# Patient Record
Sex: FEMALE | Race: BLACK OR AFRICAN AMERICAN | NOT HISPANIC OR LATINO | Employment: OTHER | URBAN - METROPOLITAN AREA
[De-identification: names, ages, dates, MRNs, and addresses within clinical notes are randomized per-mention and may not be internally consistent; named-entity substitution may affect disease eponyms.]

---

## 2017-01-06 RX ORDER — GLIMEPIRIDE 1 MG/1
TABLET ORAL
Qty: 30 TABLET | Refills: 0 | Status: SHIPPED | OUTPATIENT
Start: 2017-01-06 | End: 2017-02-06 | Stop reason: SDUPTHER

## 2017-01-10 RX ORDER — INSULIN GLARGINE 100 [IU]/ML
INJECTION, SOLUTION SUBCUTANEOUS
Qty: 15 ML | Refills: 11 | Status: SHIPPED | OUTPATIENT
Start: 2017-01-10 | End: 2017-01-11 | Stop reason: SDUPTHER

## 2017-01-10 RX ORDER — INSULIN GLARGINE 100 [IU]/ML
INJECTION, SOLUTION SUBCUTANEOUS
Refills: 0 | Status: CANCELLED | OUTPATIENT
Start: 2017-01-10

## 2017-01-11 RX ORDER — INSULIN GLARGINE 100 [IU]/ML
INJECTION, SOLUTION SUBCUTANEOUS
Qty: 24 ML | Refills: 3 | Status: SHIPPED | OUTPATIENT
Start: 2017-01-11 | End: 2017-01-12 | Stop reason: SDUPTHER

## 2017-01-11 NOTE — TELEPHONE ENCOUNTER
please resend the pt insulin rx to [facility]  You sent in 5 pens which is enough for 2 mo. She can receive 3 mo for the same price  Pt will check with the pharmacy later to  the rx

## 2017-01-12 RX ORDER — INSULIN GLARGINE 100 [IU]/ML
INJECTION, SOLUTION SUBCUTANEOUS
Qty: 24 ML | Refills: 3 | Status: SHIPPED | OUTPATIENT
Start: 2017-01-12 | End: 2017-02-14 | Stop reason: SDUPTHER

## 2017-01-12 NOTE — TELEPHONE ENCOUNTER
----- Message from Citlaly Joshua sent at 1/12/2017  2:22 PM CST -----  Contact: [facility] is requesting a new Rx for patient  Per Krzysztof wright/ [facility], directions need to say dispense 2 box per 30 days. This way it will only cost the patient   $37.00    RX name: insulin glargine (LANTUS SOLOSTAR) 100 unit/mL (3 mL) InPn pen  Strength:   Quantity:   Directions:  INJECT 25 UNITS SUB-Q ONCE DAILY (PM)    Pharmacy name: [facility]      Phone number where patient can be reached:

## 2017-01-13 NOTE — TELEPHONE ENCOUNTER
Insurance will pay for 90 days of the pt lantus at the most   The pt is taking 25 u daily and would need 8 pens for 90 days but the Lantus comes 5 pens per box and she would need 2 boxes to cover 90 days but 2 boxes would be 120 days and insurance is kicking back the claim. The pharmacy will not open the boxes and disp individual pens . The pt paid for the  Medicine and is trying to get reimbursed.   The only why to make this happen is to change the sig of the lantus. Can we do that.  Please advise  don't rx in epic please.

## 2017-01-13 NOTE — TELEPHONE ENCOUNTER
----- Message from Citlaly Joshua sent at 1/13/2017  9:35 AM CST -----  Contact: Teofilo @ [facility] pharmacy  FY message    Just wanted to let us know that there seems to be some confusion with patients   LANTUS SOLOSTAR pen. Teofilo explained that 1 box should last patient 60 days based on original directions. Anything 31-59 days patient will cost the patient a 2nd copay unless the directions are changed. He doesn't think that the double wants to double patients insulin if not needed just to reduce co pay amount

## 2017-01-19 RX ORDER — METFORMIN HYDROCHLORIDE 500 MG/1
TABLET ORAL
Qty: 60 TABLET | Refills: 0 | Status: SHIPPED | OUTPATIENT
Start: 2017-01-19 | End: 2017-02-14 | Stop reason: SDUPTHER

## 2017-01-31 ENCOUNTER — CLINICAL SUPPORT (OUTPATIENT)
Dept: FAMILY MEDICINE | Facility: CLINIC | Age: 67
End: 2017-01-31
Payer: MEDICARE

## 2017-01-31 DIAGNOSIS — Z12.11 SCREEN FOR COLON CANCER: Primary | ICD-10-CM

## 2017-01-31 PROCEDURE — 82272 OCCULT BLD FECES 1-3 TESTS: CPT

## 2017-02-01 LAB
OB PNL STL: NEGATIVE

## 2017-02-06 RX ORDER — GLIMEPIRIDE 1 MG/1
TABLET ORAL
Qty: 30 TABLET | Refills: 0 | Status: SHIPPED | OUTPATIENT
Start: 2017-02-06 | End: 2017-02-14 | Stop reason: SDUPTHER

## 2017-02-06 RX ORDER — LISINOPRIL 20 MG/1
TABLET ORAL
Qty: 90 TABLET | Refills: 0 | Status: SHIPPED | OUTPATIENT
Start: 2017-02-06 | End: 2017-05-09 | Stop reason: SDUPTHER

## 2017-02-14 ENCOUNTER — OFFICE VISIT (OUTPATIENT)
Dept: FAMILY MEDICINE | Facility: CLINIC | Age: 67
End: 2017-02-14
Payer: MEDICARE

## 2017-02-14 VITALS
BODY MASS INDEX: 37.47 KG/M2 | HEIGHT: 67 IN | WEIGHT: 238.75 LBS | SYSTOLIC BLOOD PRESSURE: 136 MMHG | TEMPERATURE: 99 F | DIASTOLIC BLOOD PRESSURE: 78 MMHG | HEART RATE: 97 BPM | OXYGEN SATURATION: 95 %

## 2017-02-14 DIAGNOSIS — R60.9 EDEMA, UNSPECIFIED TYPE: ICD-10-CM

## 2017-02-14 DIAGNOSIS — I10 ESSENTIAL HYPERTENSION: ICD-10-CM

## 2017-02-14 DIAGNOSIS — R71.8 RBC MICROCYTOSIS: ICD-10-CM

## 2017-02-14 DIAGNOSIS — E66.9 NON MORBID OBESITY, UNSPECIFIED OBESITY TYPE: ICD-10-CM

## 2017-02-14 PROCEDURE — 99214 OFFICE O/P EST MOD 30 MIN: CPT | Mod: S$GLB,,, | Performed by: FAMILY MEDICINE

## 2017-02-14 PROCEDURE — 3075F SYST BP GE 130 - 139MM HG: CPT | Mod: S$GLB,,, | Performed by: FAMILY MEDICINE

## 2017-02-14 PROCEDURE — 2022F DILAT RTA XM EVC RTNOPTHY: CPT | Mod: S$GLB,,, | Performed by: FAMILY MEDICINE

## 2017-02-14 PROCEDURE — 4010F ACE/ARB THERAPY RXD/TAKEN: CPT | Mod: S$GLB,,, | Performed by: FAMILY MEDICINE

## 2017-02-14 PROCEDURE — 3078F DIAST BP <80 MM HG: CPT | Mod: S$GLB,,, | Performed by: FAMILY MEDICINE

## 2017-02-14 PROCEDURE — 99499 UNLISTED E&M SERVICE: CPT | Mod: S$GLB,,, | Performed by: FAMILY MEDICINE

## 2017-02-14 PROCEDURE — 3046F HEMOGLOBIN A1C LEVEL >9.0%: CPT | Mod: S$GLB,,, | Performed by: FAMILY MEDICINE

## 2017-02-14 RX ORDER — METFORMIN HYDROCHLORIDE 500 MG/1
500 TABLET ORAL 2 TIMES DAILY WITH MEALS
Qty: 180 TABLET | Refills: 3 | Status: SHIPPED | OUTPATIENT
Start: 2017-02-14 | End: 2017-07-17 | Stop reason: SDUPTHER

## 2017-02-14 RX ORDER — GLIMEPIRIDE 2 MG/1
2 TABLET ORAL
Qty: 90 TABLET | Refills: 3 | Status: SHIPPED | OUTPATIENT
Start: 2017-02-14 | End: 2017-07-17 | Stop reason: SDUPTHER

## 2017-02-14 RX ORDER — INSULIN GLARGINE 100 [IU]/ML
INJECTION, SOLUTION SUBCUTANEOUS
Qty: 45 ML | Refills: 3 | Status: SHIPPED | OUTPATIENT
Start: 2017-02-14 | End: 2017-07-17 | Stop reason: SDUPTHER

## 2017-02-14 RX ORDER — METOPROLOL TARTRATE 25 MG/1
25 TABLET, FILM COATED ORAL DAILY
Qty: 90 TABLET | Refills: 3 | Status: SHIPPED | OUTPATIENT
Start: 2017-02-14 | End: 2017-07-17 | Stop reason: SDUPTHER

## 2017-02-14 NOTE — MR AVS SNAPSHOT
[place] [address] [place] [address]  Phone: [phone]  Fax: [phone]                  Katherine Berger   2017 9:00 AM   Office Visit    Description:  Female : 1950   Provider:  Itz Muse MD   Department:  [facility]           Reason for Visit     Follow-up           Diagnoses this Visit        Comments    Uncontrolled type 2 diabetes mellitus without complication, with long-term current use of insulin    -  Primary     Edema, unspecified type         RBC microcytosis         Non morbid obesity, unspecified obesity type         Essential hypertension                To Do List           Future Appointments        Provider Department Dept Phone    2017 8:30 AM [facility] [phone]      Goals (5 Years of Data)     None       These Medications        Disp Refills Start End    metoprolol tartrate (LOPRESSOR) 25 MG tablet 90 tablet 3 2017    Take 1 tablet (25 mg total) by mouth once daily. - Oral    Pharmacy: [address] Ph #: [phone]       metformin (GLUCOPHAGE) 500 MG tablet 180 tablet 3 2017     Take 1 tablet (500 mg total) by mouth 2 (two) times daily with meals. - Oral    Pharmacy: [address] Ph #: [phone]       glimepiride (AMARYL) 2 MG tablet 90 tablet 3 2017     Take 1 tablet (2 mg total) by mouth before breakfast. - Oral    Pharmacy: [facility] [address] [place] [address] Ph #: [phone]       insulin glargine (LANTUS SOLOSTAR) 100 unit/mL (3 mL) InPn pen 45 mL 3 2017     INJECT up to 45 UNITS SUB-Q ONCE DAILY (PM)    Pharmacy: [address] Ph #: [phone]       Notes to Pharmacy: Enough for 3 mo supply      [facility]     [facility] Care [facility] -  Assistance  Registered  nurses in the [facility] provide clinical advisement, health education, appointment booking, and other advisory services.  Call for this free service at [phone].             Medications           Message regarding Medications     Verify the changes and/or additions to your medication regime listed below are the same as discussed with your clinician today.  If any of these changes or additions are incorrect, please notify your healthcare provider.        CHANGE how you are taking these medications     Start Taking Instead of    metformin (GLUCOPHAGE) 500 MG tablet metformin (GLUCOPHAGE) 500 MG tablet    Dosage:  Take 1 tablet (500 mg total) by mouth 2 (two) times daily with meals. Dosage:  TAKE ONE TABLET BY MOUTH TWICE DAILY WITH MEALS    Reason for Change:  Reorder     glimepiride (AMARYL) 2 MG tablet glimepiride (AMARYL) 1 MG tablet    Dosage:  Take 1 tablet (2 mg total) by mouth before breakfast. Dosage:  TAKE ONE TABLET BY MOUTH BEFORE BREAKFAST    Reason for Change:  Reorder     insulin glargine (LANTUS SOLOSTAR) 100 unit/mL (3 mL) InPn pen insulin glargine (LANTUS SOLOSTAR) 100 unit/mL (3 mL) InPn pen    Dosage:  INJECT up to 45 UNITS SUB-Q ONCE DAILY (PM) Dosage:  INJECT 25 UNITS SUB-Q ONCE DAILY (PM)    Reason for Change:  Reorder       STOP taking these medications     furosemide (LASIX) 20 MG tablet One daily for 7 days and may repeat.           Verify that the below list of medications is an accurate representation of the medications you are currently taking.  If none reported, the list may be blank. If incorrect, please contact your healthcare provider. Carry this list with you in case of emergency.           Current Medications     amitriptyline (ELAVIL) 10 MG tablet One to three po qhs for headache    aspirin 325 MG tablet Take 1 tablet (325 mg total) by mouth 2 (two) times daily.    chlorthalidone (HYGROTEN) 25 MG Tab Take 1 tablet (25 mg total) by mouth once daily. Start 12 19 2016  "   glimepiride (AMARYL) 2 MG tablet Take 1 tablet (2 mg total) by mouth before breakfast.    insulin glargine (LANTUS SOLOSTAR) 100 unit/mL (3 mL) InPn pen INJECT up to 45 UNITS SUB-Q ONCE DAILY (PM)    lisinopril (PRINIVIL,ZESTRIL) 20 MG tablet TAKE ONE TABLET BY MOUTH ONCE DAILY    metformin (GLUCOPHAGE) 500 MG tablet Take 1 tablet (500 mg total) by mouth 2 (two) times daily with meals.    metoprolol tartrate (LOPRESSOR) 25 MG tablet Take 1 tablet (25 mg total) by mouth once daily.    MULTIVIT/IRON/FA/K/HERB NO.244 (ALIVE WOMEN'S ENERGY ORAL) Take 1 tablet by mouth once daily.     ondansetron (ZOFRAN-ODT) 4 MG TbDL Take 1 tablet (4 mg total) by mouth every 6 (six) hours as needed (nausea).    oxycodone-acetaminophen (PERCOCET)  mg per tablet Take 1 tablet by mouth every 4 (four) hours as needed for Pain.    tamsulosin (FLOMAX) 0.4 mg Cp24 Take 1 capsule (0.4 mg total) by mouth once daily.           Clinical Reference Information           Your Vitals Were     BP Pulse Temp Height Weight SpO2    136/78 97 98.5 °F (36.9 °C) (Oral) 5' 7" (1.702 m) 108.3 kg (238 lb 12.1 oz) 95%    BMI                37.39 kg/m2          Blood Pressure          Most Recent Value    BP  136/78      Allergies as of 2017     No Known Allergies      Immunizations Administered on Date of Encounter - 2017     None      Orders Placed During Today's Visit      Normal Orders This Visit    Comprehensive metabolic panel     Hemoglobin A1c     Lipid panel     Future Labs/Procedures Expected by Expires    Comprehensive metabolic panel  As directed 2018    Hemoglobin A1c  As directed 2018    Lipid panel  As directed 2018      [facility] Sign-Up     Activating your [facility] account is as easy as 1-2-3!     1) Visit [email or web address], select Sign Up Now, enter this activation code and your date of birth, then select Next.  [email or web address]  Expires: 3/31/2017  9:40 AM      2) Create a username and password to use when you " visit [facility] in the future and select a security question in case you lose your password and select Next.    3) Enter your e-mail address and click Sign Up!    Additional Information  If you have questions, please e-mail [email or web address] or call [phone] to talk to our [facility] staff. Remember, [facility] is NOT to be used for urgent needs. For medical emergencies, dial 911.         Instructions    glimepiride increase to 2 mg daily    Lantus solo star go to 30 units daily    Increase Lantus solo star by 2 units if glucose cont to be over 200 for 3 days in a row fasting.         Language Assistance Services     ATTENTION: Language assistance services are available, free of charge. Please call [phone].      ATENCIÓN: Si yony johnson, tiene a logan disposición servicios gratuitos de asistencia lingüística. Llame al [phone].     CHÚ Ý: N?u b?n nói Ti?ng Vi?t, có các d?ch v? h? tr? ngôn ng? mi?n phí dành cho b?n. G?i s? [phone].         [facility] complies with applicable Federal civil rights laws and does not discriminate on the basis of race, color, national origin, age, disability, or sex.

## 2017-02-14 NOTE — PATIENT INSTRUCTIONS
glimepiride increase to 2 mg daily    Lantus solo star go to 30 units daily    Increase Lantus solo star by 2 units if glucose cont to be over 200 for 3 days in a row fasting.

## 2017-02-16 RX ORDER — INSULIN GLARGINE 100 [IU]/ML
45 INJECTION, SOLUTION SUBCUTANEOUS DAILY
Qty: 3 BOX | Refills: 3 | Status: SHIPPED | OUTPATIENT
Start: 2017-02-16 | End: 2017-07-17 | Stop reason: SDUPTHER

## 2017-02-18 NOTE — PROGRESS NOTES
Patient ID: Katherine Berger is a 66 y.o. female.    Chief Complaint: Follow-up (check-up)    HPI      Katherine Berger is a 66 y.o. female here to discuss chronic rinku problems.  Dm cont to use insulin and oral meds- no co  Htn stable on meds  Co of arthritis intermittent and stable.              Review of Symptoms    Constitutional  Neg activity change, No chills /fever   Resp  Neg hemoptysis, stridor, choking  CVS  Neg chest pain, palpitations    Physical Exam    Constitutional:  Oriented to person, place, and time.appears well-developed and well-nourished.  No distress.      HENT  Head: Normocephalic and atraumatic  Right Ear: External ear normal.   Left Ear: External ear normal.   Nose: External nose normal.   Mouth:  Moist mucus membranes.    Eyes:  Conjunctivae are normal. Right eye exhibits no discharge.  Left eye exhibits no discharge. No scleral icterus.  No periorbital edema    Cardiovascular:  Regular rate, Regular rhythm     No extrasystole  Normal S1-S2      Pulmonary/Chest:   Normal effort and breath sounds.  No wheezing, rhonchi or rales.    Musculoskeletal:  No edema. No obvious deformity No waisting       Neurological:  Alert and oriented to person, place, and time.   Coordination normal.     Skin:   Skin is warm and dry.  No diaphoresis.   No rash noted.     Psychiatric: Normal mood and affect. Behavior is normal.  Judgment and thought content normal.       Assessment / Plan:      ICD-10-CM ICD-9-CM   1. Uncontrolled type 2 diabetes mellitus without complication, with long-term current use of insulin E11.65 250.02    Z79.4 V58.67   2. Edema, unspecified type R60.9 782.3   3. RBC microcytosis R71.8 790.09   4. Non morbid obesity, unspecified obesity type E66.9 278.00   5. Essential hypertension I10 401.9     Uncontrolled type 2 diabetes mellitus without complication, with long-term current use of insulin  -     Comprehensive metabolic panel; Future  -     Lipid panel; Future  -     Hemoglobin A1c;  Future    Edema, unspecified type  -     Comprehensive metabolic panel; Future  -     Lipid panel; Future  -     Hemoglobin A1c; Future    RBC microcytosis  -     Comprehensive metabolic panel; Future  -     Lipid panel; Future  -     Hemoglobin A1c; Future    Non morbid obesity, unspecified obesity type  -     Comprehensive metabolic panel; Future  -     Lipid panel; Future  -     Hemoglobin A1c; Future    Essential hypertension  -     Comprehensive metabolic panel; Future  -     Lipid panel; Future  -     Hemoglobin A1c; Future    Other orders  -     metoprolol tartrate (LOPRESSOR) 25 MG tablet; Take 1 tablet (25 mg total) by mouth once daily.  Dispense: 90 tablet; Refill: 3  -     metformin (GLUCOPHAGE) 500 MG tablet; Take 1 tablet (500 mg total) by mouth 2 (two) times daily with meals.  Dispense: 180 tablet; Refill: 3  -     glimepiride (AMARYL) 2 MG tablet; Take 1 tablet (2 mg total) by mouth before breakfast.  Dispense: 90 tablet; Refill: 3  -     insulin glargine (LANTUS SOLOSTAR) 100 unit/mL (3 mL) InPn pen; INJECT up to 45 UNITS SUB-Q ONCE DAILY (PM)  Dispense: 45 mL; Refill: 3

## 2017-05-09 RX ORDER — LISINOPRIL 20 MG/1
TABLET ORAL
Qty: 90 TABLET | Refills: 0 | Status: SHIPPED | OUTPATIENT
Start: 2017-05-09 | End: 2017-07-17 | Stop reason: SDUPTHER

## 2017-06-05 ENCOUNTER — HOSPITAL ENCOUNTER (OUTPATIENT)
Dept: RADIOLOGY | Facility: HOSPITAL | Age: 67
Discharge: HOME | End: 2017-06-05
Attending: ORTHOPAEDIC SURGERY
Payer: MEDICARE

## 2017-06-05 ENCOUNTER — OFFICE VISIT (OUTPATIENT)
Dept: ORTHOPEDICS | Facility: CLINIC | Age: 67
End: 2017-06-05
Payer: MEDICARE

## 2017-06-05 VITALS — HEIGHT: 67 IN | BODY MASS INDEX: 38.15 KG/M2 | WEIGHT: 243.06 LBS

## 2017-06-05 DIAGNOSIS — Z96.651 STATUS POST TOTAL RIGHT KNEE REPLACEMENT: ICD-10-CM

## 2017-06-05 DIAGNOSIS — Z96.652 STATUS POST TOTAL LEFT KNEE REPLACEMENT: Primary | ICD-10-CM

## 2017-06-05 DIAGNOSIS — Z96.652 STATUS POST TOTAL LEFT KNEE REPLACEMENT: ICD-10-CM

## 2017-06-05 PROCEDURE — 99999 PR PBB SHADOW E&M-EST. PATIENT-LVL II: CPT | Mod: PBBFAC,,, | Performed by: ORTHOPAEDIC SURGERY

## 2017-06-05 PROCEDURE — 73560 X-RAY EXAM OF KNEE 1 OR 2: CPT | Mod: 26,59,RT, | Performed by: RADIOLOGY

## 2017-06-05 PROCEDURE — 73560 X-RAY EXAM OF KNEE 1 OR 2: CPT | Mod: TC,RT

## 2017-06-05 PROCEDURE — 99213 OFFICE O/P EST LOW 20 MIN: CPT | Mod: S$GLB,,, | Performed by: ORTHOPAEDIC SURGERY

## 2017-06-05 PROCEDURE — 73562 X-RAY EXAM OF KNEE 3: CPT | Mod: 26,LT,, | Performed by: RADIOLOGY

## 2017-06-06 NOTE — PROGRESS NOTES
HPI:    Katherine Berger is a 67 y.o. female who is here today for   Chief Complaint   Patient presents with    Left Knee - Post-op Evaluation    left knee     TKR 6-10-16   .  Patient complains of giving way and some pain with the left total knee.  There were no surgical complications.      Duration: 12 months  Intensity: mild  Character of pain: sharp  Location: She reports that the pain is predominately  intermediate    Past Medical History:   Diagnosis Date    Arthritis     Diabetes mellitus     Hypertension     Kidney stone           Current Outpatient Prescriptions:     amitriptyline (ELAVIL) 10 MG tablet, One to three po qhs for headache, Disp: 90 tablet, Rfl: 1    chlorthalidone (HYGROTEN) 25 MG Tab, Take 1 tablet (25 mg total) by mouth once daily. Start 12 19 2016, Disp: 30 tablet, Rfl: 11    glimepiride (AMARYL) 2 MG tablet, Take 1 tablet (2 mg total) by mouth before breakfast., Disp: 90 tablet, Rfl: 3    insulin glargine (LANTUS SOLOSTAR) 100 unit/mL (3 mL) InPn pen, INJECT up to 45 UNITS SUB-Q ONCE DAILY (PM), Disp: 45 mL, Rfl: 3    insulin glargine (LANTUS SOLOSTAR) 100 unit/mL (3 mL) InPn pen, Inject 45 Units into the skin once daily., Disp: 3 Box, Rfl: 3    lisinopril (PRINIVIL,ZESTRIL) 20 MG tablet, TAKE ONE TABLET BY MOUTH ONCE DAILY, Disp: 90 tablet, Rfl: 0    metformin (GLUCOPHAGE) 500 MG tablet, Take 1 tablet (500 mg total) by mouth 2 (two) times daily with meals., Disp: 180 tablet, Rfl: 3    metoprolol tartrate (LOPRESSOR) 25 MG tablet, Take 1 tablet (25 mg total) by mouth once daily., Disp: 90 tablet, Rfl: 3    MULTIVIT/IRON/FA/K/HERB NO.244 (ALIVE WOMEN'S ENERGY ORAL), Take 1 tablet by mouth once daily. , Disp: , Rfl:     ondansetron (ZOFRAN-ODT) 4 MG TbDL, Take 1 tablet (4 mg total) by mouth every 6 (six) hours as needed (nausea)., Disp: 60 tablet, Rfl: 0    oxycodone-acetaminophen (PERCOCET)  mg per tablet, Take 1 tablet by mouth every 4 (four) hours as needed for Pain.,  "Disp: 90 tablet, Rfl: 0    tamsulosin (FLOMAX) 0.4 mg Cp24, Take 1 capsule (0.4 mg total) by mouth once daily., Disp: 30 capsule, Rfl: 11    aspirin 325 MG tablet, Take 1 tablet (325 mg total) by mouth 2 (two) times daily. (Patient taking differently: Take 325 mg by mouth once daily. ), Disp: 60 tablet, Rfl: 0     Review of patient's allergies indicates:  No Known Allergies     ROS  Constitutional: Negative for fever, Negative for weight loss  HENT Negative for congestion  Cardiovascular: Negative chest pain  Respiratory: Negative Shortness of breath  Heme: Negative excessive bleeding  Skin:NegativeItching, Negative breakdown  Musculoskeletal:Negative for back pain, Positive for joint pain, Positive muscle pain, Positive muscle weakness  Neurological: Negative for numbness and paresthesias   Psychiatric/Behavioral: Negative altered mental status, Negative for depression    Physical Exam:   Ht 5' 7" (1.702 m)   Wt 110.2 kg (243 lb 0.9 oz)   BMI 38.07 kg/m²   General appearance: This is a well-developed, Well nourished female No obvious acute distress.  Psychiatric: normal mood and affect;  pleasant and conversant; judgment and thought content normal  Gait is coordinated.  Patient is ambulating well.    Cardiovascular: There are no swelling or varicosities present.   Respiratory: normal respiratory effort   Lymphatic: no adenopathy   Neurologic: alert and oriented to person, place, and time   Deep Tendon Reflexes are normal;  Coordination and Balance: Normal   Musculoskeletal   Neck    ROM shows normal flexion and extension and lateral rotation    Palpation: Non-tender    Stability is normal    Strength is normal    Skin is normal without masses and lesions    Sensation is intact to light touch   Back    ROM showsnormal flexion, extension    and  rotation    Palpation shows no masses    Stability is normal    Strength to flexion and extension well maintained    Core strength is diminished    Skin shows no rashes " or cafe au lait spots;     Sensation is intact to light touch    Right Knee  Swelling None  TendernessNone  Range of Motion: 120    Left Knee: Swelling None  TendernessNone  Range of Motion: 120  No obvious instability.    Radiograph   Implants in excellent position no signs of loosening or failure.    Assessment: Some slight laxity to her left total knee.    Plan: Patient is ambulating very well.  Patient would like to try some physical therapy for some more strengthening.

## 2017-06-18 RX ORDER — AMITRIPTYLINE HYDROCHLORIDE 10 MG/1
TABLET, FILM COATED ORAL
Qty: 90 TABLET | Refills: 0 | Status: SHIPPED | OUTPATIENT
Start: 2017-06-18 | End: 2017-07-17 | Stop reason: SDUPTHER

## 2017-07-17 ENCOUNTER — OFFICE VISIT (OUTPATIENT)
Dept: FAMILY MEDICINE | Facility: CLINIC | Age: 67
End: 2017-07-17
Payer: MEDICARE

## 2017-07-17 VITALS
OXYGEN SATURATION: 98 % | WEIGHT: 246.94 LBS | SYSTOLIC BLOOD PRESSURE: 134 MMHG | DIASTOLIC BLOOD PRESSURE: 82 MMHG | TEMPERATURE: 98 F | BODY MASS INDEX: 38.76 KG/M2 | HEART RATE: 80 BPM | HEIGHT: 67 IN

## 2017-07-17 DIAGNOSIS — N95.9 MENOPAUSAL AND POSTMENOPAUSAL DISORDER: ICD-10-CM

## 2017-07-17 DIAGNOSIS — Z12.31 ENCOUNTER FOR SCREENING MAMMOGRAM FOR BREAST CANCER: ICD-10-CM

## 2017-07-17 DIAGNOSIS — M54.50 CHRONIC BILATERAL LOW BACK PAIN WITHOUT SCIATICA: ICD-10-CM

## 2017-07-17 DIAGNOSIS — E66.9 NON MORBID OBESITY, UNSPECIFIED OBESITY TYPE: ICD-10-CM

## 2017-07-17 DIAGNOSIS — G89.29 CHRONIC BILATERAL LOW BACK PAIN WITHOUT SCIATICA: ICD-10-CM

## 2017-07-17 DIAGNOSIS — M62.830 LUMBAR PARASPINAL MUSCLE SPASM: ICD-10-CM

## 2017-07-17 DIAGNOSIS — Z12.11 ENCOUNTER FOR SCREENING FOR MALIGNANT NEOPLASM OF COLON: ICD-10-CM

## 2017-07-17 DIAGNOSIS — Z23 NEED FOR PNEUMOCOCCAL VACCINATION: ICD-10-CM

## 2017-07-17 DIAGNOSIS — I10 ESSENTIAL HYPERTENSION: Primary | ICD-10-CM

## 2017-07-17 PROCEDURE — 99499 UNLISTED E&M SERVICE: CPT | Mod: S$GLB,,, | Performed by: FAMILY MEDICINE

## 2017-07-17 PROCEDURE — 90670 PCV13 VACCINE IM: CPT | Mod: S$GLB,,, | Performed by: FAMILY MEDICINE

## 2017-07-17 PROCEDURE — 4010F ACE/ARB THERAPY RXD/TAKEN: CPT | Mod: S$GLB,,, | Performed by: FAMILY MEDICINE

## 2017-07-17 PROCEDURE — 3046F HEMOGLOBIN A1C LEVEL >9.0%: CPT | Mod: S$GLB,,, | Performed by: FAMILY MEDICINE

## 2017-07-17 PROCEDURE — G0009 ADMIN PNEUMOCOCCAL VACCINE: HCPCS | Mod: S$GLB,,, | Performed by: FAMILY MEDICINE

## 2017-07-17 PROCEDURE — 99214 OFFICE O/P EST MOD 30 MIN: CPT | Mod: S$GLB,,, | Performed by: FAMILY MEDICINE

## 2017-07-17 RX ORDER — LISINOPRIL 20 MG/1
20 TABLET ORAL DAILY
Qty: 90 TABLET | Refills: 3 | Status: SHIPPED | OUTPATIENT
Start: 2017-07-17 | End: 2018-08-26 | Stop reason: SDUPTHER

## 2017-07-17 RX ORDER — INSULIN GLARGINE 100 [IU]/ML
45 INJECTION, SOLUTION SUBCUTANEOUS DAILY
Qty: 3 BOX | Refills: 3 | Status: SHIPPED | OUTPATIENT
Start: 2017-07-17 | End: 2018-09-10

## 2017-07-17 RX ORDER — CHLORTHALIDONE 25 MG/1
25 TABLET ORAL DAILY
Qty: 90 TABLET | Refills: 3 | Status: SHIPPED | OUTPATIENT
Start: 2017-07-17 | End: 2018-12-29 | Stop reason: SDUPTHER

## 2017-07-17 RX ORDER — AMITRIPTYLINE HYDROCHLORIDE 10 MG/1
TABLET, FILM COATED ORAL
Qty: 90 TABLET | Refills: 3 | Status: SHIPPED | OUTPATIENT
Start: 2017-07-17 | End: 2019-07-01 | Stop reason: SDUPTHER

## 2017-07-17 RX ORDER — METFORMIN HYDROCHLORIDE 500 MG/1
500 TABLET ORAL 2 TIMES DAILY WITH MEALS
Qty: 180 TABLET | Refills: 3 | Status: SHIPPED | OUTPATIENT
Start: 2017-07-17 | End: 2017-09-29 | Stop reason: SDUPTHER

## 2017-07-17 RX ORDER — GLIMEPIRIDE 2 MG/1
2 TABLET ORAL
Qty: 90 TABLET | Refills: 3 | Status: SHIPPED | OUTPATIENT
Start: 2017-07-17 | End: 2017-08-16 | Stop reason: SDUPTHER

## 2017-07-17 RX ORDER — METOPROLOL TARTRATE 25 MG/1
25 TABLET, FILM COATED ORAL DAILY
Qty: 90 TABLET | Refills: 3 | Status: SHIPPED | OUTPATIENT
Start: 2017-07-17 | End: 2018-08-26 | Stop reason: SDUPTHER

## 2017-07-17 RX ORDER — ONDANSETRON 4 MG/1
4 TABLET, ORALLY DISINTEGRATING ORAL EVERY 6 HOURS PRN
Qty: 60 TABLET | Refills: 0 | Status: SHIPPED | OUTPATIENT
Start: 2017-07-17 | End: 2019-07-01

## 2017-07-17 RX ORDER — ONDANSETRON 4 MG/1
4 TABLET, ORALLY DISINTEGRATING ORAL EVERY 6 HOURS PRN
Qty: 60 TABLET | Refills: 0 | Status: SHIPPED | OUTPATIENT
Start: 2017-07-17 | End: 2017-07-17 | Stop reason: SDUPTHER

## 2017-07-23 NOTE — PROGRESS NOTES
Patient ID: Katherine Berger is a 67 y.o. female.    Chief Complaint: Follow-up (check-up)    HPI      Katherine Berger is a 67 y.o. female checking up with several chronic diseases.  Patient with essential hypertension-controlled on current medicines  Patient with diabetes mellitus currently on insulin  Patient with obesity-we slightly elevated from last visit  Osteoarthritis-intermittent use of meds-stable            Review of Symptoms    Constitutional  Neg activity change, No chills /fever   Resp  Neg hemoptysis, stridor, choking  CVS  Neg chest pain, palpitations  Other review systems except for muscle skeletal within normal limits-up to 14      Physical Exam    Constitutional:  Oriented to person, place, and time.appears well-developed and well-nourished.  No distress.      HENT  Head: Normocephalic and atraumatic  Right Ear: External ear normal.   Left Ear: External ear normal.   Nose: External nose normal.   Mouth:  Moist mucus membranes.    Eyes:  Conjunctivae are normal. Right eye exhibits no discharge.  Left eye exhibits no discharge. No scleral icterus.  No periorbital edema    Cardiovascular:  Regular rate, Regular rhythm     No extrasystole  Normal S1-S2      Pulmonary/Chest:   Normal effort and breath sounds.  No wheezing, rhonchi or rales.    Musculoskeletal:  No edema. No obvious deformity No waisting       Neurological:  Alert and oriented to person, place, and time.   Coordination normal.     Skin:   Skin is warm and dry.  No diaphoresis.   No rash noted.     Psychiatric: Normal mood and affect. Behavior is normal.  Judgment and thought content normal.       Assessment / Plan:      ICD-10-CM ICD-9-CM   1. Essential hypertension I10 401.9   2. Non morbid obesity, unspecified obesity type E66.9 278.00   3. Uncontrolled type 2 diabetes mellitus without complication, with long-term current use of insulin E11.65 250.02    Z79.4 V58.67   4. Encounter for screening mammogram for breast cancer Z12.31 V76.12    5. Encounter for screening for malignant neoplasm of colon Z12.11 V76.51   6. Need for pneumococcal vaccination Z23 V03.82   7. Menopausal and postmenopausal disorder N95.9 627.9   8. Lumbar paraspinal muscle spasm M62.830 724.8   9. Chronic bilateral low back pain without sciatica M54.5 724.2    G89.29 338.29     Essential hypertension  -     Hemoglobin A1c; Future; Expected date: 07/17/2017  -     Comprehensive metabolic panel; Future  -     CBC auto differential; Future  -     Lipid panel; Future  -     TSH; Future    Non morbid obesity, unspecified obesity type  -     Hemoglobin A1c; Future; Expected date: 07/17/2017  -     Comprehensive metabolic panel; Future  -     CBC auto differential; Future  -     Lipid panel; Future  -     TSH; Future    Uncontrolled type 2 diabetes mellitus without complication, with long-term current use of insulin  -     Hemoglobin A1c; Future; Expected date: 07/17/2017  -     Comprehensive metabolic panel; Future  -     CBC auto differential; Future  -     Lipid panel; Future  -     TSH; Future    Encounter for screening mammogram for breast cancer  -     Mammo Digital Screening Bilat with CAD; Future; Expected date: 07/17/2017    Encounter for screening for malignant neoplasm of colon  -     Case request GI: COLONOSCOPY    Need for pneumococcal vaccination  -     Pneumococcal Conjugate Vaccine (13 Valent) (IM)    Menopausal and postmenopausal disorder  -     DXA Bone Density Spine And Hip; Future; Expected date: 07/17/2017    Lumbar paraspinal muscle spasm  -     Ambulatory Referral to Physical/Occupational Therapy    Chronic bilateral low back pain without sciatica  -     Ambulatory Referral to Physical/Occupational Therapy    Other orders  -     Cancel: Hemoglobin A1c  -     Discontinue: ondansetron (ZOFRAN-ODT) 4 MG TbDL; Take 1 tablet (4 mg total) by mouth every 6 (six) hours as needed (nausea).  Dispense: 60 tablet; Refill: 0  -     metoprolol tartrate (LOPRESSOR) 25 MG  tablet; Take 1 tablet (25 mg total) by mouth once daily.  Dispense: 90 tablet; Refill: 3  -     metformin (GLUCOPHAGE) 500 MG tablet; Take 1 tablet (500 mg total) by mouth 2 (two) times daily with meals.  Dispense: 180 tablet; Refill: 3  -     lisinopril (PRINIVIL,ZESTRIL) 20 MG tablet; Take 1 tablet (20 mg total) by mouth once daily.  Dispense: 90 tablet; Refill: 3  -     insulin glargine (LANTUS SOLOSTAR) 100 unit/mL (3 mL) InPn pen; Inject 45 Units into the skin once daily.  Dispense: 3 Box; Refill: 3  -     glimepiride (AMARYL) 2 MG tablet; Take 1 tablet (2 mg total) by mouth before breakfast.  Dispense: 90 tablet; Refill: 3  -     chlorthalidone (HYGROTEN) 25 MG Tab; Take 1 tablet (25 mg total) by mouth once daily. Start 12 19 2016  Dispense: 90 tablet; Refill: 3  -     amitriptyline (ELAVIL) 10 MG tablet; TAKE ONE TO THREE TABLETS BY MOUTH AT BEDTIME FOR  HEADACHE  Dispense: 90 tablet; Refill: 3  -     ondansetron (ZOFRAN-ODT) 4 MG TbDL; Take 1 tablet (4 mg total) by mouth every 6 (six) hours as needed (nausea).  Dispense: 60 tablet; Refill: 0

## 2017-07-26 DIAGNOSIS — Z11.59 NEED FOR HEPATITIS C SCREENING TEST: Primary | ICD-10-CM

## 2017-08-08 ENCOUNTER — HOSPITAL ENCOUNTER (OUTPATIENT)
Dept: RADIOLOGY | Facility: HOSPITAL | Age: 67
Discharge: HOME | End: 2017-08-08
Attending: FAMILY MEDICINE
Payer: MEDICARE

## 2017-08-08 DIAGNOSIS — N95.9 MENOPAUSAL AND POSTMENOPAUSAL DISORDER: ICD-10-CM

## 2017-08-08 PROCEDURE — 77080 DXA BONE DENSITY AXIAL: CPT | Mod: TC,PO

## 2017-08-09 ENCOUNTER — TELEPHONE (OUTPATIENT)
Dept: FAMILY MEDICINE | Facility: CLINIC | Age: 67
End: 2017-08-09

## 2017-08-09 DIAGNOSIS — I10 ESSENTIAL HYPERTENSION: Primary | ICD-10-CM

## 2017-08-09 DIAGNOSIS — D64.9 ANEMIA, UNSPECIFIED TYPE: ICD-10-CM

## 2017-08-09 NOTE — TELEPHONE ENCOUNTER
[facility] is going to [facility] Tuesday  Orders were sent to [facility]   I will change them to [facility]

## 2017-08-15 ENCOUNTER — LAB VISIT (OUTPATIENT)
Dept: LAB | Facility: HOSPITAL | Age: 67
End: 2017-08-15
Attending: FAMILY MEDICINE
Payer: MEDICARE

## 2017-08-15 DIAGNOSIS — D64.9 ANEMIA, UNSPECIFIED TYPE: ICD-10-CM

## 2017-08-15 DIAGNOSIS — I10 ESSENTIAL HYPERTENSION: ICD-10-CM

## 2017-08-15 LAB
ALBUMIN SERPL BCP-MCNC: 4 G/DL
ALP SERPL-CCNC: 71 U/L
ALT SERPL W/O P-5'-P-CCNC: 32 U/L
ANION GAP SERPL CALC-SCNC: 13 MMOL/L
ANISOCYTOSIS BLD QL SMEAR: SLIGHT
AST SERPL-CCNC: 23 U/L
BASOPHILS # BLD AUTO: 0.04 K/UL
BASOPHILS NFR BLD: 0.4 %
BILIRUB SERPL-MCNC: 0.3 MG/DL
BUN SERPL-MCNC: 17 MG/DL
CALCIUM SERPL-MCNC: 9.5 MG/DL
CHLORIDE SERPL-SCNC: 100 MMOL/L
CHOLEST/HDLC SERPL: 5.2 {RATIO}
CO2 SERPL-SCNC: 29 MMOL/L
CREAT SERPL-MCNC: 0.78 MG/DL
DIFFERENTIAL METHOD: ABNORMAL
EOSINOPHIL # BLD AUTO: 0.4 K/UL
EOSINOPHIL NFR BLD: 3.9 %
ERYTHROCYTE [DISTWIDTH] IN BLOOD BY AUTOMATED COUNT: 15.2 %
EST. GFR  (AFRICAN AMERICAN): >60 ML/MIN/1.73 M^2
EST. GFR  (NON AFRICAN AMERICAN): >60 ML/MIN/1.73 M^2
GLUCOSE SERPL-MCNC: 226 MG/DL
HCT VFR BLD AUTO: 34.2 %
HDL/CHOLESTEROL RATIO: 19.3 %
HDLC SERPL-MCNC: 140 MG/DL
HDLC SERPL-MCNC: 27 MG/DL
HGB BLD-MCNC: 10.5 G/DL
HYPOCHROMIA BLD QL SMEAR: ABNORMAL
LDLC SERPL CALC-MCNC: 82.2 MG/DL
LYMPHOCYTES # BLD AUTO: 2.7 K/UL
LYMPHOCYTES NFR BLD: 28.3 %
MCH RBC QN AUTO: 21.4 PG
MCHC RBC AUTO-ENTMCNC: 30.7 G/DL
MCV RBC AUTO: 70 FL
MONOCYTES # BLD AUTO: 0.7 K/UL
MONOCYTES NFR BLD: 6.8 %
NEUTROPHILS # BLD AUTO: 5.8 K/UL
NEUTROPHILS NFR BLD: 60.6 %
NONHDLC SERPL-MCNC: 113 MG/DL
PLATELET # BLD AUTO: 276 K/UL
PLATELET BLD QL SMEAR: ABNORMAL
PMV BLD AUTO: 11.7 FL
POIKILOCYTOSIS BLD QL SMEAR: SLIGHT
POTASSIUM SERPL-SCNC: 4.2 MMOL/L
PROT SERPL-MCNC: 7.3 G/DL
RBC # BLD AUTO: 4.91 M/UL
SODIUM SERPL-SCNC: 142 MMOL/L
TRIGL SERPL-MCNC: 154 MG/DL
TSH SERPL DL<=0.005 MIU/L-ACNC: 0.62 UIU/ML
WBC # BLD AUTO: 9.6 K/UL

## 2017-08-15 PROCEDURE — 83036 HEMOGLOBIN GLYCOSYLATED A1C: CPT | Mod: PO

## 2017-08-15 PROCEDURE — 80053 COMPREHEN METABOLIC PANEL: CPT | Mod: PO

## 2017-08-15 PROCEDURE — 80061 LIPID PANEL: CPT

## 2017-08-15 PROCEDURE — 85025 COMPLETE CBC W/AUTO DIFF WBC: CPT | Mod: PO

## 2017-08-15 PROCEDURE — 84443 ASSAY THYROID STIM HORMONE: CPT | Mod: PO

## 2017-08-15 PROCEDURE — 36415 COLL VENOUS BLD VENIPUNCTURE: CPT | Mod: PO

## 2017-08-16 ENCOUNTER — TELEPHONE (OUTPATIENT)
Dept: FAMILY MEDICINE | Facility: CLINIC | Age: 67
End: 2017-08-16

## 2017-08-16 LAB
ESTIMATED AVG GLUCOSE: 235 MG/DL
HBA1C MFR BLD HPLC: 9.8 %

## 2017-08-16 RX ORDER — GLIMEPIRIDE 4 MG/1
4 TABLET ORAL
Qty: 90 TABLET | Refills: 2 | Status: SHIPPED | OUTPATIENT
Start: 2017-08-16 | End: 2017-09-29 | Stop reason: SDUPTHER

## 2017-08-17 NOTE — TELEPHONE ENCOUNTER
Your labs are good except your HA!C is too high    I suggest slowly going up on insulin to 50 then 55 units    And increase your Amaryl to 4 mg daily.      I will call in the new dose of Amaryl

## 2017-08-29 NOTE — TELEPHONE ENCOUNTER
Patient is  Using Lantus  She is in the gap  It is $111 for 33 day supply    She needs 30 day supply (as close to 30 days as possible)  Sent to the pharmacy    She wants vials  Can she use [facility] while in the gap?    Please advise

## 2017-08-31 NOTE — TELEPHONE ENCOUNTER
I spoke with the pt   She is ok with BID insulin ( relion [facility] )  Send in vials with syringes and needles    Send to [facility]

## 2017-09-29 RX ORDER — METFORMIN HYDROCHLORIDE 500 MG/1
500 TABLET ORAL 2 TIMES DAILY WITH MEALS
Qty: 180 TABLET | Refills: 3 | Status: SHIPPED | OUTPATIENT
Start: 2017-09-29 | End: 2018-12-29 | Stop reason: SDUPTHER

## 2017-09-29 RX ORDER — GLIMEPIRIDE 4 MG/1
4 TABLET ORAL
Qty: 90 TABLET | Refills: 2 | Status: SHIPPED | OUTPATIENT
Start: 2017-09-29 | End: 2018-03-09 | Stop reason: SDUPTHER

## 2017-09-29 NOTE — TELEPHONE ENCOUNTER
----- Message from Citlaly Joshua sent at 9/29/2017  2:32 PM CDT -----  Patient is requesting a medication refill.     RX name: glimepiride (AMARYL) 4 MG tablet  Strength:   Quantity: 90 day with refills   Directions:  Take 1 tablet (4 mg total) by mouth before breakfast. - Oral    RX name: metformin (GLUCOPHAGE) 500 MG tablet  Strength:   Quantity: 90 day with refills   Directions: Take 1 tablet (500 mg total) by mouth 2 (two) times daily with meals. - Oral    Pharmacy name:  [facility]

## 2017-11-29 RX ORDER — HUMAN INSULIN 100 [IU]/ML
INJECTION, SUSPENSION SUBCUTANEOUS
Qty: 30 ML | Refills: 2 | Status: SHIPPED | OUTPATIENT
Start: 2017-11-29 | End: 2018-09-10

## 2017-12-20 ENCOUNTER — HOSPITAL ENCOUNTER (OUTPATIENT)
Dept: RADIOLOGY | Facility: HOSPITAL | Age: 67
Discharge: HOME | End: 2017-12-20
Attending: FAMILY MEDICINE
Payer: MEDICARE

## 2017-12-20 DIAGNOSIS — Z12.31 ENCOUNTER FOR SCREENING MAMMOGRAM FOR BREAST CANCER: ICD-10-CM

## 2017-12-20 PROCEDURE — 77063 BREAST TOMOSYNTHESIS BI: CPT | Mod: 26,,, | Performed by: RADIOLOGY

## 2017-12-20 PROCEDURE — 77067 SCR MAMMO BI INCL CAD: CPT | Mod: TC

## 2017-12-20 PROCEDURE — 77067 SCR MAMMO BI INCL CAD: CPT | Mod: 26,,, | Performed by: RADIOLOGY

## 2018-01-19 ENCOUNTER — OFFICE VISIT (OUTPATIENT)
Dept: FAMILY MEDICINE | Facility: CLINIC | Age: 68
End: 2018-01-19
Payer: MEDICARE

## 2018-01-19 VITALS
WEIGHT: 238.56 LBS | HEIGHT: 67 IN | OXYGEN SATURATION: 97 % | TEMPERATURE: 99 F | BODY MASS INDEX: 37.44 KG/M2 | SYSTOLIC BLOOD PRESSURE: 128 MMHG | HEART RATE: 93 BPM | DIASTOLIC BLOOD PRESSURE: 80 MMHG

## 2018-01-19 DIAGNOSIS — R05.9 COUGH: Primary | ICD-10-CM

## 2018-01-19 PROCEDURE — 99213 OFFICE O/P EST LOW 20 MIN: CPT | Mod: S$GLB,,, | Performed by: NURSE PRACTITIONER

## 2018-01-19 RX ORDER — BENZONATATE 200 MG/1
200 CAPSULE ORAL 3 TIMES DAILY PRN
Qty: 30 CAPSULE | Refills: 0 | Status: SHIPPED | OUTPATIENT
Start: 2018-01-19 | End: 2018-01-29

## 2018-01-19 NOTE — PROGRESS NOTES
Subjective:       Patient ID: Katherine Berger is a 67 y.o. female.    Chief Complaint: Cough (with bodyache since thursday)    Cough   This is a new problem. The current episode started yesterday. The problem has been unchanged. The problem occurs every few minutes. The cough is non-productive. Associated symptoms include nasal congestion, postnasal drip and a sore throat. Pertinent negatives include no chest pain, chills, ear congestion, ear pain, fever, headaches, heartburn, hemoptysis, myalgias, rash, rhinorrhea, shortness of breath, sweats, weight loss or wheezing. The symptoms are aggravated by lying down. Treatments tried: clorcedin. The treatment provided no relief. There is no history of asthma, bronchiectasis, bronchitis, COPD, emphysema, environmental allergies or pneumonia.     Review of Systems   Constitutional: Negative for chills, diaphoresis, fatigue, fever and weight loss.   HENT: Positive for congestion, postnasal drip and sore throat. Negative for ear pain, rhinorrhea and voice change.    Respiratory: Positive for cough. Negative for hemoptysis, chest tightness, shortness of breath and wheezing.    Cardiovascular: Negative for chest pain, palpitations and leg swelling.   Gastrointestinal: Negative for heartburn.   Musculoskeletal: Negative for myalgias.   Skin: Negative for rash.   Allergic/Immunologic: Negative for environmental allergies.   Neurological: Negative for headaches.       Objective:      Physical Exam   Constitutional: She is oriented to person, place, and time. She appears well-developed and well-nourished. No distress.   HENT:   Right Ear: Tympanic membrane and external ear normal.   Left Ear: Tympanic membrane and external ear normal.   Nose: No mucosal edema or rhinorrhea. Right sinus exhibits no maxillary sinus tenderness and no frontal sinus tenderness. Left sinus exhibits no maxillary sinus tenderness and no frontal sinus tenderness.   Mouth/Throat: No oropharyngeal exudate,  posterior oropharyngeal edema or posterior oropharyngeal erythema.   Cardiovascular: Normal rate, regular rhythm and normal heart sounds.    Pulmonary/Chest: Effort normal and breath sounds normal. No respiratory distress. She has no wheezes.   Neurological: She is alert and oriented to person, place, and time.   Skin: Skin is warm and dry. She is not diaphoretic.   Psychiatric: She has a normal mood and affect. Her behavior is normal. Judgment and thought content normal.   Vitals reviewed.      Assessment:       1. Cough        Plan:       Cough  -     benzonatate (TESSALON) 200 MG capsule; Take 1 capsule (200 mg total) by mouth 3 (three) times daily as needed for Cough.  Dispense: 30 capsule; Refill: 0      Continue antihistamine

## 2018-02-18 ENCOUNTER — HOSPITAL ENCOUNTER (EMERGENCY)
Facility: HOSPITAL | Age: 68
Discharge: HOME | End: 2018-02-18
Attending: EMERGENCY MEDICINE
Payer: MEDICARE

## 2018-02-18 VITALS
BODY MASS INDEX: 37.67 KG/M2 | WEIGHT: 240 LBS | HEIGHT: 67 IN | SYSTOLIC BLOOD PRESSURE: 130 MMHG | HEART RATE: 80 BPM | RESPIRATION RATE: 17 BRPM | DIASTOLIC BLOOD PRESSURE: 85 MMHG | TEMPERATURE: 98 F | OXYGEN SATURATION: 98 %

## 2018-02-18 DIAGNOSIS — R10.9 FLANK PAIN: Primary | ICD-10-CM

## 2018-02-18 LAB
ANION GAP SERPL CALC-SCNC: 11 MMOL/L
ANISOCYTOSIS BLD QL SMEAR: SLIGHT
BACTERIA #/AREA URNS AUTO: ABNORMAL /HPF
BASOPHILS # BLD AUTO: 0.02 K/UL
BASOPHILS NFR BLD: 0.2 %
BILIRUB UR QL STRIP: NEGATIVE
BUN SERPL-MCNC: 16 MG/DL
CALCIUM SERPL-MCNC: 9.8 MG/DL
CHLORIDE SERPL-SCNC: 98 MMOL/L
CLARITY UR REFRACT.AUTO: ABNORMAL
CO2 SERPL-SCNC: 30 MMOL/L
COLOR UR AUTO: YELLOW
CREAT SERPL-MCNC: 0.75 MG/DL
DIFFERENTIAL METHOD: ABNORMAL
EOSINOPHIL # BLD AUTO: 0.1 K/UL
EOSINOPHIL NFR BLD: 0.8 %
ERYTHROCYTE [DISTWIDTH] IN BLOOD BY AUTOMATED COUNT: 14.3 %
EST. GFR  (AFRICAN AMERICAN): >60 ML/MIN/1.73 M^2
EST. GFR  (NON AFRICAN AMERICAN): >60 ML/MIN/1.73 M^2
GLUCOSE SERPL-MCNC: 412 MG/DL
GLUCOSE UR QL STRIP: ABNORMAL
HCT VFR BLD AUTO: 31.9 %
HGB BLD-MCNC: 10 G/DL
HGB UR QL STRIP: ABNORMAL
HYALINE CASTS UR QL AUTO: 0 /LPF
HYPOCHROMIA BLD QL SMEAR: ABNORMAL
KETONES UR QL STRIP: NEGATIVE
LEUKOCYTE ESTERASE UR QL STRIP: ABNORMAL
LYMPHOCYTES # BLD AUTO: 2.4 K/UL
LYMPHOCYTES NFR BLD: 22 %
MCH RBC QN AUTO: 20.6 PG
MCHC RBC AUTO-ENTMCNC: 31.3 G/DL
MCV RBC AUTO: 66 FL
MICROSCOPIC COMMENT: ABNORMAL
MONOCYTES # BLD AUTO: 0.9 K/UL
MONOCYTES NFR BLD: 8.4 %
NEUTROPHILS # BLD AUTO: 7.6 K/UL
NEUTROPHILS NFR BLD: 68.6 %
NITRITE UR QL STRIP: NEGATIVE
PH UR STRIP: 5 [PH] (ref 5–8)
PLATELET # BLD AUTO: 330 K/UL
PMV BLD AUTO: 11.4 FL
POIKILOCYTOSIS BLD QL SMEAR: SLIGHT
POTASSIUM SERPL-SCNC: 3.8 MMOL/L
PROT UR QL STRIP: ABNORMAL
RBC # BLD AUTO: 4.86 M/UL
RBC #/AREA URNS AUTO: 0 /HPF (ref 0–4)
SODIUM SERPL-SCNC: 139 MMOL/L
SP GR UR STRIP: 1.02 (ref 1–1.03)
SQUAMOUS #/AREA URNS AUTO: 10 /HPF
URN SPEC COLLECT METH UR: ABNORMAL
UROBILINOGEN UR STRIP-ACNC: 1 EU/DL
WBC # BLD AUTO: 11.06 K/UL
WBC #/AREA URNS AUTO: 1 /HPF (ref 0–5)
YEAST UR QL AUTO: ABNORMAL

## 2018-02-18 PROCEDURE — 25000003 PHARM REV CODE 250: Performed by: EMERGENCY MEDICINE

## 2018-02-18 PROCEDURE — 99284 EMERGENCY DEPT VISIT MOD MDM: CPT | Mod: 25

## 2018-02-18 PROCEDURE — 96361 HYDRATE IV INFUSION ADD-ON: CPT

## 2018-02-18 PROCEDURE — 81000 URINALYSIS NONAUTO W/SCOPE: CPT

## 2018-02-18 PROCEDURE — 96374 THER/PROPH/DIAG INJ IV PUSH: CPT

## 2018-02-18 PROCEDURE — 80048 BASIC METABOLIC PNL TOTAL CA: CPT

## 2018-02-18 PROCEDURE — 96375 TX/PRO/DX INJ NEW DRUG ADDON: CPT

## 2018-02-18 PROCEDURE — 63600175 PHARM REV CODE 636 W HCPCS: Performed by: EMERGENCY MEDICINE

## 2018-02-18 PROCEDURE — 85025 COMPLETE CBC W/AUTO DIFF WBC: CPT

## 2018-02-18 RX ORDER — MORPHINE SULFATE 4 MG/ML
4 INJECTION, SOLUTION INTRAMUSCULAR; INTRAVENOUS
Status: COMPLETED | OUTPATIENT
Start: 2018-02-18 | End: 2018-02-18

## 2018-02-18 RX ORDER — ONDANSETRON 2 MG/ML
4 INJECTION INTRAMUSCULAR; INTRAVENOUS
Status: COMPLETED | OUTPATIENT
Start: 2018-02-18 | End: 2018-02-18

## 2018-02-18 RX ORDER — TAMSULOSIN HYDROCHLORIDE 0.4 MG/1
0.4 CAPSULE ORAL DAILY
Qty: 30 CAPSULE | Refills: 11 | Status: SHIPPED | OUTPATIENT
Start: 2018-02-18 | End: 2019-03-29

## 2018-02-18 RX ORDER — HYDROCODONE BITARTRATE AND ACETAMINOPHEN 5; 325 MG/1; MG/1
1 TABLET ORAL EVERY 4 HOURS PRN
Qty: 18 TABLET | Refills: 0 | Status: SHIPPED | OUTPATIENT
Start: 2018-02-18 | End: 2019-03-29

## 2018-02-18 RX ORDER — DOCUSATE SODIUM 100 MG/1
100 CAPSULE, LIQUID FILLED ORAL 2 TIMES DAILY PRN
Qty: 30 CAPSULE | Refills: 0 | Status: SHIPPED | OUTPATIENT
Start: 2018-02-18 | End: 2020-06-15 | Stop reason: SDUPTHER

## 2018-02-18 RX ORDER — KETOROLAC TROMETHAMINE 30 MG/ML
15 INJECTION, SOLUTION INTRAMUSCULAR; INTRAVENOUS ONCE
Status: COMPLETED | OUTPATIENT
Start: 2018-02-18 | End: 2018-02-18

## 2018-02-18 RX ADMIN — ONDANSETRON 4 MG: 2 INJECTION INTRAMUSCULAR; INTRAVENOUS at 01:02

## 2018-02-18 RX ADMIN — SODIUM CHLORIDE 1000 ML: 0.9 INJECTION, SOLUTION INTRAVENOUS at 01:02

## 2018-02-18 RX ADMIN — MORPHINE SULFATE 4 MG: 4 INJECTION INTRAVENOUS at 01:02

## 2018-02-18 RX ADMIN — KETOROLAC TROMETHAMINE 15 MG: 30 INJECTION, SOLUTION INTRAMUSCULAR at 01:02

## 2018-02-18 NOTE — ED PROVIDER NOTES
"Encounter Date: 2/18/2018       History     Chief Complaint   Patient presents with    Flank Pain     "I got a pain in my side and it burns when I use the bathroom for like a week" denies trauma. "and I got a cough"     Patient comes emergency Department complaining of dysuria and right flank pain she states similar to previous kidney stones.  Pain began today and is severe and colicky          Review of patient's allergies indicates:  No Known Allergies  Past Medical History:   Diagnosis Date    Arthritis     Diabetes mellitus     Hypertension     Kidney stone      Past Surgical History:   Procedure Laterality Date    COLONOSCOPY  05/23/2013    dr ram - 5 years    FOOT SURGERY Right 2010    bone spur    FOOT SURGERY Left 2010    fx ankle    HYSTERECTOMY      age 47    KNEE SURGERY Left 06/10/2016    TKR    OOPHORECTOMY      TONSILLECTOMY       Family History   Problem Relation Age of Onset    Heart attack Mother     Seizures Mother      fell in bathtub and drowned    Colon cancer Sister      Social History   Substance Use Topics    Smoking status: Never Smoker    Smokeless tobacco: Never Used    Alcohol use No     Review of Systems   Constitutional: Negative for fever.   HENT: Negative for congestion, sore throat and voice change.    Respiratory: Negative for chest tightness and shortness of breath.    Cardiovascular: Negative for chest pain.   Gastrointestinal: Positive for abdominal pain. Negative for abdominal distention, anal bleeding, blood in stool, constipation, diarrhea, nausea, rectal pain and vomiting.   Genitourinary: Positive for dysuria and flank pain. Negative for vaginal bleeding and vaginal discharge.   Musculoskeletal: Negative for back pain.   Skin: Negative for rash.   Neurological: Negative for weakness.   Hematological: Does not bruise/bleed easily.   All other systems reviewed and are negative.      Physical Exam     Initial Vitals [02/18/18 1159]   BP Pulse Resp Temp " SpO2   (!) 146/63 84 17 98.1 °F (36.7 °C) 95 %      MAP       90.67         Physical Exam    Nursing note and vitals reviewed.  Constitutional: Vital signs are normal. She appears well-developed and well-nourished. She is not diaphoretic. She appears distressed.   HENT:   Head: Normocephalic and atraumatic.   Eyes: Conjunctivae and EOM are normal. Pupils are equal, round, and reactive to light.   Neck: Normal range of motion. Neck supple.   Cardiovascular: Normal rate, regular rhythm, normal heart sounds and intact distal pulses.   Pulmonary/Chest: Breath sounds normal. No respiratory distress. She has no wheezes. She has no rhonchi. She has no rales.   Abdominal: Soft. She exhibits no distension. There is no tenderness. There is no rebound and no guarding.   Musculoskeletal: Normal range of motion. She exhibits no edema or tenderness.   Neurological: She is alert and oriented to person, place, and time.   Skin: Skin is warm and dry.   Psychiatric: She has a normal mood and affect.         ED Course   Procedures  Labs Reviewed   URINALYSIS - Abnormal; Notable for the following:        Result Value    Appearance, UA Cloudy (*)     Protein, UA 1+ (*)     Glucose, UA 4+ (*)     Occult Blood UA Trace (*)     Leukocytes, UA 1+ (*)     All other components within normal limits   CBC W/ AUTO DIFFERENTIAL - Abnormal; Notable for the following:     Hemoglobin 10.0 (*)     Hematocrit 31.9 (*)     MCV 66 (*)     MCH 20.6 (*)     MCHC 31.3 (*)     All other components within normal limits   BASIC METABOLIC PANEL - Abnormal; Notable for the following:     CO2 30 (*)     Glucose 412 (*)     All other components within normal limits   URINALYSIS MICROSCOPIC - Abnormal; Notable for the following:     Bacteria, UA Moderate (*)     All other components within normal limits                                Imaging Results          CT Renal Stone Study ABD Pelvis WO (Final result)  Result time 02/18/18 13:56:43    Final result by Devonte  ISABEL Cornejo MD (02/18/18 13:56:43)                 Impression:             No acute abnormality identified in the abdomen or pelvis.        All CT scans at this facility use dose modulation, iterative reconstruction and/or weight based dosing when appropriate to reduce radiation dose to as low as reasonably achievable.       Electronically signed by: ELIZA CORNEJO MD  Date:     02/18/18  Time:    13:56              Narrative:    Exam: CT RENAL STONE STUDY ABD PELVIS WO    Technique: Axial CT images performed through the abdomen and pelvis without intravenous contrast. Multiplanar reformats were performed and interpreted.    Comparison: December 14, 2015    Clinical History: Abdominal pain. Flank pain, stone disease suspected        Findings:         Lung bases are clear.    No urolithiasis, hydronephrosis, or perinephric stranding.  The liver, spleen, kidneys, adrenal glands, and pancreas have a normal noncontrasted appearance.   The gallbladder is unremarkable.  No free fluid, free air, or inflammatory change.     The bowel is nondistended and within normal limits.  The appendix is normal.  The abdominal aorta is normal in caliber.  Small fat containing umbilical hernia.    The urinary bladder is unremarkable.  Status post hysterectomy.    No significant osseous abnormality is identified.  Multilevel thoracolumbar spondylosis.                              Labs Reviewed   URINALYSIS - Abnormal; Notable for the following:        Result Value    Appearance, UA Cloudy (*)     Protein, UA 1+ (*)     Glucose, UA 4+ (*)     Occult Blood UA Trace (*)     Leukocytes, UA 1+ (*)     All other components within normal limits   CBC W/ AUTO DIFFERENTIAL - Abnormal; Notable for the following:     Hemoglobin 10.0 (*)     Hematocrit 31.9 (*)     MCV 66 (*)     MCH 20.6 (*)     MCHC 31.3 (*)     All other components within normal limits   BASIC METABOLIC PANEL - Abnormal; Notable for the following:     CO2 30 (*)     Glucose 412 (*)      All other components within normal limits   URINALYSIS MICROSCOPIC - Abnormal; Notable for the following:     Bacteria, UA Moderate (*)     All other components within normal limits          Clinical Impression:   The encounter diagnosis was Flank pain.    Disposition:   Disposition: Discharged  Condition: Stable                        Jostin Jasmine MD  02/18/18 7988

## 2018-02-19 ENCOUNTER — OFFICE VISIT (OUTPATIENT)
Dept: FAMILY MEDICINE | Facility: CLINIC | Age: 68
End: 2018-02-19
Payer: MEDICARE

## 2018-02-19 VITALS
HEIGHT: 67 IN | WEIGHT: 226.19 LBS | BODY MASS INDEX: 35.5 KG/M2 | OXYGEN SATURATION: 98 % | HEART RATE: 95 BPM | DIASTOLIC BLOOD PRESSURE: 66 MMHG | SYSTOLIC BLOOD PRESSURE: 120 MMHG | TEMPERATURE: 99 F

## 2018-02-19 PROCEDURE — 99213 OFFICE O/P EST LOW 20 MIN: CPT | Mod: S$GLB,,, | Performed by: FAMILY MEDICINE

## 2018-02-19 PROCEDURE — 3008F BODY MASS INDEX DOCD: CPT | Mod: S$GLB,,, | Performed by: FAMILY MEDICINE

## 2018-02-19 PROCEDURE — 99499 UNLISTED E&M SERVICE: CPT | Mod: S$GLB,,, | Performed by: FAMILY MEDICINE

## 2018-02-19 RX ORDER — GABAPENTIN 100 MG/1
100 CAPSULE ORAL 3 TIMES DAILY
Qty: 90 CAPSULE | Refills: 2 | Status: SHIPPED | OUTPATIENT
Start: 2018-02-19 | End: 2019-07-01 | Stop reason: SDUPTHER

## 2018-02-19 RX ORDER — AMOXICILLIN AND CLAVULANATE POTASSIUM 875; 125 MG/1; MG/1
1 TABLET, FILM COATED ORAL 2 TIMES DAILY
Qty: 14 TABLET | Refills: 0 | Status: SHIPPED | OUTPATIENT
Start: 2018-02-19 | End: 2018-02-26

## 2018-02-26 ENCOUNTER — TELEPHONE (OUTPATIENT)
Dept: FAMILY MEDICINE | Facility: CLINIC | Age: 68
End: 2018-02-26

## 2018-02-26 DIAGNOSIS — I10 ESSENTIAL HYPERTENSION: ICD-10-CM

## 2018-02-26 DIAGNOSIS — R10.9 FLANK PAIN: ICD-10-CM

## 2018-02-26 DIAGNOSIS — D64.9 ANEMIA, UNSPECIFIED TYPE: ICD-10-CM

## 2018-02-26 NOTE — PROGRESS NOTES
Patient ID: Katherine Berger is a 67 y.o. female.    Chief Complaint: Follow-up; Flank Pain; and Fatigue    HPI      Katherine Berger is a 67 y.o. female patient with type 2 diabetes experiencing some fatigue.  Patient also nursing some tingling type sensation lower extremity.  No fever chills nausea vomiting diarrhea.  Flank pain treated for urinary tract infection/nephrolithiasis.  Flank pain reduction of pain from ER visit 2/18.      Review of Symptoms    Constitutional  Neg activity change, No chills /fever   Resp  Neg hemoptysis, stridor, choking  CVS  Neg chest pain, palpitations    Physical Exam    Constitutional:  Oriented to person, place, and time.appears well-developed and well-nourished.  No distress.      HENT  Head: Normocephalic and atraumatic  Right Ear: External ear normal.   Left Ear: External ear normal.   Nose: External nose normal.   Mouth:  Moist mucus membranes.    Eyes:  Conjunctivae are normal. Right eye exhibits no discharge.  Left eye exhibits no discharge. No scleral icterus.  No periorbital edema    Cardiovascular:  Regular rate, Regular rhythm     No extrasystole  Normal S1-S2      Pulmonary/Chest:   Normal effort and breath sounds.  No wheezing, rhonchi or rales.    Musculoskeletal:  No edema. No obvious deformity No wasting  No CVA tenderness      Neurological:  Alert and oriented to person, place, and time.   Coordination normal.     Skin:   Skin is warm and dry.  No diaphoresis.   No rash noted.     Psychiatric: Normal mood and affect. Behavior is normal.  Judgment and thought content normal.       Assessment / Plan:      ICD-10-CM ICD-9-CM   1. Uncontrolled type 2 diabetes mellitus without complication, with long-term current use of insulin E11.65 250.02    Z79.4 V58.67     Uncontrolled type 2 diabetes mellitus without complication, with long-term current use of insulin  -     Hemoglobin A1c; Future  -     Lipid panel; Future    Other orders  -     liraglutide 0.6 mg/0.1 mL, 18 mg/3 mL,  subq PNIJ 0.6 mg/0.1 mL (18 mg/3 mL) PnIj; Inject 0.6 mg into the skin once daily.  Dispense: 3 mL; Refill: 11  -     gabapentin (NEURONTIN) 100 MG capsule; Take 1 capsule (100 mg total) by mouth 3 (three) times daily. For back pain radiating down leg.  Dispense: 90 capsule; Refill: 2  -     amoxicillin-clavulanate 875-125mg (AUGMENTIN) 875-125 mg per tablet; Take 1 tablet by mouth 2 (two) times daily.  Dispense: 14 tablet; Refill: 0     discussed that fatigue may be due to uncontrolled diabetes as well as neuropathy

## 2018-02-26 NOTE — TELEPHONE ENCOUNTER
----- Message from Citlaly Joshua sent at 2/26/2018  2:39 PM CST -----  As requested, patient calling to report that she is not feeling any better. Still feels weak

## 2018-02-27 ENCOUNTER — LAB VISIT (OUTPATIENT)
Dept: LAB | Facility: HOSPITAL | Age: 68
End: 2018-02-27
Attending: FAMILY MEDICINE
Payer: MEDICARE

## 2018-02-27 DIAGNOSIS — D64.9 ANEMIA, UNSPECIFIED TYPE: ICD-10-CM

## 2018-02-27 DIAGNOSIS — R10.9 FLANK PAIN: ICD-10-CM

## 2018-02-27 DIAGNOSIS — I10 ESSENTIAL HYPERTENSION: ICD-10-CM

## 2018-02-27 LAB
ALBUMIN SERPL BCP-MCNC: 3.8 G/DL
ALP SERPL-CCNC: 96 U/L
ALT SERPL W/O P-5'-P-CCNC: 35 U/L
ANION GAP SERPL CALC-SCNC: 10 MMOL/L
ANISOCYTOSIS BLD QL SMEAR: ABNORMAL
AST SERPL-CCNC: 34 U/L
BASOPHILS # BLD AUTO: 0.02 K/UL
BASOPHILS NFR BLD: 0.2 %
BILIRUB SERPL-MCNC: 0.4 MG/DL
BUN SERPL-MCNC: 17 MG/DL
CALCIUM SERPL-MCNC: 9.7 MG/DL
CHLORIDE SERPL-SCNC: 99 MMOL/L
CHOLEST SERPL-MCNC: 101 MG/DL
CHOLEST/HDLC SERPL: 3.7 {RATIO}
CO2 SERPL-SCNC: 31 MMOL/L
CREAT SERPL-MCNC: 0.69 MG/DL
DIFFERENTIAL METHOD: ABNORMAL
EOSINOPHIL # BLD AUTO: 0.1 K/UL
EOSINOPHIL NFR BLD: 1 %
ERYTHROCYTE [DISTWIDTH] IN BLOOD BY AUTOMATED COUNT: 14.8 %
EST. GFR  (AFRICAN AMERICAN): >60 ML/MIN/1.73 M^2
EST. GFR  (NON AFRICAN AMERICAN): >60 ML/MIN/1.73 M^2
ESTIMATED AVG GLUCOSE: 235 MG/DL
GLUCOSE SERPL-MCNC: 298 MG/DL
HBA1C MFR BLD HPLC: 9.8 %
HCT VFR BLD AUTO: 32.4 %
HDLC SERPL-MCNC: 27 MG/DL
HDLC SERPL: 26.7 %
HGB BLD-MCNC: 10 G/DL
HYPOCHROMIA BLD QL SMEAR: ABNORMAL
LDLC SERPL CALC-MCNC: 58.2 MG/DL
LYMPHOCYTES # BLD AUTO: 2.2 K/UL
LYMPHOCYTES NFR BLD: 21.4 %
MCH RBC QN AUTO: 20.4 PG
MCHC RBC AUTO-ENTMCNC: 30.9 G/DL
MCV RBC AUTO: 66 FL
MONOCYTES # BLD AUTO: 0.8 K/UL
MONOCYTES NFR BLD: 7.7 %
NEUTROPHILS # BLD AUTO: 7.2 K/UL
NEUTROPHILS NFR BLD: 69.7 %
NONHDLC SERPL-MCNC: 74 MG/DL
PLATELET # BLD AUTO: 334 K/UL
PMV BLD AUTO: 11.7 FL
POTASSIUM SERPL-SCNC: 4.3 MMOL/L
PROT SERPL-MCNC: 7.8 G/DL
RBC # BLD AUTO: 4.89 M/UL
SODIUM SERPL-SCNC: 140 MMOL/L
TRIGL SERPL-MCNC: 79 MG/DL
WBC # BLD AUTO: 10.39 K/UL

## 2018-02-27 PROCEDURE — 80053 COMPREHEN METABOLIC PANEL: CPT | Mod: PO

## 2018-02-27 PROCEDURE — 83036 HEMOGLOBIN GLYCOSYLATED A1C: CPT

## 2018-02-27 PROCEDURE — 36415 COLL VENOUS BLD VENIPUNCTURE: CPT | Mod: PO

## 2018-02-27 PROCEDURE — 85025 COMPLETE CBC W/AUTO DIFF WBC: CPT | Mod: PO

## 2018-02-27 PROCEDURE — 80061 LIPID PANEL: CPT

## 2018-03-01 ENCOUNTER — TELEPHONE (OUTPATIENT)
Dept: FAMILY MEDICINE | Facility: CLINIC | Age: 68
End: 2018-03-01

## 2018-03-01 DIAGNOSIS — R71.8 RBC MICROCYTOSIS: ICD-10-CM

## 2018-03-01 DIAGNOSIS — I10 ESSENTIAL HYPERTENSION: ICD-10-CM

## 2018-03-01 RX ORDER — CIPROFLOXACIN 500 MG/1
500 TABLET ORAL 2 TIMES DAILY
Qty: 14 TABLET | Refills: 0 | Status: SHIPPED | OUTPATIENT
Start: 2018-03-01 | End: 2019-03-29

## 2018-03-01 NOTE — TELEPHONE ENCOUNTER
----- Message from Citlaly Joshua sent at 3/1/2018  8:35 AM CST -----  Please call patient with blood work results. She has labs drawn this past Tues ( 2/27/18 )  Blood sugar staying in 300s with doing 6 units; Patient was drinking Boost (15grams sugar) Patient now doing 12 units & blood sugar this am was 203    Dr Muse please review results

## 2018-03-02 NOTE — TELEPHONE ENCOUNTER
I spoke with the pt   She started the the cipro last night  She is very tried and will have labs drawn on Tuesday due transportation - she will uses the bus   She would like you to add a b12 level please  She has a hx of low b12 - she would like to restart b12 injections- I advised her that she needs that level checked davin when she goes to have blood work drawn      See the initial message below regarding her blood sugars  She says they are our of wack  Please call her to discuss those or give my directions for her

## 2018-03-02 NOTE — TELEPHONE ENCOUNTER
Urine culture sensitive to everything but amoxicillin/clavulanic acid or Augmentin    Sent in prescription for ciprofloxacin 500 mg 2 times a day for 7 days    Also anemic-has been a little bit lower this time repeat blood Leak as well as checking iron levels    We may need to make adjustments to your medicine so that your glucose is lower.    Left a message on her phone today 6:20 PM

## 2018-03-05 ENCOUNTER — LAB VISIT (OUTPATIENT)
Dept: LAB | Facility: HOSPITAL | Age: 68
End: 2018-03-05
Attending: FAMILY MEDICINE
Payer: MEDICARE

## 2018-03-05 DIAGNOSIS — I10 ESSENTIAL HYPERTENSION: ICD-10-CM

## 2018-03-05 DIAGNOSIS — R71.8 RBC MICROCYTOSIS: ICD-10-CM

## 2018-03-05 LAB
ANISOCYTOSIS BLD QL SMEAR: SLIGHT
BASOPHILS # BLD AUTO: 0.02 K/UL
BASOPHILS NFR BLD: 0.2 %
DIFFERENTIAL METHOD: ABNORMAL
EOSINOPHIL # BLD AUTO: 0.1 K/UL
EOSINOPHIL NFR BLD: 1 %
ERYTHROCYTE [DISTWIDTH] IN BLOOD BY AUTOMATED COUNT: 15.1 %
HCT VFR BLD AUTO: 33.3 %
HGB BLD-MCNC: 10.2 G/DL
HYPOCHROMIA BLD QL SMEAR: ABNORMAL
IRON SERPL-MCNC: 33 UG/DL
LYMPHOCYTES # BLD AUTO: 2.5 K/UL
LYMPHOCYTES NFR BLD: 23.9 %
MCH RBC QN AUTO: 20.2 PG
MCHC RBC AUTO-ENTMCNC: 30.6 G/DL
MCV RBC AUTO: 66 FL
MONOCYTES # BLD AUTO: 0.9 K/UL
MONOCYTES NFR BLD: 8.5 %
NEUTROPHILS # BLD AUTO: 7 K/UL
NEUTROPHILS NFR BLD: 66.4 %
PLATELET # BLD AUTO: 323 K/UL
PMV BLD AUTO: 11.1 FL
RBC # BLD AUTO: 5.05 M/UL
RETICS/RBC NFR AUTO: 2.1 %
SATURATED IRON: 12 %
TOTAL IRON BINDING CAPACITY: 278 UG/DL
TRANSFERRIN SERPL-MCNC: 188 MG/DL
VIT B12 SERPL-MCNC: >2000 PG/ML
WBC # BLD AUTO: 10.62 K/UL

## 2018-03-05 PROCEDURE — 85045 AUTOMATED RETICULOCYTE COUNT: CPT | Mod: PO

## 2018-03-05 PROCEDURE — 36415 COLL VENOUS BLD VENIPUNCTURE: CPT | Mod: PO

## 2018-03-05 PROCEDURE — 82607 VITAMIN B-12: CPT | Mod: PO

## 2018-03-05 PROCEDURE — 85025 COMPLETE CBC W/AUTO DIFF WBC: CPT | Mod: PO

## 2018-03-05 PROCEDURE — 83540 ASSAY OF IRON: CPT | Mod: PO

## 2018-03-08 ENCOUNTER — TELEPHONE (OUTPATIENT)
Dept: FAMILY MEDICINE | Facility: CLINIC | Age: 68
End: 2018-03-08

## 2018-03-08 RX ORDER — FERROUS SULFATE 324(65)MG
325 TABLET, DELAYED RELEASE (ENTERIC COATED) ORAL DAILY
Qty: 30 TABLET | Refills: 2 | Status: SHIPPED | OUTPATIENT
Start: 2018-03-08 | End: 2020-06-15 | Stop reason: SDUPTHER

## 2018-03-08 RX ORDER — CIPROFLOXACIN 250 MG/1
250 TABLET, FILM COATED ORAL 2 TIMES DAILY
Qty: 10 TABLET | Refills: 0 | Status: SHIPPED | OUTPATIENT
Start: 2018-03-08 | End: 2019-03-29

## 2018-03-08 NOTE — TELEPHONE ENCOUNTER
[facility], [place], Emergency Department    [address] [place] [id number]    Phone:  [phone]                                       Cheryl Gill   MRN: [id number]    Department:  [facility], Emergency Department   Date of Visit:  9/14/2017           After Visit Summary Signature Page     I have received my discharge instructions, and my questions have been answered. I have discussed any challenges I see with this plan with the nurse or doctor.    ..........................................................................................................................................  Patient/Patient Representative Signature      ..........................................................................................................................................  Patient Representative Print Name and Relationship to Patient    ..................................................               ................................................  Date                                            Time    ..........................................................................................................................................  Reviewed by Signature/Title    ...................................................              ..............................................  Date                                                            Time           Patient is requesting a prescription for iron medication . She does not want to use OTC

## 2018-03-08 NOTE — TELEPHONE ENCOUNTER
----- Message from Citlaly Joshua sent at 3/8/2018  9:48 AM CST -----  Patient says she if feeling better. Requesting Rx for iron pill (doesn't want to take OTC) to help with her low blood level. AND pt requesting something for bladder infection. Says she is experiencing bladder discomfort    [facility]

## 2018-03-09 RX ORDER — GLIMEPIRIDE 4 MG/1
4 TABLET ORAL
Qty: 90 TABLET | Refills: 2 | Status: SHIPPED | OUTPATIENT
Start: 2018-03-09 | End: 2018-12-06 | Stop reason: SDUPTHER

## 2018-03-09 NOTE — TELEPHONE ENCOUNTER
I notified the pt that iron tablets were called in the pharmacy   The pt needs refill for   1. glimepiride (AMARYL) 4 MG tablet 1 daily   2. liraglutide 0.6 mg/0.1 mL, 18 mg/3 mL, subq PNIJ - was originally prescribed as 0.6 mg but she is now taking  0.12mg once daily   Please send in with the new directions  Both to [facility]

## 2018-03-09 NOTE — TELEPHONE ENCOUNTER
No need to restart B 12 injections her level is above normal  Her level is greater than 2000 high normal is about 1200

## 2018-03-22 ENCOUNTER — TELEPHONE (OUTPATIENT)
Dept: FAMILY MEDICINE | Facility: CLINIC | Age: 68
End: 2018-03-22

## 2018-03-22 NOTE — TELEPHONE ENCOUNTER
----- Message from Petty Davis sent at 3/22/2018  3:43 PM CDT -----  Contact: the pt called  Calling for a refill on the Victoza.  States she started off on 6 units and is now taking 12 units.  She needs this called in to [facility] for the 12 units. Completely out.  She can be reached at [phone]

## 2018-06-11 ENCOUNTER — TELEPHONE (OUTPATIENT)
Dept: FAMILY MEDICINE | Facility: CLINIC | Age: 68
End: 2018-06-11

## 2018-06-11 NOTE — TELEPHONE ENCOUNTER
----- Message from Citlaly Joshua sent at 6/11/2018  9:25 AM CDT -----  Contact: Vandana wright/[facility] [phone]  Requesting orders & clinical for diabetic supplies. Strips & lancets. Patient test 2xdaily  Fax [phone]

## 2018-07-09 ENCOUNTER — TELEPHONE (OUTPATIENT)
Dept: FAMILY MEDICINE | Facility: CLINIC | Age: 68
End: 2018-07-09

## 2018-07-11 ENCOUNTER — LAB VISIT (OUTPATIENT)
Dept: LAB | Facility: HOSPITAL | Age: 68
End: 2018-07-11
Attending: FAMILY MEDICINE
Payer: MEDICARE

## 2018-07-11 LAB
ESTIMATED AVG GLUCOSE: 197 MG/DL
HBA1C MFR BLD HPLC: 8.5 %

## 2018-07-11 PROCEDURE — 36415 COLL VENOUS BLD VENIPUNCTURE: CPT | Mod: PO

## 2018-07-11 PROCEDURE — 83036 HEMOGLOBIN GLYCOSYLATED A1C: CPT

## 2018-07-12 ENCOUNTER — TELEPHONE (OUTPATIENT)
Dept: FAMILY MEDICINE | Facility: CLINIC | Age: 68
End: 2018-07-12

## 2018-07-12 NOTE — TELEPHONE ENCOUNTER
Significant improvement in your hemoglobin A1c.  Let's repeat in 2 or 3 months-I will order the lab work

## 2018-07-12 NOTE — TELEPHONE ENCOUNTER
Called pt back to inform her of the significant improvement in her A1c and that Dr Muse would like her to repeat in 2-3 months no answer left message on VM to call office back

## 2018-08-15 ENCOUNTER — TELEPHONE (OUTPATIENT)
Dept: FAMILY MEDICINE | Facility: CLINIC | Age: 68
End: 2018-08-15

## 2018-08-15 RX ORDER — ALPRAZOLAM 0.25 MG/1
0.25 TABLET ORAL 3 TIMES DAILY
Qty: 30 TABLET | Refills: 0 | Status: SHIPPED | OUTPATIENT
Start: 2018-08-15 | End: 2019-03-29

## 2018-08-15 NOTE — TELEPHONE ENCOUNTER
----- Message from Negra Jimenez sent at 8/15/2018  3:15 PM CDT -----  Contact: [phone]  Patient requested to speak with the nurse about getting something to calm her nerves sent to [id number] because she found her son . Please call and advise.

## 2018-08-26 RX ORDER — LISINOPRIL 20 MG/1
TABLET ORAL
Qty: 90 TABLET | Refills: 3 | Status: SHIPPED | OUTPATIENT
Start: 2018-08-26 | End: 2019-03-29

## 2018-08-26 RX ORDER — METOPROLOL TARTRATE 25 MG/1
TABLET, FILM COATED ORAL
Qty: 90 TABLET | Refills: 3 | Status: SHIPPED | OUTPATIENT
Start: 2018-08-26 | End: 2019-07-01 | Stop reason: SDUPTHER

## 2018-09-10 ENCOUNTER — TELEPHONE (OUTPATIENT)
Dept: FAMILY MEDICINE | Facility: CLINIC | Age: 68
End: 2018-09-10

## 2018-09-10 NOTE — TELEPHONE ENCOUNTER
Ok sent in the same insulin used last august      See if we can get you through to December with this medicine

## 2018-09-10 NOTE — TELEPHONE ENCOUNTER
----- Message from Domitila Medina sent at 9/10/2018  8:16 AM CDT -----  Contact: Self/ [phone]  Patient called in to speak with you regarding her insulin. She stated the price went up to $140 on the one she normally takes and she has not taken her insulin for the past 3 days. Patient needs a different prescription for insulin called in.     Please call.

## 2018-09-11 NOTE — TELEPHONE ENCOUNTER
I left message for the pt to return call     Per dr nunez - Ok sent in the same insulin used last august  ( relion - [facility] brand insulin   See if we can get you through to December with this medicine

## 2018-12-06 RX ORDER — GLIMEPIRIDE 4 MG/1
TABLET ORAL
Qty: 90 TABLET | Refills: 2 | Status: SHIPPED | OUTPATIENT
Start: 2018-12-06 | End: 2019-03-29 | Stop reason: SDUPTHER

## 2018-12-29 RX ORDER — CHLORTHALIDONE 25 MG/1
TABLET ORAL
Qty: 90 TABLET | Refills: 0 | Status: SHIPPED | OUTPATIENT
Start: 2018-12-29 | End: 2019-03-29 | Stop reason: SDUPTHER

## 2018-12-29 RX ORDER — METFORMIN HYDROCHLORIDE 500 MG/1
TABLET ORAL
Qty: 180 TABLET | Refills: 0 | Status: SHIPPED | OUTPATIENT
Start: 2018-12-29 | End: 2019-07-01 | Stop reason: SDUPTHER

## 2019-01-09 DIAGNOSIS — Z12.39 SCREENING FOR BREAST CANCER: Primary | ICD-10-CM

## 2019-01-09 NOTE — TELEPHONE ENCOUNTER
----- Message from Clark Dior sent at 1/9/2019  3:39 PM CST -----  Contact: self/[phone]  She would like to schedule mammogram in [place].      Please sign orders and I will then call her to schedule

## 2019-01-10 NOTE — TELEPHONE ENCOUNTER
Patient's Mammogram scheduled 01/18/2019.    Patient also requested a refill on liraglutide 0.6 mg/0.1 mL, 18 mg/3 mL, subq PNIJ 0.6 mg/0.1 mL (18 mg/3 mL) PnIj Inject 1.2 mg into the skin once daily

## 2019-01-18 ENCOUNTER — HOSPITAL ENCOUNTER (OUTPATIENT)
Dept: RADIOLOGY | Facility: HOSPITAL | Age: 69
Discharge: HOME | End: 2019-01-18
Attending: FAMILY MEDICINE
Payer: MEDICARE

## 2019-01-18 DIAGNOSIS — Z12.39 SCREENING FOR BREAST CANCER: ICD-10-CM

## 2019-01-18 PROCEDURE — 77067 SCR MAMMO BI INCL CAD: CPT | Mod: TC,PO,ER

## 2019-01-24 DIAGNOSIS — E11.9 TYPE 2 DIABETES MELLITUS WITHOUT COMPLICATION: ICD-10-CM

## 2019-03-10 ENCOUNTER — HOSPITAL ENCOUNTER (EMERGENCY)
Facility: HOSPITAL | Age: 69
Discharge: HOME | End: 2019-03-10
Attending: EMERGENCY MEDICINE
Payer: MEDICARE

## 2019-03-10 VITALS
RESPIRATION RATE: 18 BRPM | DIASTOLIC BLOOD PRESSURE: 86 MMHG | HEIGHT: 67 IN | WEIGHT: 232 LBS | SYSTOLIC BLOOD PRESSURE: 158 MMHG | BODY MASS INDEX: 36.41 KG/M2 | HEART RATE: 88 BPM | OXYGEN SATURATION: 99 % | TEMPERATURE: 98 F

## 2019-03-10 DIAGNOSIS — M75.02 ADHESIVE CAPSULITIS OF LEFT SHOULDER: Primary | ICD-10-CM

## 2019-03-10 DIAGNOSIS — M25.512 SHOULDER PAIN, LEFT: ICD-10-CM

## 2019-03-10 PROCEDURE — 93005 ELECTROCARDIOGRAM TRACING: CPT | Mod: ER,59

## 2019-03-10 PROCEDURE — 63600175 PHARM REV CODE 636 W HCPCS: Mod: ER | Performed by: EMERGENCY MEDICINE

## 2019-03-10 PROCEDURE — 25000003 PHARM REV CODE 250: Mod: ER | Performed by: EMERGENCY MEDICINE

## 2019-03-10 PROCEDURE — 99284 EMERGENCY DEPT VISIT MOD MDM: CPT | Mod: 25,ER

## 2019-03-10 PROCEDURE — 20610 DRAIN/INJ JOINT/BURSA W/O US: CPT | Mod: ER

## 2019-03-10 PROCEDURE — 93010 ELECTROCARDIOGRAM REPORT: CPT | Mod: ,,, | Performed by: INTERNAL MEDICINE

## 2019-03-10 PROCEDURE — 93010 EKG 12-LEAD: ICD-10-PCS | Mod: ,,, | Performed by: INTERNAL MEDICINE

## 2019-03-10 RX ORDER — TRIAMCINOLONE ACETONIDE 40 MG/ML
40 INJECTION, SUSPENSION INTRA-ARTICULAR; INTRAMUSCULAR
Status: COMPLETED | OUTPATIENT
Start: 2019-03-10 | End: 2019-03-10

## 2019-03-10 RX ORDER — LIDOCAINE HYDROCHLORIDE 10 MG/ML
4 INJECTION INFILTRATION; PERINEURAL
Status: COMPLETED | OUTPATIENT
Start: 2019-03-10 | End: 2019-03-10

## 2019-03-10 RX ORDER — MELOXICAM 15 MG/1
15 TABLET ORAL DAILY
Qty: 30 TABLET | Refills: 0 | Status: SHIPPED | OUTPATIENT
Start: 2019-03-10 | End: 2020-06-15 | Stop reason: SDUPTHER

## 2019-03-10 RX ADMIN — TRIAMCINOLONE ACETONIDE 40 MG: 40 INJECTION, SUSPENSION INTRA-ARTICULAR; INTRAMUSCULAR at 09:03

## 2019-03-10 RX ADMIN — LIDOCAINE HYDROCHLORIDE 4 ML: 10 INJECTION, SOLUTION EPIDURAL; INFILTRATION; INTRACAUDAL; PERINEURAL at 09:03

## 2019-03-10 NOTE — ED PROVIDER NOTES
"Encounter Date: 3/10/2019       History     Chief Complaint   Patient presents with    Shoulder Pain     Pt c/o waking up yesterday with pain to left shoulder, states "can't move it."  Pt denies any injury or known problems.  Pt states took OTC medication for pain at 0200.      69-year-old female presents complaining of waking up and unable to move her left shoulder.  Patient denies any injury or passed injury to left shoulder.  Patient denies fever/chills/nausea/vomiting/skin rash. Patient does have history of diabetes mellitus and high blood pressure.  Patient also has history significant for arthritis.  Pain is described as sharp and 10/10.          Review of patient's allergies indicates:  No Known Allergies  Past Medical History:   Diagnosis Date    Arthritis     Diabetes mellitus     Hypertension     Kidney stone      Past Surgical History:   Procedure Laterality Date    COLONOSCOPY  05/23/2013    dr ram - 5 years    FOOT SURGERY Right 2010    bone spur    FOOT SURGERY Left 2010    fx ankle    HYSTERECTOMY      age 47    KNEE SURGERY Left 06/10/2016    TKR    OOPHORECTOMY      REPLACEMENT-KNEE-TOTAL Left 6/10/2016    [facility]    TONSILLECTOMY       Family History   Problem Relation Age of Onset    Heart attack Mother     Seizures Mother         fell in bathtub and drowned    Colon cancer Sister      Social History     Tobacco Use    Smoking status: Never Smoker    Smokeless tobacco: Never Used   Substance Use Topics    Alcohol use: No     Alcohol/week: 0.0 oz    Drug use: No     Review of Systems   Musculoskeletal: Positive for arthralgias.   All other systems reviewed and are negative.      Physical Exam     Initial Vitals [03/10/19 0715]   BP Pulse Resp Temp SpO2   (!) 160/90 90 18 98.3 °F (36.8 °C) 96 %      MAP       --         Physical Exam    Nursing note and vitals reviewed.  Constitutional: She appears distressed.   Moderate " "distress due to left shoulder pain   HENT:   Head: Normocephalic and atraumatic.   Mouth/Throat: Oropharynx is clear and moist.   Eyes: EOM are normal. Pupils are equal, round, and reactive to light.   Neck: Normal range of motion. Neck supple.   Cardiovascular: Normal rate, regular rhythm and normal heart sounds.   Pulmonary/Chest: Breath sounds normal.   Musculoskeletal: She exhibits tenderness.   Patient unable to move left shoulder at glenohumeral joint without extreme severe pain. Patient appears to have frozen shoulder.  Radial pulses 2+ bilaterally. Pain on palpation of left glenohumeral joint.  No heat present.   Neurological: She is alert. GCS score is 15. GCS eye subscore is 4. GCS verbal subscore is 5. GCS motor subscore is 6.   Skin: Skin is warm. Capillary refill takes less than 2 seconds.         ED Course   Large joint injection  Date/Time: 3/10/2019 10:36 AM  Location procedure was performed: [facility] EMERGENCY DEPARTMENT  Performed by: Molina Horn MD  Authorized by: Molina Horn MD   Pre-op diagnosis: Left frozen shoulder  Post-op diagnosis: Left frozen shoulder  Consent Done: Yes  Consent: Verbal consent obtained. Written consent not obtained.  Risks and benefits: risks, benefits and alternatives were discussed  Consent given by: patient  Patient understanding: patient states understanding of the procedure being performed  Patient consent: the patient's understanding of the procedure matches consent given  Site marked: the operative site was marked  Imaging studies: imaging studies available  Required items: required blood products, implants, devices, and special equipment available  Patient identity confirmed: verbally with patient  Time out: Immediately prior to procedure a "time out" was called to verify the correct patient, procedure, equipment, support staff and site/side marked as required.  Indications: pain   Body area: shoulder  Joint: left shoulder  Local anesthesia used: " no    Anesthesia:  Local anesthesia used: no  Patient sedated: no  Needle size: 22 G  Approach: posterior  Triamcinolone amount: 40 mg  Lidocaine 1% amount: 2 mL  Patient tolerance: Patient tolerated the procedure well with no immediate complications  Technical procedures used: Using sterile technique injected 1 mL of 40 mg of Kenalog mixed with 2 mL of lidocaine 1% into left shoulder joint  Complications: No  Estimated blood loss (mL): 0  Specimens: No  Comments: Injected left frozen shoulder with 40 mg of Kenalog with 2 mL of lidocaine 1% and 2 glenohumeral joint using posterior approach        Labs Reviewed - No data to display  EKG Readings: (Independently Interpreted)   Initial Reading: No STEMI. Rhythm: Normal Sinus Rhythm. Heart Rate: 76. Axis: Left Axis Deviation. Other Impression: Abnormal EKG       Imaging Results          X-Ray Shoulder 2 or More Views Left (Final result)  Result time 03/10/19 08:35:04    Final result by Arron Jaeger MD (03/10/19 08:35:04)                 Impression:      See above      Electronically signed by: Arron Jaeger MD  Date:    03/10/2019  Time:    08:35             Narrative:    EXAMINATION:  XR SHOULDER COMPLETE 2 OR MORE VIEWS LEFT    CLINICAL HISTORY:  shoulder pain;    TECHNIQUE:  Two or three views of the left shoulder were performed.    COMPARISON:  None    FINDINGS:  There is no fracture of the left shoulder.  No dislocation.  There is a large bulky spur arising from the undersurface of the acromion with marked narrowing of the supraspinatus outlet.  Given the size of the undersurface spur a rotator cuff tear is likely.  There is mild osteoarthritis of the glenohumeral joint and mild to moderate osteoarthritis of the acromioclavicular joint.                                               Patient will follow up with Orthopedics for treatment of frozen shoulder and possible MRI to look at rotator cuff.  Patient much improved and increased range of movement after left  shoulder joint injection.  Patient reports feeling 100 times better than when she arrived.        Clinical Impression:       ICD-10-CM ICD-9-CM   1. Adhesive capsulitis of left shoulder M75.02 726.0   2. Shoulder pain, left M25.512 719.41                                Molina Horn MD  03/10/19 1247

## 2019-03-11 ENCOUNTER — TELEPHONE (OUTPATIENT)
Dept: FAMILY MEDICINE | Facility: CLINIC | Age: 69
End: 2019-03-11

## 2019-03-11 NOTE — TELEPHONE ENCOUNTER
----- Message from Belgica Mcduffie sent at 3/11/2019  3:17 PM CDT -----  Contact: [phone] /self  Patient is requesting a call back regarding getting an MRI on her left shoulder. She went to ER and they told her to get the orders from her PCP. French BOURGEOIS   Pt has not been in clinic since Feb 2018  She needs an office visit, A1C and urine microalbumin  ( per PHN ) and many other health maintenance things

## 2019-03-29 ENCOUNTER — OFFICE VISIT (OUTPATIENT)
Dept: FAMILY MEDICINE | Facility: CLINIC | Age: 69
End: 2019-03-29
Payer: MEDICARE

## 2019-03-29 VITALS
HEART RATE: 92 BPM | WEIGHT: 243.81 LBS | DIASTOLIC BLOOD PRESSURE: 82 MMHG | BODY MASS INDEX: 38.27 KG/M2 | OXYGEN SATURATION: 100 % | TEMPERATURE: 99 F | HEIGHT: 67 IN | SYSTOLIC BLOOD PRESSURE: 134 MMHG

## 2019-03-29 DIAGNOSIS — I10 ESSENTIAL HYPERTENSION: ICD-10-CM

## 2019-03-29 DIAGNOSIS — E66.9 NON MORBID OBESITY, UNSPECIFIED OBESITY TYPE: ICD-10-CM

## 2019-03-29 DIAGNOSIS — D64.9 ANEMIA, UNSPECIFIED TYPE: ICD-10-CM

## 2019-03-29 PROCEDURE — 99214 PR OFFICE/OUTPT VISIT, EST, LEVL IV, 30-39 MIN: ICD-10-PCS | Mod: S$GLB,,, | Performed by: FAMILY MEDICINE

## 2019-03-29 PROCEDURE — 3075F PR MOST RECENT SYSTOLIC BLOOD PRESS GE 130-139MM HG: ICD-10-PCS | Mod: CPTII,S$GLB,, | Performed by: FAMILY MEDICINE

## 2019-03-29 PROCEDURE — 3045F PR MOST RECENT HEMOGLOBIN A1C LEVEL 7.0-9.0%: CPT | Mod: CPTII,S$GLB,, | Performed by: FAMILY MEDICINE

## 2019-03-29 PROCEDURE — 99499 RISK ADDL DX/OHS AUDIT: ICD-10-PCS | Mod: S$GLB,,, | Performed by: FAMILY MEDICINE

## 2019-03-29 PROCEDURE — 3045F PR MOST RECENT HEMOGLOBIN A1C LEVEL 7.0-9.0%: ICD-10-PCS | Mod: CPTII,S$GLB,, | Performed by: FAMILY MEDICINE

## 2019-03-29 PROCEDURE — 99499 UNLISTED E&M SERVICE: CPT | Mod: S$GLB,,, | Performed by: FAMILY MEDICINE

## 2019-03-29 PROCEDURE — 3079F PR MOST RECENT DIASTOLIC BLOOD PRESSURE 80-89 MM HG: ICD-10-PCS | Mod: CPTII,S$GLB,, | Performed by: FAMILY MEDICINE

## 2019-03-29 PROCEDURE — 99214 OFFICE O/P EST MOD 30 MIN: CPT | Mod: S$GLB,,, | Performed by: FAMILY MEDICINE

## 2019-03-29 PROCEDURE — 3079F DIAST BP 80-89 MM HG: CPT | Mod: CPTII,S$GLB,, | Performed by: FAMILY MEDICINE

## 2019-03-29 PROCEDURE — 3075F SYST BP GE 130 - 139MM HG: CPT | Mod: CPTII,S$GLB,, | Performed by: FAMILY MEDICINE

## 2019-03-29 RX ORDER — LISINOPRIL 20 MG/1
20 TABLET ORAL DAILY
Qty: 90 TABLET | Refills: 3 | Status: SHIPPED | OUTPATIENT
Start: 2019-03-29 | End: 2019-08-13 | Stop reason: SDUPTHER

## 2019-03-29 RX ORDER — OXYCODONE AND ACETAMINOPHEN 10; 325 MG/1; MG/1
1 TABLET ORAL 2 TIMES DAILY PRN
Status: ON HOLD | COMMUNITY
Start: 2018-12-27 | End: 2022-05-15 | Stop reason: HOSPADM

## 2019-03-29 RX ORDER — GLIMEPIRIDE 4 MG/1
4 TABLET ORAL
Qty: 90 TABLET | Refills: 2 | Status: SHIPPED | OUTPATIENT
Start: 2019-03-29 | End: 2019-07-01 | Stop reason: SDUPTHER

## 2019-03-29 RX ORDER — CHLORTHALIDONE 25 MG/1
25 TABLET ORAL DAILY
Qty: 90 TABLET | Refills: 1 | Status: SHIPPED | OUTPATIENT
Start: 2019-03-29 | End: 2021-08-19

## 2019-03-29 RX ORDER — ATORVASTATIN CALCIUM 10 MG/1
10 TABLET, FILM COATED ORAL EVERY OTHER DAY
Qty: 45 TABLET | Refills: 3 | Status: SHIPPED | OUTPATIENT
Start: 2019-03-29 | End: 2019-07-01 | Stop reason: SDUPTHER

## 2019-03-29 NOTE — PATIENT INSTRUCTIONS
two physical therapist-look on you tube for shoulder exercises    Visit with me every three months to we get your hemoglobin A1c below eight    Please drop off every two weeks a copy of your insulin dosing and your glucose readings so we can adjust.

## 2019-04-01 ENCOUNTER — LAB VISIT (OUTPATIENT)
Dept: LAB | Facility: HOSPITAL | Age: 69
End: 2019-04-01
Attending: FAMILY MEDICINE
Payer: MEDICARE

## 2019-04-01 DIAGNOSIS — E66.9 NON MORBID OBESITY, UNSPECIFIED OBESITY TYPE: ICD-10-CM

## 2019-04-01 DIAGNOSIS — D64.9 ANEMIA, UNSPECIFIED TYPE: ICD-10-CM

## 2019-04-01 DIAGNOSIS — I10 ESSENTIAL HYPERTENSION: ICD-10-CM

## 2019-04-01 LAB
ALBUMIN SERPL BCP-MCNC: 3.9 G/DL (ref 3.5–5.2)
ALP SERPL-CCNC: 75 U/L (ref 38–126)
ALT SERPL W/O P-5'-P-CCNC: 23 U/L (ref 10–44)
ANION GAP SERPL CALC-SCNC: 5 MMOL/L (ref 8–16)
ANISOCYTOSIS BLD QL SMEAR: SLIGHT
AST SERPL-CCNC: 24 U/L (ref 15–46)
BASOPHILS # BLD AUTO: 0.02 K/UL (ref 0–0.2)
BASOPHILS NFR BLD: 0.2 % (ref 0–1.9)
BILIRUB SERPL-MCNC: 0.5 MG/DL (ref 0.1–1)
BUN SERPL-MCNC: 25 MG/DL (ref 7–17)
CALCIUM SERPL-MCNC: 9.7 MG/DL (ref 8.7–10.5)
CHLORIDE SERPL-SCNC: 99 MMOL/L (ref 95–110)
CHOLEST SERPL-MCNC: 145 MG/DL (ref 120–199)
CHOLEST/HDLC SERPL: 3.5 {RATIO} (ref 2–5)
CO2 SERPL-SCNC: 33 MMOL/L (ref 23–29)
CREAT SERPL-MCNC: 0.93 MG/DL (ref 0.5–1.4)
DIFFERENTIAL METHOD: ABNORMAL
EOSINOPHIL # BLD AUTO: 0.2 K/UL (ref 0–0.5)
EOSINOPHIL NFR BLD: 1.8 % (ref 0–8)
ERYTHROCYTE [DISTWIDTH] IN BLOOD BY AUTOMATED COUNT: 15.5 % (ref 11.5–14.5)
EST. GFR  (AFRICAN AMERICAN): >60 ML/MIN/1.73 M^2
EST. GFR  (NON AFRICAN AMERICAN): >60 ML/MIN/1.73 M^2
ESTIMATED AVG GLUCOSE: 240 MG/DL (ref 68–131)
GLUCOSE SERPL-MCNC: 184 MG/DL (ref 70–110)
HBA1C MFR BLD HPLC: 10 % (ref 4–5.6)
HCT VFR BLD AUTO: 36.7 % (ref 37–48.5)
HDLC SERPL-MCNC: 42 MG/DL (ref 40–75)
HDLC SERPL: 29 % (ref 20–50)
HGB BLD-MCNC: 11.4 G/DL (ref 12–16)
HYPOCHROMIA BLD QL SMEAR: ABNORMAL
LDLC SERPL CALC-MCNC: 88.6 MG/DL (ref 63–159)
LYMPHOCYTES # BLD AUTO: 2.4 K/UL (ref 1–4.8)
LYMPHOCYTES NFR BLD: 21.2 % (ref 18–48)
MCH RBC QN AUTO: 21.7 PG (ref 27–31)
MCHC RBC AUTO-ENTMCNC: 31.1 G/DL (ref 32–36)
MCV RBC AUTO: 70 FL (ref 82–98)
MONOCYTES # BLD AUTO: 0.8 K/UL (ref 0.3–1)
MONOCYTES NFR BLD: 6.6 % (ref 4–15)
NEUTROPHILS # BLD AUTO: 8 K/UL (ref 1.8–7.7)
NEUTROPHILS NFR BLD: 70.2 % (ref 38–73)
NONHDLC SERPL-MCNC: 103 MG/DL
PLATELET # BLD AUTO: 210 K/UL (ref 150–350)
PMV BLD AUTO: 12.2 FL (ref 9.2–12.9)
POIKILOCYTOSIS BLD QL SMEAR: SLIGHT
POTASSIUM SERPL-SCNC: 4.3 MMOL/L (ref 3.5–5.1)
PROT SERPL-MCNC: 7.4 G/DL (ref 6–8.4)
RBC # BLD AUTO: 5.26 M/UL (ref 4–5.4)
SODIUM SERPL-SCNC: 137 MMOL/L (ref 136–145)
TRIGL SERPL-MCNC: 72 MG/DL (ref 30–150)
TSH SERPL DL<=0.005 MIU/L-ACNC: 0.43 UIU/ML (ref 0.4–4)
WBC # BLD AUTO: 11.39 K/UL (ref 3.9–12.7)

## 2019-04-01 PROCEDURE — 80061 LIPID PANEL: CPT

## 2019-04-01 PROCEDURE — 36415 COLL VENOUS BLD VENIPUNCTURE: CPT | Mod: PO

## 2019-04-01 PROCEDURE — 85025 COMPLETE CBC W/AUTO DIFF WBC: CPT | Mod: PO

## 2019-04-01 PROCEDURE — 84443 ASSAY THYROID STIM HORMONE: CPT | Mod: PO

## 2019-04-01 PROCEDURE — 80053 COMPREHEN METABOLIC PANEL: CPT | Mod: PO

## 2019-04-01 PROCEDURE — 83036 HEMOGLOBIN GLYCOSYLATED A1C: CPT

## 2019-04-01 NOTE — PROGRESS NOTES
Patient ID: Katherine Berger is a 69 y.o. female.    Chief Complaint: Shoulder Pain; Anxiety; and fluid    HPI       Katherine Berger is a 69 y.o. female here with multiple shoulder pain which is acute or chronic.  No trauma.  No loss of strength or sensation.  Pain is near proximal humerus.  Anxiety-continues to periods of anxiousness.  Ft swelling occasionally.  Taking chlorthalidone which helps.  Diabetes not controlled      Review of Symptoms    Constitutional  some mild activity change, No chills/ fever   Resp  Neg hemoptysis, stridor, choking  CVS  Neg chest pain, palpitations        Physical Exam    Vitals:    03/29/19 0941   BP: 134/82   Pulse: 92   Temp: 98.6 °F (37 °C)       Constitutional:   Oriented to person, place, and time.appears well-developed and well-nourished.   No distress.     HENT:   Head: Normocephalic and atraumatic.     Right Ear: Tympanic membrane, external ear and ear canal normal     Left Ear: Tympanic membrane, external ear and ear canal normal    Nose: External Normal. Normal turbinates, No rhinorrhea     Mouth/Throat: Uvula is midline, oropharynx is clear and moist and mucous membranes are normal.     Neck: supple no anterior cervical adenopathy      Eyes:   Conjunctivae are normal. Right eye exhibits no discharge. Left eye exhibits no discharge. No scleral icterus. No periorbital edema     Cardiovascular:  Regular rate and rhythm with normal S1 and S2     Pulmonary/Chest:   Clear to auscultation bilaterally without wheezes, rhonchi or rales    Musculoskeletal:  No edema. No obvious deformity No wasting    shoulder with no crepitus-no loss range of motion-no loss of stress no erythema or swelling      Neurological:   Alert and oriented to person, place, and time. Coordination normal.     Skin:   Skin is warm and dry.   No diaphoresis.   No rash noted.    Psychiatric: Normal mood and affect. Behavior is normal. Judgment and thought content normal.       Assessment / Plan:      ICD-10-CM  ICD-9-CM   1. Uncontrolled type 2 diabetes mellitus without complication, with long-term current use of insulin E11.65 250.02    Z79.4 V58.67   2. Essential hypertension I10 401.9   3. Anemia, unspecified type D64.9 285.9   4. Non morbid obesity, unspecified obesity type E66.9 278.00     Uncontrolled type 2 diabetes mellitus without complication, with long-term current use of insulin  -     Lipid panel; Future; Expected date: 03/29/2019  -     CBC auto differential; Future; Expected date: 03/29/2019  -     Comprehensive metabolic panel; Future; Expected date: 03/29/2019  -     TSH; Future; Expected date: 03/29/2019  -     Hemoglobin A1c; Future; Expected date: 03/29/2019  -     MICROALBUMIN / CREATININE RATIO URINE; Future; Expected date: 03/29/2019    Essential hypertension  -     Lipid panel; Future; Expected date: 03/29/2019  -     CBC auto differential; Future; Expected date: 03/29/2019  -     Comprehensive metabolic panel; Future; Expected date: 03/29/2019  -     TSH; Future; Expected date: 03/29/2019  -     Hemoglobin A1c; Future; Expected date: 03/29/2019  -     MICROALBUMIN / CREATININE RATIO URINE; Future; Expected date: 03/29/2019    Anemia, unspecified type  -     Lipid panel; Future; Expected date: 03/29/2019  -     CBC auto differential; Future; Expected date: 03/29/2019  -     Comprehensive metabolic panel; Future; Expected date: 03/29/2019  -     TSH; Future; Expected date: 03/29/2019  -     Hemoglobin A1c; Future; Expected date: 03/29/2019  -     MICROALBUMIN / CREATININE RATIO URINE; Future; Expected date: 03/29/2019    Non morbid obesity, unspecified obesity type  -     Lipid panel; Future; Expected date: 03/29/2019  -     CBC auto differential; Future; Expected date: 03/29/2019  -     Comprehensive metabolic panel; Future; Expected date: 03/29/2019  -     TSH; Future; Expected date: 03/29/2019  -     Hemoglobin A1c; Future; Expected date: 03/29/2019  -     MICROALBUMIN / CREATININE RATIO URINE;  Future; Expected date: 03/29/2019    Other orders  -     chlorthalidone (HYGROTEN) 25 MG Tab; Take 1 tablet (25 mg total) by mouth once daily.  Dispense: 90 tablet; Refill: 1  -     lisinopril (PRINIVIL,ZESTRIL) 20 MG tablet; Take 1 tablet (20 mg total) by mouth once daily.  Dispense: 90 tablet; Refill: 3  -     insulin NPH (NOVOLIN N) 100 unit/mL injection; 25 units in the am and 15 units in the pm  Dispense: 10 mL; Refill: 2  -     atorvastatin (LIPITOR) 10 MG tablet; Take 1 tablet (10 mg total) by mouth every other day.  Dispense: 45 tablet; Refill: 3  -     glimepiride (AMARYL) 4 MG tablet; Take 1 tablet (4 mg total) by mouth before breakfast.  Dispense: 90 tablet; Refill: 2    two physical therapist-look on you tube for shoulder exercises    Visit with me every three months to we get your hemoglobin A1c below eight    Please drop off every two weeks a copy of your insulin dosing and

## 2019-04-02 ENCOUNTER — TELEPHONE (OUTPATIENT)
Dept: FAMILY MEDICINE | Facility: CLINIC | Age: 69
End: 2019-04-02

## 2019-04-02 DIAGNOSIS — D64.9 ANEMIA, UNSPECIFIED TYPE: ICD-10-CM

## 2019-04-02 DIAGNOSIS — I10 ESSENTIAL HYPERTENSION: ICD-10-CM

## 2019-04-02 NOTE — TELEPHONE ENCOUNTER
I spoke the pt and notified of last results  She will drop off glucose reading in 2 weeks for dr nunez to review      Raine,  The patient agrees to seeing Endo. Could you please reach out to her again to attempt to set up this appt.?  Thank you  Krys

## 2019-04-02 NOTE — TELEPHONE ENCOUNTER
Your blood glucose is very elevated continue to slowly increase your insulin so your fasting glucose is below 200  Please bring me readings  Also please draw labs for iron levels your blood count is just a little low.  I will order. och    I would like to send you to endocrine for evaluation

## 2019-04-04 ENCOUNTER — TELEPHONE (OUTPATIENT)
Dept: FAMILY MEDICINE | Facility: CLINIC | Age: 69
End: 2019-04-04

## 2019-04-04 NOTE — TELEPHONE ENCOUNTER
Toribio Marcano, I've tried calling this patient several times and have not been able to contact her, I sent her a letter to contact me

## 2019-04-05 ENCOUNTER — HOSPITAL ENCOUNTER (EMERGENCY)
Facility: HOSPITAL | Age: 69
Discharge: HOME | End: 2019-04-05
Attending: SURGERY
Payer: MEDICARE

## 2019-04-05 VITALS
HEART RATE: 73 BPM | WEIGHT: 240 LBS | BODY MASS INDEX: 37.67 KG/M2 | RESPIRATION RATE: 18 BRPM | SYSTOLIC BLOOD PRESSURE: 152 MMHG | HEIGHT: 67 IN | OXYGEN SATURATION: 99 % | TEMPERATURE: 98 F | DIASTOLIC BLOOD PRESSURE: 85 MMHG

## 2019-04-05 DIAGNOSIS — M54.32 SCIATICA OF LEFT SIDE: Primary | ICD-10-CM

## 2019-04-05 PROCEDURE — 99284 EMERGENCY DEPT VISIT MOD MDM: CPT | Mod: 25,ER

## 2019-04-05 PROCEDURE — 63600175 PHARM REV CODE 636 W HCPCS: Mod: ER | Performed by: SURGERY

## 2019-04-05 PROCEDURE — 96372 THER/PROPH/DIAG INJ SC/IM: CPT | Mod: ER

## 2019-04-05 PROCEDURE — 25000003 PHARM REV CODE 250: Mod: ER | Performed by: SURGERY

## 2019-04-05 RX ORDER — DEXAMETHASONE SODIUM PHOSPHATE 4 MG/ML
8 INJECTION, SOLUTION INTRA-ARTICULAR; INTRALESIONAL; INTRAMUSCULAR; INTRAVENOUS; SOFT TISSUE
Status: COMPLETED | OUTPATIENT
Start: 2019-04-05 | End: 2019-04-05

## 2019-04-05 RX ORDER — HYDROCODONE BITARTRATE AND ACETAMINOPHEN 10; 325 MG/1; MG/1
1 TABLET ORAL
Status: COMPLETED | OUTPATIENT
Start: 2019-04-05 | End: 2019-04-05

## 2019-04-05 RX ORDER — HYDROCODONE BITARTRATE AND ACETAMINOPHEN 10; 325 MG/1; MG/1
1 TABLET ORAL EVERY 8 HOURS PRN
Qty: 12 TABLET | Refills: 0 | Status: SHIPPED | OUTPATIENT
Start: 2019-04-05 | End: 2019-04-08

## 2019-04-05 RX ORDER — ONDANSETRON 4 MG/1
8 TABLET, ORALLY DISINTEGRATING ORAL
Status: COMPLETED | OUTPATIENT
Start: 2019-04-05 | End: 2019-04-05

## 2019-04-05 RX ADMIN — DEXAMETHASONE SODIUM PHOSPHATE 8 MG: 4 INJECTION, SOLUTION INTRAMUSCULAR; INTRAVENOUS at 09:04

## 2019-04-05 RX ADMIN — ONDANSETRON 8 MG: 4 TABLET, ORALLY DISINTEGRATING ORAL at 09:04

## 2019-04-05 RX ADMIN — HYDROCODONE BITARTRATE AND ACETAMINOPHEN 1 TABLET: 10; 325 TABLET ORAL at 09:04

## 2019-04-05 NOTE — ED PROVIDER NOTES
Encounter Date: 4/5/2019       History     Chief Complaint   Patient presents with    Hip Pain     PT reports left hip pain x 2 days. Denies injury     Patient complains of lower back pain radiating down her left hip for last 2 days.  She denies trauma she has no abdominal pain or CVA tenderness or hematuria or dysuria    The history is provided by the patient.   Hip Pain   This is a new problem. The current episode started 2 days ago. The problem occurs hourly. The problem has not changed since onset.Pertinent negatives include no chest pain, no abdominal pain and no headaches. The symptoms are aggravated by walking and twisting. Nothing relieves the symptoms. She has tried nothing for the symptoms. The treatment provided no relief.     Review of patient's allergies indicates:  No Known Allergies  Past Medical History:   Diagnosis Date    Arthritis     Diabetes mellitus     Hypertension     Kidney stone      Past Surgical History:   Procedure Laterality Date    COLONOSCOPY  05/23/2013    dr ram - 5 years    FOOT SURGERY Right 2010    bone spur    FOOT SURGERY Left 2010    fx ankle    HYSTERECTOMY      age 47    KNEE SURGERY Left 06/10/2016    TKR    OOPHORECTOMY      REPLACEMENT-KNEE-TOTAL Left 6/10/2016    [facility]    TONSILLECTOMY       Family History   Problem Relation Age of Onset    Heart attack Mother     Seizures Mother         fell in bathtub and drowned    Colon cancer Sister      Social History     Tobacco Use    Smoking status: Never Smoker    Smokeless tobacco: Never Used   Substance Use Topics    Alcohol use: No     Alcohol/week: 0.0 oz    Drug use: No     Review of Systems   Constitutional: Negative.    HENT: Negative.    Respiratory: Negative.    Cardiovascular: Negative for chest pain.   Gastrointestinal: Negative.  Negative for abdominal pain.   Endocrine: Negative.    Genitourinary: Negative.    Musculoskeletal: Positive for  back pain.   Skin: Negative.    Neurological: Negative.  Negative for headaches.   Hematological: Negative.    Psychiatric/Behavioral: Negative.        Physical Exam     Initial Vitals [04/05/19 0832]   BP Pulse Resp Temp SpO2   (!) 167/77 73 18 97.9 °F (36.6 °C) 99 %      MAP       --         Physical Exam    Nursing note and vitals reviewed.  Constitutional: She appears well-developed and well-nourished.   Neck: Normal range of motion.   Cardiovascular: Normal rate.   Pulmonary/Chest: Breath sounds normal.   Abdominal: Soft. She exhibits no distension.   Musculoskeletal: She exhibits tenderness.        Arms:        ED Course   Procedures  Labs Reviewed - No data to display       Imaging Results    None          Medical Decision Making:   Initial Assessment:   Sciatica  ED Management:  Patient on Mobic sciatica treated with steroid shots and will treat with short-term analgesics and follow up with primary doctor                      Clinical Impression:       ICD-10-CM ICD-9-CM   1. Sciatica of left side M54.32 724.3         Disposition:   Disposition: Discharged  Condition: Stable                        TYLOR Soto III, MD  04/05/19 1030

## 2019-04-12 ENCOUNTER — TELEPHONE (OUTPATIENT)
Dept: FAMILY MEDICINE | Facility: CLINIC | Age: 69
End: 2019-04-12

## 2019-04-13 ENCOUNTER — OFFICE VISIT (OUTPATIENT)
Dept: FAMILY MEDICINE | Facility: CLINIC | Age: 69
End: 2019-04-13
Payer: MEDICARE

## 2019-04-13 VITALS
TEMPERATURE: 99 F | OXYGEN SATURATION: 98 % | WEIGHT: 244.69 LBS | SYSTOLIC BLOOD PRESSURE: 118 MMHG | DIASTOLIC BLOOD PRESSURE: 66 MMHG | HEIGHT: 67 IN | BODY MASS INDEX: 38.4 KG/M2 | HEART RATE: 99 BPM

## 2019-04-13 DIAGNOSIS — F41.9 ANXIETY: Primary | ICD-10-CM

## 2019-04-13 DIAGNOSIS — M54.30 SCIATIC LEG PAIN: ICD-10-CM

## 2019-04-13 DIAGNOSIS — M54.41 ACUTE RIGHT-SIDED LOW BACK PAIN WITH RIGHT-SIDED SCIATICA: ICD-10-CM

## 2019-04-13 PROCEDURE — 99213 PR OFFICE/OUTPT VISIT, EST, LEVL III, 20-29 MIN: ICD-10-PCS | Mod: S$GLB,,, | Performed by: FAMILY MEDICINE

## 2019-04-13 PROCEDURE — 3074F PR MOST RECENT SYSTOLIC BLOOD PRESSURE < 130 MM HG: ICD-10-PCS | Mod: CPTII,S$GLB,, | Performed by: FAMILY MEDICINE

## 2019-04-13 PROCEDURE — 3074F SYST BP LT 130 MM HG: CPT | Mod: CPTII,S$GLB,, | Performed by: FAMILY MEDICINE

## 2019-04-13 PROCEDURE — 99213 OFFICE O/P EST LOW 20 MIN: CPT | Mod: S$GLB,,, | Performed by: FAMILY MEDICINE

## 2019-04-13 PROCEDURE — 1101F PT FALLS ASSESS-DOCD LE1/YR: CPT | Mod: CPTII,S$GLB,, | Performed by: FAMILY MEDICINE

## 2019-04-13 PROCEDURE — 3078F DIAST BP <80 MM HG: CPT | Mod: CPTII,S$GLB,, | Performed by: FAMILY MEDICINE

## 2019-04-13 PROCEDURE — 1101F PR PT FALLS ASSESS DOC 0-1 FALLS W/OUT INJ PAST YR: ICD-10-PCS | Mod: CPTII,S$GLB,, | Performed by: FAMILY MEDICINE

## 2019-04-13 PROCEDURE — 3078F PR MOST RECENT DIASTOLIC BLOOD PRESSURE < 80 MM HG: ICD-10-PCS | Mod: CPTII,S$GLB,, | Performed by: FAMILY MEDICINE

## 2019-04-13 RX ORDER — ESCITALOPRAM OXALATE 5 MG/1
5 TABLET ORAL DAILY
Qty: 30 TABLET | Refills: 11 | Status: SHIPPED | OUTPATIENT
Start: 2019-04-13 | End: 2020-06-10

## 2019-04-13 RX ORDER — METHYLPREDNISOLONE 4 MG/1
TABLET ORAL
Qty: 21 TABLET | Refills: 0 | Status: SHIPPED | OUTPATIENT
Start: 2019-04-13 | End: 2019-05-04

## 2019-04-13 NOTE — PROGRESS NOTES
Subjective:       Patient ID: Katherine Berger is a 69 y.o. female.    Chief Complaint: Sciatica    Back Pain   This is a new problem. The current episode started in the past 7 days. The problem occurs constantly (Was seen in the ER last week for this.  Was diagnosed with sciatica and treated with steroids. ). The problem is unchanged. The pain is present in the lumbar spine and sacro-iliac. The pain is moderate. The pain is the same all the time. The symptoms are aggravated by bending, standing and twisting. Associated symptoms include leg pain, numbness and weakness. Pertinent negatives include no abdominal pain, bladder incontinence, bowel incontinence, chest pain, dysuria, fever, headaches, paresis, paresthesias, pelvic pain, perianal numbness, tingling or weight loss. Risk factors include lack of exercise, obesity, poor posture and sedentary lifestyle. She has tried analgesics (has percocet) for the symptoms. The treatment provided no relief.     Review of Systems   Constitutional: Negative for activity change, appetite change, chills, fatigue, fever and weight loss.   HENT: Negative for congestion, ear discharge, ear pain, rhinorrhea, sinus pain, sore throat and trouble swallowing.    Eyes: Negative for photophobia, pain, redness, itching and visual disturbance.   Respiratory: Negative for cough, chest tightness, shortness of breath and wheezing.    Cardiovascular: Negative for chest pain, palpitations and leg swelling.   Gastrointestinal: Negative for abdominal distention, abdominal pain, blood in stool, bowel incontinence, diarrhea, nausea and vomiting.   Genitourinary: Negative for bladder incontinence, dysuria, pelvic pain, vaginal bleeding, vaginal discharge and vaginal pain.   Musculoskeletal: Positive for back pain. Negative for arthralgias, gait problem and neck pain.   Skin: Negative for color change, pallor and rash.   Neurological: Positive for weakness and numbness. Negative for dizziness, tingling,  tremors, light-headedness, headaches and paresthesias.   Psychiatric/Behavioral: Negative for agitation, behavioral problems, confusion and sleep disturbance.       Objective:      Physical Exam   Constitutional: She appears well-developed and well-nourished.   HENT:   Head: Normocephalic.   Eyes: Conjunctivae are normal.   Neck: Normal range of motion. Neck supple.   Cardiovascular: Normal rate, regular rhythm and normal heart sounds.   Pulmonary/Chest: Effort normal and breath sounds normal.   Musculoskeletal:        Lumbar back: She exhibits decreased range of motion and tenderness. She exhibits no bony tenderness, no swelling, no edema, no deformity, no laceration, no pain and no spasm.        Back:    Diffuse tenderness  Throughout lumbar spine paraspinals and in bilateral quadratus lumborum and sacroiliiac.     ROM limited by pain.    Neurological: She is alert. She has normal strength. No cranial nerve deficit or sensory deficit.   Skin: Skin is warm and dry.   Psychiatric: Her behavior is normal.       Assessment:       1. Anxiety    2. Sciatic leg pain    3. Acute right-sided low back pain with right-sided sciatica        Plan:       Anxiety  -     escitalopram oxalate (LEXAPRO) 5 MG Tab; Take 1 tablet (5 mg total) by mouth once daily.  Dispense: 30 tablet; Refill: 11    Sciatic leg pain  -     methylPREDNISolone (MEDROL DOSEPACK) 4 mg tablet; use as directed  Dispense: 21 tablet; Refill: 0  -     Ambulatory Referral to Physical/Occupational Therapy    Acute right-sided low back pain with right-sided sciatica  -     X-Ray Lumbar Spine AP And Lateral; Future; Expected date: 04/13/2019

## 2019-04-15 ENCOUNTER — TELEPHONE (OUTPATIENT)
Dept: FAMILY MEDICINE | Facility: CLINIC | Age: 69
End: 2019-04-15

## 2019-04-16 ENCOUNTER — HOSPITAL ENCOUNTER (OUTPATIENT)
Dept: RADIOLOGY | Facility: HOSPITAL | Age: 69
Discharge: HOME | End: 2019-04-16
Attending: FAMILY MEDICINE
Payer: MEDICARE

## 2019-04-16 DIAGNOSIS — M54.41 ACUTE RIGHT-SIDED LOW BACK PAIN WITH RIGHT-SIDED SCIATICA: ICD-10-CM

## 2019-04-16 PROCEDURE — 72100 X-RAY EXAM L-S SPINE 2/3 VWS: CPT | Mod: TC,FY,PO

## 2019-06-05 ENCOUNTER — PATIENT OUTREACH (OUTPATIENT)
Dept: ADMINISTRATIVE | Facility: HOSPITAL | Age: 69
End: 2019-06-05

## 2019-07-01 ENCOUNTER — OFFICE VISIT (OUTPATIENT)
Dept: FAMILY MEDICINE | Facility: CLINIC | Age: 69
End: 2019-07-01
Payer: MEDICARE

## 2019-07-01 VITALS
DIASTOLIC BLOOD PRESSURE: 64 MMHG | BODY MASS INDEX: 39.19 KG/M2 | TEMPERATURE: 98 F | OXYGEN SATURATION: 98 % | SYSTOLIC BLOOD PRESSURE: 124 MMHG | HEIGHT: 67 IN | WEIGHT: 249.69 LBS | HEART RATE: 92 BPM

## 2019-07-01 DIAGNOSIS — R30.0 DYSURIA: Primary | ICD-10-CM

## 2019-07-01 LAB
BACTERIA #/AREA URNS AUTO: ABNORMAL /HPF
BILIRUB UR QL STRIP: NEGATIVE
CLARITY UR REFRACT.AUTO: ABNORMAL
COLOR UR AUTO: ABNORMAL
GLUCOSE UR QL STRIP: NEGATIVE
HGB UR QL STRIP: NEGATIVE
KETONES UR QL STRIP: NEGATIVE
LEUKOCYTE ESTERASE UR QL STRIP: NEGATIVE
MICROSCOPIC COMMENT: ABNORMAL
NITRITE UR QL STRIP: NEGATIVE
PH UR STRIP: 5 [PH] (ref 5–8)
PROT UR QL STRIP: NEGATIVE
SP GR UR STRIP: 1.02 (ref 1–1.03)
SQUAMOUS #/AREA URNS AUTO: 18 /HPF
URN SPEC COLLECT METH UR: ABNORMAL
WBC #/AREA URNS AUTO: 4 /HPF (ref 0–5)

## 2019-07-01 PROCEDURE — 99499 UNLISTED E&M SERVICE: CPT | Mod: S$GLB,,, | Performed by: FAMILY MEDICINE

## 2019-07-01 PROCEDURE — 81001 URINALYSIS AUTO W/SCOPE: CPT

## 2019-07-01 PROCEDURE — 1101F PT FALLS ASSESS-DOCD LE1/YR: CPT | Mod: CPTII,S$GLB,, | Performed by: FAMILY MEDICINE

## 2019-07-01 PROCEDURE — 3074F PR MOST RECENT SYSTOLIC BLOOD PRESSURE < 130 MM HG: ICD-10-PCS | Mod: CPTII,S$GLB,, | Performed by: FAMILY MEDICINE

## 2019-07-01 PROCEDURE — 99499 RISK ADDL DX/OHS AUDIT: ICD-10-PCS | Mod: S$GLB,,, | Performed by: FAMILY MEDICINE

## 2019-07-01 PROCEDURE — 99213 PR OFFICE/OUTPT VISIT, EST, LEVL III, 20-29 MIN: ICD-10-PCS | Mod: S$GLB,,, | Performed by: FAMILY MEDICINE

## 2019-07-01 PROCEDURE — 1101F PR PT FALLS ASSESS DOC 0-1 FALLS W/OUT INJ PAST YR: ICD-10-PCS | Mod: CPTII,S$GLB,, | Performed by: FAMILY MEDICINE

## 2019-07-01 PROCEDURE — 99213 OFFICE O/P EST LOW 20 MIN: CPT | Mod: S$GLB,,, | Performed by: FAMILY MEDICINE

## 2019-07-01 PROCEDURE — 3078F PR MOST RECENT DIASTOLIC BLOOD PRESSURE < 80 MM HG: ICD-10-PCS | Mod: CPTII,S$GLB,, | Performed by: FAMILY MEDICINE

## 2019-07-01 PROCEDURE — 3078F DIAST BP <80 MM HG: CPT | Mod: CPTII,S$GLB,, | Performed by: FAMILY MEDICINE

## 2019-07-01 PROCEDURE — 3074F SYST BP LT 130 MM HG: CPT | Mod: CPTII,S$GLB,, | Performed by: FAMILY MEDICINE

## 2019-07-01 RX ORDER — AMITRIPTYLINE HYDROCHLORIDE 10 MG/1
TABLET, FILM COATED ORAL
Qty: 90 TABLET | Refills: 3 | Status: SHIPPED | OUTPATIENT
Start: 2019-07-01 | End: 2020-06-15 | Stop reason: SDUPTHER

## 2019-07-01 RX ORDER — GABAPENTIN 100 MG/1
CAPSULE ORAL
Qty: 90 CAPSULE | Refills: 2 | Status: SHIPPED | OUTPATIENT
Start: 2019-07-01 | End: 2020-06-15 | Stop reason: SDUPTHER

## 2019-07-01 RX ORDER — METFORMIN HYDROCHLORIDE 500 MG/1
TABLET ORAL
Qty: 180 TABLET | Refills: 0 | Status: SHIPPED | OUTPATIENT
Start: 2019-07-01 | End: 2019-07-01

## 2019-07-01 RX ORDER — ATORVASTATIN CALCIUM 10 MG/1
10 TABLET, FILM COATED ORAL EVERY OTHER DAY
Qty: 45 TABLET | Refills: 3 | Status: SHIPPED | OUTPATIENT
Start: 2019-07-01 | End: 2020-06-15 | Stop reason: SDUPTHER

## 2019-07-01 RX ORDER — GLIMEPIRIDE 4 MG/1
4 TABLET ORAL
Qty: 90 TABLET | Refills: 2 | Status: SHIPPED | OUTPATIENT
Start: 2019-07-01 | End: 2020-06-15 | Stop reason: SDUPTHER

## 2019-07-01 RX ORDER — METFORMIN HYDROCHLORIDE 500 MG/1
1000 TABLET, EXTENDED RELEASE ORAL
Qty: 180 TABLET | Refills: 3 | Status: SHIPPED | OUTPATIENT
Start: 2019-07-01 | End: 2020-06-15 | Stop reason: SDUPTHER

## 2019-07-01 RX ORDER — METOPROLOL TARTRATE 25 MG/1
25 TABLET, FILM COATED ORAL DAILY
Qty: 90 TABLET | Refills: 3 | Status: SHIPPED | OUTPATIENT
Start: 2019-07-01 | End: 2020-06-15 | Stop reason: SDUPTHER

## 2019-07-01 RX ORDER — TAMSULOSIN HYDROCHLORIDE 0.4 MG/1
CAPSULE ORAL
Qty: 30 CAPSULE | Refills: 11 | Status: SHIPPED | OUTPATIENT
Start: 2019-07-01 | End: 2020-09-08 | Stop reason: SDUPTHER

## 2019-07-01 NOTE — PROGRESS NOTES
HM: Release signed to obtain reports on Annual Eye Exam done this year with . Also signed release for colonoscopy report from [facility].

## 2019-07-01 NOTE — TELEPHONE ENCOUNTER
----- Message from Clark Dior sent at 7/1/2019  8:30 AM CDT -----  Contact: self/[phone]  She is in severe pain with the kidney stone and is requesting an appointment today.

## 2019-07-02 ENCOUNTER — LAB VISIT (OUTPATIENT)
Dept: LAB | Facility: HOSPITAL | Age: 69
End: 2019-07-02
Attending: FAMILY MEDICINE
Payer: MEDICARE

## 2019-07-02 DIAGNOSIS — I10 ESSENTIAL HYPERTENSION: ICD-10-CM

## 2019-07-02 DIAGNOSIS — D64.9 ANEMIA, UNSPECIFIED TYPE: ICD-10-CM

## 2019-07-02 LAB
ESTIMATED AVG GLUCOSE: 246 MG/DL (ref 68–131)
FERRITIN SERPL-MCNC: 121 NG/ML (ref 20–300)
HBA1C MFR BLD HPLC: 10.2 % (ref 4–5.6)
IRON SERPL-MCNC: 89 UG/DL (ref 30–160)
SATURATED IRON: 25 % (ref 20–50)
TOTAL IRON BINDING CAPACITY: 358 UG/DL (ref 250–450)
TRANSFERRIN SERPL-MCNC: 242 MG/DL (ref 200–375)

## 2019-07-02 PROCEDURE — 83036 HEMOGLOBIN GLYCOSYLATED A1C: CPT

## 2019-07-02 PROCEDURE — 83540 ASSAY OF IRON: CPT | Mod: PO

## 2019-07-02 PROCEDURE — 36415 COLL VENOUS BLD VENIPUNCTURE: CPT | Mod: PO

## 2019-07-02 PROCEDURE — 82728 ASSAY OF FERRITIN: CPT

## 2019-07-02 NOTE — PROGRESS NOTES
" Patient ID: Katherine Berger is a 69 y.o. female.    Chief Complaint: Nephrolithiasis    HPI      Katherine Berger is a 69 y.o. female here with complaints of possible nephrolithiasis.  Patient states has some abdominal bloating and fullness periodically.  Wonders if this is from a kidney stone.  No patrica dysuria-no gross hematuria-no flank pain-no inguinal pain.  Continues to periodically have pain running down her leg-takes Neurontin one in the morning and two in the evening  Diabetes-still takes medication-was not controlled last time      Vitals:    07/01/19 1143   BP: 124/64   Pulse: 92   Temp: 98.3 °F (36.8 °C)   SpO2: 98%   Weight: 113.3 kg (249 lb 10.7 oz)   Height: 5' 7" (1.702 m)            Review of Symptoms    Constitutional  Neg activity change, No chills /fever   Resp  Neg hemoptysis, stridor, choking  CVS  Neg chest pain, palpitations    Physical Exam    Constitutional:  Oriented to person, place, and time.appears well-developed and well-nourished.  No distress.      HENT  Head: Normocephalic and atraumatic  Right Ear: External ear normal.   Left Ear: External ear normal.   Nose: External nose normal.   Mouth:  Moist mucus membranes.    Eyes:  Conjunctivae are normal. Right eye exhibits no discharge.  Left eye exhibits no discharge. No scleral icterus.  No periorbital edema    Cardiovascular:  Regular rate and rhythm with normal S1 and S2     Pulmonary/Chest:   Clear to auscultation bilaterally without wheezes, rhonchi or rales      Musculoskeletal:  No edema. No obvious deformity No wasting    Abdomen soft nontender nondistended     Neurological:  Alert and oriented to person, place, and time.   Coordination normal.     Skin:   Skin is warm and dry.  No diaphoresis.   No rash noted.     Psychiatric: Normal mood and affect. Behavior is normal.  Judgment and thought content normal.     Complete Blood Count  Lab Results   Component Value Date    RBC 5.26 04/01/2019    HGB 11.4 (L) 04/01/2019    HCT 36.7 (L) " 04/01/2019    MCV 70 (L) 04/01/2019    MCH 21.7 (L) 04/01/2019    MCHC 31.1 (L) 04/01/2019    RDW 15.5 (H) 04/01/2019     04/01/2019    MPV 12.2 04/01/2019    GRAN 8.0 (H) 04/01/2019    GRAN 70.2 04/01/2019    LYMPH 2.4 04/01/2019    LYMPH 21.2 04/01/2019    MONO 0.8 04/01/2019    MONO 6.6 04/01/2019    EOS 0.2 04/01/2019    BASO 0.02 04/01/2019    EOSINOPHIL 1.8 04/01/2019    BASOPHIL 0.2 04/01/2019    DIFFMETHOD Automated 04/01/2019       Comprehensive Metabolic Panel  Lab Results   Component Value Date     (H) 04/01/2019    BUN 25 (H) 04/01/2019    CREATININE 0.93 04/01/2019     04/01/2019    K 4.3 04/01/2019    CL 99 04/01/2019    PROT 7.4 04/01/2019    ALBUMIN 3.9 04/01/2019    BILITOT 0.5 04/01/2019    AST 24 04/01/2019    ALKPHOS 75 04/01/2019    CO2 33 (H) 04/01/2019    ALT 23 04/01/2019    ANIONGAP 5 (L) 04/01/2019    EGFRNONAA >60.0 04/01/2019    ESTGFRAFRICA >60.0 04/01/2019       TSH  Lab Results   Component Value Date    TSH 0.427 04/01/2019       Assessment / Plan:      ICD-10-CM ICD-9-CM   1. Dysuria R30.0 788.1     Dysuria  -     URINALYSIS    Other orders  -     metoprolol tartrate (LOPRESSOR) 25 MG tablet; Take 1 tablet (25 mg total) by mouth once daily.  Dispense: 90 tablet; Refill: 3  -     glimepiride (AMARYL) 4 MG tablet; Take 1 tablet (4 mg total) by mouth before breakfast.  Dispense: 90 tablet; Refill: 2  -     gabapentin (NEURONTIN) 100 MG capsule; Takes on in am and two in pm for back pain radiating down leg.  Dispense: 90 capsule; Refill: 2  -     atorvastatin (LIPITOR) 10 MG tablet; Take 1 tablet (10 mg total) by mouth every other day.  Dispense: 45 tablet; Refill: 3  -     amitriptyline (ELAVIL) 10 MG tablet; TAKE ONE TO THREE TABLETS BY MOUTH AT BEDTIME FOR  HEADACHE  Dispense: 90 tablet; Refill: 3  -     metFORMIN (GLUCOPHAGE-XR) 500 MG 24 hr tablet; Take 2 tablets (1,000 mg total) by mouth daily with breakfast.  Dispense: 180 tablet; Refill: 3  -     Urinalysis  Microscopic      Urinalysis with no occult blood-cloudy with no evidence of leukocytes or nitrite

## 2019-07-05 ENCOUNTER — TELEPHONE (OUTPATIENT)
Dept: FAMILY MEDICINE | Facility: CLINIC | Age: 69
End: 2019-07-05

## 2019-07-06 NOTE — TELEPHONE ENCOUNTER
Your lab work is very good except your hemoglobin A1c is at 10 and out of control    Your blood indices ferritin iron are normal.    I would like to change her insulin from NPH to 70 30 if that is okay.  It will give us 70% of the NPH that you take now Plus 30% of the more rapid acting insulin.  You can stop taking Amaryl after starting this medication.  Please send this back if she agrees to start the 70 30     I have sent a referral to dietary education bc your glucose is eleveted

## 2019-07-08 ENCOUNTER — TELEPHONE (OUTPATIENT)
Dept: ADMINISTRATIVE | Facility: OTHER | Age: 69
End: 2019-07-08

## 2019-07-08 NOTE — TELEPHONE ENCOUNTER
Patient advised of lab results; Patient agree's to change insulin to Novolog 70/30. Please send new prescription to the pharmacy. Patient will stop Amaryl

## 2019-07-11 ENCOUNTER — TELEPHONE (OUTPATIENT)
Dept: ADMINISTRATIVE | Facility: HOSPITAL | Age: 69
End: 2019-07-11

## 2019-08-05 ENCOUNTER — TELEPHONE (OUTPATIENT)
Dept: FAMILY MEDICINE | Facility: CLINIC | Age: 69
End: 2019-08-05

## 2019-08-05 NOTE — TELEPHONE ENCOUNTER
----- Message from Lucinda Menon sent at 8/5/2019  8:44 AM CDT -----  Contact: Katherine  Type: Needs Medical Advice    Who Called:  patient  Pharmacy name and phone #:    [facility] - [address] [facility]  [address] [place] [address]  Phone: [phone] Fax: [phone]    Best Call Back Number: [phone]  Additional Information: patient states she just bought the insulin NPH/Reg human (HUMULIN 70/30) 100 unit/mL (70-30) InPn pen for $80 & it is already gone & she can't afford $240--patient would like to change this medication to the vial so she can afford it--please advise--thank you

## 2019-08-12 NOTE — TELEPHONE ENCOUNTER
----- Message from Sherrill Dior sent at 8/12/2019  4:07 PM CDT -----  Contact: [facility] [phone]  [facility] called requesting medical necessity and clinical notes for patients diabetic supplies.       Diabetic supplies order given to Eder with PHN to complete    Pt requesting dietitian - I discussed with eder and she will discuss further with the pt and if she truly wants it I will place the order

## 2019-08-13 ENCOUNTER — TELEPHONE (OUTPATIENT)
Dept: FAMILY MEDICINE | Facility: CLINIC | Age: 69
End: 2019-08-13

## 2019-08-13 RX ORDER — LISINOPRIL 20 MG/1
20 TABLET ORAL DAILY
Qty: 90 TABLET | Refills: 3 | Status: SHIPPED | OUTPATIENT
Start: 2019-08-13 | End: 2020-06-15 | Stop reason: SDUPTHER

## 2019-08-13 RX ORDER — LISINOPRIL 20 MG/1
20 TABLET ORAL DAILY
Qty: 90 TABLET | Refills: 3 | Status: SHIPPED | OUTPATIENT
Start: 2019-08-13 | End: 2019-08-13 | Stop reason: SDUPTHER

## 2019-08-13 NOTE — TELEPHONE ENCOUNTER
Sent refill for lisinopril-which was sent on 08/13/2019-but re sent.    Also signed order for diabetic education

## 2019-08-13 NOTE — TELEPHONE ENCOUNTER
Please sign diabetes ed order - pt said this was supposed to be ordered last visit    Also she needs refill of lisinopril

## 2019-08-19 ENCOUNTER — TELEPHONE (OUTPATIENT)
Dept: FAMILY MEDICINE | Facility: CLINIC | Age: 69
End: 2019-08-19

## 2019-08-19 RX ORDER — CEPHALEXIN 500 MG/1
1000 CAPSULE ORAL EVERY 12 HOURS
Qty: 28 CAPSULE | Refills: 0 | Status: SHIPPED | OUTPATIENT
Start: 2019-08-19 | End: 2020-06-15

## 2019-08-19 NOTE — TELEPHONE ENCOUNTER
----- Message from Crissy Arciniega sent at 8/19/2019  8:22 AM CDT -----  Contact: [phone]  Type:  Same Day Appointment Request    Caller is requesting a same day appointment.  Caller declined first available appointment listed below.    Name of Caller: pt  When is the first available appointment? 8/23  Symptoms: fever, sinus infection, swollen face  Best Call Back Number: [phone]  Additional Information:

## 2019-10-14 ENCOUNTER — TELEPHONE (OUTPATIENT)
Dept: FAMILY MEDICINE | Facility: CLINIC | Age: 69
End: 2019-10-14

## 2019-10-14 RX ORDER — INSULIN ASPART 100 [IU]/ML
INJECTION, SOLUTION INTRAVENOUS; SUBCUTANEOUS
Qty: 3 VIAL | Refills: 11 | Status: SHIPPED | OUTPATIENT
Start: 2019-10-14 | End: 2020-02-28

## 2019-10-14 NOTE — TELEPHONE ENCOUNTER
----- Message from Belgica Mcduffie sent at 10/14/2019  9:32 AM CDT -----  Contact: [phone]/self  Patient is requesting a call back regarding getting a new rx for Novolog to keep her sugar under control. thanks      I spoke with the pt  C/o   bld sugar elevated between 300 - 400 during the day    Taking latus 15 u QHS  And then added  5 u Novolog at breakfast   10 u Novolog at lunch  10 u novolog at dinner    She tried this for 1 week  bld sugars are much better now  118/120 u in the am    Not taking the humulin any more      Send to [facility]   novolog   Vial and syringes bc she cant afford the pen     Please advise

## 2019-10-18 DIAGNOSIS — E11.9 TYPE 2 DIABETES MELLITUS WITHOUT COMPLICATION: ICD-10-CM

## 2019-11-11 ENCOUNTER — TELEPHONE (OUTPATIENT)
Dept: FAMILY MEDICINE | Facility: CLINIC | Age: 69
End: 2019-11-11

## 2019-11-11 RX ORDER — AZITHROMYCIN 250 MG/1
TABLET, FILM COATED ORAL
Qty: 6 TABLET | Refills: 0 | Status: SHIPPED | OUTPATIENT
Start: 2019-11-11 | End: 2019-11-16

## 2019-11-11 NOTE — TELEPHONE ENCOUNTER
----- Message from Zahraa Linder sent at 11/11/2019 10:38 AM CST -----  Contact: self  Pt called to speak with office to have a prescription called in for her sinus its been 3 weeks now she been dealing with it.    Pt callback number [phone]      Pt has DM and HTN  Cant take otc  C/o sinus congestion, nasal draining, stuffy  No pain  Headache  Coughing - a little chest congestion   Nonproductive cough    [facility]

## 2019-12-02 ENCOUNTER — PATIENT OUTREACH (OUTPATIENT)
Dept: ADMINISTRATIVE | Facility: HOSPITAL | Age: 69
End: 2019-12-02

## 2020-02-05 ENCOUNTER — TELEPHONE (OUTPATIENT)
Dept: FAMILY MEDICINE | Facility: CLINIC | Age: 70
End: 2020-02-05

## 2020-02-05 DIAGNOSIS — Z12.31 ENCOUNTER FOR SCREENING MAMMOGRAM FOR BREAST CANCER: Primary | ICD-10-CM

## 2020-02-05 NOTE — TELEPHONE ENCOUNTER
----- Message from Misty Zamora sent at 2/5/2020  8:44 AM CST -----  Contact: [phone]/self   Patient is requesting orders for a mammogram. Please advise.

## 2020-02-18 ENCOUNTER — HOSPITAL ENCOUNTER (OUTPATIENT)
Dept: RADIOLOGY | Facility: HOSPITAL | Age: 70
Discharge: HOME | End: 2020-02-18
Attending: FAMILY MEDICINE
Payer: MEDICARE

## 2020-02-18 DIAGNOSIS — Z12.31 ENCOUNTER FOR SCREENING MAMMOGRAM FOR BREAST CANCER: ICD-10-CM

## 2020-02-18 PROCEDURE — 77067 SCR MAMMO BI INCL CAD: CPT | Mod: TC,PO

## 2020-02-28 ENCOUNTER — TELEPHONE (OUTPATIENT)
Dept: FAMILY MEDICINE | Facility: CLINIC | Age: 70
End: 2020-02-28

## 2020-02-28 RX ORDER — INSULIN LISPRO 100 [IU]/ML
INJECTION, SOLUTION INTRAVENOUS; SUBCUTANEOUS
Qty: 15 ML | Refills: 11 | Status: SHIPPED | OUTPATIENT
Start: 2020-02-28 | End: 2020-03-11 | Stop reason: SDUPTHER

## 2020-02-28 NOTE — TELEPHONE ENCOUNTER
[employer] covered a 30 day supply of Novolog; future prescriptions will no longer be covered. Please change to Humalog

## 2020-02-28 NOTE — TELEPHONE ENCOUNTER
Typically not much to do for goals which are viral-I would consider using Mucinex/generic guaifenesin over-the-counter in the tablet form  Consider using Afrin nasal spray or similar generic for 4-5 days    New insulin sent to pharmacy

## 2020-02-28 NOTE — TELEPHONE ENCOUNTER
----- Message from Eric Mosquera sent at 2/28/2020  4:07 PM CST -----  Contact: [phone] / self   Patient states she has a very bad cough and congestion, pt would like a medication called in for it. Pharmacy is [facility] [place]       I spoke  She has used 2 bottles of Plain robitussin for diabetics  She has a  Nonproductive cough  Nasal congestion and chest congestion  No fever   She is not taking anything else otc bc she is a diabetic and was scared what to take

## 2020-03-02 NOTE — TELEPHONE ENCOUNTER
Message   Received: Today   Message Contents   Ewa ABURTO [facility] Staff   Caller: [phone]/ self (Today,  2:57 PM)             Patient called in returning your call. Please advise.

## 2020-03-11 ENCOUNTER — TELEPHONE (OUTPATIENT)
Dept: FAMILY MEDICINE | Facility: CLINIC | Age: 70
End: 2020-03-11

## 2020-03-11 RX ORDER — INSULIN LISPRO 100 [IU]/ML
INJECTION, SOLUTION INTRAVENOUS; SUBCUTANEOUS
Qty: 15 ML | Refills: 11 | Status: SHIPPED | OUTPATIENT
Start: 2020-03-11 | End: 2020-06-15

## 2020-03-11 NOTE — TELEPHONE ENCOUNTER
Please resend rx for humalog with more specific instructions     You wrote:   to 10 u per s/s  Max 30 u a day    Can you change it to say :    Per the s/s - up to 10 u TID - max 30 u per day     [facility]

## 2020-04-13 ENCOUNTER — TELEPHONE (OUTPATIENT)
Dept: FAMILY MEDICINE | Facility: CLINIC | Age: 70
End: 2020-04-13

## 2020-04-13 NOTE — TELEPHONE ENCOUNTER
----- Message from Belgica Mcduffie sent at 4/13/2020  1:14 PM CDT -----  Contact: [phone]/self  Patient is requesting a call back regarding the medication sent to pharmacy does not help with a tightness along her stomach and dry cough. Please call her to discuss. Thanks       Used over the counter meds  Tried 2 bottles of robitussin and   Then tried mucinex  And Flonase  No help   Chest tightness/mucus in chest /chest congestion  From coughing  The mucus is not breaking up  Cant cough it up  No fever    Ongoing for 4 weeks  [facility]

## 2020-04-14 ENCOUNTER — TELEPHONE (OUTPATIENT)
Dept: FAMILY MEDICINE | Facility: CLINIC | Age: 70
End: 2020-04-14

## 2020-04-14 ENCOUNTER — OFFICE VISIT (OUTPATIENT)
Dept: FAMILY MEDICINE | Facility: CLINIC | Age: 70
End: 2020-04-14
Payer: MEDICARE

## 2020-04-14 ENCOUNTER — HOSPITAL ENCOUNTER (OUTPATIENT)
Dept: RADIOLOGY | Facility: HOSPITAL | Age: 70
Discharge: HOME | End: 2020-04-14
Attending: FAMILY MEDICINE
Payer: MEDICARE

## 2020-04-14 VITALS
TEMPERATURE: 98 F | SYSTOLIC BLOOD PRESSURE: 134 MMHG | HEIGHT: 67 IN | DIASTOLIC BLOOD PRESSURE: 82 MMHG | WEIGHT: 250.31 LBS | OXYGEN SATURATION: 94 % | HEART RATE: 94 BPM | BODY MASS INDEX: 39.29 KG/M2

## 2020-04-14 DIAGNOSIS — I10 ESSENTIAL HYPERTENSION: ICD-10-CM

## 2020-04-14 DIAGNOSIS — R06.02 SOB (SHORTNESS OF BREATH): ICD-10-CM

## 2020-04-14 DIAGNOSIS — R60.9 EDEMA, UNSPECIFIED TYPE: ICD-10-CM

## 2020-04-14 DIAGNOSIS — R05.9 COUGH: Primary | ICD-10-CM

## 2020-04-14 DIAGNOSIS — R05.9 COUGH: ICD-10-CM

## 2020-04-14 PROCEDURE — 1126F AMNT PAIN NOTED NONE PRSNT: CPT | Mod: S$GLB,,, | Performed by: FAMILY MEDICINE

## 2020-04-14 PROCEDURE — 1159F MED LIST DOCD IN RCRD: CPT | Mod: S$GLB,,, | Performed by: FAMILY MEDICINE

## 2020-04-14 PROCEDURE — 3079F DIAST BP 80-89 MM HG: CPT | Mod: CPTII,S$GLB,, | Performed by: FAMILY MEDICINE

## 2020-04-14 PROCEDURE — 3079F PR MOST RECENT DIASTOLIC BLOOD PRESSURE 80-89 MM HG: ICD-10-PCS | Mod: CPTII,S$GLB,, | Performed by: FAMILY MEDICINE

## 2020-04-14 PROCEDURE — 1126F PR PAIN SEVERITY QUANTIFIED, NO PAIN PRESENT: ICD-10-PCS | Mod: S$GLB,,, | Performed by: FAMILY MEDICINE

## 2020-04-14 PROCEDURE — 99499 UNLISTED E&M SERVICE: CPT | Mod: S$GLB,,, | Performed by: FAMILY MEDICINE

## 2020-04-14 PROCEDURE — 99214 OFFICE O/P EST MOD 30 MIN: CPT | Mod: S$GLB,,, | Performed by: FAMILY MEDICINE

## 2020-04-14 PROCEDURE — 3051F HG A1C>EQUAL 7.0%<8.0%: CPT | Mod: CPTII,S$GLB,, | Performed by: FAMILY MEDICINE

## 2020-04-14 PROCEDURE — 99214 PR OFFICE/OUTPT VISIT, EST, LEVL IV, 30-39 MIN: ICD-10-PCS | Mod: S$GLB,,, | Performed by: FAMILY MEDICINE

## 2020-04-14 PROCEDURE — 1159F PR MEDICATION LIST DOCUMENTED IN MEDICAL RECORD: ICD-10-PCS | Mod: S$GLB,,, | Performed by: FAMILY MEDICINE

## 2020-04-14 PROCEDURE — 1101F PT FALLS ASSESS-DOCD LE1/YR: CPT | Mod: CPTII,S$GLB,, | Performed by: FAMILY MEDICINE

## 2020-04-14 PROCEDURE — 99499 RISK ADDL DX/OHS AUDIT: ICD-10-PCS | Mod: S$GLB,,, | Performed by: FAMILY MEDICINE

## 2020-04-14 PROCEDURE — 71046 X-RAY EXAM CHEST 2 VIEWS: CPT | Mod: TC,FY,PO

## 2020-04-14 PROCEDURE — 1101F PR PT FALLS ASSESS DOC 0-1 FALLS W/OUT INJ PAST YR: ICD-10-PCS | Mod: CPTII,S$GLB,, | Performed by: FAMILY MEDICINE

## 2020-04-14 PROCEDURE — 3075F PR MOST RECENT SYSTOLIC BLOOD PRESS GE 130-139MM HG: ICD-10-PCS | Mod: CPTII,S$GLB,, | Performed by: FAMILY MEDICINE

## 2020-04-14 PROCEDURE — 3075F SYST BP GE 130 - 139MM HG: CPT | Mod: CPTII,S$GLB,, | Performed by: FAMILY MEDICINE

## 2020-04-14 PROCEDURE — 3051F PR MOST RECENT HEMOGLOBIN A1C LEVEL 7.0 - < 8.0%: ICD-10-PCS | Mod: CPTII,S$GLB,, | Performed by: FAMILY MEDICINE

## 2020-04-14 RX ORDER — FUROSEMIDE 20 MG/1
TABLET ORAL
Qty: 30 TABLET | Refills: 0 | Status: SHIPPED | OUTPATIENT
Start: 2020-04-14 | End: 2020-06-15 | Stop reason: SDUPTHER

## 2020-04-14 NOTE — TELEPHONE ENCOUNTER
Spoke with patient regarding her lab and chest xray  She will take lasix as recommended by MD, we discussed fluid intake and covid testing pending.

## 2020-04-14 NOTE — TELEPHONE ENCOUNTER
Chest xray and labs consistent with fluid build up  Also the labs indicate the same    I would take the lasix as we discussed reduce fluid intake to 4 cups a day total for this week    Covid testing pending  Will know by tomorrow or the next day      Follow up with me in 3 to 4 weeks

## 2020-04-14 NOTE — TELEPHONE ENCOUNTER
Pt having sinus drip  She feels it dripping in back of throat  She would like script called into pharmacy

## 2020-04-15 ENCOUNTER — TELEPHONE (OUTPATIENT)
Dept: INTERNAL MEDICINE | Facility: CLINIC | Age: 70
End: 2020-04-15

## 2020-04-18 NOTE — PROGRESS NOTES
" Patient ID: Katherine Berger is a 70 y.o. female.    Chief Complaint: Cough (congestion with SOB)    HPI       Katherine Berger is a 70 y.o. female patient with a month history of having cough mostly nonproductive.  No fever no chills.  She does have some shortness of breath increased weight gain mild swelling of her lower extremity.  Denies PND orthopnea.  Denies left chest wall arm or neck pain.  Denies diaphoresis with ambulation.      Review of Symptoms    Constitutional  shortness of breath with ambulation cough, No chills/ fever   Resp  Neg hemoptysis, stridor, choking  CVS  Neg chest pain, palpitations        Physical Exam    Vitals:    04/14/20 0942   BP: 134/82   Pulse: 94   Temp: 98.4 °F (36.9 °C)   SpO2: (!) 94%   Weight: 113.6 kg (250 lb 5.3 oz)   Height: 5' 7" (1.702 m)        Constitutional:   Oriented to person, place, and time.appears well-developed and well-nourished.   No distress.     HENT:   Head: Normocephalic and atraumatic.     Right Ear: Tympanic membrane, external ear and ear canal normal     Left Ear: Tympanic membrane, external ear and ear canal normal    Nose: External Normal. Normal turbinates, No rhinorrhea     Mouth/Throat: Uvula is midline, oropharynx is clear and moist and mucous membranes are normal.     Neck: supple no anterior cervical adenopathy    No JVD    Eyes:   Conjunctivae are normal. Right eye exhibits no discharge. Left eye exhibits no discharge. No scleral icterus. No periorbital edema     Cardiovascular:  Regular rate and rhythm with normal S1 and S2     Pulmonary/Chest:   Rales bilaterally lower base    Musculoskeletal:  No edema. No obvious deformity No wasting     Neurological:   Alert and oriented to person, place, and time. Coordination normal.     Skin:   Skin is warm and dry.   No diaphoresis.   No rash noted.    Psychiatric: Normal mood and affect. Behavior is normal. Judgment and thought content normal.       Assessment / Plan:      ICD-10-CM ICD-9-CM   1. Cough R05 " 786.2   2. SOB (shortness of breath) R06.02 786.05   3. Edema, unspecified type R60.9 782.3   4. Essential hypertension I10 401.9   5. Uncontrolled type 2 diabetes mellitus without complication, with long-term current use of insulin E11.65 250.02    Z79.4 V58.67     Cough  -     Comprehensive metabolic panel; Future; Expected date: 04/14/2020  -     CBC auto differential; Future; Expected date: 04/14/2020  -     C-reactive protein; Future; Expected date: 04/14/2020  -     Cancel: Brain Natriuretic Peptide; Future; Expected date: 04/14/2020  -     X-Ray Chest PA And Lateral; Future; Expected date: 04/14/2020    SOB (shortness of breath)  -     Comprehensive metabolic panel; Future; Expected date: 04/14/2020  -     CBC auto differential; Future; Expected date: 04/14/2020  -     C-reactive protein; Future; Expected date: 04/14/2020  -     Cancel: Brain Natriuretic Peptide; Future; Expected date: 04/14/2020  -     X-Ray Chest PA And Lateral; Future; Expected date: 04/14/2020    Edema, unspecified type  -     Comprehensive metabolic panel; Future; Expected date: 04/14/2020  -     CBC auto differential; Future; Expected date: 04/14/2020  -     C-reactive protein; Future; Expected date: 04/14/2020  -     Cancel: Brain Natriuretic Peptide; Future; Expected date: 04/14/2020  -     X-Ray Chest PA And Lateral; Future; Expected date: 04/14/2020    Essential hypertension  -     Comprehensive metabolic panel; Future; Expected date: 04/14/2020  -     CBC auto differential; Future; Expected date: 04/14/2020  -     C-reactive protein; Future; Expected date: 04/14/2020  -     Cancel: Brain Natriuretic Peptide; Future; Expected date: 04/14/2020  -     X-Ray Chest PA And Lateral; Future; Expected date: 04/14/2020    Uncontrolled type 2 diabetes mellitus without complication, with long-term current use of insulin  -     Comprehensive metabolic panel; Future; Expected date: 04/14/2020  -     CBC auto differential; Future; Expected date:  04/14/2020  -     C-reactive protein; Future; Expected date: 04/14/2020  -     Cancel: Brain Natriuretic Peptide; Future; Expected date: 04/14/2020  -     X-Ray Chest PA And Lateral; Future; Expected date: 04/14/2020    Other orders  -     furosemide (LASIX) 20 MG tablet; One tablet po daily for swelling/fluid as directed by md  Dispense: 30 tablet; Refill: 0

## 2020-05-25 ENCOUNTER — TELEPHONE (OUTPATIENT)
Dept: FAMILY MEDICINE | Facility: CLINIC | Age: 70
End: 2020-05-25

## 2020-05-25 NOTE — TELEPHONE ENCOUNTER
Patient has been scheduled.  No further action needed at this time.      ----- Message from Marifer Harris sent at 5/25/2020  9:12 AM CDT -----  Contact: [phone] self   Pt is requesting to be seen sooner than the next available appt for follow up appt. First available was 6/24 pt refused. Please advise

## 2020-05-29 DIAGNOSIS — E11.9 TYPE 2 DIABETES MELLITUS WITHOUT COMPLICATION: ICD-10-CM

## 2020-06-01 ENCOUNTER — PATIENT OUTREACH (OUTPATIENT)
Dept: ADMINISTRATIVE | Facility: HOSPITAL | Age: 70
End: 2020-06-01

## 2020-06-11 LAB
LEFT EYE DM RETINOPATHY: NEGATIVE
RIGHT EYE DM RETINOPATHY: NEGATIVE

## 2020-06-15 ENCOUNTER — OFFICE VISIT (OUTPATIENT)
Dept: FAMILY MEDICINE | Facility: CLINIC | Age: 70
End: 2020-06-15
Payer: MEDICARE

## 2020-06-15 VITALS
BODY MASS INDEX: 38.18 KG/M2 | HEIGHT: 67 IN | SYSTOLIC BLOOD PRESSURE: 116 MMHG | OXYGEN SATURATION: 97 % | TEMPERATURE: 97 F | HEART RATE: 105 BPM | WEIGHT: 243.25 LBS | DIASTOLIC BLOOD PRESSURE: 64 MMHG

## 2020-06-15 DIAGNOSIS — F41.9 ANXIETY: ICD-10-CM

## 2020-06-15 DIAGNOSIS — Z12.4 ENCOUNTER FOR SCREENING FOR CERVICAL CANCER: ICD-10-CM

## 2020-06-15 DIAGNOSIS — I10 ESSENTIAL HYPERTENSION: Primary | ICD-10-CM

## 2020-06-15 DIAGNOSIS — D64.9 ANEMIA, UNSPECIFIED TYPE: ICD-10-CM

## 2020-06-15 DIAGNOSIS — R06.02 SOB (SHORTNESS OF BREATH): ICD-10-CM

## 2020-06-15 PROCEDURE — 3078F PR MOST RECENT DIASTOLIC BLOOD PRESSURE < 80 MM HG: ICD-10-PCS | Mod: CPTII,S$GLB,, | Performed by: FAMILY MEDICINE

## 2020-06-15 PROCEDURE — 99213 OFFICE O/P EST LOW 20 MIN: CPT | Mod: S$GLB,,, | Performed by: FAMILY MEDICINE

## 2020-06-15 PROCEDURE — 99499 RISK ADDL DX/OHS AUDIT: ICD-10-PCS | Mod: S$GLB,,, | Performed by: FAMILY MEDICINE

## 2020-06-15 PROCEDURE — 99499 UNLISTED E&M SERVICE: CPT | Mod: S$GLB,,, | Performed by: FAMILY MEDICINE

## 2020-06-15 PROCEDURE — 99213 PR OFFICE/OUTPT VISIT, EST, LEVL III, 20-29 MIN: ICD-10-PCS | Mod: S$GLB,,, | Performed by: FAMILY MEDICINE

## 2020-06-15 PROCEDURE — 3051F PR MOST RECENT HEMOGLOBIN A1C LEVEL 7.0 - < 8.0%: ICD-10-PCS | Mod: CPTII,S$GLB,, | Performed by: FAMILY MEDICINE

## 2020-06-15 PROCEDURE — 3051F HG A1C>EQUAL 7.0%<8.0%: CPT | Mod: CPTII,S$GLB,, | Performed by: FAMILY MEDICINE

## 2020-06-15 PROCEDURE — 1159F PR MEDICATION LIST DOCUMENTED IN MEDICAL RECORD: ICD-10-PCS | Mod: S$GLB,,, | Performed by: FAMILY MEDICINE

## 2020-06-15 PROCEDURE — 1101F PR PT FALLS ASSESS DOC 0-1 FALLS W/OUT INJ PAST YR: ICD-10-PCS | Mod: CPTII,S$GLB,, | Performed by: FAMILY MEDICINE

## 2020-06-15 PROCEDURE — 1159F MED LIST DOCD IN RCRD: CPT | Mod: S$GLB,,, | Performed by: FAMILY MEDICINE

## 2020-06-15 PROCEDURE — 1125F AMNT PAIN NOTED PAIN PRSNT: CPT | Mod: S$GLB,,, | Performed by: FAMILY MEDICINE

## 2020-06-15 PROCEDURE — 3078F DIAST BP <80 MM HG: CPT | Mod: CPTII,S$GLB,, | Performed by: FAMILY MEDICINE

## 2020-06-15 PROCEDURE — 1125F PR PAIN SEVERITY QUANTIFIED, PAIN PRESENT: ICD-10-PCS | Mod: S$GLB,,, | Performed by: FAMILY MEDICINE

## 2020-06-15 PROCEDURE — 3074F PR MOST RECENT SYSTOLIC BLOOD PRESSURE < 130 MM HG: ICD-10-PCS | Mod: CPTII,S$GLB,, | Performed by: FAMILY MEDICINE

## 2020-06-15 PROCEDURE — 3074F SYST BP LT 130 MM HG: CPT | Mod: CPTII,S$GLB,, | Performed by: FAMILY MEDICINE

## 2020-06-15 PROCEDURE — 1101F PT FALLS ASSESS-DOCD LE1/YR: CPT | Mod: CPTII,S$GLB,, | Performed by: FAMILY MEDICINE

## 2020-06-15 RX ORDER — FERROUS SULFATE 324(65)MG
325 TABLET, DELAYED RELEASE (ENTERIC COATED) ORAL DAILY PRN
Qty: 90 TABLET | Refills: 3 | Status: SHIPPED | OUTPATIENT
Start: 2020-06-15 | End: 2020-09-08 | Stop reason: SDUPTHER

## 2020-06-15 RX ORDER — ESCITALOPRAM OXALATE 5 MG/1
5 TABLET ORAL DAILY
Qty: 90 TABLET | Refills: 3 | Status: SHIPPED | OUTPATIENT
Start: 2020-06-15 | End: 2021-08-19 | Stop reason: SDUPTHER

## 2020-06-15 RX ORDER — DOCUSATE SODIUM 100 MG/1
100 CAPSULE, LIQUID FILLED ORAL 2 TIMES DAILY PRN
Qty: 180 CAPSULE | Refills: 0 | Status: SHIPPED | OUTPATIENT
Start: 2020-06-15 | End: 2021-08-19 | Stop reason: SDUPTHER

## 2020-06-15 RX ORDER — GABAPENTIN 100 MG/1
CAPSULE ORAL
Qty: 270 CAPSULE | Refills: 3 | Status: SHIPPED | OUTPATIENT
Start: 2020-06-15

## 2020-06-15 RX ORDER — METOPROLOL TARTRATE 25 MG/1
25 TABLET, FILM COATED ORAL DAILY
Qty: 90 TABLET | Refills: 3 | Status: SHIPPED | OUTPATIENT
Start: 2020-06-15 | End: 2020-10-26

## 2020-06-15 RX ORDER — MELOXICAM 15 MG/1
15 TABLET ORAL DAILY PRN
Qty: 90 TABLET | Refills: 0 | Status: SHIPPED | OUTPATIENT
Start: 2020-06-15 | End: 2020-06-15 | Stop reason: SDUPTHER

## 2020-06-15 RX ORDER — AMITRIPTYLINE HYDROCHLORIDE 10 MG/1
TABLET, FILM COATED ORAL
Qty: 90 TABLET | Refills: 3 | Status: SHIPPED | OUTPATIENT
Start: 2020-06-15

## 2020-06-15 RX ORDER — METFORMIN HYDROCHLORIDE 500 MG/1
1000 TABLET, EXTENDED RELEASE ORAL
Qty: 180 TABLET | Refills: 3 | Status: SHIPPED | OUTPATIENT
Start: 2020-06-15 | End: 2020-09-08 | Stop reason: SDUPTHER

## 2020-06-15 RX ORDER — MELOXICAM 15 MG/1
15 TABLET ORAL DAILY PRN
Qty: 90 TABLET | Refills: 0 | Status: SHIPPED | OUTPATIENT
Start: 2020-06-15 | End: 2021-08-19 | Stop reason: SDUPTHER

## 2020-06-15 RX ORDER — ATORVASTATIN CALCIUM 10 MG/1
10 TABLET, FILM COATED ORAL EVERY OTHER DAY
Qty: 45 TABLET | Refills: 3 | Status: SHIPPED | OUTPATIENT
Start: 2020-06-15 | End: 2021-08-19 | Stop reason: SDUPTHER

## 2020-06-15 RX ORDER — FUROSEMIDE 20 MG/1
TABLET ORAL
Qty: 90 TABLET | Refills: 1 | Status: SHIPPED | OUTPATIENT
Start: 2020-06-15 | End: 2020-09-08 | Stop reason: SDUPTHER

## 2020-06-15 RX ORDER — DOCUSATE SODIUM 100 MG/1
100 CAPSULE, LIQUID FILLED ORAL 2 TIMES DAILY PRN
Qty: 180 CAPSULE | Refills: 0 | Status: SHIPPED | OUTPATIENT
Start: 2020-06-15 | End: 2020-06-15 | Stop reason: SDUPTHER

## 2020-06-15 RX ORDER — GLIMEPIRIDE 4 MG/1
4 TABLET ORAL
Qty: 90 TABLET | Refills: 3 | Status: SHIPPED | OUTPATIENT
Start: 2020-06-15 | End: 2020-10-26

## 2020-06-15 RX ORDER — LISINOPRIL 20 MG/1
20 TABLET ORAL DAILY
Qty: 90 TABLET | Refills: 3 | Status: SHIPPED | OUTPATIENT
Start: 2020-06-15 | End: 2020-10-26

## 2020-06-15 NOTE — PROGRESS NOTES
" Patient ID: Katherine Berger is a 70 y.o. female.    Chief Complaint: Follow up    HPI      Katherine Berger is a 70 y.o. female following up for chronic medical problems including hypertension diabetes history of anemia.  Patient with mild occasional shortness of breath.  No association with chest pain or palpitations.  No association with wheezing cough or with exercise.    Vitals:    06/15/20 1523   BP: 116/64   Pulse: 105   Temp: 97.1 °F (36.2 °C)   SpO2: 97%   Weight: 110.3 kg (243 lb 4.4 oz)   Height: 5' 7" (1.702 m)            Review of Symptoms    Constitutional  Neg activity change, No chills /fever   Resp  Neg hemoptysis, stridor, choking  CVS  Neg chest pain, palpitations    Physical Exam    Constitutional:  Oriented to person, place, and time.appears well-developed and well-nourished.  No distress.      HENT  Head: Normocephalic and atraumatic  Right Ear: External ear normal.   Left Ear: External ear normal.   Nose: External nose normal.   Mouth:  Moist mucus membranes.    Eyes:  Conjunctivae are normal. Right eye exhibits no discharge.  Left eye exhibits no discharge. No scleral icterus.  No periorbital edema    Cardiovascular:  Regular rate and rhythm with normal S1 and S2     Pulmonary/Chest:   Clear to auscultation bilaterally without wheezes, rhonchi or rales      Musculoskeletal:  No edema. No obvious deformity No wasting       Neurological:  Alert and oriented to person, place, and time.   Coordination normal.     Skin:   Skin is warm and dry.  No diaphoresis.   No rash noted.     Psychiatric: Normal mood and affect. Behavior is normal.  Judgment and thought content normal.     Complete Blood Count  Lab Results   Component Value Date    RBC 5.13 06/19/2020    HGB 11.1 (L) 06/19/2020    HCT 36.6 (L) 06/19/2020    MCV 71 (L) 06/19/2020    MCH 21.6 (L) 06/19/2020    MCHC 30.3 (L) 06/19/2020    RDW 15.7 (H) 06/19/2020     06/19/2020    MPV 11.2 06/19/2020    GRAN 8.4 (H) 06/19/2020    GRAN 64.7 " 06/19/2020    LYMPH 3.1 06/19/2020    LYMPH 24.0 06/19/2020    MONO 1.1 (H) 06/19/2020    MONO 8.4 06/19/2020    EOS 0.3 06/19/2020    BASO 0.06 06/19/2020    EOSINOPHIL 2.2 06/19/2020    BASOPHIL 0.5 06/19/2020    DIFFMETHOD Automated 06/19/2020       Comprehensive Metabolic Panel  Lab Results   Component Value Date     (H) 06/19/2020    BUN 21 (H) 06/19/2020    CREATININE 0.78 06/19/2020     06/19/2020    K 3.9 06/19/2020     06/19/2020    PROT 7.9 06/19/2020    ALBUMIN 4.2 06/19/2020    BILITOT 0.6 06/19/2020    AST 34 06/19/2020    ALKPHOS 77 06/19/2020    CO2 28 06/19/2020    ALT 23 06/19/2020    ANIONGAP 8 06/19/2020    EGFRNONAA >60.0 06/19/2020    ESTGFRAFRICA >60.0 06/19/2020       TSH  No results found for: TSH    Assessment / Plan:      ICD-10-CM ICD-9-CM   1. Essential hypertension  I10 401.9   2. Encounter for screening for cervical cancer   Z12.4 V76.2   3. SOB (shortness of breath)  R06.02 786.05   4. Anemia, unspecified type  D64.9 285.9   5. Uncontrolled type 2 diabetes mellitus without complication, with long-term current use of insulin  E11.65 250.02    Z79.4 V58.67   6. Anxiety  F41.9 300.00     Essential hypertension  -     Comprehensive metabolic panel; Future; Expected date: 06/15/2020  -     CBC auto differential; Future; Expected date: 06/15/2020  -     Hemoglobin A1C; Future; Expected date: 06/15/2020    Encounter for screening for cervical cancer   -     Ambulatory referral/consult to Obstetrics / Gynecology; Future; Expected date: 06/22/2020  -     Comprehensive metabolic panel; Future; Expected date: 06/15/2020  -     CBC auto differential; Future; Expected date: 06/15/2020  -     Hemoglobin A1C; Future; Expected date: 06/15/2020    SOB (shortness of breath)  -     Comprehensive metabolic panel; Future; Expected date: 06/15/2020  -     CBC auto differential; Future; Expected date: 06/15/2020  -     Hemoglobin A1C; Future; Expected date: 06/15/2020    Anemia,  unspecified type  -     Comprehensive metabolic panel; Future; Expected date: 06/15/2020  -     CBC auto differential; Future; Expected date: 06/15/2020  -     Hemoglobin A1C; Future; Expected date: 06/15/2020    Uncontrolled type 2 diabetes mellitus without complication, with long-term current use of insulin  -     Hemoglobin A1C; Future; Expected date: 06/15/2020    Anxiety  -     escitalopram oxalate (LEXAPRO) 5 MG Tab; Take 1 tablet (5 mg total) by mouth once daily.  Dispense: 90 tablet; Refill: 3    Other orders  -     amitriptyline (ELAVIL) 10 MG tablet; TAKE ONE TO THREE TABLETS BY MOUTH AT BEDTIME FOR  HEADACHE  Dispense: 90 tablet; Refill: 3  -     atorvastatin (LIPITOR) 10 MG tablet; Take 1 tablet (10 mg total) by mouth every other day.  Dispense: 45 tablet; Refill: 3  -     Discontinue: docusate sodium (COLACE) 100 MG capsule; Take 1 capsule (100 mg total) by mouth 2 (two) times daily as needed.  Dispense: 180 capsule; Refill: 0  -     ferrous sulfate 324 mg (65 mg iron) TbEC; Take 1 tablet (324 mg total) by mouth daily as needed.  Dispense: 90 tablet; Refill: 3  -     metFORMIN (GLUCOPHAGE-XR) 500 MG XR 24hr tablet; Take 2 tablets (1,000 mg total) by mouth daily with breakfast.  Dispense: 180 tablet; Refill: 3  -     metoprolol tartrate (LOPRESSOR) 25 MG tablet; Take 1 tablet (25 mg total) by mouth once daily.  Dispense: 90 tablet; Refill: 3  -     Discontinue: meloxicam (MOBIC) 15 MG tablet; Take 1 tablet (15 mg total) by mouth daily as needed.  Dispense: 90 tablet; Refill: 0  -     lisinopriL (PRINIVIL,ZESTRIL) 20 MG tablet; Take 1 tablet (20 mg total) by mouth once daily.  Dispense: 90 tablet; Refill: 3  -     furosemide (LASIX) 20 MG tablet; One tablet po daily for swelling/fluid as directed by md 3 to 7 time a week  Dispense: 90 tablet; Refill: 1  -     gabapentin (NEURONTIN) 100 MG capsule; Takes on in am and two in pm for back pain radiating down leg.  Dispense: 270 capsule; Refill: 3  -      glimepiride (AMARYL) 4 MG tablet; Take 1 tablet (4 mg total) by mouth before breakfast.  Dispense: 90 tablet; Refill: 3  -     insulin NPH-insulin regular, 70/30, (NOVOLIN 70/30 U-100 INSULIN) 100 unit/mL (70-30) injection; INJECT 20 UNITS SUBCUTANEOUSLY IN THE MORNING AND 12 UNITS IN THE EVENING  Dispense: 90 mL; Refill: 11  -     meloxicam (MOBIC) 15 MG tablet; Take 1 tablet (15 mg total) by mouth daily as needed.  Dispense: 90 tablet; Refill: 0  -     docusate sodium (COLACE) 100 MG capsule; Take 1 capsule (100 mg total) by mouth 2 (two) times daily as needed.  Dispense: 180 capsule; Refill: 0      Discussed controlled diabetes-exercising-tracking chronic problems

## 2020-06-16 ENCOUNTER — TELEPHONE (OUTPATIENT)
Dept: FAMILY MEDICINE | Facility: CLINIC | Age: 70
End: 2020-06-16

## 2020-06-16 ENCOUNTER — PATIENT OUTREACH (OUTPATIENT)
Dept: ADMINISTRATIVE | Facility: HOSPITAL | Age: 70
End: 2020-06-16

## 2020-06-19 ENCOUNTER — LAB VISIT (OUTPATIENT)
Dept: LAB | Facility: HOSPITAL | Age: 70
End: 2020-06-19
Attending: FAMILY MEDICINE
Payer: MEDICARE

## 2020-06-19 DIAGNOSIS — I10 ESSENTIAL HYPERTENSION: ICD-10-CM

## 2020-06-19 DIAGNOSIS — R06.02 SOB (SHORTNESS OF BREATH): ICD-10-CM

## 2020-06-19 DIAGNOSIS — Z12.4 ENCOUNTER FOR SCREENING FOR CERVICAL CANCER: ICD-10-CM

## 2020-06-19 DIAGNOSIS — D64.9 ANEMIA, UNSPECIFIED TYPE: ICD-10-CM

## 2020-06-19 LAB
ALBUMIN SERPL BCP-MCNC: 4.2 G/DL (ref 3.5–5.2)
ALP SERPL-CCNC: 77 U/L (ref 38–126)
ALT SERPL W/O P-5'-P-CCNC: 23 U/L (ref 10–44)
ANION GAP SERPL CALC-SCNC: 8 MMOL/L (ref 8–16)
AST SERPL-CCNC: 34 U/L (ref 15–46)
BASOPHILS # BLD AUTO: 0.06 K/UL (ref 0–0.2)
BASOPHILS NFR BLD: 0.5 % (ref 0–1.9)
BILIRUB SERPL-MCNC: 0.6 MG/DL (ref 0.1–1)
BUN SERPL-MCNC: 21 MG/DL (ref 7–17)
CALCIUM SERPL-MCNC: 9.7 MG/DL (ref 8.7–10.5)
CHLORIDE SERPL-SCNC: 104 MMOL/L (ref 95–110)
CO2 SERPL-SCNC: 28 MMOL/L (ref 23–29)
CREAT SERPL-MCNC: 0.78 MG/DL (ref 0.5–1.4)
DIFFERENTIAL METHOD: ABNORMAL
EOSINOPHIL # BLD AUTO: 0.3 K/UL (ref 0–0.5)
EOSINOPHIL NFR BLD: 2.2 % (ref 0–8)
ERYTHROCYTE [DISTWIDTH] IN BLOOD BY AUTOMATED COUNT: 15.7 % (ref 11.5–14.5)
EST. GFR  (AFRICAN AMERICAN): >60 ML/MIN/1.73 M^2
EST. GFR  (NON AFRICAN AMERICAN): >60 ML/MIN/1.73 M^2
ESTIMATED AVG GLUCOSE: 146 MG/DL (ref 68–131)
GLUCOSE SERPL-MCNC: 119 MG/DL (ref 70–110)
HBA1C MFR BLD HPLC: 6.7 % (ref 4–5.6)
HCT VFR BLD AUTO: 36.6 % (ref 37–48.5)
HGB BLD-MCNC: 11.1 G/DL (ref 12–16)
IMM GRANULOCYTES # BLD AUTO: 0.03 K/UL (ref 0–0.04)
IMM GRANULOCYTES NFR BLD AUTO: 0.2 % (ref 0–0.5)
LYMPHOCYTES # BLD AUTO: 3.1 K/UL (ref 1–4.8)
LYMPHOCYTES NFR BLD: 24 % (ref 18–48)
MCH RBC QN AUTO: 21.6 PG (ref 27–31)
MCHC RBC AUTO-ENTMCNC: 30.3 G/DL (ref 32–36)
MCV RBC AUTO: 71 FL (ref 82–98)
MONOCYTES # BLD AUTO: 1.1 K/UL (ref 0.3–1)
MONOCYTES NFR BLD: 8.4 % (ref 4–15)
NEUTROPHILS # BLD AUTO: 8.4 K/UL (ref 1.8–7.7)
NEUTROPHILS NFR BLD: 64.7 % (ref 38–73)
NRBC BLD-RTO: 0 /100 WBC
PLATELET # BLD AUTO: 288 K/UL (ref 150–350)
PMV BLD AUTO: 11.2 FL (ref 9.2–12.9)
POTASSIUM SERPL-SCNC: 3.9 MMOL/L (ref 3.5–5.1)
PROT SERPL-MCNC: 7.9 G/DL (ref 6–8.4)
RBC # BLD AUTO: 5.13 M/UL (ref 4–5.4)
SODIUM SERPL-SCNC: 140 MMOL/L (ref 136–145)
WBC # BLD AUTO: 13 K/UL (ref 3.9–12.7)

## 2020-06-19 PROCEDURE — 80053 COMPREHEN METABOLIC PANEL: CPT | Mod: PO

## 2020-06-19 PROCEDURE — 83036 HEMOGLOBIN GLYCOSYLATED A1C: CPT

## 2020-06-19 PROCEDURE — 36415 COLL VENOUS BLD VENIPUNCTURE: CPT | Mod: PO

## 2020-06-19 PROCEDURE — 85025 COMPLETE CBC W/AUTO DIFF WBC: CPT | Mod: PO

## 2020-06-21 ENCOUNTER — TELEPHONE (OUTPATIENT)
Dept: FAMILY MEDICINE | Facility: CLINIC | Age: 70
End: 2020-06-21

## 2020-06-21 DIAGNOSIS — D64.9 ANEMIA, UNSPECIFIED TYPE: Primary | ICD-10-CM

## 2020-06-21 NOTE — TELEPHONE ENCOUNTER
Overall your blood work was very good-including your hemoglobin A1c which shows controlled diabetes-your blood count was a little low-I would like to repeat this in one month.  I will order the lab work.  Ordered [facility]

## 2020-06-28 NOTE — PROGRESS NOTES
Patient, Katherine Berger (MRN #[id number]), presented with a recorded BMI of 38.1 kg/m^2 and a documented comorbidity(s):  - Diabetes Mellitus Type 2  - Hypertension  to which the severe obesity is a contributing factor. This is consistent with the definition of severe obesity (BMI 35.0-39.9) with comorbidity (ICD-10 E66.01, Z68.35). The patient's severe obesity was monitored, evaluated, addressed and/or treated. This addendum to the medical record is made on 06/28/2020.

## 2020-07-02 ENCOUNTER — TELEPHONE (OUTPATIENT)
Dept: FAMILY MEDICINE | Facility: CLINIC | Age: 70
End: 2020-07-02

## 2020-07-06 ENCOUNTER — PATIENT OUTREACH (OUTPATIENT)
Dept: ADMINISTRATIVE | Facility: OTHER | Age: 70
End: 2020-07-06

## 2020-07-06 ENCOUNTER — OFFICE VISIT (OUTPATIENT)
Dept: OBSTETRICS AND GYNECOLOGY | Facility: CLINIC | Age: 70
End: 2020-07-06
Payer: MEDICARE

## 2020-07-06 VITALS
DIASTOLIC BLOOD PRESSURE: 70 MMHG | SYSTOLIC BLOOD PRESSURE: 136 MMHG | HEIGHT: 67 IN | BODY MASS INDEX: 37.48 KG/M2 | WEIGHT: 238.81 LBS

## 2020-07-06 DIAGNOSIS — Z01.419 ENCOUNTER FOR WELL WOMAN EXAM: Primary | ICD-10-CM

## 2020-07-06 PROCEDURE — 99999 PR PBB SHADOW E&M-EST. PATIENT-LVL IV: ICD-10-PCS | Mod: PBBFAC,,, | Performed by: OBSTETRICS & GYNECOLOGY

## 2020-07-06 PROCEDURE — 99999 PR PBB SHADOW E&M-EST. PATIENT-LVL IV: CPT | Mod: PBBFAC,,, | Performed by: OBSTETRICS & GYNECOLOGY

## 2020-07-06 PROCEDURE — G0101 PR CA SCREEN;PELVIC/BREAST EXAM: ICD-10-PCS | Mod: S$GLB,,, | Performed by: OBSTETRICS & GYNECOLOGY

## 2020-07-06 PROCEDURE — G0101 CA SCREEN;PELVIC/BREAST EXAM: HCPCS | Mod: S$GLB,,, | Performed by: OBSTETRICS & GYNECOLOGY

## 2020-07-06 NOTE — LETTER
July 6, 2020      Itz Muse MD  [address] [facility] [place] [address]           [facility]  [address], [address]  [facility] [address]  Phone: [phone]  Fax: [phone]          Patient: Katherine Berger   MR Number: [id number]   YOB: 1950   Date of Visit: 7/6/2020       Dear Dr. Itz Muse:    Thank you for referring Katherine Berger to me for evaluation. Attached you will find relevant portions of my assessment and plan of care.    If you have questions, please do not hesitate to call me. I look forward to following Katherine Berger along with you.    Sincerely,    Stiven De La Paz MD    Enclosure  CC:  No Recipients    If you would like to receive this communication electronically, please contact [email or web address] or [phone] to request more information on [employer] I Link access.    For providers and/or their staff who would like to refer a patient to [facility], please contact us through our one-stop-shop provider referral line, [facility], at [phone].    If you feel you have received this communication in error or would no longer like to receive these types of communications, please e-mail [email or web address]

## 2020-07-06 NOTE — PROGRESS NOTES
Requested updates within Care Everywhere.  Patient's chart was reviewed for overdue DAVID topics.  Immunizations reconciled.    Orders placed:  Tasked appts:  Labs Linked:

## 2020-07-06 NOTE — PROGRESS NOTES
CC: Annual check-up    SUBJECTIVE:   70 y.o. female  for checkup. No LMP recorded (lmp unknown). Patient has had a hysterectomy. Hysterectomy (total) 2/2 fibroids - denies history of abnormal paps. 1 year HRT after hysterectomy, no additional HRT since then. She has no unusual complaints.        Past Medical History:   Diagnosis Date    Arthritis     Diabetes mellitus     Hypertension     Kidney stone      Past Surgical History:   Procedure Laterality Date    COLONOSCOPY  2013    dr ram - 5 years    FOOT SURGERY Right 2010    bone spur    FOOT SURGERY Left     fx ankle    HYSTERECTOMY      age 47    KNEE SURGERY Left 06/10/2016    TKR    OOPHORECTOMY      TONSILLECTOMY       Social History     Socioeconomic History    Marital status: Single     Spouse name: Not on file    Number of children: Not on file    Years of education: Not on file    Highest education level: Not on file   Occupational History    Not on file   Social Needs    Financial resource strain: Not on file    Food insecurity     Worry: Not on file     Inability: Not on file    Transportation needs     Medical: Not on file     Non-medical: Not on file   Tobacco Use    Smoking status: Never Smoker    Smokeless tobacco: Never Used   Substance and Sexual Activity    Alcohol use: No     Alcohol/week: 0.0 standard drinks    Drug use: No    Sexual activity: Not Currently   Lifestyle    Physical activity     Days per week: Not on file     Minutes per session: Not on file    Stress: Not on file   Relationships    Social connections     Talks on phone: Not on file     Gets together: Not on file     Attends Jew service: Not on file     Active member of club or organization: Not on file     Attends meetings of clubs or organizations: Not on file     Relationship status: Not on file   Other Topics Concern    Not on file   Social History Narrative    Not on file     Family History   Problem Relation Age of  Onset    Heart attack Mother     Seizures Mother         fell in bathtub and drowned    Colon cancer Sister     Breast cancer Sister      OB History    Para Term  AB Living   4 4 4     4   SAB TAB Ectopic Multiple Live Births           4      # Outcome Date GA Lbr Nixon/2nd Weight Sex Delivery Anes PTL Lv   4 Term     F Vag-Spont  N HAI   3 Term     M Vag-Spont  N HAI   2 Term     M Vag-Spont  N HAI   1 Term     M Vag-Spont  N HAI     Current Outpatient Medications   Medication Sig Dispense Refill    amitriptyline (ELAVIL) 10 MG tablet TAKE ONE TO THREE TABLETS BY MOUTH AT BEDTIME FOR  HEADACHE 90 tablet 3    atorvastatin (LIPITOR) 10 MG tablet Take 1 tablet (10 mg total) by mouth every other day. 45 tablet 3    chlorthalidone (HYGROTEN) 25 MG Tab Take 1 tablet (25 mg total) by mouth once daily. 90 tablet 1    diabetic supplies, [employer]. Kit Use for testing 1 kit 1    docusate sodium (COLACE) 100 MG capsule Take 1 capsule (100 mg total) by mouth 2 (two) times daily as needed. 180 capsule 0    escitalopram oxalate (LEXAPRO) 5 MG Tab Take 1 tablet (5 mg total) by mouth once daily. 90 tablet 3    ferrous sulfate 324 mg (65 mg iron) TbEC Take 1 tablet (324 mg total) by mouth daily as needed. 90 tablet 3    furosemide (LASIX) 20 MG tablet One tablet po daily for swelling/fluid as directed by md 3 to 7 time a week 90 tablet 1    gabapentin (NEURONTIN) 100 MG capsule Takes on in am and two in pm for back pain radiating down leg. 270 capsule 3    insulin NPH-insulin regular, 70/30, (NOVOLIN 70/30 U-100 INSULIN) 100 unit/mL (70-30) injection INJECT 20 UNITS SUBCUTANEOUSLY IN THE MORNING AND 12 UNITS IN THE EVENING 90 mL 11    insulin NPH/Reg human (HUMULIN 70/30) 100 unit/mL (70-30) InPn pen 20 units each a.m. and 12 units each p.m. 30 mL 3    lisinopriL (PRINIVIL,ZESTRIL) 20 MG tablet Take 1 tablet (20 mg total) by mouth once daily. 90 tablet 3    metFORMIN (GLUCOPHAGE-XR) 500 MG XR 24hr  tablet Take 2 tablets (1,000 mg total) by mouth daily with breakfast. 180 tablet 3    metoprolol tartrate (LOPRESSOR) 25 MG tablet Take 1 tablet (25 mg total) by mouth once daily. 90 tablet 3    MULTIVIT/IRON/FA/K/HERB NO.244 (ALIVE WOMEN'S ENERGY ORAL) Take 1 tablet by mouth daily as needed.       oxyCODONE-acetaminophen (PERCOCET)  mg per tablet 1 tablet 3 (three) times daily as needed.      tamsulosin (FLOMAX) 0.4 mg Cap TAKE ONE CAPSULE BY MOUTH ONCE DAILY 30 capsule 11    glimepiride (AMARYL) 4 MG tablet Take 1 tablet (4 mg total) by mouth before breakfast. 90 tablet 3    meloxicam (MOBIC) 15 MG tablet Take 1 tablet (15 mg total) by mouth daily as needed. 90 tablet 0     No current facility-administered medications for this visit.      Allergies: Patient has no known allergies.     ROS:  Constitutional: no weight loss, weight gain, fever, fatigue  Eyes:  No vision changes, glasses/contacts  ENT/Mouth: No ulcers, sinus problems, ears ringing, headache  Cardiovascular: No inability to lie flat, chest pain, exercise intolerance, swelling, heart palpitations  Respiratory: No wheezing, coughing blood, shortness of breath, or cough  Gastrointestinal: No diarrhea, bloody stool, nausea/vomiting, constipation, gas, hemorrhoids  Genitourinary: No blood in urine, painful urination, urgency of urination, frequency of urination, incomplete emptying, incontinence, abnormal bleeding, painful periods, heavy periods, vaginal discharge, vaginal odor, painful intercourse, sexual problems, bleeding after intercourse.  Musculoskeletal: No muscle weakness  Skin/Breast: No painful breasts, nipple discharge, masses, rash, ulcers  Neurological: No passing out, seizures, numbness, headache  Endocrine: No diabetes, hypothyroid, hyperthyroid, hot flashes, hair loss, abnormal hair growth, ance  Psychiatric: No depression, crying  Hematologic: No bruises, bleeding, swollen lymph nodes, anemia.      OBJECTIVE:   The patient  "appears well, alert, oriented x 3, in no distress.  /70   Ht 5' 7" (1.702 m)   Wt 108.3 kg (238 lb 12.8 oz)   LMP  (LMP Unknown)   BMI 37.40 kg/m²   Pt declines physical exam.       ASSESSMENT:   Well woman  1. Encounter for well woman exam        PLAN:   Mammogram up to date  Pap smear not indicated as pt s/p total hysterectomy and no history of abnormal paps  Return annually or prn       "

## 2020-07-07 ENCOUNTER — TELEPHONE (OUTPATIENT)
Dept: FAMILY MEDICINE | Facility: CLINIC | Age: 70
End: 2020-07-07

## 2020-07-07 DIAGNOSIS — R29.898 WEAKNESS OF LOWER EXTREMITY, UNSPECIFIED LATERALITY: Primary | ICD-10-CM

## 2020-07-07 NOTE — TELEPHONE ENCOUNTER
----- Message from Carlitos Smith sent at 7/7/2020 11:06 AM CDT -----  Contact: [phone]/ patient  Patient requesting physical therapy orders be put in the system  Thank you     I LM for the pt - why PT?  [facility] or external?  I LM advising the pt that we need more info

## 2020-07-08 NOTE — TELEPHONE ENCOUNTER
She needs out patient PT  She wants it at [facility] in [place]    Legs are weak and she is having trouble walking  I created the referral just sign if you agree

## 2020-07-22 ENCOUNTER — LAB VISIT (OUTPATIENT)
Dept: LAB | Facility: HOSPITAL | Age: 70
End: 2020-07-22
Attending: FAMILY MEDICINE
Payer: MEDICARE

## 2020-07-22 DIAGNOSIS — D64.9 ANEMIA, UNSPECIFIED TYPE: ICD-10-CM

## 2020-07-22 LAB
BASOPHILS # BLD AUTO: 0.05 K/UL (ref 0–0.2)
BASOPHILS NFR BLD: 0.4 % (ref 0–1.9)
DIFFERENTIAL METHOD: ABNORMAL
EOSINOPHIL # BLD AUTO: 0.2 K/UL (ref 0–0.5)
EOSINOPHIL NFR BLD: 2.1 % (ref 0–8)
ERYTHROCYTE [DISTWIDTH] IN BLOOD BY AUTOMATED COUNT: 16 % (ref 11.5–14.5)
HCT VFR BLD AUTO: 36.7 % (ref 37–48.5)
HGB BLD-MCNC: 11.2 G/DL (ref 12–16)
IMM GRANULOCYTES # BLD AUTO: 0.03 K/UL (ref 0–0.04)
IMM GRANULOCYTES NFR BLD AUTO: 0.3 % (ref 0–0.5)
LYMPHOCYTES # BLD AUTO: 2.8 K/UL (ref 1–4.8)
LYMPHOCYTES NFR BLD: 25.1 % (ref 18–48)
MCH RBC QN AUTO: 21.6 PG (ref 27–31)
MCHC RBC AUTO-ENTMCNC: 30.5 G/DL (ref 32–36)
MCV RBC AUTO: 71 FL (ref 82–98)
MONOCYTES # BLD AUTO: 0.9 K/UL (ref 0.3–1)
MONOCYTES NFR BLD: 8.3 % (ref 4–15)
NEUTROPHILS # BLD AUTO: 7.2 K/UL (ref 1.8–7.7)
NEUTROPHILS NFR BLD: 63.8 % (ref 38–73)
NRBC BLD-RTO: 0 /100 WBC
PLATELET # BLD AUTO: 257 K/UL (ref 150–350)
PMV BLD AUTO: 11.7 FL (ref 9.2–12.9)
RBC # BLD AUTO: 5.19 M/UL (ref 4–5.4)
WBC # BLD AUTO: 11.32 K/UL (ref 3.9–12.7)

## 2020-07-22 PROCEDURE — 85025 COMPLETE CBC W/AUTO DIFF WBC: CPT | Mod: PO

## 2020-07-22 PROCEDURE — 36415 COLL VENOUS BLD VENIPUNCTURE: CPT | Mod: PO

## 2020-07-27 ENCOUNTER — CLINICAL SUPPORT (OUTPATIENT)
Dept: REHABILITATION | Facility: HOSPITAL | Age: 70
End: 2020-07-27
Attending: FAMILY MEDICINE
Payer: MEDICARE

## 2020-07-27 DIAGNOSIS — M62.9 HAMSTRING TIGHTNESS OF BOTH LOWER EXTREMITIES: ICD-10-CM

## 2020-07-27 DIAGNOSIS — R29.898 WEAKNESS OF LOWER EXTREMITY, UNSPECIFIED LATERALITY: ICD-10-CM

## 2020-07-27 DIAGNOSIS — R29.898 WEAKNESS OF BOTH LOWER EXTREMITIES: ICD-10-CM

## 2020-07-27 DIAGNOSIS — R26.89 DECREASED FUNCTIONAL MOBILITY: ICD-10-CM

## 2020-07-27 DIAGNOSIS — M25.661 DECREASED RANGE OF MOTION OF RIGHT KNEE: ICD-10-CM

## 2020-07-27 PROCEDURE — 97162 PT EVAL MOD COMPLEX 30 MIN: CPT | Mod: PO

## 2020-07-27 PROCEDURE — 97110 THERAPEUTIC EXERCISES: CPT | Mod: PO

## 2020-07-27 NOTE — PATIENT INSTRUCTIONS
Isometric Stabilization        Tighten abdominal muscles as if tightening a belt. Hold 5 seconds.  Do 10 times, 2 times per day.     [email or web address]     Copyright © [employer]. All rights reserved.   Piriformis Stretch, Sitting        Sit, one ankle on opposite knee, same-side hand on crossed knee. Push down on knee, keeping spine straight. Lean torso forward, with flat back, until tension is felt in hamstrings and gluteals of crossed-leg side. Hold 15 seconds.   Repeat 3 times per session. Do 2 sessions per day.    Copyright © [employer]. All rights reserved.

## 2020-07-29 PROBLEM — R26.89 DECREASED FUNCTIONAL MOBILITY: Status: ACTIVE | Noted: 2020-07-29

## 2020-07-29 PROBLEM — M25.661 DECREASED RANGE OF MOTION OF RIGHT KNEE: Status: ACTIVE | Noted: 2020-07-29

## 2020-07-29 PROBLEM — R29.898 WEAKNESS OF BOTH LOWER EXTREMITIES: Status: ACTIVE | Noted: 2020-07-29

## 2020-07-29 PROBLEM — M62.9 HAMSTRING TIGHTNESS OF BOTH LOWER EXTREMITIES: Status: ACTIVE | Noted: 2020-07-29

## 2020-07-29 NOTE — PLAN OF CARE
[facility]  Physical Therapy Initial Evaluation    Date: 7/27/2020   Name: Katherine Berger  Clinic Number: [id number]    Therapy Diagnosis:   Encounter Diagnoses   Name Primary?    Weakness of lower extremity, unspecified laterality     Decreased range of motion of right knee     Weakness of both lower extremities     Hamstring tightness of both lower extremities     Decreased functional mobility      Physician: Itz Muse MD    Physician Orders: PT Eval and Treat   Medical Diagnosis from Referral: R29.898 (ICD-10-CM) - Weakness of lower extremity, unspecified laterality  Evaluation Date: 7/27/2020  Authorization Period Expiration: 08/10/2020  Plan of Care Expiration: 9/27/2020  Visit # / Visits authorized: 1/ 6    Time In: 3:38 am  Time Out: 4:15 pm  Total Appointment Time (timed & untimed codes): 37 minutes    Precautions: Standard and Diabetes    Subjective   Date of onset: chronic   History of current condition - Katherine reports: she has been having trouble with her legs for about 2 years and feels like they have progressively gotten weaker. Currently she feels like her legs are going to give out when she is walking. She has difficulty carrying bags up the steps in front of her home, has to use a shower chair to bathe as she has trouble standing longer than 5 minutes, she also has trouble getting up from the bottom of the tub, pain in her low back down to her right ankle when driving to [place], and she is unable to working in yard. She feels like the trouble with her legs is coming from her back.      Medical History:   Past Medical History:   Diagnosis Date    Arthritis     Diabetes mellitus     Hypertension     Kidney stone        Surgical History:   Katherine Berger  has a past surgical history that includes Colonoscopy (05/23/2013); Hysterectomy; Tonsillectomy; Foot surgery (Right, 2010); Foot surgery (Left, 2010); Knee surgery (Left, 06/10/2016); and  Oophorectomy.    Medications:   Katherine has a current medication list which includes the following prescription(s): amitriptyline, atorvastatin, chlorthalidone, diabetic supplies, [employer] docusate sodium, escitalopram oxalate, ferrous sulfate, furosemide, gabapentin, glimepiride, insulin nph-insulin regular (70/30), insulin nph/reg human, lisinopril, meloxicam, metformin, metoprolol tartrate, multivit/iron/fa/k/herb no.244, oxycodone-acetaminophen, and tamsulosin.    Allergies:   Review of patient's allergies indicates:  No Known Allergies     Imaging, none: no recent imaging available to PT    Prior Therapy: PT previously for back and knees  Social History: two story home, 5 steps to enter with handrails on both sides, lives with their granddaughter  Occupation: retired  Prior Level of Function: independent with AE  Current Level of Function: independent with AE and pain    Pain:  Current 7/10, worst 10/10, best 6/10   Location: bilateral back  and LEs   Description: like a beating on a drum  Aggravating Factors: Sitting, Standing, Bending, Walking and Lifting  Easing Factors: pain medication, ice, heating pad and hot bath    Pts goals: so I can walk and strength up my legs    Objective     Observation: Patient is a 70 year old female, who presents to the clinic in no apparent distress.       Posture: decreased lumbar lordosis in standing, R knee in slight flexion in standing      Gait: antalgic gait without an AD    Range of Motion: AROM (PROM):  Hip Left Right   Flexion WFL WFL   Abduction WFL WFL     Knee Left Right   Extension 0 -10   Flexion 115 114       Ankle Left Right   Dorsiflexion WFL WFL   Plantarflexion WFL WFL   Inversion WFL WFL   Eversion WFL WFL     Lumbar Range of Motion:    Degrees Pain   Flexion 60 HS B   Extension 10    Left Side Bending 15    Right Side Bending 15      Strength:  Hip Left Right   Flexion 3+/5 3/5   Abduction 3/5 3/5   Adduction 4-/5 4-/5   Extension NT NT     Knee Left  "Right   Extension 4/5 4/5   Flexion 4/5 4+/5     Ankle Left Right   Dorsiflexion 5/5 5/5   Plantarflexion 5/5 5/5   Inversion 5/5 5/5   Eversion 5/5 5/5       Special Tests:  Repeated flexion: no change  Repeated extension: no change    Joint Mobility: R patella hypomobile compared to L patella in all planes    Palpation: no tenderness to palpation    Sensation: light touch intact    Flexibility: Popliteal angle L: 40 degrees; R 40 degrees      Limitation/Restriction for FOTO Lower Leg Survey    Therapist reviewed FOTO scores for Katherine Rising on 7/27/2020.   FOTO documents entered into EPIC - see Media section.    Limitation Score: 62%         TREATMENT   Treatment Time In: 4:07 pm  Treatment Time Out: 4:15 pm  Total Treatment time (time-based codes) separate from Evaluation: 8 minutes    Katherine received therapeutic exercises to develop flexibility and core stabilization for 8 minutes including:    Date 7/27/2020   Visit 1/6   FTF 8/27/2020   FOTO 1/5   Cap visit  Cap total 113.20  113.20   POC exp    MT    Piriformis str 3 x 10"   Hamstring str Next?   Supine:    TAs --   LTR --   March --   Bridge --   SLR --   Hip abd --   Hip Add --   Seated:    TAs 5 x 5"   March --   LAQs --   Hamstring curl --         Initials DG       Home Exercises and Patient Education Provided    Education provided:   - compliance with HEP  - role of PT and goals for PT     Written Home Exercises Provided: yes.  Exercises were reviewed and Katherine was able to demonstrate them prior to the end of the session.  Katherine demonstrated good  understanding of the education provided.     See EMR under Patient Instructions for exercises provided 7/27/2020.    Assessment   Katherine is a 70 y.o. female referred to outpatient Physical Therapy with a medical diagnosis of Weakness of lower extremity. Pt presents with weakness of both lower extremities, decreased range of motion of right knee, bilateral hamstring tightness, abnormality of gait, and " decreased functional mobility. Due to her weakness and decreased ROM she has difficulty with prolonged standing, prolonged walking, transfers from low surfaces, ascending/descending stairs, performing her usual household duties, and yard work. When ambulating she has an antalgic gait pattern due to her weakness and complaints of pain in her right knee. Her current score on FOTO Lower Leg Survey indicates she is 62% impaired, limited, or restricted.    Pt prognosis is Good.   Pt will benefit from skilled outpatient Physical Therapy to address the deficits stated above and in the chart below, provide pt/family education, and to maximize pt's level of independence.     Plan of care discussed with patient: Yes  Pt's spiritual, cultural and educational needs considered and patient is agreeable to the plan of care and goals as stated below:     Anticipated Barriers for therapy: none    Medical Necessity is demonstrated by the following  History  Co-morbidities and personal factors that may impact the plan of care Co-morbidities:   diabetes, high BMI and level of undertstanding of current condition    Personal Factors:   no deficits     moderate   Examination  Body Structures and Functions, activity limitations and participation restrictions that may impact the plan of care Body Regions:   back  lower extremities    Body Systems:    ROM  strength  balance  gait  motor control    Participation Restrictions:   Difficulty with prolonged standing, prolonged walking, transfers from low surfaces, ascending/descending stairs, performing usual household duties, yard work    Activity limitations:   Learning and applying knowledge  no deficits    General Tasks and Commands  no deficits    Communication  no deficits    Mobility  lifting and carrying objects  walking  ascending/descending stairs    Self care  no deficits    Domestic Life  shopping  cooking  doing house work (cleaning house, washing dishes,  laundry)    Interactions/Relationships  no deficits    Life Areas  no deficits    Community and Social Life  community life  recreation and leisure         moderate   Clinical Presentation evolving clinical presentation with changing clinical characteristics moderate   Decision Making/ Complexity Score: moderate     Goals:  Short Term Goals: 4 weeks   1. This patient will be independent with a basic HEP. In progress, not met  2. This patient will increase R knee extension to 0 degrees in order to ambulate with a normal gait pattern without an AD. In progress, not met  3. This patient will increase B LE strength by 1 grade in order to be able to ascend/descend 5 steps with a reciprocal gait pattern and no complaints of pain. In progress, not met  4. This patient will have a pain rating of 5/10 at worst with ADLs. In progress, not met  5. Patient will be able to achieve greater than or equal to 48 on the FOTO Lower Leg Survey indicating patient is 52% impaired, limited, or restricted and demonstrating overall improved functional ability with lower extremity. In progress, not met    Long Term Goals: 8 weeks   1. This patient will be independent with an updated HEP. In progress, not met  2. This patient will be able to stand longer than 15 minutes with no increase in pain in order to cook a meal. In progress, not met  3. This patient will increase B LE strength to 5/5 in order to be able to perform her yard work with no complaints of pain. In progress, not met  4. This patient will have a pain rating of 3/10 at worst with ADLs. In progress, not met  5. Patient will be able to achieve greater than or equal to 58 on the FOTO Lower Leg Survey indicating patient is 42% impaired, limited, or restricted and demonstrating overall improved functional ability with lower extremity. In progress, not met    Plan   Plan of care Certification: 7/27/2020 to 9/27/2020.    Outpatient Physical Therapy 2 times weekly for 8 weeks to  include the following interventions: Gait Training, Manual Therapy, Moist Heat/ Ice, Neuromuscular Re-ed, Patient Education, Therapeutic Activites, Therapeutic Exercise and IASTM. Dry needling with manual therapy techniques to decrease pain, inflammation and swelling, increase circulation and promote healing process.    Edna Valentin, PT

## 2020-08-03 ENCOUNTER — TELEPHONE (OUTPATIENT)
Dept: FAMILY MEDICINE | Facility: CLINIC | Age: 70
End: 2020-08-03

## 2020-08-03 DIAGNOSIS — M25.539 PAIN IN WRIST, UNSPECIFIED LATERALITY: Primary | ICD-10-CM

## 2020-08-05 ENCOUNTER — CLINICAL SUPPORT (OUTPATIENT)
Dept: REHABILITATION | Facility: HOSPITAL | Age: 70
End: 2020-08-05
Attending: FAMILY MEDICINE
Payer: MEDICARE

## 2020-08-05 DIAGNOSIS — R29.898 WEAKNESS OF BOTH LOWER EXTREMITIES: ICD-10-CM

## 2020-08-05 DIAGNOSIS — R26.89 DECREASED FUNCTIONAL MOBILITY: ICD-10-CM

## 2020-08-05 DIAGNOSIS — M62.9 HAMSTRING TIGHTNESS OF BOTH LOWER EXTREMITIES: ICD-10-CM

## 2020-08-05 DIAGNOSIS — M25.661 DECREASED RANGE OF MOTION OF RIGHT KNEE: ICD-10-CM

## 2020-08-05 PROCEDURE — 97110 THERAPEUTIC EXERCISES: CPT | Mod: PO

## 2020-08-05 NOTE — PATIENT INSTRUCTIONS
Supine: Leg Stretch With Strap (Intermediate)        Lie on back with one knee bent, foot flat on floor. Hook strap around other foot. Straighten knee. Raise leg to a comfortable range toward its maximal range. Hold 10 seconds. Relax leg completely down to floor.   Repeat 5times per session. Do 2 sessions per day.    Copyright © [employer]. All rights reserved.   Piriformis Stretch, Supine        Lie supine, legs bent, feet flat. Raise one bent leg and, grasping terrell with both hands, pull leg toward opposite shoulder. Hold 10 seconds.   Repeat 5 times per session. Do 2 sessions per day. Perform with other leg straight.    Copyright © [employer]. All rights reserved.   Strengthening: Hip Adduction - Isometric        With ball or folded pillow between knees, squeeze knees together. Hold 3 seconds.  Repeat 10times per set. Do 3 sets per session. Do 2 sessions per day.     [email or web address]     Copyright © [employer]. All rights reserved.   Strengthening: Hip Abductor - Resisted        With band looped around both legs above knees, push thighs apart.  Repeat 10 times per set. Do 3 sets per session. Do 2 sessions per day.     [email or web address]     Copyright © [employer]. All rights reserved.   Strengthening: Straight Leg Raise (Phase 1)        Tighten muscles on front of right thigh, then lift leg from surface, keeping knee locked. Repeat with the left leg.  Repeat 10 times per set. Do 3 sets per session. Do 2 sessions per day.     [email or web address]     Copyright © [employer]. All rights reserved.   Bridging        Slowly raise buttocks from floor, keeping stomach tight.  Repeat 10 times per set. Do 3 sets per session. Do 2 sessions per day.     [email or web address]     Copyright © [employer]. All rights reserved.

## 2020-08-05 NOTE — PROGRESS NOTES
"  Physical Therapy Treatment Note     Name: Katherine Berger  Clinic Number: [id number]    Therapy Diagnosis:   Encounter Diagnoses   Name Primary?    Decreased range of motion of right knee     Weakness of both lower extremities     Hamstring tightness of both lower extremities     Decreased functional mobility      Physician: Itz Muse MD    Visit Date: 8/5/2020    Physician Orders: PT Eval and Treat   Medical Diagnosis from Referral: R29.898 (ICD-10-CM) - Weakness of lower extremity, unspecified laterality  Evaluation Date: 7/27/2020  Authorization Period Expiration: 08/10/2020  Plan of Care Expiration: 9/27/2020  Visit # / Visits authorized: 2/ 6    Time In: 1:26 pm  Time Out: 2:00 pm  Total Billable Time: 34 minutes    Precautions: Standard and Diabetes    Subjective     Pt reports: she has been suffering with her R hand since she had a heart procedure 1 month ago, had to take a pain pill last night because of her back.  She was compliant with home exercise program.  Response to previous treatment: increase soreness after last visit  Functional change: none    Pain: 7/10  Location: bilateral low back      Objective     Katherine received therapeutic exercises to develop strength, flexibility and core stabilization for 34 minutes including:    Date 8/5/2020 7/27/2020   Visit 2/6 1/6   FTF 8/27/2020 8/27/2020   FOTO 2/5 1/5   Cap visit  Cap total 60.64  173.84 113.20  113.20   POC exp 9/27/2020     MT --     Piriformis str 5 x 10" 3 x 10"   Hamstring str 5 x 10" Next?   Supine:      TAs 10 x 3" --   LTR 1 x 10 --   March Next? --   Bridge 1 x 10 --   SLR 1 x 10 --   Hip abd 1 x 10 RTB --   Hip Add 10 x 3" w/ ball --   Seated:      TAs Not today 5 x 5"   March next --   LAQs next --   Hamstring curl -- --            Initials DG DG       Home Exercises Provided and Patient Education Provided     Education provided:   - compliance with HEP    Written Home Exercises Provided: yes.  Exercises were reviewed and " Ktaherine was able to demonstrate them prior to the end of the session.  Katherine demonstrated good  understanding of the education provided.     See EMR under Patient Instructions for exercises provided 8/5/2020.    Assessment     Katherine required frequent verbal cues during the session to perform her exercises correctly and to keep her exercises in a pain free range. She completed all of today's activities with no increase in symptoms prior to leaving the clinic.   Katherine is progressing well towards her goals.   Pt prognosis is Good.     Pt will continue to benefit from skilled outpatient physical therapy to address the deficits listed in the problem list box on initial evaluation, provide pt/family education and to maximize pt's level of independence in the home and community environment.     Pt's spiritual, cultural and educational needs considered and pt agreeable to plan of care and goals.     Anticipated barriers to physical therapy: none    Goals:   Short Term Goals: 4 weeks   1. This patient will be independent with a basic HEP. In progress, not met  2. This patient will increase R knee extension to 0 degrees in order to ambulate with a normal gait pattern without an AD. In progress, not met  3. This patient will increase B LE strength by 1 grade in order to be able to ascend/descend 5 steps with a reciprocal gait pattern and no complaints of pain. In progress, not met  4. This patient will have a pain rating of 5/10 at worst with ADLs. In progress, not met  5. Patient will be able to achieve greater than or equal to 48 on the FOTO Lower Leg Survey indicating patient is 52% impaired, limited, or restricted and demonstrating overall improved functional ability with lower extremity. In progress, not met     Long Term Goals: 8 weeks   1. This patient will be independent with an updated HEP. In progress, not met  2. This patient will be able to stand longer than 15 minutes with no increase in pain in order to cook a  meal. In progress, not met  3. This patient will increase B LE strength to 5/5 in order to be able to perform her yard work with no complaints of pain. In progress, not met  4. This patient will have a pain rating of 3/10 at worst with ADLs. In progress, not met  5. Patient will be able to achieve greater than or equal to 58 on the FOTO Lower Leg Survey indicating patient is 42% impaired, limited, or restricted and demonstrating overall improved functional ability with lower extremity. In progress, not met  Plan     Continue with her plan of care and begin seated marching along with LAQs next visit.     Edna Valentin, PT

## 2020-08-06 ENCOUNTER — TELEPHONE (OUTPATIENT)
Dept: FAMILY MEDICINE | Facility: CLINIC | Age: 70
End: 2020-08-06

## 2020-08-06 NOTE — TELEPHONE ENCOUNTER
Pt phoned states she has been taking januvia and the side effects is joint pain, she is having joint pain  And wants to know what should she do , please advise

## 2020-08-06 NOTE — TELEPHONE ENCOUNTER
----- Message from Misty Zamora sent at 8/6/2020 10:07 AM CDT -----  Contact: [phone]/Self  Patient is requesting to speak with nurse regarding having joint pain. Pt thinks it is a side effect from taking medication JANUVIA. Please advise.

## 2020-08-07 ENCOUNTER — TELEPHONE (OUTPATIENT)
Dept: ORTHOPEDICS | Facility: CLINIC | Age: 70
End: 2020-08-07

## 2020-08-07 ENCOUNTER — PATIENT OUTREACH (OUTPATIENT)
Dept: ADMINISTRATIVE | Facility: OTHER | Age: 70
End: 2020-08-07

## 2020-08-07 DIAGNOSIS — R52 PAIN: Primary | ICD-10-CM

## 2020-08-07 NOTE — TELEPHONE ENCOUNTER
I do not see in her chart that she is using or has been Rxed Januvia    Is there a mix up?  Given to her by other md?

## 2020-08-07 NOTE — TELEPHONE ENCOUNTER
I would take tylenol for the arthritis pain until he returns your phone call-if this does not work or the pain is to intense than I would stop the medication and monitor your sugars closely.  Let me know or Dr. Sheriff know if your glucose gets very high.  Very high is 250-300

## 2020-08-14 ENCOUNTER — TELEPHONE (OUTPATIENT)
Dept: FAMILY MEDICINE | Facility: CLINIC | Age: 70
End: 2020-08-14

## 2020-08-14 RX ORDER — BUSPIRONE HYDROCHLORIDE 5 MG/1
5 TABLET ORAL 2 TIMES DAILY
Qty: 60 TABLET | Refills: 1 | Status: SHIPPED | OUTPATIENT
Start: 2020-08-14 | End: 2021-08-19 | Stop reason: SDUPTHER

## 2020-08-14 NOTE — TELEPHONE ENCOUNTER
----- Message from Coby Madrigal sent at 8/14/2020  9:42 AM CDT -----  Contact: [phone]  Type:  Needs Medical Advice    Who Called: PT  Reason for Call: regarding the Dr prescribing her something for Anxiety and Depression  Pharmacy name and phone #:  [facility] [address] [place] [address] [address] [facility]  Would the patient rather a call back or a response via [facility]? Call back  Best Call Back Number: [phone]  Additional Information:  pt was already taking a medication to help, pt states it is not helping      lexapro 5 mg

## 2020-09-08 RX ORDER — METFORMIN HYDROCHLORIDE 500 MG/1
1000 TABLET, EXTENDED RELEASE ORAL
Qty: 180 TABLET | Refills: 3 | Status: SHIPPED | OUTPATIENT
Start: 2020-09-08 | End: 2020-12-01

## 2020-09-08 RX ORDER — FUROSEMIDE 20 MG/1
TABLET ORAL
Qty: 90 TABLET | Refills: 1 | Status: SHIPPED | OUTPATIENT
Start: 2020-09-08 | End: 2020-12-01 | Stop reason: SDUPTHER

## 2020-09-08 RX ORDER — FERROUS SULFATE 324(65)MG
325 TABLET, DELAYED RELEASE (ENTERIC COATED) ORAL DAILY PRN
Qty: 90 TABLET | Refills: 3 | Status: SHIPPED | OUTPATIENT
Start: 2020-09-08 | End: 2021-03-05 | Stop reason: SDUPTHER

## 2020-09-08 RX ORDER — TAMSULOSIN HYDROCHLORIDE 0.4 MG/1
1 CAPSULE ORAL DAILY
Qty: 30 CAPSULE | Refills: 11 | Status: SHIPPED | OUTPATIENT
Start: 2020-09-08 | End: 2020-12-01 | Stop reason: SDUPTHER

## 2020-09-08 NOTE — TELEPHONE ENCOUNTER
----- Message from Belgica Mcduffie sent at 9/8/2020  3:23 PM CDT -----  Regarding: refill  Contact: [phone]/self  Type:  RX Refill Request    Who Called:  self   Refill or New Rx: refill  RX Name and Strength: metFORMIN (GLUCOPHAGE-XR) 500 MG XR 24hr tablet  How is the patient currently taking it? (ex. 1XDay): Take 2 tablets (1,000 mg total) by mouth daily with breakfast. - Oral  Is this a 30 day or 90 day RX: 90/3 refills    RX Name and Strength: ferrous sulfate 324 mg (65 mg iron) TbEC  How is the patient currently taking it? (ex. 1XDay): Take 1 tablet (324 mg total) by mouth daily as needed. - Oral  Is this a 30 day or 90 day RX: 90/3 refills    RX Name and Strength: furosemide (LASIX) 20 MG tablet  How is the patient currently taking it? (ex. 1XDay): One tablet po daily for swelling/fluid as directed by md 3 to 7 time a week  Is this a 30 day or 90 day RX: 90/1 refill    RX Name and Strength: tamsulosin (FLOMAX) 0.4 mg Cap  How is the patient currently taking it? (ex. 1XDay): TAKE ONE CAPSULE BY MOUTH ONCE DAILY  Is this a 30 day or 90 day RX: 30/11 refills      Preferred Pharmacy with phone number: 1 - [facility] [address] [place] [address] [address] [facility];  Local or Mail Order:   Ordering Provider:   Would the patient rather a call back or a response via [facility]?  call  Best Call Back Number: [phone]/self  Additional Information:

## 2020-10-23 ENCOUNTER — PATIENT OUTREACH (OUTPATIENT)
Dept: ADMINISTRATIVE | Facility: OTHER | Age: 70
End: 2020-10-23

## 2020-10-23 NOTE — PROGRESS NOTES
Health Maintenance Due   Topic Date Due    TETANUS VACCINE  02/26/1970    Shingles Vaccine (1 of 2) 02/26/2002    Influenza Vaccine (1) 08/01/2020    DEXA SCAN  08/08/2020     Updates were requested from care everywhere.  Chart was reviewed for overdue Proactive [facility] Encounters (DAVID) topics (CRS, Breast Cancer Screening, Eye exam)  Health Maintenance has been updated.  LINKS immunization registry triggered.  Immunizations were reconciled.

## 2020-10-26 ENCOUNTER — TELEPHONE (OUTPATIENT)
Dept: ORTHOPEDICS | Facility: CLINIC | Age: 70
End: 2020-10-26

## 2020-10-26 ENCOUNTER — HOSPITAL ENCOUNTER (OUTPATIENT)
Dept: RADIOLOGY | Facility: HOSPITAL | Age: 70
Discharge: HOME | End: 2020-10-26
Attending: ORTHOPAEDIC SURGERY
Payer: MEDICARE

## 2020-10-26 ENCOUNTER — OFFICE VISIT (OUTPATIENT)
Dept: ORTHOPEDICS | Facility: CLINIC | Age: 70
End: 2020-10-26
Payer: MEDICARE

## 2020-10-26 VITALS — BODY MASS INDEX: 37.35 KG/M2 | WEIGHT: 238 LBS | HEIGHT: 67 IN

## 2020-10-26 DIAGNOSIS — M25.539 PAIN IN WRIST, UNSPECIFIED LATERALITY: ICD-10-CM

## 2020-10-26 DIAGNOSIS — M79.642 BILATERAL HAND PAIN: ICD-10-CM

## 2020-10-26 DIAGNOSIS — G56.03 BILATERAL CARPAL TUNNEL SYNDROME: ICD-10-CM

## 2020-10-26 DIAGNOSIS — M79.641 BILATERAL HAND PAIN: Primary | ICD-10-CM

## 2020-10-26 DIAGNOSIS — M79.641 BILATERAL HAND PAIN: ICD-10-CM

## 2020-10-26 DIAGNOSIS — M79.642 BILATERAL HAND PAIN: Primary | ICD-10-CM

## 2020-10-26 PROCEDURE — 1159F MED LIST DOCD IN RCRD: CPT | Mod: S$GLB,,, | Performed by: ORTHOPAEDIC SURGERY

## 2020-10-26 PROCEDURE — 20526 PR INJECT CARPAL TUNNEL: ICD-10-PCS | Mod: 50,S$GLB,, | Performed by: ORTHOPAEDIC SURGERY

## 2020-10-26 PROCEDURE — 99999 PR PBB SHADOW E&M-EST. PATIENT-LVL V: CPT | Mod: PBBFAC,,, | Performed by: ORTHOPAEDIC SURGERY

## 2020-10-26 PROCEDURE — 1101F PR PT FALLS ASSESS DOC 0-1 FALLS W/OUT INJ PAST YR: ICD-10-PCS | Mod: CPTII,S$GLB,, | Performed by: ORTHOPAEDIC SURGERY

## 2020-10-26 PROCEDURE — 3008F BODY MASS INDEX DOCD: CPT | Mod: CPTII,S$GLB,, | Performed by: ORTHOPAEDIC SURGERY

## 2020-10-26 PROCEDURE — 99999 PR PBB SHADOW E&M-EST. PATIENT-LVL V: ICD-10-PCS | Mod: PBBFAC,,, | Performed by: ORTHOPAEDIC SURGERY

## 2020-10-26 PROCEDURE — 73130 X-RAY EXAM OF HAND: CPT | Mod: TC,50,PN

## 2020-10-26 PROCEDURE — 73130 X-RAY EXAM OF HAND: CPT | Mod: 26,50,, | Performed by: RADIOLOGY

## 2020-10-26 PROCEDURE — 20526 THER INJECTION CARP TUNNEL: CPT | Mod: 50,S$GLB,, | Performed by: ORTHOPAEDIC SURGERY

## 2020-10-26 PROCEDURE — 1101F PT FALLS ASSESS-DOCD LE1/YR: CPT | Mod: CPTII,S$GLB,, | Performed by: ORTHOPAEDIC SURGERY

## 2020-10-26 PROCEDURE — 1159F PR MEDICATION LIST DOCUMENTED IN MEDICAL RECORD: ICD-10-PCS | Mod: S$GLB,,, | Performed by: ORTHOPAEDIC SURGERY

## 2020-10-26 PROCEDURE — 1125F AMNT PAIN NOTED PAIN PRSNT: CPT | Mod: S$GLB,,, | Performed by: ORTHOPAEDIC SURGERY

## 2020-10-26 PROCEDURE — 73130 XR HAND COMPLETE 3 VIEWS BILATERAL: ICD-10-PCS | Mod: 26,50,, | Performed by: RADIOLOGY

## 2020-10-26 PROCEDURE — 3008F PR BODY MASS INDEX (BMI) DOCUMENTED: ICD-10-PCS | Mod: CPTII,S$GLB,, | Performed by: ORTHOPAEDIC SURGERY

## 2020-10-26 PROCEDURE — 99203 OFFICE O/P NEW LOW 30 MIN: CPT | Mod: 25,S$GLB,, | Performed by: ORTHOPAEDIC SURGERY

## 2020-10-26 PROCEDURE — 99203 PR OFFICE/OUTPT VISIT, NEW, LEVL III, 30-44 MIN: ICD-10-PCS | Mod: 25,S$GLB,, | Performed by: ORTHOPAEDIC SURGERY

## 2020-10-26 PROCEDURE — 1125F PR PAIN SEVERITY QUANTIFIED, PAIN PRESENT: ICD-10-PCS | Mod: S$GLB,,, | Performed by: ORTHOPAEDIC SURGERY

## 2020-10-26 RX ORDER — DULAGLUTIDE 0.75 MG/.5ML
INJECTION, SOLUTION SUBCUTANEOUS
COMMUNITY
Start: 2020-09-09 | End: 2020-12-01

## 2020-10-26 RX ORDER — SACUBITRIL AND VALSARTAN 24; 26 MG/1; MG/1
1 TABLET, FILM COATED ORAL 2 TIMES DAILY
COMMUNITY
Start: 2020-09-10 | End: 2022-01-23 | Stop reason: SDUPTHER

## 2020-10-26 RX ORDER — TRIAMCINOLONE ACETONIDE 40 MG/ML
40 INJECTION, SUSPENSION INTRA-ARTICULAR; INTRAMUSCULAR
Status: COMPLETED | OUTPATIENT
Start: 2020-10-26 | End: 2020-10-26

## 2020-10-26 RX ORDER — DICLOFENAC SODIUM 10 MG/G
2 GEL TOPICAL 3 TIMES DAILY
Qty: 1 TUBE | Refills: 3 | Status: SHIPPED | OUTPATIENT
Start: 2020-10-26

## 2020-10-26 RX ADMIN — TRIAMCINOLONE ACETONIDE 40 MG: 40 INJECTION, SUSPENSION INTRA-ARTICULAR; INTRAMUSCULAR at 11:10

## 2020-10-26 NOTE — PROGRESS NOTES
Subjective:      Patient ID: Katherine Berger is a 70 y.o. female.    Chief Complaint: Hand Pain (Bilateral hand pain)      HPI  Katherine Berger is a  70 y.o. female presenting today for bilateral hand pain and stiffness.  There was not a history of trauma.  Onset of symptoms began several months ago  She is having pain at night occasional paresthesias in stiffness both hands.      Review of patient's allergies indicates:  No Known Allergies      Current Outpatient Medications   Medication Sig Dispense Refill    amitriptyline (ELAVIL) 10 MG tablet TAKE ONE TO THREE TABLETS BY MOUTH AT BEDTIME FOR  HEADACHE 90 tablet 3    atorvastatin (LIPITOR) 10 MG tablet Take 1 tablet (10 mg total) by mouth every other day. 45 tablet 3    busPIRone (BUSPAR) 5 MG Tab Take 1 tablet (5 mg total) by mouth 2 (two) times daily. For anxiety 60 tablet 1    chlorthalidone (HYGROTEN) 25 MG Tab Take 1 tablet (25 mg total) by mouth once daily. 90 tablet 1    diabetic supplies, TurboHeads. Kit Use for testing 1 kit 1    docusate sodium (COLACE) 100 MG capsule Take 1 capsule (100 mg total) by mouth 2 (two) times daily as needed. 180 capsule 0    ENTRESTO 24-26 mg per tablet Take 1 tablet by mouth 2 (two) times daily.      escitalopram oxalate (LEXAPRO) 5 MG Tab Take 1 tablet (5 mg total) by mouth once daily. 90 tablet 3    ferrous sulfate 324 mg (65 mg iron) TbEC Take 1 tablet (324 mg total) by mouth daily as needed. 90 tablet 3    furosemide (LASIX) 20 MG tablet One tablet po daily for swelling/fluid as directed by md 3 to 7 time a week 90 tablet 1    gabapentin (NEURONTIN) 100 MG capsule Takes on in am and two in pm for back pain radiating down leg. 270 capsule 3    insulin NPH-insulin regular, 70/30, (NOVOLIN 70/30 U-100 INSULIN) 100 unit/mL (70-30) injection INJECT 20 UNITS SUBCUTANEOUSLY IN THE MORNING AND 12 UNITS IN THE EVENING 90 mL 11    insulin NPH/Reg human (HUMULIN 70/30) 100 unit/mL (70-30) InPn pen 20 units each a.m. and 12  "units each p.m. 30 mL 3    meloxicam (MOBIC) 15 MG tablet Take 1 tablet (15 mg total) by mouth daily as needed. 90 tablet 0    metFORMIN (GLUCOPHAGE-XR) 500 MG ER 24hr tablet Take 2 tablets (1,000 mg total) by mouth daily with breakfast. 180 tablet 3    MULTIVIT/IRON/FA/K/HERB NO.244 (ALIVE WOMEN'S ENERGY ORAL) Take 1 tablet by mouth daily as needed.       oxyCODONE-acetaminophen (PERCOCET)  mg per tablet 1 tablet 3 (three) times daily as needed.      tamsulosin (FLOMAX) 0.4 mg Cap Take 1 capsule (0.4 mg total) by mouth once daily. 30 capsule 11    TRULICITY 0.75 mg/0.5 mL pen injector INJECT 0.5ML (0.75MG) SUBCUTANEOUSLY EVERY 7 DAYS IN THE ABDOMEN THIGH OR UPPER ARM ROTATING INJECTION SITES.      diclofenac sodium (VOLTAREN) 1 % Gel Apply 2 g topically 3 (three) times daily. 1 Tube 3     No current facility-administered medications for this visit.        Past Medical History:   Diagnosis Date    Arthritis     Diabetes mellitus     Hypertension     Kidney stone        Past Surgical History:   Procedure Laterality Date    COLONOSCOPY  05/23/2013    dr ram - 5 years    FOOT SURGERY Right 2010    bone spur    FOOT SURGERY Left 2010    fx ankle    HYSTERECTOMY      age 47    KNEE SURGERY Left 06/10/2016    TKR    OOPHORECTOMY      TONSILLECTOMY         Review of Systems:  ROS    OBJECTIVE:     PHYSICAL EXAM:  Height: 5' 7" (170.2 cm) Weight: 108 kg (238 lb)  Vitals:    10/26/20 1117   Weight: 108 kg (238 lb)   Height: 5' 7" (1.702 m)   PainSc:   7     Well developed, well nourished female in no acute distress  Alert and oriented x 3  HEENT- Normal exam  Lungs- Clear to auscultation  Heart- Regular rate and rhythm  Abdomen- Soft nontender  Extremity exam- examination hands demonstrates some mild swelling bilaterally  Tinel sign positive bilateral  Phalen's test negative  Sensation slightly decreased in the tips of all fingers  She also has very limited range of motion and inability to make " a fist both hands   strength decreased      RADIOGRAPHS:  AP and lateral x-rays bilateral hands demonstrates arthritic changes at the CMC joint bilateral  Comments: I have personally reviewed the imaging and I agree with the above radiologist's report.    ASSESSMENT/PLAN:     IMPRESSION:  1.  Carpal tunnel syndrome bilateral  2.  Bilateral hand CMC arthritis.  3.  Finger stiffness bilateral hands which may be related to diabetic key row arthropathy    PLAN:  I explained the nature of the problem the patient.  Recommended wrist splints at night  Also performed injections today  After pause for time-out identified each carpal tunnel injected bilaterally with combination Kenalog 20 mg 0.5 cc xylocaine sterile technique  She tolerated the procedure well without complication  I have also ordered some therapy both hands to work on range of motion and strengthening  Voltaren gel topical  Follow-up 4-6 weeks       - We talked at length about the anatomy and pathophysiology of   Encounter Diagnoses   Name Primary?    Pain in wrist, unspecified laterality     Bilateral carpal tunnel syndrome            Disclaimer: This note has been generated using voice-recognition software. There may be typographical errors that have been missed during proof-reading.

## 2020-10-26 NOTE — LETTER
October 26, 2020      Itz Muse MD  [address] [facility] [address]           [place] [facility]  [address]  [facility] [place] [address]  Phone: [phone]          Patient: Katherine Berger   MR Number: [id number]   YOB: 1950   Date of Visit: 10/26/2020       Dear Dr. Itz Muse:    Thank you for referring Katherine Berger to me for evaluation. Attached you will find relevant portions of my assessment and plan of care.    If you have questions, please do not hesitate to call me. I look forward to following Katherine Berger along with you.    Sincerely,    Arpit Arita Jr., MD    Enclosure  CC:  No Recipients    If you would like to receive this communication electronically, please contact [email or web address] or [phone] to request more information on [employer] access.    For providers and/or their staff who would like to refer a patient to [facility], please contact us through our one-stop-shop provider referral line, Jose Murcia, at [phone].    If you feel you have received this communication in error or would no longer like to receive these types of communications, please e-mail [email or web address]

## 2020-10-27 ENCOUNTER — CLINICAL SUPPORT (OUTPATIENT)
Dept: REHABILITATION | Facility: HOSPITAL | Age: 70
End: 2020-10-27
Attending: ORTHOPAEDIC SURGERY
Payer: MEDICARE

## 2020-10-27 DIAGNOSIS — G56.03 BILATERAL CARPAL TUNNEL SYNDROME: ICD-10-CM

## 2020-10-27 DIAGNOSIS — M79.641 PAIN IN BOTH HANDS: ICD-10-CM

## 2020-10-27 DIAGNOSIS — M79.642 PAIN IN BOTH HANDS: ICD-10-CM

## 2020-10-27 DIAGNOSIS — M25.539 PAIN IN WRIST, UNSPECIFIED LATERALITY: ICD-10-CM

## 2020-10-27 DIAGNOSIS — M25.649 FINGER STIFFNESS, UNSPECIFIED LATERALITY: ICD-10-CM

## 2020-10-27 DIAGNOSIS — M62.81 MUSCLE WEAKNESS: ICD-10-CM

## 2020-10-27 PROCEDURE — 97110 THERAPEUTIC EXERCISES: CPT | Mod: PO

## 2020-10-27 PROCEDURE — 97018 PARAFFIN BATH THERAPY: CPT | Mod: PO

## 2020-10-27 PROCEDURE — 97535 SELF CARE MNGMENT TRAINING: CPT | Mod: PO

## 2020-10-27 PROCEDURE — 97165 OT EVAL LOW COMPLEX 30 MIN: CPT | Mod: PO

## 2020-10-27 NOTE — PLAN OF CARE
[facility] Occupational Therapy  Initial Evaluation     Date: 10/27/2020  Name: Katherine Berger  Clinic Number: [id number]    Therapy Diagnosis:   Encounter Diagnoses   Name Primary?    Pain in wrist, unspecified laterality     Bilateral carpal tunnel syndrome     Pain in both hands     Finger stiffness, unspecified laterality     Muscle weakness      Physician: Arpit Arita Jr., *    Physician Orders: Eval & treat  Medical Diagnosis: M25.539 (ICD-10-CM) - Pain in wrist, unspecified laterality G56.03 (ICD-10-CM) - Bilateral carpal tunnel syndrome   Surgical Procedure and Date: n/a  Evaluation Date: 10/27/20  Insurance Authorization Period Expiration: 11/10/20  Plan of Care Certification Period: 10/27/20 to 12/07/20  Date of Return to MD: RAMONA    Visit # / Visits authorized: 1 / 6  Time In: 9:05 am  Time Out: 10:00 am  Total Billable Time: 55 minutes    Precautions:  Standard, Diabetes and blood thinners, cardiac    Subjective     Involved Side: both hands  Dominant Side: Right  Date of Onset: couple of months  Mechanism of Injury: gradual onset of symptoms  History of Current Condition: Patient went to see her PCP who referred her to a hand ortho specialist  Surgical Procedure: none  Imaging: x-rays   Previous Therapy: none for this conditon    Past Medical History/Physical Systems Review:   Katherine Berger  has a past medical history of Arthritis, Diabetes mellitus, Hypertension, and Kidney stone.    Katherine Berger  has a past surgical history that includes Colonoscopy (05/23/2013); Hysterectomy; Tonsillectomy; Foot surgery (Right, 2010); Foot surgery (Left, 2010); Knee surgery (Left, 06/10/2016); and Oophorectomy.    Katherine has a current medication list which includes the following prescription(s): amitriptyline, atorvastatin, buspirone, chlorthalidone, diabetic supplies, miscellan., diclofenac sodium, docusate sodium, entresto, escitalopram oxalate, ferrous sulfate, furosemide, gabapentin,  "insulin nph-insulin regular (70/30), insulin nph/reg human, meloxicam, metformin, multivit/iron/fa/k/herb no.244, oxycodone-acetaminophen, tamsulosin, and trulicity.    Review of patient's allergies indicates:  No Known Allergies     Patient's Goals for Therapy: "have less pain and be able to use my hands better everyday"    Pain:  Functional Pain Scale Rating 0-10:   4/10 on average  4/10 at best  8/10 at worst  Location: B wrist to finger tips  Description: Aching, Throbbing, Tingling and Numb  Aggravating Factors: Night Time, Lifting and any resistive use of hands  Easing Factors: pain medication, heating pad and injection    Occupation:  Retired as a nursing tech from the [employer]  Working presently: home-maker  Duties: cooking, cleaning, shopping, laundry    Functional Limitations/Social History:    Previous functional status includes: Independent with all ADLs.     Current FunctionalStatus   Home/Living environment : lives alone      Limitation of Functional Status as follows:   ADLs/IADLs:     - Feeding: no difficulty, takes increased time    - Bathing: no difficulty    - Dressing/Grooming: no difficulty; uses adaptive methods    - Driving: no difficulty short distance     Objective     Observation/Appearance:  Moderate edema noted in B wrist/hands    Edema. Measured in centimeters.   10/27/2020 10/27/2020    Left Right   Wrist Crease 20.5 20.2   Mid palm 21.9 22.0   MCPs 21.1 21.6       Hand ROM. Measured in degrees.   10/27/2020 10/27/2020    Left Right   Forearm sup/pron WFL WFL   Wrist E/F 56/58 55/55   Wrist RD/UD 14/28 10/34   Fist loose loose        Thumb: MP -10/35 -5/60                IP 0/60 0/44       Rad ADD/ABD 45 49       Pal ADD/ABD 45 45          Opposition To RF MCP To RF MCP     Sensation: Patient reports numbness & tingling at this time. Light touch, temperature, sharp and dull are grossly intact in B  Forearm/wrist/hand, however, a slight diminished sensation on tips of all fingers is noted,  "     Special Tests:   B hands 10/27/2020   Thumb CMC Grind Test positive   Phalen's Test negative   Tinel's Test positive      Strength (Dyanmometer) and Pinch Strength (Pinch Gauge)  Measured in pounds and psi. Average of three trials.   10/27/2020 10/27/2020    Left Right   Rung II 44 42   Key Pinch 15 13   3pt Pinch 8 7.5   2pt Pinch 9.5 8       CMS Impairment/Limitation/Restriction for FOTO Wrist Survey    Therapist reviewed FOTO scores for Katherine Rising on 10/27/2020.   FOTO documents entered into [employer] [facility] - see Media section.    Limitation Score: 41%  Category: Self Care         Treatment     Treatment Time In: 9:30 am  Treatment Time Out: 10:00 am  Total Treatment time separate from Evaluation time: 30 minutes    Katherine received the following supervised modalities after being cleared for contradictions for 8 minutes:   -Paraffin w/ MHP to B wrist/hand, pre-tx to decrease pain & increase tissue extensibility    Katherine received the following manual therapy techniques for 6 minutes:   - In demonstration of HEP: Edema mgmt w/ lymphatic drainage technique;  Deep STM, including MFR, CFM & scar mgmt to B wrist/hand, using hand techniques and IASTM tools to increase blood flow/circulation, improve soft tissue pliability and decrease pain.    Katherine received therapeutic exercises for 8 minutes including:  -performed initial HEP, see attached in Patient Instructions for details    Katherine participated in dynamic functional self-care/ADL's retraining to improve functional performance for 8  minutes, including:  -discussed basic joint protection principles in ADL/IADL's, discussed benefits of compression gloves and paraffin home unit    Home Exercise Program/Education:  Issued HEP (see patient instructions in EMR) and educated on modality use for pain management . Exercises were reviewed and Katherine was able to demonstrate them prior to the end of the session.   Patient received a written copy of exercises to perform at  home. Katherine demonstrated good  understanding of the education provided.  Patient was advised to perform these exercises free of pain, and to stop performing them if pain occurs.    Patient/Family Education: role of OT, goals for OT, scheduling/cancellations - patient verbalized understanding. Discussed insurance limitations with patient.    Additional Education provided: see above for education in ADL's provided    Assessment     Katherine Berger is a 70 y.o. female referred to outpatient occupational therapy and presents with a medical diagnosis of B CTS & B CMC arthritis, resulting in Decreased ROM, Decreased  strength, Decreased pinch strength, Decreased functional hand use, Increased pain, Edema, Joint Stiffness, Diminished/Impaired Sensation and Diminished/Impaired Coordination and demonstrates limitations as described in the chart below. Following medical record review it is determined that patient will benefit from occupational therapy services in order to maximize pain free and/or functional use of bilateral wrist/hands. The following goals were discussed with the patient and patient is in agreement with them as to be addressed in the treatment plan. The patient's rehab potential is Good.     Anticipated barriers to occupational therapy:  DM, cardiac conditions, obesity  Patient has no cultural, educational or language barriers to learning provided.    Profile and History Assessment of Occupational Performance Level of Clinical Decision Making Complexity Score   Occupational Profile:   Katherine Berger is a 70 y.o. female who lives alone and is currently home-maker. Katherine Berger has difficulty with  feeding, grooming and dressing  shopping and housework/household chores  affecting his/her daily functional abilities. His/her main goal for therapy is regain function of B wrist/hands.     Comorbidities:   diabetes and HTN    Medical and Therapy History Review:   Brief               Performance  Deficits    Physical:  Joint Mobility  Skin Integrity/Scar Formation  Edema   Strength  Pinch Strength  Fine Motor Coordination  Pain  sensory changes    Cognitive:  No Deficits    Psychosocial:    No Deficits     Clinical Decision Making:  low    Assessment Process:  Problem-Focused Assessments    Modification/Need for Assistance:  Minimal-Moderate Modifications/Assistance    Intervention Selection:  Several Treatment Options       low  Based on PMHX, co morbidities , data from assessments and functional level of assistance required with task and clinical presentation directly impacting function.       The following goals were discussed with the patient and patient is in agreement with them as to be addressed in the treatment plan.     Goals:   Short Term Goals: (in 2 weeks)  1) Patient will be independent in HEP  2) Decrease pain in B wrist/hands to no more than 5-6/10 worst in ADL/IADL's  3) Increase AROM in B wrist E/F by 5-8 degrees for improved functioning in ADL/IADL's  4) Increase B  strength by 3-5 psi for increased functional use  5) Decrease edema in B wrist/hand by .2 cm   6) Increased AROM of composite fist in B hands with WFL for improved function in ADL/IADL's    Long Term Goals: (in 6 weeks)  1) Decrease pain in B wrist/hand to no more than 2-3/10 worst in ADL/IADL's  2) Increase AROM of B wrist/hands to WFL for increased functioning in ADL/IADL's  3) Increase strength in B /pinch by 15% of initial measures for improved functioning in ADL/IADL's  4) Increased functioning in ADL/IADL's, as evidenced by a FOTO impairment rating of no more than 28%  5) Decrease edema in B wrist/hand to trace      Plan     Certification Period/Plan of care expiration: 10/27/2020 to 12/07/20.    Outpatient Occupational Therapy 2-3 times weekly for 6 weeks to include the following interventions: Paraffin, Manual therapy/joint mobilizations, Modalities for pain management, US 3 mhz, Therapeutic  exercises/activities., Strengthening, Orthotic Fabrication/Fit/Training, Edema Control, Electrical Modalities, Joint Protection and Energy Conservation.      Martínez Sapp, OT

## 2020-10-27 NOTE — PATIENT INSTRUCTIONS
"[facility], OCCUPATIONAL THERAPY  HOME EXERCISE PROGRAM     Complete the following massages for 3-5 min each, 2x/day.                          Complete the following exercises with 5-10 repetitions each, 2x/day.     AROM: Supination / Pronation   With your elbow by your side, turn your palm up then turn your palm down.     AROM: Wrist Flexion / Extension               Bend your wrist forward and back as far as possible.      AROM: Wrist Radial / Ulnar Deviation  Bend your wrist from side to side as far as possible.    AROM: Wrist Flexion / Extension  Make a fist, then bend your wrist forward then back as far as possible.         AROM: Wrist Radial / Ulnar Deviation   Make a fist then bend your wrist toward your body, then away.         AROM: Isolated MCP Flexion / Extension ("Wave")   Bend only your large, bottom knuckles. Hold 3 seconds. Keep the tips of your fingers straight. Straighten fingers.    AROM: Isolated IPJ Flexion / Extension ("Hook")  Bend only your middle and end knuckles. Hold 3 seconds.   Straighten your fingers.     AROM: MCP and PIP Flexion / Extension ("Straight Fist")  Bend your bottom and middle knuckles, keeping the tips of your fingers straight. Try to touch the pads of your fingers on your palm. Hold 3 seconds. Straighten your fingers.     AROM: Composite Flexion / Extension ("Full Fist")  Bend every joint in your hand into a fist. Hold 3 seconds. Straighten your fingers.     AROM: Composte Extension ("Finger Lifts")  Lift your finger off of the table one at a time. Hold 3 seconds. Relax your finger.    AROM: Abduction / Adduction  With hand flat on table, spread all fingers apart, then bring them together as close as possible.    Copyright © I. All rights reserved.     Therapist: Martínez Sapp OT      "

## 2020-11-03 ENCOUNTER — CLINICAL SUPPORT (OUTPATIENT)
Dept: REHABILITATION | Facility: HOSPITAL | Age: 70
End: 2020-11-03
Attending: ORTHOPAEDIC SURGERY
Payer: MEDICARE

## 2020-11-03 DIAGNOSIS — M25.649 FINGER STIFFNESS, UNSPECIFIED LATERALITY: ICD-10-CM

## 2020-11-03 DIAGNOSIS — M62.81 MUSCLE WEAKNESS: ICD-10-CM

## 2020-11-03 DIAGNOSIS — M79.641 PAIN IN BOTH HANDS: ICD-10-CM

## 2020-11-03 DIAGNOSIS — M79.642 PAIN IN BOTH HANDS: ICD-10-CM

## 2020-11-03 PROCEDURE — 97018 PARAFFIN BATH THERAPY: CPT | Mod: PO,59

## 2020-11-03 PROCEDURE — 97110 THERAPEUTIC EXERCISES: CPT | Mod: PO

## 2020-11-03 NOTE — PROGRESS NOTES
Occupational Therapy Treatment Note     Date: 11/3/2020  Name: Katherine Berger  Clinic Number: [id number]    Therapy Diagnosis:   Encounter Diagnoses   Name Primary?    Pain in both hands     Finger stiffness, unspecified laterality     Muscle weakness      Physician: Arpit Arita Jr., *    Physician Orders: Eval & treat  Medical Diagnosis: M25.539 (ICD-10-CM) - Pain in wrist, unspecified laterality G56.03 (ICD-10-CM) - Bilateral carpal tunnel syndrome   Surgical Procedure and Date: n/a  Evaluation Date: 10/27/20  Insurance Authorization Period Expiration: 11/10/20  Plan of Care Certification Period: 10/27/20 to 12/07/20  Date of Return to MD: TBD    Visit # / Visits authorized: 2 / 6  Time In:945 am  Time Out: 1035 am  Total Billable Time: 50 minutes (P, 1MT, 1TE)    Precautions:  Standard, Diabetes and blood thinners      Subjective     Pt reports: No problems with initial HEP. She obtained a pair of Isotoner compression gloves (23-32 mmHg) and reports she is wearing them throughout the day. She feels the gloves have helped with the edema and pain in her hands, however, she is looking for another pair to use when she is washing the current pair.    she was compliant with home exercise program given last session.   Response to previous treatment: No adverse reactions reported of noted at this visit  Functional change: None reported at first treatment after initial evaluation     Pain: 6/10 pre-tx; 4-5/10 pst-tx  Location: bilateral wrists      Objective     Katherine received the following supervised modalities after being cleared for contradictions for 10 minutes:   -Paraffin w/ MHP to B wrist/hands, pre-tx to decrease pain & increase tissue extensibility    Katherine received the following manual therapy techniques for 10 minutes:   -Edema mgmt w/ lymphatic drainage technique;  Deep STM, including MFR, CFM & scar mgmt to B forearm/wrist/hand, using hand techniques and IASTM tools to increase blood  flow/circulation, improve soft tissue pliability and decrease pain.    Katherine received therapeutic exercises for 30 minutes including:  Exercises B hands   Dexterciser 2 min each   Isospheres 2 min each   9 Peg 2 trials each   Digi-flex 2/10 reps each, red       T-putty yellow   -Molding 2 min each   -Grasp Manipulation 3 reps each   -Log Rolls  1 log each       Home Exercises and Education Provided     Education provided:   - Reviewed initial HEP;  Discussed other options for compression gloves when her current pair of Isotoner gloves are being washed, including use of carpal tunnel compression glove combo  - Progress towards goals     Written Home Exercises Provided: Patient instructed to cont prior HEP.  Exercises were reviewed and Katherine was able to demonstrate them prior to the end of the session.  Katherine demonstrated good  understanding of the HEP provided.   .   See EMR under Patient Instructions for exercises provided 10/27/20.        Assessment     Patient presents with a decrease in B wrist/hand edema today compared to initial evaluation, but this has not completely resolved.   Patient was able to perform all above listed therapeutic exercises without increased pain or difficulty today.    Katherine is progressing well towards her goals and there are no updates to goals at this time. Pt prognosis is Good.     Pt will continue to benefit from skilled outpatient occupational therapy to address the deficits listed in the problem list on initial evaluation provide pt/family education and to maximize pt's level of independence in the home and community environment.     Anticipated barriers to occupational therapy: DM, cardiac, arthritis    Pt's spiritual, cultural and educational needs considered and pt agreeable to plan of care and goals.    Goals:  Short Term Goals: (in 2 weeks)  1) Patient will be independent in HEP  2) Decrease pain in B wrist/hands to no more than 5-6/10 worst in ADL/IADL's  3) Increase AROM  in B wrist E/F by 5-8 degrees for improved functioning in ADL/IADL's  4) Increase B  strength by 3-5 psi for increased functional use  5) Decrease edema in B wrist/hand by .2 cm   6) Increased AROM of composite fist in B hands with WFL for improved function in ADL/IADL's     Long Term Goals: (in 6 weeks)  1) Decrease pain in B wrist/hand to no more than 2-3/10 worst in ADL/IADL's  2) Increase AROM of B wrist/hands to WFL for increased functioning in ADL/IADL's  3) Increase strength in B /pinch by 15% of initial measures for improved functioning in ADL/IADL's  4) Increased functioning in ADL/IADL's, as evidenced by a FOTO impairment rating of no more than 28%  5) Decrease edema in B wrist/hand to trace       Plan     Certification Period/Plan of care expiration: 10/27/2020 to 12/07/20.     Outpatient Occupational Therapy 2-3 times weekly for 6 weeks to include the following interventions: Paraffin, Manual therapy/joint mobilizations, Modalities for pain management, US 3 mhz, Therapeutic exercises/activities., Strengthening, Orthotic Fabrication/Fit/Training, Edema Control, Electrical Modalities, Joint Protection and Energy Conservation    Updates/Grading for next session: progress to light strengthening, as tolerated      Martínez Sapp, OT

## 2020-11-10 ENCOUNTER — CLINICAL SUPPORT (OUTPATIENT)
Dept: REHABILITATION | Facility: HOSPITAL | Age: 70
End: 2020-11-10
Attending: ORTHOPAEDIC SURGERY
Payer: MEDICARE

## 2020-11-10 DIAGNOSIS — M79.642 PAIN IN BOTH HANDS: ICD-10-CM

## 2020-11-10 DIAGNOSIS — M25.649 FINGER STIFFNESS, UNSPECIFIED LATERALITY: ICD-10-CM

## 2020-11-10 DIAGNOSIS — M79.641 PAIN IN BOTH HANDS: ICD-10-CM

## 2020-11-10 DIAGNOSIS — M62.81 MUSCLE WEAKNESS: ICD-10-CM

## 2020-11-10 PROCEDURE — 97110 THERAPEUTIC EXERCISES: CPT | Mod: PO

## 2020-11-10 PROCEDURE — 97140 MANUAL THERAPY 1/> REGIONS: CPT | Mod: PO

## 2020-11-10 PROCEDURE — 97018 PARAFFIN BATH THERAPY: CPT | Mod: PO

## 2020-11-10 NOTE — PROGRESS NOTES
Occupational Therapy Treatment Note     Date: 11/10/2020  Name: Katherine Berger  Clinic Number: [id number]    Therapy Diagnosis:   Encounter Diagnoses   Name Primary?    Pain in both hands     Finger stiffness, unspecified laterality     Muscle weakness      Physician: Arpit Arita Jr., *    Physician Orders: Eval & treat  Medical Diagnosis: M25.539 (ICD-10-CM) - Pain in wrist, unspecified laterality G56.03 (ICD-10-CM) - Bilateral carpal tunnel syndrome   Surgical Procedure and Date: n/a  Evaluation Date: 10/27/20  Insurance Authorization Period Expiration: 11/10/20  Plan of Care Certification Period: 10/27/20 to 12/07/20  Date of Return to MD: TBD    Visit # / Visits authorized: 3 / 6  Time In:9:45 am  Time Out: 10:30 am  Total Billable Time: 45 minutes (P, 1MT, 1TE)    Precautions:  Standard, Diabetes and blood thinners      Subjective     Pt reports: she purchased a home paraffin unit and has been using it regularly. She also reports she has been wearing her compression gloves at night.  She feels both of these have been helping improve condition.     she was compliant with home exercise program given last session.   Response to previous treatment: No adverse reactions reported of noted at this visit  Functional change: able to hold light objects with both hands without as much pain    Pain:  5/10 pre-tx; 3/10 pst-tx  Location: bilateral wrists  (R > L)    Objective     Katherine received the following supervised modalities after being cleared for contradictions for 10 minutes:   -Paraffin w/ MHP to B wrist/hands, pre-tx to decrease pain & increase tissue extensibility    Katherine received the following manual therapy techniques for 10 minutes:   -Edema mgmt w/ lymphatic drainage technique;  Deep STM, including MFR, CFM & scar mgmt to B forearm/wrist/hand, using hand techniques and IASTM tools to increase blood flow/circulation, improve soft tissue pliability and decrease pain.    Katherine received therapeutic  exercises for 25 minutes including:  Exercises B hands   Dexterciser 2 min each   Isospheres 2 min each   9 Peg 1 trials each   Digi-flex 2/10 reps each, red       T-putty yellow   -Molding 2 min each   -Grasp Manipulation 3 reps each   -Log Rolls  1 log each       Edema. Measured in centimeters.    10/27/2020 10/27/2020 11/10/20 11/10/20     Left Right Left Right   Wrist Crease 20.5 20.2 20.0 (-.5) 20.0 (-.2)   Mid palm 21.9 22.0 21.8 (-.1) 21.6 (-.4)   MCPs 21.1 21.6 21.0 (-.1) 21.0 (-.6)        Home Exercises and Education Provided     Education provided:   - Reviewed initial HEP  - Progress towards goals     Written Home Exercises Provided: Patient instructed to cont prior HEP.  Exercises were reviewed and Katherine was able to demonstrate them prior to the end of the session.  Katherine demonstrated good  understanding of the HEP provided.     See EMR under Patient Instructions for exercises provided 10/27/20.        Assessment     Patient continues to have a slight decrease in B wrist/hand pain and a marked improvement in edema of both hands compared to initial evaluation.   Patient was able to perform all above listed therapeutic exercises without increased pain or difficulty today.    Katherine is progressing well towards her goals and there are no updates to goals at this time. Pt prognosis is Good.     Pt will continue to benefit from skilled outpatient occupational therapy to address the deficits listed in the problem list on initial evaluation provide pt/family education and to maximize pt's level of independence in the home and community environment.     Anticipated barriers to occupational therapy: DM, cardiac, arthritis    Pt's spiritual, cultural and educational needs considered and pt agreeable to plan of care and goals.    Goals:  Short Term Goals: (in 2 weeks)  1) Patient will be independent in HEP  2) Decrease pain in B wrist/hands to no more than 5-6/10 worst in ADL/IADL's  3) Increase AROM in B wrist  E/F by 5-8 degrees for improved functioning in ADL/IADL's  4) Increase B  strength by 3-5 psi for increased functional use  5) Decrease edema in B wrist/hand by .2 cm   6) Increased AROM of composite fist in B hands with WFL for improved function in ADL/IADL's     Long Term Goals: (in 6 weeks)  1) Decrease pain in B wrist/hand to no more than 2-3/10 worst in ADL/IADL's  2) Increase AROM of B wrist/hands to WFL for increased functioning in ADL/IADL's  3) Increase strength in B /pinch by 15% of initial measures for improved functioning in ADL/IADL's  4) Increased functioning in ADL/IADL's, as evidenced by a FOTO impairment rating of no more than 28%  5) Decrease edema in B wrist/hand to trace       Plan     Certification Period/Plan of care expiration: 10/27/2020 to 12/07/20.     Outpatient Occupational Therapy 2-3 times weekly for 6 weeks to include the following interventions: Paraffin, Manual therapy/joint mobilizations, Modalities for pain management, US 3 mhz, Therapeutic exercises/activities., Strengthening, Orthotic Fabrication/Fit/Training, Edema Control, Electrical Modalities, Joint Protection and Energy Conservation    Updates/Grading for next session: progress to light strengthening, as tolerated      Martínez Sapp, OT

## 2020-11-12 ENCOUNTER — CLINICAL SUPPORT (OUTPATIENT)
Dept: REHABILITATION | Facility: HOSPITAL | Age: 70
End: 2020-11-12
Attending: ORTHOPAEDIC SURGERY
Payer: MEDICARE

## 2020-11-12 DIAGNOSIS — M79.641 PAIN IN BOTH HANDS: ICD-10-CM

## 2020-11-12 DIAGNOSIS — M62.81 MUSCLE WEAKNESS: ICD-10-CM

## 2020-11-12 DIAGNOSIS — M79.642 PAIN IN BOTH HANDS: ICD-10-CM

## 2020-11-12 DIAGNOSIS — M25.649 FINGER STIFFNESS, UNSPECIFIED LATERALITY: ICD-10-CM

## 2020-11-12 PROCEDURE — 97110 THERAPEUTIC EXERCISES: CPT | Mod: PO

## 2020-11-12 PROCEDURE — 97140 MANUAL THERAPY 1/> REGIONS: CPT | Mod: PO

## 2020-11-12 PROCEDURE — 97018 PARAFFIN BATH THERAPY: CPT | Mod: PO

## 2020-11-12 NOTE — PROGRESS NOTES
Occupational Therapy Treatment Note     Date: 11/12/2020  Name: Katherine Berger  Clinic Number: [id number]    Therapy Diagnosis:   Encounter Diagnoses   Name Primary?    Pain in both hands     Finger stiffness, unspecified laterality     Muscle weakness      Physician: Arpit Arita Jr., *    Physician Orders: Eval & treat  Medical Diagnosis: M25.539 (ICD-10-CM) - Pain in wrist, unspecified laterality G56.03 (ICD-10-CM) - Bilateral carpal tunnel syndrome   Surgical Procedure and Date: n/a  Evaluation Date: 10/27/20  Insurance Authorization Period Expiration: 11/10/20  Plan of Care Certification Period: 10/27/20 to 12/07/20  Date of Return to MD: TBD    Visit # / Visits authorized: 4 / 6  Time In: 2:50 pm  Time Out: 3:35 pm  Total Billable Time: 45 minutes (P, 1MT, 1TE)    Precautions:  Standard, Diabetes and blood thinners      Subjective     Pt reports: she is using her home paraffin unit and feels it is helping manage her pain in B hands.     she was compliant with home exercise program given last session.   Response to previous treatment: No adverse reactions reported of noted at this visit  Functional change: able to hold light objects with both hands without as much pain    Pain:  4/10 pre-tx; 3/10 pst-tx  Location: bilateral wrists  (R > L)    Objective     Katherine received the following supervised modalities after being cleared for contradictions for 10 minutes:   -Paraffin w/ MHP to B wrist/hands, pre-tx to decrease pain & increase tissue extensibility    Katherine received the following manual therapy techniques for 10 minutes:   -Edema mgmt w/ lymphatic drainage technique;  Deep STM, including MFR, CFM & scar mgmt to B forearm/wrist/hand, using hand techniques and IASTM tools to increase blood flow/circulation, improve soft tissue pliability and decrease pain.    Katherine received therapeutic exercises for 20 minutes including:  Exercises B hands   Dexterciser 2 min each   Isospheres 2 min each   9 Peg 1  trials each   Digi-flex 2/10 reps each, red       T-putty yellow   -Molding 2 min each   -Grasp Manipulation 3 reps each, 5 se3c hold   -Log Rolls w/ tripod 1 log each   -dowel digs 1 min each       Home Exercises and Education Provided     Education provided:   - Reviewed HEP  - Progress towards goals     Written Home Exercises Provided: Patient instructed to cont prior HEP.  Exercises were reviewed and Katherine was able to demonstrate them prior to the end of the session.  Katherine demonstrated good  understanding of the HEP provided.     See EMR under Patient Instructions for exercises provided 10/27/20.        Assessment     Patient with further decrease in B wrist/hand pain.  Patient was able to perform all above listed progressed therapeutic exercises without increased pain or difficulty today.    Katherine is progressing well towards her goals and there are no updates to goals at this time. Pt prognosis is Good.     Pt will continue to benefit from skilled outpatient occupational therapy to address the deficits listed in the problem list on initial evaluation provide pt/family education and to maximize pt's level of independence in the home and community environment.     Anticipated barriers to occupational therapy: DM, cardiac, arthritis    Pt's spiritual, cultural and educational needs considered and pt agreeable to plan of care and goals.    Goals:  Short Term Goals: (in 2 weeks)  1) Patient will be independent in HEP  2) Decrease pain in B wrist/hands to no more than 5-6/10 worst in ADL/IADL's  3) Increase AROM in B wrist E/F by 5-8 degrees for improved functioning in ADL/IADL's  4) Increase B  strength by 3-5 psi for increased functional use  5) Decrease edema in B wrist/hand by .2 cm   6) Increased AROM of composite fist in B hands with WFL for improved function in ADL/IADL's     Long Term Goals: (in 6 weeks)  1) Decrease pain in B wrist/hand to no more than 2-3/10 worst in ADL/IADL's  2) Increase AROM of  B wrist/hands to WFL for increased functioning in ADL/IADL's  3) Increase strength in B /pinch by 15% of initial measures for improved functioning in ADL/IADL's  4) Increased functioning in ADL/IADL's, as evidenced by a FOTO impairment rating of no more than 28%  5) Decrease edema in B wrist/hand to trace       Plan     Certification Period/Plan of care expiration: 10/27/2020 to 12/07/20.     Outpatient Occupational Therapy 2-3 times weekly for 6 weeks to include the following interventions: Paraffin, Manual therapy/joint mobilizations, Modalities for pain management, US 3 mhz, Therapeutic exercises/activities., Strengthening, Orthotic Fabrication/Fit/Training, Edema Control, Electrical Modalities, Joint Protection and Energy Conservation    Updates/Grading for next session: progress to light strengthening, as tolerated      Martínez Sapp, OT

## 2020-11-30 ENCOUNTER — TELEPHONE (OUTPATIENT)
Dept: FAMILY MEDICINE | Facility: CLINIC | Age: 70
End: 2020-11-30

## 2020-11-30 NOTE — TELEPHONE ENCOUNTER
----- Message from Misty Zamora sent at 11/30/2020 10:43 AM CST -----  Contact: [phone]/Self  Type:  Needs Medical Advice    Who Called: Pt  Symptoms (please be specific): Acid reflux , abdominal pain    How long has patient had these symptoms:  3 days   Pharmacy name and phone #:  [facility]   Would the patient rather a call back or a response via [facility]? Call back   Best Call Back Number: [phone]  Additional Information:

## 2020-12-01 ENCOUNTER — OFFICE VISIT (OUTPATIENT)
Dept: FAMILY MEDICINE | Facility: CLINIC | Age: 70
End: 2020-12-01
Payer: MEDICARE

## 2020-12-01 VITALS
DIASTOLIC BLOOD PRESSURE: 76 MMHG | SYSTOLIC BLOOD PRESSURE: 120 MMHG | OXYGEN SATURATION: 98 % | HEIGHT: 67 IN | HEART RATE: 109 BPM | WEIGHT: 229.25 LBS | BODY MASS INDEX: 35.98 KG/M2 | TEMPERATURE: 98 F

## 2020-12-01 DIAGNOSIS — N95.9 MENOPAUSAL DISORDER: ICD-10-CM

## 2020-12-01 DIAGNOSIS — Z23 NEED FOR STREPTOCOCCUS PNEUMONIAE VACCINATION: ICD-10-CM

## 2020-12-01 DIAGNOSIS — I10 ESSENTIAL HYPERTENSION: ICD-10-CM

## 2020-12-01 DIAGNOSIS — E73.9 LACTOSE INTOLERANCE: Primary | ICD-10-CM

## 2020-12-01 DIAGNOSIS — Z78.0 POST-MENOPAUSAL: ICD-10-CM

## 2020-12-01 PROCEDURE — 3074F PR MOST RECENT SYSTOLIC BLOOD PRESSURE < 130 MM HG: ICD-10-PCS | Mod: CPTII,S$GLB,, | Performed by: STUDENT IN AN ORGANIZED HEALTH CARE EDUCATION/TRAINING PROGRAM

## 2020-12-01 PROCEDURE — 99213 OFFICE O/P EST LOW 20 MIN: CPT | Mod: 25,S$GLB,, | Performed by: STUDENT IN AN ORGANIZED HEALTH CARE EDUCATION/TRAINING PROGRAM

## 2020-12-01 PROCEDURE — 1159F PR MEDICATION LIST DOCUMENTED IN MEDICAL RECORD: ICD-10-PCS | Mod: S$GLB,,, | Performed by: STUDENT IN AN ORGANIZED HEALTH CARE EDUCATION/TRAINING PROGRAM

## 2020-12-01 PROCEDURE — 1125F PR PAIN SEVERITY QUANTIFIED, PAIN PRESENT: ICD-10-PCS | Mod: S$GLB,,, | Performed by: STUDENT IN AN ORGANIZED HEALTH CARE EDUCATION/TRAINING PROGRAM

## 2020-12-01 PROCEDURE — 1159F MED LIST DOCD IN RCRD: CPT | Mod: S$GLB,,, | Performed by: STUDENT IN AN ORGANIZED HEALTH CARE EDUCATION/TRAINING PROGRAM

## 2020-12-01 PROCEDURE — 3074F SYST BP LT 130 MM HG: CPT | Mod: CPTII,S$GLB,, | Performed by: STUDENT IN AN ORGANIZED HEALTH CARE EDUCATION/TRAINING PROGRAM

## 2020-12-01 PROCEDURE — 99213 PR OFFICE/OUTPT VISIT, EST, LEVL III, 20-29 MIN: ICD-10-PCS | Mod: 25,S$GLB,, | Performed by: STUDENT IN AN ORGANIZED HEALTH CARE EDUCATION/TRAINING PROGRAM

## 2020-12-01 PROCEDURE — 3008F PR BODY MASS INDEX (BMI) DOCUMENTED: ICD-10-PCS | Mod: CPTII,S$GLB,, | Performed by: STUDENT IN AN ORGANIZED HEALTH CARE EDUCATION/TRAINING PROGRAM

## 2020-12-01 PROCEDURE — 3078F DIAST BP <80 MM HG: CPT | Mod: CPTII,S$GLB,, | Performed by: STUDENT IN AN ORGANIZED HEALTH CARE EDUCATION/TRAINING PROGRAM

## 2020-12-01 PROCEDURE — G0009 PNEUMOCOCCAL POLYSACCHARIDE VACCINE 23-VALENT =>2YO SQ IM: ICD-10-PCS | Mod: S$GLB,,, | Performed by: STUDENT IN AN ORGANIZED HEALTH CARE EDUCATION/TRAINING PROGRAM

## 2020-12-01 PROCEDURE — 3008F BODY MASS INDEX DOCD: CPT | Mod: CPTII,S$GLB,, | Performed by: STUDENT IN AN ORGANIZED HEALTH CARE EDUCATION/TRAINING PROGRAM

## 2020-12-01 PROCEDURE — 90732 PPSV23 VACC 2 YRS+ SUBQ/IM: CPT | Mod: S$GLB,,, | Performed by: STUDENT IN AN ORGANIZED HEALTH CARE EDUCATION/TRAINING PROGRAM

## 2020-12-01 PROCEDURE — 90732 PNEUMOCOCCAL POLYSACCHARIDE VACCINE 23-VALENT =>2YO SQ IM: ICD-10-PCS | Mod: S$GLB,,, | Performed by: STUDENT IN AN ORGANIZED HEALTH CARE EDUCATION/TRAINING PROGRAM

## 2020-12-01 PROCEDURE — G0009 ADMIN PNEUMOCOCCAL VACCINE: HCPCS | Mod: S$GLB,,, | Performed by: STUDENT IN AN ORGANIZED HEALTH CARE EDUCATION/TRAINING PROGRAM

## 2020-12-01 PROCEDURE — 3078F PR MOST RECENT DIASTOLIC BLOOD PRESSURE < 80 MM HG: ICD-10-PCS | Mod: CPTII,S$GLB,, | Performed by: STUDENT IN AN ORGANIZED HEALTH CARE EDUCATION/TRAINING PROGRAM

## 2020-12-01 PROCEDURE — 1125F AMNT PAIN NOTED PAIN PRSNT: CPT | Mod: S$GLB,,, | Performed by: STUDENT IN AN ORGANIZED HEALTH CARE EDUCATION/TRAINING PROGRAM

## 2020-12-01 RX ORDER — TAMSULOSIN HYDROCHLORIDE 0.4 MG/1
1 CAPSULE ORAL DAILY
Qty: 90 CAPSULE | Refills: 3 | Status: SHIPPED | OUTPATIENT
Start: 2020-12-01 | End: 2021-08-19 | Stop reason: SDUPTHER

## 2020-12-01 RX ORDER — DULAGLUTIDE 1.5 MG/.5ML
INJECTION, SOLUTION SUBCUTANEOUS
COMMUNITY
Start: 2020-11-04 | End: 2022-04-25

## 2020-12-01 RX ORDER — GLIMEPIRIDE 2 MG/1
2 TABLET ORAL 2 TIMES DAILY
COMMUNITY
Start: 2020-11-04 | End: 2022-08-18 | Stop reason: SDUPTHER

## 2020-12-01 RX ORDER — FUROSEMIDE 20 MG/1
TABLET ORAL
Qty: 90 TABLET | Refills: 1 | Status: SHIPPED | OUTPATIENT
Start: 2020-12-01 | End: 2021-01-07 | Stop reason: SDUPTHER

## 2020-12-01 NOTE — PROGRESS NOTES
Patient ID: Katherine Berger is a 70 y.o. female. This patient is new to me.    Chief Complaint: Gastroesophageal Reflux (gas)    Gastroesophageal Reflux  She complains of abdominal pain and belching. She reports no chest pain, no choking, no coughing, no dysphagia, no heartburn, no hoarse voice, no nausea, no sore throat or no stridor. This is a new problem. The current episode started in the past 7 days. The problem occurs frequently (After eating yogurt). The problem has been unchanged. Nothing aggravates the symptoms. Pertinent negatives include no anemia, fatigue, melena or muscle weakness. Risk factors include obesity. She has tried nothing for the symptoms. Past invasive treatments do not include gastroplication or reflux surgery.     Patient reports that she started eating yogurt recently  this is a low-fat your dirt that contains artificial sweeteners.     She also needs a refill of her Flomax and Lasix today    Review of Systems  Review of Systems   Constitutional: Negative for fatigue and fever.   HENT: Negative for ear pain, hoarse voice, sinus pain and sore throat.    Eyes: Negative for discharge.   Respiratory: Negative for cough, choking and shortness of breath.    Cardiovascular: Negative for chest pain and leg swelling.   Gastrointestinal: Positive for abdominal pain. Negative for diarrhea, dysphagia, heartburn, melena, nausea and vomiting.   Genitourinary: Negative for urgency.   Musculoskeletal: Negative for myalgias and muscle weakness.   Skin: Negative for rash.   Neurological: Negative for weakness and headaches.   Psychiatric/Behavioral: Negative for depression.   All other systems reviewed and are negative.      Currently Medications  Current Outpatient Medications on File Prior to Visit   Medication Sig Dispense Refill    amitriptyline (ELAVIL) 10 MG tablet TAKE ONE TO THREE TABLETS BY MOUTH AT BEDTIME FOR  HEADACHE 90 tablet 3    atorvastatin (LIPITOR) 10 MG tablet Take 1 tablet (10 mg  total) by mouth every other day. 45 tablet 3    busPIRone (BUSPAR) 5 MG Tab Take 1 tablet (5 mg total) by mouth 2 (two) times daily. For anxiety 60 tablet 1    chlorthalidone (HYGROTEN) 25 MG Tab Take 1 tablet (25 mg total) by mouth once daily. 90 tablet 1    diclofenac sodium (VOLTAREN) 1 % Gel Apply 2 g topically 3 (three) times daily. 1 Tube 3    docusate sodium (COLACE) 100 MG capsule Take 1 capsule (100 mg total) by mouth 2 (two) times daily as needed. 180 capsule 0    ENTRESTO 24-26 mg per tablet Take 1 tablet by mouth 2 (two) times daily.      escitalopram oxalate (LEXAPRO) 5 MG Tab Take 1 tablet (5 mg total) by mouth once daily. 90 tablet 3    ferrous sulfate 324 mg (65 mg iron) TbEC Take 1 tablet (324 mg total) by mouth daily as needed. 90 tablet 3    gabapentin (NEURONTIN) 100 MG capsule Takes on in am and two in pm for back pain radiating down leg. 270 capsule 3    glimepiride (AMARYL) 2 MG tablet Take 2 mg by mouth 2 (two) times daily.      insulin NPH-insulin regular, 70/30, (NOVOLIN 70/30 U-100 INSULIN) 100 unit/mL (70-30) injection INJECT 20 UNITS SUBCUTANEOUSLY IN THE MORNING AND 12 UNITS IN THE EVENING 90 mL 11    meloxicam (MOBIC) 15 MG tablet Take 1 tablet (15 mg total) by mouth daily as needed. 90 tablet 0    MULTIVIT/IRON/FA/K/HERB NO.244 (ALIVE WOMEN'S ENERGY ORAL) Take 1 tablet by mouth daily as needed.       oxyCODONE-acetaminophen (PERCOCET)  mg per tablet 1 tablet 3 (three) times daily as needed.      TRULICITY 1.5 mg/0.5 mL pen injector       [DISCONTINUED] furosemide (LASIX) 20 MG tablet One tablet po daily for swelling/fluid as directed by md 3 to 7 time a week 90 tablet 1    [DISCONTINUED] tamsulosin (FLOMAX) 0.4 mg Cap Take 1 capsule (0.4 mg total) by mouth once daily. 30 capsule 11    diabetic supplies, miscellan. Kit Use for testing 1 kit 1    [DISCONTINUED] insulin NPH/Reg human (HUMULIN 70/30) 100 unit/mL (70-30) InPn pen 20 units each a.m. and 12 units each  "p.m. 30 mL 3    [DISCONTINUED] metFORMIN (GLUCOPHAGE-XR) 500 MG ER 24hr tablet Take 2 tablets (1,000 mg total) by mouth daily with breakfast. (Patient not taking: Reported on 12/1/2020) 180 tablet 3    [DISCONTINUED] TRULICITY 0.75 mg/0.5 mL pen injector INJECT 0.5ML (0.75MG) SUBCUTANEOUSLY EVERY 7 DAYS IN THE ABDOMEN THIGH OR UPPER ARM ROTATING INJECTION SITES.       No current facility-administered medications on file prior to visit.        Physical  Exam  Vitals:    12/01/20 1015   BP: 120/76   Pulse: 109   Temp: 97.8 °F (36.6 °C)   SpO2: 98%   Weight: 104 kg (229 lb 4.5 oz)   Height: 5' 7" (1.702 m)      Physical Exam  Vitals signs and nursing note reviewed.   Constitutional:       General: She is not in acute distress.     Appearance: She is not ill-appearing.   HENT:      Head: Normocephalic and atraumatic.      Right Ear: External ear normal.      Left Ear: External ear normal.      Nose: Nose normal.      Mouth/Throat:      Mouth: Mucous membranes are moist.   Eyes:      Extraocular Movements: Extraocular movements intact.      Conjunctiva/sclera: Conjunctivae normal.   Neck:      Musculoskeletal: Normal range of motion.   Cardiovascular:      Rate and Rhythm: Normal rate and regular rhythm.      Pulses: Normal pulses.      Heart sounds: No murmur.   Pulmonary:      Effort: Pulmonary effort is normal. No respiratory distress.      Breath sounds: No wheezing.   Abdominal:      General: Bowel sounds are normal. There is no distension.      Palpations: Abdomen is soft. There is no mass.      Tenderness: There is no abdominal tenderness.   Musculoskeletal:         General: No swelling.   Skin:     Coloration: Skin is not jaundiced.      Findings: No rash.   Neurological:      General: No focal deficit present.      Mental Status: She is alert and oriented to person, place, and time.   Psychiatric:         Mood and Affect: Mood normal.         Thought Content: Thought content normal.         Labs:    Complete " Blood Count  Lab Results   Component Value Date    RBC 5.19 07/22/2020    HGB 11.2 (L) 07/22/2020    HCT 36.7 (L) 07/22/2020    MCV 71 (L) 07/22/2020    MCH 21.6 (L) 07/22/2020    MCHC 30.5 (L) 07/22/2020    RDW 16.0 (H) 07/22/2020     07/22/2020    MPV 11.7 07/22/2020    GRAN 7.2 07/22/2020    GRAN 63.8 07/22/2020    LYMPH 2.8 07/22/2020    LYMPH 25.1 07/22/2020    MONO 0.9 07/22/2020    MONO 8.3 07/22/2020    EOS 0.2 07/22/2020    BASO 0.05 07/22/2020    EOSINOPHIL 2.1 07/22/2020    BASOPHIL 0.4 07/22/2020    DIFFMETHOD Automated 07/22/2020       Comprehensive Metabolic Panel  Lab Results   Component Value Date     (H) 06/19/2020    BUN 21 (H) 06/19/2020    CREATININE 0.78 06/19/2020     06/19/2020    K 3.9 06/19/2020     06/19/2020    PROT 7.9 06/19/2020    ALBUMIN 4.2 06/19/2020    BILITOT 0.6 06/19/2020    AST 34 06/19/2020    ALKPHOS 77 06/19/2020    CO2 28 06/19/2020    ALT 23 06/19/2020    ANIONGAP 8 06/19/2020    EGFRNONAA >60.0 06/19/2020    ESTGFRAFRICA >60.0 06/19/2020       TSH  No results found for: TSH    Imaging:  X-Ray Hand 3 View Bilateral  Narrative: EXAMINATION:  XR HAND COMPLETE 3 VIEWS BILATERAL    CLINICAL HISTORY:  . Pain in right hand    TECHNIQUE:  PA, lateral, and oblique views of both hands were performed.    COMPARISON:  None available    FINDINGS:  Left: There are bony hypertrophic changes of left thumb distal metacarpal, possible sequela of remote injury and/or hypertrophic DJD change.  There is mild ulnar subluxation of the thumb proximal phalanx relative to the metacarpal head. There is additional scattered interphalangeal joint space narrowing and moderately advanced base of thumb DJD.  Additional joint space narrowing of the 3rd MCP with hook osteophytes of the 3rd through 5th distal metacarpals.  A 0.5 cm soft tissue mineralized density projecting at radial aspect of the wrist.    Right: Scattered interphalangeal joint space narrowing and bladder base of  thumb DJD.  Small osteophytes of the 3rd through 5th metacarpal heads.    No acute fracture or osseous destruction noted in either hand.  Impression: As above.    Electronically signed by: Bernabe Oakley  Date:    10/26/2020  Time:    12:24      Assessment/Plan:      ICD-10-CM ICD-9-CM   1. Lactose intolerance  E73.9 271.3   2. Post-menopausal  Z78.0 V49.81   3. Menopausal disorder  N95.9 627.9   4. Essential hypertension  I10 401.9   5. Need for Streptococcus pneumoniae vaccination  Z23 V03.82     Lactose intolerance    Post-menopausal  -     DXA Bone Density Spine And Hip; Future; Expected date: 12/01/2020    Menopausal disorder    Essential hypertension  -     furosemide (LASIX) 20 MG tablet; One tablet po daily for swelling/fluid as directed by md 3 to 7 time a week  Dispense: 90 tablet; Refill: 1    Need for Streptococcus pneumoniae vaccination  -     (In Office Administered) Pneumococcal Polysaccharide Vaccine (23 Valent) (SQ/IM)    Other orders  -     tamsulosin (FLOMAX) 0.4 mg Cap; Take 1 capsule (0.4 mg total) by mouth once daily.  Dispense: 90 capsule; Refill: 3        Patient instructed to follow a lactose-free diet.  If she does consume lactose, She should use Lactaid over-the-counter.    Mendoza Escobar MD

## 2020-12-04 DIAGNOSIS — E11.9 TYPE 2 DIABETES MELLITUS WITHOUT COMPLICATION: ICD-10-CM

## 2020-12-08 ENCOUNTER — TELEPHONE (OUTPATIENT)
Dept: FAMILY MEDICINE | Facility: CLINIC | Age: 70
End: 2020-12-08

## 2020-12-08 ENCOUNTER — HOSPITAL ENCOUNTER (OUTPATIENT)
Dept: RADIOLOGY | Facility: HOSPITAL | Age: 70
Discharge: HOME | End: 2020-12-08
Attending: STUDENT IN AN ORGANIZED HEALTH CARE EDUCATION/TRAINING PROGRAM
Payer: MEDICARE

## 2020-12-08 DIAGNOSIS — Z78.0 POST-MENOPAUSAL: ICD-10-CM

## 2020-12-08 PROCEDURE — 77080 DXA BONE DENSITY AXIAL: CPT | Mod: TC,PO

## 2020-12-08 NOTE — TELEPHONE ENCOUNTER
----- Message from Mendoza Escobar MD sent at 12/8/2020  9:55 AM CST -----  Please call the patient and let her know that her bone density scan is normal.

## 2021-01-07 DIAGNOSIS — I10 ESSENTIAL HYPERTENSION: ICD-10-CM

## 2021-01-07 RX ORDER — FUROSEMIDE 20 MG/1
TABLET ORAL
Qty: 90 TABLET | Refills: 1 | Status: SHIPPED | OUTPATIENT
Start: 2021-01-07 | End: 2021-08-19 | Stop reason: SDUPTHER

## 2021-02-19 ENCOUNTER — TELEPHONE (OUTPATIENT)
Dept: FAMILY MEDICINE | Facility: CLINIC | Age: 71
End: 2021-02-19

## 2021-02-19 DIAGNOSIS — Z12.31 SCREENING MAMMOGRAM FOR HIGH-RISK PATIENT: Primary | ICD-10-CM

## 2021-03-05 RX ORDER — FERROUS SULFATE 324(65)MG
325 TABLET, DELAYED RELEASE (ENTERIC COATED) ORAL DAILY PRN
Qty: 90 TABLET | Refills: 3 | Status: SHIPPED | OUTPATIENT
Start: 2021-03-05 | End: 2022-04-25 | Stop reason: SDUPTHER

## 2021-03-24 ENCOUNTER — HOSPITAL ENCOUNTER (OUTPATIENT)
Dept: RADIOLOGY | Facility: HOSPITAL | Age: 71
Discharge: HOME | End: 2021-03-24
Attending: STUDENT IN AN ORGANIZED HEALTH CARE EDUCATION/TRAINING PROGRAM
Payer: MEDICARE

## 2021-03-24 DIAGNOSIS — Z12.31 SCREENING MAMMOGRAM FOR HIGH-RISK PATIENT: ICD-10-CM

## 2021-03-24 PROCEDURE — 77067 SCR MAMMO BI INCL CAD: CPT | Mod: TC,PO

## 2021-04-11 NOTE — TELEPHONE ENCOUNTER
BK    ----- Message from Belgica Mcduffie sent at 2020 11:11 AM CDT -----  [phone]/self  Patient would like to notify doctor that she is going to [facility] to check for heart blockage. Thanks        <<-----Click here for Discharge Medication Review

## 2021-05-05 DIAGNOSIS — E11.9 TYPE 2 DIABETES MELLITUS WITHOUT COMPLICATION: ICD-10-CM

## 2021-06-07 NOTE — TELEPHONE ENCOUNTER
Refill Approved    Rx renewed per Medication Renewal Policy. Medication was last renewed on 4/20/20.    Marine Moon, [facility] Triage/Med Refill 5/4/2020     Requested Prescriptions   Pending Prescriptions Disp Refills     pravastatin (PRAVACHOL) 80 MG tablet [Pharmacy Med Name: PRAVASTATIN SODIUM 80 MG TAB] 90 tablet 2     Sig: TAKE 1 TABLET (80 MG TOTAL) BY MOUTH EVERY EVENING.       Statins Refill Protocol (Hmg CoA Reductase Inhibitors) Passed - 5/2/2020  9:11 AM        Passed - PCP or prescribing provider visit in past 12 months      Last office visit with prescriber/PCP: 12/24/2019 Sole Ch CNP OR same dept: 12/24/2019 Sole Ch CNP OR same specialty: 12/24/2019 Sole Ch CNP  Last physical: 10/24/2017 Last MTM visit: Visit date not found   Next visit within 3 mo: Visit date not found  Next physical within 3 mo: Visit date not found  Prescriber OR PCP: Sole Ch CNP  Last diagnosis associated with med order: 1. Mixed hyperlipidemia  - pravastatin (PRAVACHOL) 80 MG tablet [Pharmacy Med Name: PRAVASTATIN SODIUM 80 MG TAB]; Take 1 tablet (80 mg total) by mouth every evening.  Dispense: 90 tablet; Refill: 2    If protocol passes may refill for 12 months if within 3 months of last provider visit (or a total of 15 months).                             She received this medication from endocrinologist - Dr Sheriff     She said she has not contacted him to discuss these side effects    Should she reach out to him? But in the mean time what should she do?

## 2021-08-19 ENCOUNTER — OFFICE VISIT (OUTPATIENT)
Dept: FAMILY MEDICINE | Facility: CLINIC | Age: 71
End: 2021-08-19
Payer: MEDICARE

## 2021-08-19 VITALS
TEMPERATURE: 98 F | DIASTOLIC BLOOD PRESSURE: 66 MMHG | BODY MASS INDEX: 36.79 KG/M2 | HEART RATE: 81 BPM | SYSTOLIC BLOOD PRESSURE: 126 MMHG | HEIGHT: 67 IN | OXYGEN SATURATION: 98 % | WEIGHT: 234.38 LBS

## 2021-08-19 DIAGNOSIS — F41.9 ANXIETY: ICD-10-CM

## 2021-08-19 DIAGNOSIS — E11.9 TYPE 2 DIABETES MELLITUS WITHOUT COMPLICATION, WITHOUT LONG-TERM CURRENT USE OF INSULIN: ICD-10-CM

## 2021-08-19 DIAGNOSIS — I10 ESSENTIAL HYPERTENSION: Primary | ICD-10-CM

## 2021-08-19 PROCEDURE — 3078F DIAST BP <80 MM HG: CPT | Mod: CPTII,S$GLB,, | Performed by: FAMILY MEDICINE

## 2021-08-19 PROCEDURE — 1159F MED LIST DOCD IN RCRD: CPT | Mod: CPTII,S$GLB,, | Performed by: FAMILY MEDICINE

## 2021-08-19 PROCEDURE — 3008F PR BODY MASS INDEX (BMI) DOCUMENTED: ICD-10-PCS | Mod: CPTII,S$GLB,, | Performed by: FAMILY MEDICINE

## 2021-08-19 PROCEDURE — 99499 UNLISTED E&M SERVICE: CPT | Mod: S$GLB,,, | Performed by: FAMILY MEDICINE

## 2021-08-19 PROCEDURE — 99499 RISK ADDL DX/OHS AUDIT: ICD-10-PCS | Mod: S$GLB,,, | Performed by: FAMILY MEDICINE

## 2021-08-19 PROCEDURE — 3008F BODY MASS INDEX DOCD: CPT | Mod: CPTII,S$GLB,, | Performed by: FAMILY MEDICINE

## 2021-08-19 PROCEDURE — 99214 PR OFFICE/OUTPT VISIT, EST, LEVL IV, 30-39 MIN: ICD-10-PCS | Mod: S$GLB,,, | Performed by: FAMILY MEDICINE

## 2021-08-19 PROCEDURE — 1160F RVW MEDS BY RX/DR IN RCRD: CPT | Mod: CPTII,S$GLB,, | Performed by: FAMILY MEDICINE

## 2021-08-19 PROCEDURE — 3078F PR MOST RECENT DIASTOLIC BLOOD PRESSURE < 80 MM HG: ICD-10-PCS | Mod: CPTII,S$GLB,, | Performed by: FAMILY MEDICINE

## 2021-08-19 PROCEDURE — 3074F PR MOST RECENT SYSTOLIC BLOOD PRESSURE < 130 MM HG: ICD-10-PCS | Mod: CPTII,S$GLB,, | Performed by: FAMILY MEDICINE

## 2021-08-19 PROCEDURE — 3074F SYST BP LT 130 MM HG: CPT | Mod: CPTII,S$GLB,, | Performed by: FAMILY MEDICINE

## 2021-08-19 PROCEDURE — 1125F AMNT PAIN NOTED PAIN PRSNT: CPT | Mod: CPTII,S$GLB,, | Performed by: FAMILY MEDICINE

## 2021-08-19 PROCEDURE — 1125F PR PAIN SEVERITY QUANTIFIED, PAIN PRESENT: ICD-10-PCS | Mod: CPTII,S$GLB,, | Performed by: FAMILY MEDICINE

## 2021-08-19 PROCEDURE — 1160F PR REVIEW ALL MEDS BY PRESCRIBER/CLIN PHARMACIST DOCUMENTED: ICD-10-PCS | Mod: CPTII,S$GLB,, | Performed by: FAMILY MEDICINE

## 2021-08-19 PROCEDURE — 99214 OFFICE O/P EST MOD 30 MIN: CPT | Mod: S$GLB,,, | Performed by: FAMILY MEDICINE

## 2021-08-19 PROCEDURE — 1159F PR MEDICATION LIST DOCUMENTED IN MEDICAL RECORD: ICD-10-PCS | Mod: CPTII,S$GLB,, | Performed by: FAMILY MEDICINE

## 2021-08-19 RX ORDER — DOCUSATE SODIUM 100 MG/1
100 CAPSULE, LIQUID FILLED ORAL 2 TIMES DAILY PRN
Qty: 180 CAPSULE | Refills: 0 | Status: SHIPPED | OUTPATIENT
Start: 2021-08-19 | End: 2022-05-13

## 2021-08-19 RX ORDER — FUROSEMIDE 20 MG/1
TABLET ORAL
Qty: 90 TABLET | Refills: 1 | Status: SHIPPED | OUTPATIENT
Start: 2021-08-19 | End: 2022-04-25 | Stop reason: SDUPTHER

## 2021-08-19 RX ORDER — BUSPIRONE HYDROCHLORIDE 5 MG/1
5 TABLET ORAL 2 TIMES DAILY
Qty: 180 TABLET | Refills: 3 | Status: SHIPPED | OUTPATIENT
Start: 2021-08-19 | End: 2022-10-24 | Stop reason: SDUPTHER

## 2021-08-19 RX ORDER — ATORVASTATIN CALCIUM 10 MG/1
10 TABLET, FILM COATED ORAL EVERY OTHER DAY
Qty: 45 TABLET | Refills: 3 | Status: ON HOLD | OUTPATIENT
Start: 2021-08-19 | End: 2022-05-15 | Stop reason: HOSPADM

## 2021-08-19 RX ORDER — MELOXICAM 15 MG/1
15 TABLET ORAL DAILY PRN
Qty: 90 TABLET | Refills: 0 | Status: ON HOLD | OUTPATIENT
Start: 2021-08-19 | End: 2022-05-15 | Stop reason: HOSPADM

## 2021-08-19 RX ORDER — ESCITALOPRAM OXALATE 10 MG/1
10 TABLET ORAL DAILY
Qty: 90 TABLET | Refills: 3 | Status: SHIPPED | OUTPATIENT
Start: 2021-08-19 | End: 2022-10-24 | Stop reason: SDUPTHER

## 2021-08-19 RX ORDER — METFORMIN HYDROCHLORIDE 500 MG/1
TABLET, EXTENDED RELEASE ORAL
COMMUNITY
Start: 2021-07-02 | End: 2022-04-25 | Stop reason: DRUGHIGH

## 2021-08-19 RX ORDER — TAMSULOSIN HYDROCHLORIDE 0.4 MG/1
1 CAPSULE ORAL DAILY
Qty: 90 CAPSULE | Refills: 3 | Status: SHIPPED | OUTPATIENT
Start: 2021-08-19 | End: 2022-04-25 | Stop reason: SDUPTHER

## 2021-08-26 LAB — HBA1C MFR BLD: 10.5 % (ref 4–6)

## 2021-09-30 ENCOUNTER — PATIENT OUTREACH (OUTPATIENT)
Dept: ADMINISTRATIVE | Facility: HOSPITAL | Age: 71
End: 2021-09-30

## 2021-11-29 ENCOUNTER — TELEPHONE (OUTPATIENT)
Dept: FAMILY MEDICINE | Facility: CLINIC | Age: 71
End: 2021-11-29
Payer: MEDICARE

## 2021-12-05 ENCOUNTER — HOSPITAL ENCOUNTER (EMERGENCY)
Facility: HOSPITAL | Age: 71
Discharge: HOME | End: 2021-12-05
Attending: EMERGENCY MEDICINE
Payer: MEDICARE

## 2021-12-05 VITALS
SYSTOLIC BLOOD PRESSURE: 157 MMHG | TEMPERATURE: 99 F | HEIGHT: 67 IN | BODY MASS INDEX: 36.79 KG/M2 | DIASTOLIC BLOOD PRESSURE: 76 MMHG | RESPIRATION RATE: 12 BRPM | WEIGHT: 234.38 LBS | HEART RATE: 67 BPM | OXYGEN SATURATION: 97 %

## 2021-12-05 DIAGNOSIS — N30.00 ACUTE CYSTITIS WITHOUT HEMATURIA: Primary | ICD-10-CM

## 2021-12-05 LAB
ALBUMIN SERPL BCP-MCNC: 3.9 G/DL (ref 3.5–5.2)
ALP SERPL-CCNC: 54 U/L (ref 38–126)
ALT SERPL W/O P-5'-P-CCNC: 20 U/L (ref 10–44)
ANION GAP SERPL CALC-SCNC: 12 MMOL/L (ref 8–16)
AST SERPL-CCNC: 21 U/L (ref 15–46)
BACTERIA #/AREA URNS AUTO: ABNORMAL /HPF
BASOPHILS # BLD AUTO: 0.04 K/UL (ref 0–0.2)
BASOPHILS NFR BLD: 0.4 % (ref 0–1.9)
BILIRUB SERPL-MCNC: 0.4 MG/DL (ref 0.1–1)
BILIRUB UR QL STRIP: NEGATIVE
CALCIUM SERPL-MCNC: 9.2 MG/DL (ref 8.7–10.5)
CHLORIDE SERPL-SCNC: 104 MMOL/L (ref 95–110)
CLARITY UR REFRACT.AUTO: CLEAR
CO2 SERPL-SCNC: 26 MMOL/L (ref 23–29)
COLOR UR AUTO: ABNORMAL
CREAT SERPL-MCNC: 0.82 MG/DL (ref 0.5–1.4)
DIFFERENTIAL METHOD: ABNORMAL
EOSINOPHIL # BLD AUTO: 0.2 K/UL (ref 0–0.5)
EOSINOPHIL NFR BLD: 1.6 % (ref 0–8)
ERYTHROCYTE [DISTWIDTH] IN BLOOD BY AUTOMATED COUNT: 13.9 % (ref 11.5–14.5)
EST. GFR  (AFRICAN AMERICAN): >60 ML/MIN/1.73 M^2
EST. GFR  (NON AFRICAN AMERICAN): >60 ML/MIN/1.73 M^2
GLUCOSE SERPL-MCNC: 153 MG/DL (ref 70–110)
GLUCOSE UR QL STRIP: NEGATIVE
HCT VFR BLD AUTO: 32.1 % (ref 37–48.5)
HGB BLD-MCNC: 9.8 G/DL (ref 12–16)
HGB UR QL STRIP: ABNORMAL
HYALINE CASTS UR QL AUTO: 0 /LPF
IMM GRANULOCYTES # BLD AUTO: 0.03 K/UL (ref 0–0.04)
IMM GRANULOCYTES NFR BLD AUTO: 0.3 % (ref 0–0.5)
KETONES UR QL STRIP: NEGATIVE
LEUKOCYTE ESTERASE UR QL STRIP: NEGATIVE
LIPASE SERPL-CCNC: 175 U/L (ref 23–300)
LYMPHOCYTES # BLD AUTO: 2.7 K/UL (ref 1–4.8)
LYMPHOCYTES NFR BLD: 26.8 % (ref 18–48)
MCH RBC QN AUTO: 22.7 PG (ref 27–31)
MCHC RBC AUTO-ENTMCNC: 30.5 G/DL (ref 32–36)
MCV RBC AUTO: 75 FL (ref 82–98)
MICROSCOPIC COMMENT: ABNORMAL
MONOCYTES # BLD AUTO: 0.7 K/UL (ref 0.3–1)
MONOCYTES NFR BLD: 6.9 % (ref 4–15)
NEUTROPHILS # BLD AUTO: 6.5 K/UL (ref 1.8–7.7)
NEUTROPHILS NFR BLD: 64 % (ref 38–73)
NITRITE UR QL STRIP: NEGATIVE
NRBC BLD-RTO: 0 /100 WBC
PH UR STRIP: 5 [PH] (ref 5–8)
PLATELET # BLD AUTO: 253 K/UL (ref 150–450)
PMV BLD AUTO: 11.7 FL (ref 9.2–12.9)
POTASSIUM SERPL-SCNC: 3.9 MMOL/L (ref 3.5–5.1)
PROT SERPL-MCNC: 6.8 G/DL (ref 6–8.4)
PROT UR QL STRIP: ABNORMAL
RBC # BLD AUTO: 4.31 M/UL (ref 4–5.4)
RBC #/AREA URNS AUTO: 5 /HPF (ref 0–4)
SODIUM SERPL-SCNC: 142 MMOL/L (ref 136–145)
SP GR UR STRIP: 1.02 (ref 1–1.03)
URN SPEC COLLECT METH UR: ABNORMAL
UROBILINOGEN UR STRIP-ACNC: 1 EU/DL
UUN UR-MCNC: 21 MG/DL (ref 7–17)
WBC # BLD AUTO: 10.1 K/UL (ref 3.9–12.7)
WBC #/AREA URNS AUTO: 1 /HPF (ref 0–5)

## 2021-12-05 PROCEDURE — 99283 EMERGENCY DEPT VISIT LOW MDM: CPT | Mod: ER

## 2021-12-05 PROCEDURE — 85025 COMPLETE CBC W/AUTO DIFF WBC: CPT | Mod: ER | Performed by: EMERGENCY MEDICINE

## 2021-12-05 PROCEDURE — 83690 ASSAY OF LIPASE: CPT | Mod: ER | Performed by: EMERGENCY MEDICINE

## 2021-12-05 PROCEDURE — 25000003 PHARM REV CODE 250: Mod: ER | Performed by: EMERGENCY MEDICINE

## 2021-12-05 PROCEDURE — 81000 URINALYSIS NONAUTO W/SCOPE: CPT | Mod: ER | Performed by: EMERGENCY MEDICINE

## 2021-12-05 PROCEDURE — 80053 COMPREHEN METABOLIC PANEL: CPT | Mod: ER | Performed by: EMERGENCY MEDICINE

## 2021-12-05 RX ORDER — CEPHALEXIN 500 MG/1
500 CAPSULE ORAL EVERY 12 HOURS
Qty: 10 CAPSULE | Refills: 0 | Status: SHIPPED | OUTPATIENT
Start: 2021-12-05 | End: 2021-12-10

## 2021-12-05 RX ORDER — CEPHALEXIN 500 MG/1
500 CAPSULE ORAL
Status: COMPLETED | OUTPATIENT
Start: 2021-12-05 | End: 2021-12-05

## 2021-12-05 RX ADMIN — CEPHALEXIN 500 MG: 500 CAPSULE ORAL at 05:12

## 2021-12-22 DIAGNOSIS — E11.9 TYPE 2 DIABETES MELLITUS WITHOUT COMPLICATION: ICD-10-CM

## 2022-01-05 DIAGNOSIS — E11.9 TYPE 2 DIABETES MELLITUS WITHOUT COMPLICATION: ICD-10-CM

## 2022-01-14 LAB — HBA1C MFR BLD: 8.1 % (ref 4–6)

## 2022-01-23 ENCOUNTER — HOSPITAL ENCOUNTER (EMERGENCY)
Facility: HOSPITAL | Age: 72
Discharge: HOME | End: 2022-01-23
Attending: EMERGENCY MEDICINE
Payer: MEDICARE

## 2022-01-23 VITALS
HEART RATE: 89 BPM | WEIGHT: 235 LBS | RESPIRATION RATE: 18 BRPM | TEMPERATURE: 98 F | HEIGHT: 66 IN | BODY MASS INDEX: 37.77 KG/M2 | SYSTOLIC BLOOD PRESSURE: 138 MMHG | OXYGEN SATURATION: 95 % | DIASTOLIC BLOOD PRESSURE: 77 MMHG

## 2022-01-23 DIAGNOSIS — R10.31 RIGHT LOWER QUADRANT ABDOMINAL PAIN: Primary | ICD-10-CM

## 2022-01-23 DIAGNOSIS — R11.2 NAUSEA AND VOMITING, INTRACTABILITY OF VOMITING NOT SPECIFIED, UNSPECIFIED VOMITING TYPE: ICD-10-CM

## 2022-01-23 LAB
ALBUMIN SERPL BCP-MCNC: 3.6 G/DL (ref 3.5–5.2)
ALP SERPL-CCNC: 63 U/L (ref 55–135)
ALT SERPL W/O P-5'-P-CCNC: 20 U/L (ref 10–44)
ANION GAP SERPL CALC-SCNC: 15 MMOL/L (ref 8–16)
AST SERPL-CCNC: 16 U/L (ref 10–40)
BACTERIA #/AREA URNS HPF: ABNORMAL /HPF
BASOPHILS # BLD AUTO: 0.03 K/UL (ref 0–0.2)
BASOPHILS NFR BLD: 0.2 % (ref 0–1.9)
BILIRUB SERPL-MCNC: 0.5 MG/DL (ref 0.1–1)
BILIRUB UR QL STRIP: NEGATIVE
BUN SERPL-MCNC: 19 MG/DL (ref 8–23)
CALCIUM SERPL-MCNC: 9 MG/DL (ref 8.7–10.5)
CHLORIDE SERPL-SCNC: 100 MMOL/L (ref 95–110)
CLARITY UR: ABNORMAL
CO2 SERPL-SCNC: 21 MMOL/L (ref 23–29)
COLOR UR: YELLOW
CREAT SERPL-MCNC: 1 MG/DL (ref 0.5–1.4)
CTP QC/QA: YES
DIFFERENTIAL METHOD: ABNORMAL
EOSINOPHIL # BLD AUTO: 0 K/UL (ref 0–0.5)
EOSINOPHIL NFR BLD: 0 % (ref 0–8)
ERYTHROCYTE [DISTWIDTH] IN BLOOD BY AUTOMATED COUNT: 14.7 % (ref 11.5–14.5)
EST. GFR  (AFRICAN AMERICAN): >60 ML/MIN/1.73 M^2
EST. GFR  (NON AFRICAN AMERICAN): 58 ML/MIN/1.73 M^2
GLUCOSE SERPL-MCNC: 328 MG/DL (ref 70–110)
GLUCOSE UR QL STRIP: ABNORMAL
HCT VFR BLD AUTO: 32.1 % (ref 37–48.5)
HGB BLD-MCNC: 9.9 G/DL (ref 12–16)
HGB UR QL STRIP: ABNORMAL
HYALINE CASTS #/AREA URNS LPF: 1 /LPF
IMM GRANULOCYTES # BLD AUTO: 0.05 K/UL (ref 0–0.04)
IMM GRANULOCYTES NFR BLD AUTO: 0.4 % (ref 0–0.5)
KETONES UR QL STRIP: ABNORMAL
LEUKOCYTE ESTERASE UR QL STRIP: NEGATIVE
LIPASE SERPL-CCNC: 34 U/L (ref 4–60)
LYMPHOCYTES # BLD AUTO: 0.8 K/UL (ref 1–4.8)
LYMPHOCYTES NFR BLD: 5.6 % (ref 18–48)
MCH RBC QN AUTO: 22.7 PG (ref 27–31)
MCHC RBC AUTO-ENTMCNC: 30.8 G/DL (ref 32–36)
MCV RBC AUTO: 74 FL (ref 82–98)
MICROSCOPIC COMMENT: ABNORMAL
MONOCYTES # BLD AUTO: 0.4 K/UL (ref 0.3–1)
MONOCYTES NFR BLD: 3 % (ref 4–15)
NEUTROPHILS # BLD AUTO: 12.4 K/UL (ref 1.8–7.7)
NEUTROPHILS NFR BLD: 90.8 % (ref 38–73)
NITRITE UR QL STRIP: NEGATIVE
NRBC BLD-RTO: 0 /100 WBC
PH UR STRIP: 6 [PH] (ref 5–8)
PLATELET # BLD AUTO: 216 K/UL (ref 150–450)
PMV BLD AUTO: ABNORMAL FL (ref 9.2–12.9)
POTASSIUM SERPL-SCNC: 4.9 MMOL/L (ref 3.5–5.1)
PROT SERPL-MCNC: 6.9 G/DL (ref 6–8.4)
PROT UR QL STRIP: ABNORMAL
RBC # BLD AUTO: 4.36 M/UL (ref 4–5.4)
RBC #/AREA URNS HPF: 0 /HPF (ref 0–4)
SARS-COV-2 RDRP RESP QL NAA+PROBE: NEGATIVE
SODIUM SERPL-SCNC: 136 MMOL/L (ref 136–145)
SP GR UR STRIP: >1.03 (ref 1–1.03)
SQUAMOUS #/AREA URNS HPF: 2 /HPF
URN SPEC COLLECT METH UR: ABNORMAL
UROBILINOGEN UR STRIP-ACNC: NEGATIVE EU/DL
WBC # BLD AUTO: 13.6 K/UL (ref 3.9–12.7)
WBC #/AREA URNS HPF: 0 /HPF (ref 0–5)
YEAST URNS QL MICRO: ABNORMAL

## 2022-01-23 PROCEDURE — U0002 COVID-19 LAB TEST NON-CDC: HCPCS | Performed by: EMERGENCY MEDICINE

## 2022-01-23 PROCEDURE — 83690 ASSAY OF LIPASE: CPT | Performed by: EMERGENCY MEDICINE

## 2022-01-23 PROCEDURE — 96375 TX/PRO/DX INJ NEW DRUG ADDON: CPT

## 2022-01-23 PROCEDURE — 63600175 PHARM REV CODE 636 W HCPCS: Performed by: EMERGENCY MEDICINE

## 2022-01-23 PROCEDURE — 25000003 PHARM REV CODE 250: Performed by: EMERGENCY MEDICINE

## 2022-01-23 PROCEDURE — 96361 HYDRATE IV INFUSION ADD-ON: CPT

## 2022-01-23 PROCEDURE — 80053 COMPREHEN METABOLIC PANEL: CPT | Performed by: EMERGENCY MEDICINE

## 2022-01-23 PROCEDURE — 99284 EMERGENCY DEPT VISIT MOD MDM: CPT | Mod: 25

## 2022-01-23 PROCEDURE — 81000 URINALYSIS NONAUTO W/SCOPE: CPT | Performed by: EMERGENCY MEDICINE

## 2022-01-23 PROCEDURE — 85025 COMPLETE CBC W/AUTO DIFF WBC: CPT | Performed by: EMERGENCY MEDICINE

## 2022-01-23 PROCEDURE — 96374 THER/PROPH/DIAG INJ IV PUSH: CPT

## 2022-01-23 RX ORDER — ONDANSETRON 2 MG/ML
4 INJECTION INTRAMUSCULAR; INTRAVENOUS
Status: COMPLETED | OUTPATIENT
Start: 2022-01-23 | End: 2022-01-23

## 2022-01-23 RX ORDER — ONDANSETRON 4 MG/1
4 TABLET, FILM COATED ORAL EVERY 6 HOURS
Qty: 12 TABLET | Refills: 0 | Status: ON HOLD | OUTPATIENT
Start: 2022-01-23 | End: 2022-05-15 | Stop reason: HOSPADM

## 2022-01-23 RX ORDER — KETOROLAC TROMETHAMINE 30 MG/ML
15 INJECTION, SOLUTION INTRAMUSCULAR; INTRAVENOUS
Status: COMPLETED | OUTPATIENT
Start: 2022-01-23 | End: 2022-01-23

## 2022-01-23 RX ORDER — SACUBITRIL AND VALSARTAN 24; 26 MG/1; MG/1
1 TABLET, FILM COATED ORAL 2 TIMES DAILY
Qty: 60 TABLET | Refills: 0 | Status: SHIPPED | OUTPATIENT
Start: 2022-01-23 | End: 2022-06-20

## 2022-01-23 RX ORDER — SODIUM CHLORIDE 9 MG/ML
INJECTION, SOLUTION INTRAVENOUS
Status: COMPLETED | OUTPATIENT
Start: 2022-01-23 | End: 2022-01-23

## 2022-01-23 RX ORDER — MORPHINE SULFATE 4 MG/ML
4 INJECTION, SOLUTION INTRAMUSCULAR; INTRAVENOUS
Status: COMPLETED | OUTPATIENT
Start: 2022-01-23 | End: 2022-01-23

## 2022-01-23 RX ADMIN — ONDANSETRON 4 MG: 2 INJECTION INTRAMUSCULAR; INTRAVENOUS at 07:01

## 2022-01-23 RX ADMIN — SODIUM CHLORIDE: 0.9 INJECTION, SOLUTION INTRAVENOUS at 07:01

## 2022-01-23 RX ADMIN — KETOROLAC TROMETHAMINE 15 MG: 30 INJECTION, SOLUTION INTRAMUSCULAR; INTRAVENOUS at 08:01

## 2022-01-23 RX ADMIN — MORPHINE SULFATE 4 MG: 4 INJECTION INTRAVENOUS at 07:01

## 2022-01-23 NOTE — ED NOTES
Pt in room 23 via ems. Pt awake, alert and oriented. Pt able to ambulate to bathroom and provide urine sample. Pt returned to room and dressed in gown. Head to toe assessment completed, plan of care reviewed, call bell within reach.

## 2022-01-23 NOTE — ED PROVIDER NOTES
Dictation #1  MRN:[id number]  CSN:[id number]  Encounter Date: 1/23/2022    SCRIBE #1 NOTE: I, Clemencia Gamboa, am scribing for, and in the presence of, Arnol Dunbar MD.       History     Chief Complaint   Patient presents with    Abdominal Pain     RLQ abdominal pain radiating to right flank with N/V since yesterday AM. No fever. States h/o kidney stones and feels the same. Presents awake, alert, oriented. Pain has eased with IV meds given by EMS.     A 71 y.o. female who has a past medical history of Arthritis, Diabetes mellitus, Hypertension, and Kidney stones presents to the ED for abdominal and back pain. Her symptoms started 2 days ago but worsened yesterday. She states the pain in her back is located to the right side and radiating. She denies fever, diarrhea, constipation, and dysuria but admits to vomiting, nausea, and an increase in urination.     The history is provided by the patient.     Review of patient's allergies indicates:  No Known Allergies  Past Medical History:   Diagnosis Date    Arthritis     Diabetes mellitus     Hypertension     Kidney stone      Past Surgical History:   Procedure Laterality Date    COLONOSCOPY  05/23/2013    dr ram - 5 years    FOOT SURGERY Right 2010    bone spur    FOOT SURGERY Left 2010    fx ankle    HYSTERECTOMY      age 47    KNEE SURGERY Left 06/10/2016    TKR    OOPHORECTOMY      TONSILLECTOMY       Family History   Problem Relation Age of Onset    Heart attack Mother     Seizures Mother         fell in bathtub and drowned    Colon cancer Sister     Breast cancer Sister      Social History     Tobacco Use    Smoking status: Never Smoker    Smokeless tobacco: Never Used   Substance Use Topics    Alcohol use: No     Alcohol/week: 0.0 standard drinks    Drug use: No     Review of Systems   Constitutional: Negative for fever.   HENT: Negative for sore throat.    Eyes: Negative for visual disturbance.   Respiratory: Negative for shortness of  breath.    Cardiovascular: Negative for chest pain.   Gastrointestinal: Positive for abdominal pain, nausea and vomiting. Negative for constipation and diarrhea.   Genitourinary: Positive for urgency (increase). Negative for difficulty urinating and dysuria.   Musculoskeletal: Positive for back pain.   Skin: Negative for rash.   Neurological: Negative for headaches.       Physical Exam     Initial Vitals [01/23/22 0718]   BP Pulse Resp Temp SpO2   (!) 156/83 99 16 98.1 °F (36.7 °C) 100 %      MAP       --         Physical Exam    Nursing note and vitals reviewed.  HENT:   Head: Atraumatic.   Eyes: Conjunctivae and EOM are normal.   Neck:   Normal range of motion.  Cardiovascular: Exam reveals no gallop and no friction rub.    No murmur heard.  Pulmonary/Chest: Breath sounds normal. No respiratory distress.   Abdominal: Abdomen is soft. There is abdominal tenderness (RLQ and R flank).   Musculoskeletal:         General: No edema. Normal range of motion.      Cervical back: Normal range of motion.     Neurological: She is alert and oriented to person, place, and time.   Psychiatric: She has a normal mood and affect.         ED Course   Procedures  Labs Reviewed   URINALYSIS, REFLEX TO URINE CULTURE - Abnormal; Notable for the following components:       Result Value    Appearance, UA Hazy (*)     Specific Gravity, UA >1.030 (*)     Protein, UA 3+ (*)     Glucose, UA 4+ (*)     Ketones, UA 1+ (*)     Occult Blood UA 1+ (*)     All other components within normal limits    Narrative:     Specimen Source->Urine   CBC W/ AUTO DIFFERENTIAL - Abnormal; Notable for the following components:    WBC 13.60 (*)     Hemoglobin 9.9 (*)     Hematocrit 32.1 (*)     MCV 74 (*)     MCH 22.7 (*)     MCHC 30.8 (*)     RDW 14.7 (*)     Gran # (ANC) 12.4 (*)     Immature Grans (Abs) 0.05 (*)     Lymph # 0.8 (*)     Gran % 90.8 (*)     Lymph % 5.6 (*)     Mono % 3.0 (*)     All other components within normal limits   URINALYSIS MICROSCOPIC  - Abnormal; Notable for the following components:    Yeast, UA Rare (*)     All other components within normal limits    Narrative:     Specimen Source->Urine   COMPREHENSIVE METABOLIC PANEL - Abnormal; Notable for the following components:    CO2 21 (*)     Glucose 328 (*)     eGFR if non  58 (*)     All other components within normal limits   LIPASE   SARS-COV-2 RDRP GENE          Imaging Results          CT Abdomen Pelvis  Without Contrast (Final result)  Result time 01/23/22 08:30:23    Final result by Jaswant Pulido MD (01/23/22 08:30:23)                 Impression:      No evidence of nephrolithiasis or obstructive uropathy.  Normal appendix.    Cardiomegaly with bibasilar ground-glass opacities and small pleural effusions, potentially related to CHF exacerbation and/or volume overload.  Infectious/inflammatory process could have a similar appearance.    Hepatic steatosis.    No other significant abnormality identified on this motion limited noncontrast study.      Electronically signed by: Jaswant Pulido MD  Date:    01/23/2022  Time:    08:30             Narrative:    EXAMINATION:  CT ABDOMEN PELVIS WITHOUT CONTRAST    CLINICAL HISTORY:  RLQ abdominal pain (Age >= 14y);    TECHNIQUE:  Low dose axial images, sagittal and coronal reformations were obtained from the lung bases to the pubic symphysis.  Oral contrast was not administered.    COMPARISON:  CT abdomen pelvis without contrast, 02/18/2018.    FINDINGS:  Lower chest: Heart is enlarged.  There is minimal pericardial fluid.  Small bilateral pleural effusions.  Patchy ground-glass opacities throughout the lung bases.    Abdomen:    Evaluation of the solid abdominal organs and bowel is limited in the absence of IV contrast.  Evaluation is mildly limited by motion.    Liver is stable in size and contour.  There is diffuse hypoattenuation of the liver parenchyma suggestive of steatosis.  Gallbladder is decompressed and otherwise  unremarkable.  No intra-or extrahepatic biliary ductal dilatation.    Spleen is not enlarged.  Adrenal glands and pancreas are unremarkable.    Kidneys are symmetric.  No renal or ureteral stones. No hydronephrosis.    No distended loops of small bowel. The appendix is normal.  No convincing inflammatory changes identified in bowel allowing for motion limitations.  There are scattered colonic diverticula.    Abdominal aorta is normal in course and caliber with mild calcific atherosclerosis.    No abdominal lymphadenopathy.    No abdominal free fluid or pneumoperitoneum.    Pelvis: Urinary bladder is decompressed and not well evaluated.  Rectum is unremarkable.  No free fluid in the pelvis.    Bones and soft tissues: No aggressive osseous lesions.  Moderate degenerative changes in the lumbar spine.  Extraperitoneal soft tissues are negative for acute finding.  There is a small fat containing umbilical hernia.                                 Medications   ondansetron injection 4 mg (4 mg Intravenous Given 1/23/22 0751)   0.9%  NaCl infusion ( Intravenous Stopped 1/23/22 0903)   morphine injection 4 mg (4 mg Intravenous Given 1/23/22 1388)   ketorolac injection 15 mg (15 mg Intravenous Given 1/23/22 6056)     Medical Decision Making:   Initial Assessment:   71-year-old female presenting with 2 days of right lower quadrant and right flank pain with associated vomiting.  Hx of kidney stones. Vital signs notable for mild tachycardia.  She is afebrile.  She does have tenderness in the right lower quadrant and right flank.  CT abdomen and pelvis shows no evidence of ureteral stones.  No evidence of appendicitis.  Of note there are some ground-glass opacities and small pleural effusions.  Urinalysis with hematuria but no evidence of urinary tract infection.  Mild leukocytosis 13,000 noted.  No significant abnormalities on CMP. No evidence of pancreatitis. Covid neg.     9:02 AM  On re-evaluation, the patient says that her  pain has eased up.  Etiology of her pain is not clear at this time.  I see no evidence of acute life-threatening condition.  Her vital signs remained stable.  I will discharge home with symptomatic treatment instructions of follow-up with her primary care doctor soon as possible return if she develops fever or worsening symptoms or any concerns.          Scribe Attestation:   Scribe #1: I performed the above scribed service and the documentation accurately describes the services I performed. I attest to the accuracy of the note.                 Clinical Impression:   Final diagnoses:  [R10.31] Right lower quadrant abdominal pain (Primary)  [R11.2] Nausea and vomiting, intractability of vomiting not specified, unspecified vomiting type          ED Disposition Condition    Discharge Stable        ED Prescriptions     Medication Sig Dispense Start Date End Date Auth. Provider    ENTRESTO 24-26 mg per tablet Take 1 tablet by mouth 2 (two) times daily. 60 tablet 1/23/2022  Arnol Dunbar MD    ondansetron (ZOFRAN) 4 MG tablet Take 1 tablet (4 mg total) by mouth every 6 (six) hours. 12 tablet 1/23/2022  Arnol Dunbar MD        Follow-up Information     Follow up With Specialties Details Why Contact Info    Itz Msue MD Family Medicine Schedule an appointment as soon as possible for a visit   [address] [place] [address] [id number] [phone]             Arnol Dunbar MD  01/23/22 0955

## 2022-01-23 NOTE — ED NOTES
Pt given discharge and follow up instructions. Pt verbalized understanding. Pt able to dress herself. Pt wheeled out of room to lobby to wait for her son via wc by ER tech.

## 2022-01-24 ENCOUNTER — TELEPHONE (OUTPATIENT)
Dept: FAMILY MEDICINE | Facility: CLINIC | Age: 72
End: 2022-01-24
Payer: MEDICARE

## 2022-01-24 NOTE — TELEPHONE ENCOUNTER
----- Message from Jannette Portillo sent at 1/24/2022 10:07 AM CST -----  Contact: Pt  Type:  Patient Returning Call    Who Called:Pt  Would the patient rather a call back or a response via [facility]? call  Best Call Back Number:[phone]  Additional Information:     Pt went to [facility]d on Saturday 01/22 and she was admitted   Pt was released on yesterday Sunday 01/23  Pt would like to schedule a hospital f/u for tomorrow 01/25 preferably 11

## 2022-01-28 ENCOUNTER — TELEPHONE (OUTPATIENT)
Dept: FAMILY MEDICINE | Facility: CLINIC | Age: 72
End: 2022-01-28
Payer: MEDICARE

## 2022-01-28 DIAGNOSIS — R06.02 SOB (SHORTNESS OF BREATH): ICD-10-CM

## 2022-01-28 DIAGNOSIS — I10 ESSENTIAL HYPERTENSION: Primary | ICD-10-CM

## 2022-01-28 RX ORDER — DICYCLOMINE HYDROCHLORIDE 10 MG/1
10 CAPSULE ORAL
Qty: 30 CAPSULE | Refills: 0 | Status: SHIPPED | OUTPATIENT
Start: 2022-01-28 | End: 2022-02-27

## 2022-01-28 RX ORDER — AZITHROMYCIN 250 MG/1
TABLET, FILM COATED ORAL
Qty: 6 TABLET | Refills: 0 | Status: SHIPPED | OUTPATIENT
Start: 2022-01-28 | End: 2022-02-02

## 2022-01-28 NOTE — TELEPHONE ENCOUNTER
----- Message from Petty Ryan sent at 1/28/2022 11:09 AM CST -----  Regarding: appt  Contact: self  The pt states that she has been trying to get an appt with Dr Muse since Monday.  States she called and was told he is booked for the month.  She went to the ER for severe stomach pains and was told she needed to f/u asap with Dr Abbasi.  I spoke with Krys regarding this and explained to the pt that I would send a message on high alert so that someone can address this.  THE PT WAS NOT HAPPY.  Please call her at [phone]

## 2022-01-29 ENCOUNTER — LAB VISIT (OUTPATIENT)
Dept: LAB | Facility: HOSPITAL | Age: 72
End: 2022-01-29
Attending: FAMILY MEDICINE
Payer: MEDICARE

## 2022-01-29 DIAGNOSIS — R06.02 SOB (SHORTNESS OF BREATH): ICD-10-CM

## 2022-01-29 DIAGNOSIS — I10 ESSENTIAL HYPERTENSION: ICD-10-CM

## 2022-01-29 LAB — BNP SERPL-MCNC: 673 PG/ML (ref 0–99)

## 2022-01-29 PROCEDURE — 36415 COLL VENOUS BLD VENIPUNCTURE: CPT | Performed by: FAMILY MEDICINE

## 2022-01-29 PROCEDURE — 83880 ASSAY OF NATRIURETIC PEPTIDE: CPT | Performed by: FAMILY MEDICINE

## 2022-01-31 ENCOUNTER — HOSPITAL ENCOUNTER (OUTPATIENT)
Dept: RADIOLOGY | Facility: HOSPITAL | Age: 72
Discharge: HOME | End: 2022-01-31
Attending: FAMILY MEDICINE
Payer: MEDICARE

## 2022-01-31 DIAGNOSIS — I10 ESSENTIAL HYPERTENSION: ICD-10-CM

## 2022-01-31 DIAGNOSIS — R06.02 SOB (SHORTNESS OF BREATH): ICD-10-CM

## 2022-01-31 PROCEDURE — 71046 X-RAY EXAM CHEST 2 VIEWS: CPT | Mod: 26,,, | Performed by: RADIOLOGY

## 2022-01-31 PROCEDURE — 71046 X-RAY EXAM CHEST 2 VIEWS: CPT | Mod: TC,FY

## 2022-01-31 PROCEDURE — 71046 XR CHEST PA AND LATERAL: ICD-10-PCS | Mod: 26,,, | Performed by: RADIOLOGY

## 2022-02-01 ENCOUNTER — TELEPHONE (OUTPATIENT)
Dept: FAMILY MEDICINE | Facility: CLINIC | Age: 72
End: 2022-02-01
Payer: MEDICARE

## 2022-02-02 NOTE — TELEPHONE ENCOUNTER
Your chest x-ray is normal  Your other blood work shows slight increase in the bnp  which shows a little heart strain.  Continue with the diuretic medication as we discussed.

## 2022-02-03 NOTE — TELEPHONE ENCOUNTER
Pt has been notified and verbalized understanding that she should use Robitussin and Saline Nasal Spray.

## 2022-02-03 NOTE — TELEPHONE ENCOUNTER
Pt has been notified and verbalized understanding of test results. She's wanting to know what can she take to help get rid of mucus. Please advise. Thanks

## 2022-02-24 ENCOUNTER — PES CALL (OUTPATIENT)
Dept: ADMINISTRATIVE | Facility: CLINIC | Age: 72
End: 2022-02-24
Payer: MEDICARE

## 2022-03-13 ENCOUNTER — TELEPHONE (OUTPATIENT)
Dept: FAMILY MEDICINE | Facility: CLINIC | Age: 72
End: 2022-03-13
Payer: MEDICARE

## 2022-03-17 NOTE — TELEPHONE ENCOUNTER
Patient states she had labs drawn at [facility] last week ordered by the   endocrinologist   The pt is scheduled to see dr nunez next month   She will bring the results with her to this viist

## 2022-03-30 DIAGNOSIS — E11.9 TYPE 2 DIABETES MELLITUS WITHOUT COMPLICATION: ICD-10-CM

## 2022-04-25 ENCOUNTER — OFFICE VISIT (OUTPATIENT)
Dept: FAMILY MEDICINE | Facility: CLINIC | Age: 72
End: 2022-04-25
Payer: MEDICARE

## 2022-04-25 VITALS
HEART RATE: 83 BPM | SYSTOLIC BLOOD PRESSURE: 130 MMHG | DIASTOLIC BLOOD PRESSURE: 70 MMHG | HEIGHT: 66 IN | TEMPERATURE: 99 F | WEIGHT: 219.38 LBS | OXYGEN SATURATION: 96 % | BODY MASS INDEX: 35.26 KG/M2

## 2022-04-25 DIAGNOSIS — R29.898 WEAKNESS OF LOWER EXTREMITY, UNSPECIFIED LATERALITY: ICD-10-CM

## 2022-04-25 DIAGNOSIS — I10 ESSENTIAL HYPERTENSION: Primary | ICD-10-CM

## 2022-04-25 DIAGNOSIS — Z12.31 ENCOUNTER FOR SCREENING MAMMOGRAM FOR BREAST CANCER: ICD-10-CM

## 2022-04-25 DIAGNOSIS — N39.0 URINARY TRACT INFECTION WITHOUT HEMATURIA, SITE UNSPECIFIED: ICD-10-CM

## 2022-04-25 PROCEDURE — 3288F PR FALLS RISK ASSESSMENT DOCUMENTED: ICD-10-PCS | Mod: CPTII,S$GLB,, | Performed by: FAMILY MEDICINE

## 2022-04-25 PROCEDURE — 4010F PR ACE/ARB THEARPY RXD/TAKEN: ICD-10-PCS | Mod: CPTII,S$GLB,, | Performed by: FAMILY MEDICINE

## 2022-04-25 PROCEDURE — 3075F SYST BP GE 130 - 139MM HG: CPT | Mod: CPTII,S$GLB,, | Performed by: FAMILY MEDICINE

## 2022-04-25 PROCEDURE — 87086 URINE CULTURE/COLONY COUNT: CPT | Performed by: FAMILY MEDICINE

## 2022-04-25 PROCEDURE — 99214 PR OFFICE/OUTPT VISIT, EST, LEVL IV, 30-39 MIN: ICD-10-PCS | Mod: S$GLB,,, | Performed by: FAMILY MEDICINE

## 2022-04-25 PROCEDURE — 3078F DIAST BP <80 MM HG: CPT | Mod: CPTII,S$GLB,, | Performed by: FAMILY MEDICINE

## 2022-04-25 PROCEDURE — 1101F PT FALLS ASSESS-DOCD LE1/YR: CPT | Mod: CPTII,S$GLB,, | Performed by: FAMILY MEDICINE

## 2022-04-25 PROCEDURE — 4010F ACE/ARB THERAPY RXD/TAKEN: CPT | Mod: CPTII,S$GLB,, | Performed by: FAMILY MEDICINE

## 2022-04-25 PROCEDURE — 3288F FALL RISK ASSESSMENT DOCD: CPT | Mod: CPTII,S$GLB,, | Performed by: FAMILY MEDICINE

## 2022-04-25 PROCEDURE — 3078F PR MOST RECENT DIASTOLIC BLOOD PRESSURE < 80 MM HG: ICD-10-PCS | Mod: CPTII,S$GLB,, | Performed by: FAMILY MEDICINE

## 2022-04-25 PROCEDURE — 1125F PR PAIN SEVERITY QUANTIFIED, PAIN PRESENT: ICD-10-PCS | Mod: CPTII,S$GLB,, | Performed by: FAMILY MEDICINE

## 2022-04-25 PROCEDURE — 3075F PR MOST RECENT SYSTOLIC BLOOD PRESS GE 130-139MM HG: ICD-10-PCS | Mod: CPTII,S$GLB,, | Performed by: FAMILY MEDICINE

## 2022-04-25 PROCEDURE — 3008F PR BODY MASS INDEX (BMI) DOCUMENTED: ICD-10-PCS | Mod: CPTII,S$GLB,, | Performed by: FAMILY MEDICINE

## 2022-04-25 PROCEDURE — 1101F PR PT FALLS ASSESS DOC 0-1 FALLS W/OUT INJ PAST YR: ICD-10-PCS | Mod: CPTII,S$GLB,, | Performed by: FAMILY MEDICINE

## 2022-04-25 PROCEDURE — 99214 OFFICE O/P EST MOD 30 MIN: CPT | Mod: S$GLB,,, | Performed by: FAMILY MEDICINE

## 2022-04-25 PROCEDURE — 3008F BODY MASS INDEX DOCD: CPT | Mod: CPTII,S$GLB,, | Performed by: FAMILY MEDICINE

## 2022-04-25 PROCEDURE — 99499 UNLISTED E&M SERVICE: CPT | Mod: S$GLB,,, | Performed by: FAMILY MEDICINE

## 2022-04-25 PROCEDURE — 99499 RISK ADDL DX/OHS AUDIT: ICD-10-PCS | Mod: S$GLB,,, | Performed by: FAMILY MEDICINE

## 2022-04-25 PROCEDURE — 1125F AMNT PAIN NOTED PAIN PRSNT: CPT | Mod: CPTII,S$GLB,, | Performed by: FAMILY MEDICINE

## 2022-04-25 RX ORDER — CEPHALEXIN 500 MG/1
1000 CAPSULE ORAL EVERY 12 HOURS
Qty: 28 CAPSULE | Refills: 0 | Status: SHIPPED | OUTPATIENT
Start: 2022-04-25 | End: 2022-05-13

## 2022-04-25 RX ORDER — FUROSEMIDE 20 MG/1
TABLET ORAL
Qty: 90 TABLET | Refills: 1 | Status: ON HOLD | OUTPATIENT
Start: 2022-04-25 | End: 2022-05-15 | Stop reason: HOSPADM

## 2022-04-25 RX ORDER — TAMSULOSIN HYDROCHLORIDE 0.4 MG/1
1 CAPSULE ORAL DAILY
Qty: 90 CAPSULE | Refills: 3 | Status: SHIPPED | OUTPATIENT
Start: 2022-04-25 | End: 2024-03-21 | Stop reason: SDUPTHER

## 2022-04-25 RX ORDER — FERROUS SULFATE 324(65)MG
325 TABLET, DELAYED RELEASE (ENTERIC COATED) ORAL DAILY PRN
Qty: 90 TABLET | Refills: 3 | Status: SHIPPED | OUTPATIENT
Start: 2022-04-25 | End: 2022-04-25 | Stop reason: SDUPTHER

## 2022-04-25 RX ORDER — METFORMIN HYDROCHLORIDE 1000 MG/1
1000 TABLET ORAL 2 TIMES DAILY
COMMUNITY
Start: 2022-04-20 | End: 2022-10-24

## 2022-04-25 RX ORDER — FERROUS SULFATE 324(65)MG
325 TABLET, DELAYED RELEASE (ENTERIC COATED) ORAL DAILY PRN
Qty: 90 TABLET | Refills: 3 | Status: SHIPPED | OUTPATIENT
Start: 2022-04-25 | End: 2022-10-24 | Stop reason: SDUPTHER

## 2022-04-25 NOTE — PROGRESS NOTES
" Patient ID: Katherine Berger is a 72 y.o. female.    Chief Complaint: Diabetes, Urinary Tract Infection, and Hip Pain    HPI      Katherine Berger is a 72 y.o. female here following up on diabetes mellitus-not having any problems this time.  Also complains of dysuria with suprapubic pain change in color or urine.  Lastly, complains of muscle skeletal pain and weakness of lower extremity due to inactivity.  No recent trauma.    Vitals:    04/25/22 0858   BP: 130/70   BP Location: Left arm   Patient Position: Sitting   Pulse: 83   Temp: 98.6 °F (37 °C)   TempSrc: Oral   SpO2: 96%   Weight: 99.5 kg (219 lb 5.7 oz)   Height: 5' 6" (1.676 m)            Review of Symptoms      Physical Exam    Constitutional:  Oriented to person, place, and time.appears well-developed and well-nourished.  No distress.      HENT  Head: Normocephalic and atraumatic  Right Ear: External ear normal.   Left Ear: External ear normal.   Nose: External nose normal.   Mouth:  Moist mucus membranes.    Eyes:  Conjunctivae are normal. Right eye exhibits no discharge.  Left eye exhibits no discharge. No scleral icterus.  No periorbital edema    Cardiovascular:  Regular rate and rhythm with normal S1 and S2     Pulmonary/Chest:   Clear to auscultation bilaterally without wheezes, rhonchi or rales      Musculoskeletal:  No edema. No obvious deformity No wasting    Abdomen:  Soft non tender, non distended, No hepatic or splenic enlargement.  No rebound or guarding.     Neurological:  Alert and oriented to person, place, and time.   Coordination normal.     Skin:   Skin is warm and dry.  No diaphoresis.   No rash noted.     Psychiatric: Normal mood and affect. Behavior is normal.  Judgment and thought content normal.     Complete Blood Count  Lab Results   Component Value Date    RBC 4.36 01/23/2022    HGB 9.9 (L) 01/23/2022    HCT 32.1 (L) 01/23/2022    MCV 74 (L) 01/23/2022    MCH 22.7 (L) 01/23/2022    MCHC 30.8 (L) 01/23/2022    RDW 14.7 (H) 01/23/2022    "  01/23/2022    MPV SEE COMMENT 01/23/2022    GRAN 12.4 (H) 01/23/2022    GRAN 90.8 (H) 01/23/2022    LYMPH 0.8 (L) 01/23/2022    LYMPH 5.6 (L) 01/23/2022    MONO 0.4 01/23/2022    MONO 3.0 (L) 01/23/2022    EOS 0.0 01/23/2022    BASO 0.03 01/23/2022    EOSINOPHIL 0.0 01/23/2022    BASOPHIL 0.2 01/23/2022    DIFFMETHOD Automated 01/23/2022       Comprehensive Metabolic Panel  Lab Results   Component Value Date     (H) 01/23/2022    BUN 19 01/23/2022    CREATININE 1.0 01/23/2022     01/23/2022    K 4.9 01/23/2022     01/23/2022    PROT 6.9 01/23/2022    ALBUMIN 3.6 01/23/2022    BILITOT 0.5 01/23/2022    AST 16 01/23/2022    ALKPHOS 63 01/23/2022    CO2 21 (L) 01/23/2022    ALT 20 01/23/2022    ANIONGAP 15 01/23/2022    EGFRNONAA 58 (A) 01/23/2022    ESTGFRAFRICA >60 01/23/2022       TSH  No results found for: TSH    Assessment / Plan:      ICD-10-CM ICD-9-CM   1. Essential hypertension  I10 401.9   2. Encounter for screening mammogram for breast cancer  Z12.31 V76.12   3. Weakness of lower extremity, unspecified laterality  R29.898 729.89   4. Urinary tract infection without hematuria, site unspecified  N39.0 599.0     Essential hypertension  -     furosemide (LASIX) 20 MG tablet; One tablet po daily for swelling/fluid as directed by md 3 to 7 time a week  Dispense: 90 tablet; Refill: 1    Encounter for screening mammogram for breast cancer  -     Mammo Digital Screening Bilat w/ Navjot; Future; Expected date: 04/25/2022    Weakness of lower extremity, unspecified laterality  -     Ambulatory referral/consult to Physical/Occupational Therapy; Future; Expected date: 05/02/2022    Urinary tract infection without hematuria, site unspecified  -     Urine culture    Other orders  -     Discontinue: ferrous sulfate 324 mg (65 mg iron) TbEC; Take 1 tablet (324 mg total) by mouth daily as needed.  Dispense: 90 tablet; Refill: 3  -     tamsulosin (FLOMAX) 0.4 mg Cap; Take 1 capsule (0.4 mg total) by  mouth once daily.  Dispense: 90 capsule; Refill: 3  -     cephALEXin (KEFLEX) 500 MG capsule; Take 2 capsules (1,000 mg total) by mouth every 12 (twelve) hours.  Dispense: 28 capsule; Refill: 0  -     ferrous sulfate 324 mg (65 mg iron) TbEC; Take 1 tablet (324 mg total) by mouth daily as needed.  Dispense: 90 tablet; Refill: 3

## 2022-04-26 LAB — BACTERIA UR CULT: NORMAL

## 2022-04-26 NOTE — PROGRESS NOTES
Patient, Katherine Berger (MRN #[id number]), presented with a recorded BMI of 35.41 kg/m^2 and a documented comorbidity(s):  - Hypertension  to which the severe obesity is a contributing factor. This is consistent with the definition of severe obesity (BMI 35.0-39.9) with comorbidity (ICD-10 E66.01, Z68.35). The patient's severe obesity was monitored, evaluated, addressed and/or treated. This addendum to the medical record is made on 04/26/2022.

## 2022-04-27 ENCOUNTER — PATIENT OUTREACH (OUTPATIENT)
Dept: ADMINISTRATIVE | Facility: HOSPITAL | Age: 72
End: 2022-04-27
Payer: MEDICARE

## 2022-04-27 ENCOUNTER — TELEPHONE (OUTPATIENT)
Dept: FAMILY MEDICINE | Facility: CLINIC | Age: 72
End: 2022-04-27
Payer: MEDICARE

## 2022-04-27 DIAGNOSIS — E11.9 TYPE 2 DIABETES MELLITUS WITHOUT COMPLICATION: ICD-10-CM

## 2022-04-27 NOTE — TELEPHONE ENCOUNTER
Sherice ABURTO [facility] Staff  Caller: Unspecified (Today,  4:07 PM)  Needs advice from nurse:       Who Called:pt   Regarding:returning a call   Would the patient rather a call back or [facility]?   Best Call Back number:[phone]   Additional Info

## 2022-04-28 ENCOUNTER — TELEPHONE (OUTPATIENT)
Dept: FAMILY MEDICINE | Facility: CLINIC | Age: 72
End: 2022-04-28
Payer: MEDICARE

## 2022-04-28 DIAGNOSIS — N39.0 URINARY TRACT INFECTION WITHOUT HEMATURIA, SITE UNSPECIFIED: ICD-10-CM

## 2022-04-28 DIAGNOSIS — E11.9 TYPE 2 DIABETES MELLITUS WITHOUT COMPLICATION, WITHOUT LONG-TERM CURRENT USE OF INSULIN: Primary | ICD-10-CM

## 2022-04-29 NOTE — TELEPHONE ENCOUNTER
Hemoglobin A1c was 8.1-just about under adequate control-please repeat in mid to late May    Hemoglobin A1c ordered

## 2022-04-29 NOTE — TELEPHONE ENCOUNTER
Spoke to patient. Patient verbally understood the message below.     Scheduled patient for next A1c and Micro Alb for 05/31/2022 along with her mammo.    Patient stated that she is still experiencing pain on her side and UTI symptoms.

## 2022-05-03 ENCOUNTER — TELEPHONE (OUTPATIENT)
Dept: FAMILY MEDICINE | Facility: CLINIC | Age: 72
End: 2022-05-03
Payer: MEDICARE

## 2022-05-03 RX ORDER — HYOSCYAMINE SULFATE 0.125 MG
125 TABLET ORAL EVERY 6 HOURS PRN
Qty: 25 TABLET | Refills: 0 | Status: SHIPPED | OUTPATIENT
Start: 2022-05-03 | End: 2022-10-24 | Stop reason: SDUPTHER

## 2022-05-03 NOTE — TELEPHONE ENCOUNTER
----- Message from Erica Lawrence sent at 5/3/2022  8:12 AM CDT -----  Type:  Patient Returning Call    Who Called: Pt   Would the patient rather a call back or a response via [facility]?call back   Best Call Back Number: [phone]  Additional Information:  still having bladder issues, medication is not working       Pt has been taking keflex since 4/25/2021

## 2022-05-03 NOTE — TELEPHONE ENCOUNTER
I sent in a medication that may help reduce bladder spasm-I would take AZ 0 over-the-counter with that as well.  You can use this up to 4 times a day.  I also sent in a referral to the urologist for your current urological pain.

## 2022-05-06 NOTE — TELEPHONE ENCOUNTER
I LM for pt advising meds were sent to the pharmacy and urology referral was written    I advised someone should reach out to her to set up this appt

## 2022-05-13 ENCOUNTER — HOSPITAL ENCOUNTER (OUTPATIENT)
Facility: HOSPITAL | Age: 72
Discharge: HOME | End: 2022-05-15
Attending: EMERGENCY MEDICINE | Admitting: FAMILY MEDICINE
Payer: MEDICARE

## 2022-05-13 DIAGNOSIS — R06.02 SHORTNESS OF BREATH: ICD-10-CM

## 2022-05-13 DIAGNOSIS — I50.9 ACUTE ON CHRONIC CONGESTIVE HEART FAILURE, UNSPECIFIED HEART FAILURE TYPE: Primary | ICD-10-CM

## 2022-05-13 DIAGNOSIS — R79.89 ELEVATED TROPONIN: ICD-10-CM

## 2022-05-13 DIAGNOSIS — E11.65 UNCONTROLLED TYPE 2 DIABETES MELLITUS WITH HYPERGLYCEMIA: ICD-10-CM

## 2022-05-13 DIAGNOSIS — E11.9 TYPE 2 DIABETES MELLITUS WITHOUT COMPLICATION, WITHOUT LONG-TERM CURRENT USE OF INSULIN: ICD-10-CM

## 2022-05-13 LAB
ANISOCYTOSIS BLD QL SMEAR: ABNORMAL
BASOPHILS # BLD AUTO: 0.02 K/UL (ref 0–0.2)
BASOPHILS NFR BLD: 0.2 % (ref 0–1.9)
DIFFERENTIAL METHOD: ABNORMAL
EOSINOPHIL # BLD AUTO: 0.1 K/UL (ref 0–0.5)
EOSINOPHIL NFR BLD: 1 % (ref 0–8)
ERYTHROCYTE [DISTWIDTH] IN BLOOD BY AUTOMATED COUNT: 14.6 % (ref 11.5–14.5)
HCT VFR BLD AUTO: 35.4 % (ref 37–48.5)
HGB BLD-MCNC: 10.9 G/DL (ref 12–16)
IMM GRANULOCYTES # BLD AUTO: 0.03 K/UL (ref 0–0.04)
IMM GRANULOCYTES NFR BLD AUTO: 0.3 % (ref 0–0.5)
INR PPP: 1.1 (ref 0.8–1.2)
LYMPHOCYTES # BLD AUTO: 1.5 K/UL (ref 1–4.8)
LYMPHOCYTES NFR BLD: 15.9 % (ref 18–48)
MCH RBC QN AUTO: 22 PG (ref 27–31)
MCHC RBC AUTO-ENTMCNC: 30.8 G/DL (ref 32–36)
MCV RBC AUTO: 71 FL (ref 82–98)
MONOCYTES # BLD AUTO: 0.7 K/UL (ref 0.3–1)
MONOCYTES NFR BLD: 7.7 % (ref 4–15)
NEUTROPHILS # BLD AUTO: 7.1 K/UL (ref 1.8–7.7)
NEUTROPHILS NFR BLD: 74.9 % (ref 38–73)
NRBC BLD-RTO: 0 /100 WBC
NT-PROBNP SERPL-MCNC: 3210 PG/ML (ref 5–900)
PLATELET # BLD AUTO: 207 K/UL (ref 150–450)
PMV BLD AUTO: 11.6 FL (ref 9.2–12.9)
POCT GLUCOSE: 458 MG/DL (ref 70–110)
PROTHROMBIN TIME: 12.3 SEC (ref 9–12.5)
RBC # BLD AUTO: 4.96 M/UL (ref 4–5.4)
SARS-COV-2 RDRP RESP QL NAA+PROBE: NEGATIVE
TROPONIN I SERPL-MCNC: 0.05 NG/ML (ref 0.01–0.03)
WBC # BLD AUTO: 9.43 K/UL (ref 3.9–12.7)

## 2022-05-13 PROCEDURE — 84484 ASSAY OF TROPONIN QUANT: CPT | Mod: ER | Performed by: PHYSICIAN ASSISTANT

## 2022-05-13 PROCEDURE — 83880 ASSAY OF NATRIURETIC PEPTIDE: CPT | Mod: ER | Performed by: PHYSICIAN ASSISTANT

## 2022-05-13 PROCEDURE — 94760 N-INVAS EAR/PLS OXIMETRY 1: CPT | Mod: ER

## 2022-05-13 PROCEDURE — 93010 ELECTROCARDIOGRAM REPORT: CPT | Mod: ,,, | Performed by: INTERNAL MEDICINE

## 2022-05-13 PROCEDURE — 63600175 PHARM REV CODE 636 W HCPCS: Mod: ER | Performed by: FAMILY MEDICINE

## 2022-05-13 PROCEDURE — 99285 EMERGENCY DEPT VISIT HI MDM: CPT | Mod: 25,ER

## 2022-05-13 PROCEDURE — 94640 AIRWAY INHALATION TREATMENT: CPT | Mod: ER

## 2022-05-13 PROCEDURE — 82962 GLUCOSE BLOOD TEST: CPT | Mod: ER

## 2022-05-13 PROCEDURE — 85025 COMPLETE CBC W/AUTO DIFF WBC: CPT | Mod: ER | Performed by: PHYSICIAN ASSISTANT

## 2022-05-13 PROCEDURE — 85610 PROTHROMBIN TIME: CPT | Mod: ER | Performed by: PHYSICIAN ASSISTANT

## 2022-05-13 PROCEDURE — U0002 COVID-19 LAB TEST NON-CDC: HCPCS | Mod: ER | Performed by: EMERGENCY MEDICINE

## 2022-05-13 PROCEDURE — 63600175 PHARM REV CODE 636 W HCPCS: Mod: ER | Performed by: PHYSICIAN ASSISTANT

## 2022-05-13 PROCEDURE — 96372 THER/PROPH/DIAG INJ SC/IM: CPT | Mod: 59 | Performed by: FAMILY MEDICINE

## 2022-05-13 PROCEDURE — 93005 ELECTROCARDIOGRAM TRACING: CPT | Mod: ER

## 2022-05-13 PROCEDURE — 25000242 PHARM REV CODE 250 ALT 637 W/ HCPCS: Mod: ER | Performed by: PHYSICIAN ASSISTANT

## 2022-05-13 PROCEDURE — 93010 EKG 12-LEAD: ICD-10-PCS | Mod: ,,, | Performed by: INTERNAL MEDICINE

## 2022-05-13 PROCEDURE — 96374 THER/PROPH/DIAG INJ IV PUSH: CPT | Mod: ER

## 2022-05-13 RX ORDER — IPRATROPIUM BROMIDE AND ALBUTEROL SULFATE 2.5; .5 MG/3ML; MG/3ML
3 SOLUTION RESPIRATORY (INHALATION)
Status: COMPLETED | OUTPATIENT
Start: 2022-05-13 | End: 2022-05-13

## 2022-05-13 RX ORDER — ASCORBIC ACID 500 MG
500 TABLET ORAL DAILY
COMMUNITY

## 2022-05-13 RX ORDER — FUROSEMIDE 10 MG/ML
40 INJECTION INTRAMUSCULAR; INTRAVENOUS
Status: COMPLETED | OUTPATIENT
Start: 2022-05-13 | End: 2022-05-13

## 2022-05-13 RX ORDER — FUROSEMIDE 10 MG/ML
20 INJECTION INTRAMUSCULAR; INTRAVENOUS
Status: DISCONTINUED | OUTPATIENT
Start: 2022-05-13 | End: 2022-05-13

## 2022-05-13 RX ORDER — CHOLECALCIFEROL (VITAMIN D3) 25 MCG
1000 TABLET ORAL DAILY
COMMUNITY

## 2022-05-13 RX ADMIN — INSULIN HUMAN 10 UNITS: 100 INJECTION, SOLUTION PARENTERAL at 11:05

## 2022-05-13 RX ADMIN — FUROSEMIDE 40 MG: 10 INJECTION, SOLUTION INTRAVENOUS at 08:05

## 2022-05-13 RX ADMIN — IPRATROPIUM BROMIDE AND ALBUTEROL SULFATE 3 ML: 2.5; .5 SOLUTION RESPIRATORY (INHALATION) at 05:05

## 2022-05-14 PROBLEM — D50.9 IRON DEFICIENCY ANEMIA: Status: ACTIVE | Noted: 2022-05-14

## 2022-05-14 PROBLEM — F41.9 ANXIETY: Status: ACTIVE | Noted: 2022-05-14

## 2022-05-14 PROBLEM — E11.9 DM2 (DIABETES MELLITUS, TYPE 2): Status: ACTIVE | Noted: 2022-05-14

## 2022-05-14 PROBLEM — I50.9 ACUTE ON CHRONIC CONGESTIVE HEART FAILURE: Status: ACTIVE | Noted: 2022-05-14

## 2022-05-14 PROBLEM — R79.89 ELEVATED TROPONIN: Status: ACTIVE | Noted: 2022-05-14

## 2022-05-14 LAB
ALBUMIN SERPL BCP-MCNC: 3.4 G/DL (ref 3.5–5.2)
ALP SERPL-CCNC: 62 U/L (ref 55–135)
ALT SERPL W/O P-5'-P-CCNC: 14 U/L (ref 10–44)
ANION GAP SERPL CALC-SCNC: 13 MMOL/L (ref 8–16)
ANION GAP SERPL CALC-SCNC: 14 MMOL/L (ref 8–16)
AORTIC ROOT ANNULUS: 2.81 CM
ASCENDING AORTA: 3.43 CM
AST SERPL-CCNC: 14 U/L (ref 10–40)
AV INDEX (PROSTH): 0.94
AV MEAN GRADIENT: 2 MMHG
AV PEAK GRADIENT: 4 MMHG
AV VALVE AREA: 3.75 CM2
AV VELOCITY RATIO: 0.83
BACTERIA #/AREA URNS HPF: ABNORMAL /HPF
BASOPHILS # BLD AUTO: 0.03 K/UL (ref 0–0.2)
BASOPHILS NFR BLD: 0.3 % (ref 0–1.9)
BILIRUB SERPL-MCNC: 0.5 MG/DL (ref 0.1–1)
BILIRUB UR QL STRIP: NEGATIVE
BSA FOR ECHO PROCEDURE: 2.17 M2
BUN SERPL-MCNC: 16 MG/DL (ref 8–23)
BUN SERPL-MCNC: 16 MG/DL (ref 8–23)
CALCIUM SERPL-MCNC: 9.1 MG/DL (ref 8.7–10.5)
CALCIUM SERPL-MCNC: 9.2 MG/DL (ref 8.7–10.5)
CHLORIDE SERPL-SCNC: 104 MMOL/L (ref 95–110)
CHLORIDE SERPL-SCNC: 104 MMOL/L (ref 95–110)
CHOLEST SERPL-MCNC: 121 MG/DL (ref 120–199)
CHOLEST/HDLC SERPL: 4.7 {RATIO} (ref 2–5)
CLARITY UR: CLEAR
CO2 SERPL-SCNC: 24 MMOL/L (ref 23–29)
CO2 SERPL-SCNC: 25 MMOL/L (ref 23–29)
COLOR UR: YELLOW
CREAT SERPL-MCNC: 1 MG/DL (ref 0.5–1.4)
CREAT SERPL-MCNC: 1 MG/DL (ref 0.5–1.4)
CV ECHO LV RWT: 0.5 CM
DIFFERENTIAL METHOD: ABNORMAL
DOP CALC AO PEAK VEL: 0.94 M/S
DOP CALC AO VTI: 13.43 CM
DOP CALC LVOT AREA: 4 CM2
DOP CALC LVOT DIAMETER: 2.25 CM
DOP CALC LVOT PEAK VEL: 0.78 M/S
DOP CALC LVOT STROKE VOLUME: 50.39 CM3
DOP CALC MV VTI: 17.64 CM
DOP CALCLVOT PEAK VEL VTI: 12.68 CM
E WAVE DECELERATION TIME: 119.86 MSEC
E/A RATIO: 1.48
E/E' RATIO: 25.67 M/S
ECHO LV POSTERIOR WALL: 1.47 CM (ref 0.6–1.1)
EJECTION FRACTION: 25 %
EOSINOPHIL # BLD AUTO: 0.1 K/UL (ref 0–0.5)
EOSINOPHIL NFR BLD: 1.3 % (ref 0–8)
ERYTHROCYTE [DISTWIDTH] IN BLOOD BY AUTOMATED COUNT: 14.7 % (ref 11.5–14.5)
EST. GFR  (AFRICAN AMERICAN): >60 ML/MIN/1.73 M^2
EST. GFR  (AFRICAN AMERICAN): >60 ML/MIN/1.73 M^2
EST. GFR  (NON AFRICAN AMERICAN): 57 ML/MIN/1.73 M^2
EST. GFR  (NON AFRICAN AMERICAN): 57 ML/MIN/1.73 M^2
ESTIMATED AVG GLUCOSE: 209 MG/DL (ref 68–131)
FRACTIONAL SHORTENING: 13 % (ref 28–44)
GLUCOSE SERPL-MCNC: 258 MG/DL (ref 70–110)
GLUCOSE SERPL-MCNC: 259 MG/DL (ref 70–110)
GLUCOSE UR QL STRIP: ABNORMAL
HBA1C MFR BLD: 8.9 % (ref 4–5.6)
HCT VFR BLD AUTO: 33.2 % (ref 37–48.5)
HDLC SERPL-MCNC: 26 MG/DL (ref 40–75)
HDLC SERPL: 21.5 % (ref 20–50)
HGB BLD-MCNC: 10.2 G/DL (ref 12–16)
HGB UR QL STRIP: NEGATIVE
HYALINE CASTS #/AREA URNS LPF: 0 /LPF
IMM GRANULOCYTES # BLD AUTO: 0.03 K/UL (ref 0–0.04)
IMM GRANULOCYTES NFR BLD AUTO: 0.3 % (ref 0–0.5)
INTERVENTRICULAR SEPTUM: 1.02 CM (ref 0.6–1.1)
IVC DIAMETER: 2.4 CM
IVRT: 85.63 MSEC
KETONES UR QL STRIP: NEGATIVE
LA MAJOR: 5.78 CM
LA MINOR: 5.14 CM
LA WIDTH: 4.61 CM
LDLC SERPL CALC-MCNC: 81.8 MG/DL (ref 63–159)
LEFT ATRIUM SIZE: 4.39 CM
LEFT ATRIUM VOLUME INDEX MOD: 29.3 ML/M2
LEFT ATRIUM VOLUME INDEX: 44.6 ML/M2
LEFT ATRIUM VOLUME MOD: 61.43 CM3
LEFT ATRIUM VOLUME: 93.6 CM3
LEFT INTERNAL DIMENSION IN SYSTOLE: 5.11 CM (ref 2.1–4)
LEFT VENTRICLE DIASTOLIC VOLUME INDEX: 81.74 ML/M2
LEFT VENTRICLE DIASTOLIC VOLUME: 171.65 ML
LEFT VENTRICLE MASS INDEX: 152 G/M2
LEFT VENTRICLE SYSTOLIC VOLUME INDEX: 59.3 ML/M2
LEFT VENTRICLE SYSTOLIC VOLUME: 124.5 ML
LEFT VENTRICULAR INTERNAL DIMENSION IN DIASTOLE: 5.88 CM (ref 3.5–6)
LEFT VENTRICULAR MASS: 319.32 G
LEUKOCYTE ESTERASE UR QL STRIP: NEGATIVE
LV LATERAL E/E' RATIO: 25.67 M/S
LV SEPTAL E/E' RATIO: 25.67 M/S
LYMPHOCYTES # BLD AUTO: 1.8 K/UL (ref 1–4.8)
LYMPHOCYTES NFR BLD: 17.9 % (ref 18–48)
MAGNESIUM SERPL-MCNC: 1.7 MG/DL (ref 1.6–2.6)
MCH RBC QN AUTO: 22.1 PG (ref 27–31)
MCHC RBC AUTO-ENTMCNC: 30.7 G/DL (ref 32–36)
MCV RBC AUTO: 72 FL (ref 82–98)
MICROSCOPIC COMMENT: ABNORMAL
MONOCYTES # BLD AUTO: 0.8 K/UL (ref 0.3–1)
MONOCYTES NFR BLD: 8.2 % (ref 4–15)
MV MEAN GRADIENT: 1 MMHG
MV PEAK A VEL: 0.52 M/S
MV PEAK E VEL: 0.77 M/S
MV PEAK GRADIENT: 6 MMHG
MV STENOSIS PRESSURE HALF TIME: 34.76 MS
MV VALVE AREA BY CONTINUITY EQUATION: 2.86 CM2
MV VALVE AREA P 1/2 METHOD: 6.33 CM2
NEUTROPHILS # BLD AUTO: 7.3 K/UL (ref 1.8–7.7)
NEUTROPHILS NFR BLD: 72 % (ref 38–73)
NITRITE UR QL STRIP: NEGATIVE
NONHDLC SERPL-MCNC: 95 MG/DL
NRBC BLD-RTO: 0 /100 WBC
PH UR STRIP: 6 [PH] (ref 5–8)
PHOSPHATE SERPL-MCNC: 3.2 MG/DL (ref 2.7–4.5)
PISA MRMAX VEL: 0.05 M/S
PISA TR MAX VEL: 2.02 M/S
PLATELET # BLD AUTO: 208 K/UL (ref 150–450)
PMV BLD AUTO: 11.1 FL (ref 9.2–12.9)
POCT GLUCOSE: 191 MG/DL (ref 70–110)
POCT GLUCOSE: 283 MG/DL (ref 70–110)
POCT GLUCOSE: 284 MG/DL (ref 70–110)
POCT GLUCOSE: 328 MG/DL (ref 70–110)
POCT GLUCOSE: 379 MG/DL (ref 70–110)
POTASSIUM SERPL-SCNC: 3.6 MMOL/L (ref 3.5–5.1)
POTASSIUM SERPL-SCNC: 3.8 MMOL/L (ref 3.5–5.1)
PROT SERPL-MCNC: 6.6 G/DL (ref 6–8.4)
PROT UR QL STRIP: ABNORMAL
PV PEAK VELOCITY: 0.65 CM/S
RA MAJOR: 5.5 CM
RA PRESSURE: 8 MMHG
RA WIDTH: 4.03 CM
RBC # BLD AUTO: 4.61 M/UL (ref 4–5.4)
RBC #/AREA URNS HPF: 2 /HPF (ref 0–4)
RIGHT VENTRICULAR END-DIASTOLIC DIMENSION: 3.9 CM
RV TISSUE DOPPLER FREE WALL SYSTOLIC VELOCITY 1 (APICAL 4 CHAMBER VIEW): 8.77 CM/S
SODIUM SERPL-SCNC: 142 MMOL/L (ref 136–145)
SODIUM SERPL-SCNC: 142 MMOL/L (ref 136–145)
SP GR UR STRIP: 1.02 (ref 1–1.03)
SQUAMOUS #/AREA URNS HPF: 3 /HPF
STJ: 2.64 CM
TDI LATERAL: 0.03 M/S
TDI SEPTAL: 0.03 M/S
TDI: 0.03 M/S
TR MAX PG: 16 MMHG
TRIGL SERPL-MCNC: 66 MG/DL (ref 30–150)
TROPONIN I SERPL DL<=0.01 NG/ML-MCNC: 0.05 NG/ML (ref 0–0.03)
TROPONIN I SERPL DL<=0.01 NG/ML-MCNC: 0.05 NG/ML (ref 0–0.03)
TV REST PULMONARY ARTERY PRESSURE: 24 MMHG
URN SPEC COLLECT METH UR: ABNORMAL
UROBILINOGEN UR STRIP-ACNC: ABNORMAL EU/DL
WBC # BLD AUTO: 10.15 K/UL (ref 3.9–12.7)
WBC #/AREA URNS HPF: 6 /HPF (ref 0–5)
YEAST URNS QL MICRO: ABNORMAL

## 2022-05-14 PROCEDURE — 25000003 PHARM REV CODE 250: Performed by: PHYSICIAN ASSISTANT

## 2022-05-14 PROCEDURE — 80048 BASIC METABOLIC PNL TOTAL CA: CPT | Mod: XB | Performed by: STUDENT IN AN ORGANIZED HEALTH CARE EDUCATION/TRAINING PROGRAM

## 2022-05-14 PROCEDURE — 85025 COMPLETE CBC W/AUTO DIFF WBC: CPT | Performed by: STUDENT IN AN ORGANIZED HEALTH CARE EDUCATION/TRAINING PROGRAM

## 2022-05-14 PROCEDURE — 96372 THER/PROPH/DIAG INJ SC/IM: CPT | Performed by: STUDENT IN AN ORGANIZED HEALTH CARE EDUCATION/TRAINING PROGRAM

## 2022-05-14 PROCEDURE — 84484 ASSAY OF TROPONIN QUANT: CPT | Performed by: STUDENT IN AN ORGANIZED HEALTH CARE EDUCATION/TRAINING PROGRAM

## 2022-05-14 PROCEDURE — 83036 HEMOGLOBIN GLYCOSYLATED A1C: CPT | Performed by: STUDENT IN AN ORGANIZED HEALTH CARE EDUCATION/TRAINING PROGRAM

## 2022-05-14 PROCEDURE — G0378 HOSPITAL OBSERVATION PER HR: HCPCS

## 2022-05-14 PROCEDURE — 63600175 PHARM REV CODE 636 W HCPCS: Performed by: STUDENT IN AN ORGANIZED HEALTH CARE EDUCATION/TRAINING PROGRAM

## 2022-05-14 PROCEDURE — 36415 COLL VENOUS BLD VENIPUNCTURE: CPT | Performed by: STUDENT IN AN ORGANIZED HEALTH CARE EDUCATION/TRAINING PROGRAM

## 2022-05-14 PROCEDURE — 81000 URINALYSIS NONAUTO W/SCOPE: CPT | Performed by: STUDENT IN AN ORGANIZED HEALTH CARE EDUCATION/TRAINING PROGRAM

## 2022-05-14 PROCEDURE — 83735 ASSAY OF MAGNESIUM: CPT | Performed by: STUDENT IN AN ORGANIZED HEALTH CARE EDUCATION/TRAINING PROGRAM

## 2022-05-14 PROCEDURE — 94761 N-INVAS EAR/PLS OXIMETRY MLT: CPT

## 2022-05-14 PROCEDURE — 96376 TX/PRO/DX INJ SAME DRUG ADON: CPT | Mod: 59

## 2022-05-14 PROCEDURE — 25000003 PHARM REV CODE 250: Performed by: STUDENT IN AN ORGANIZED HEALTH CARE EDUCATION/TRAINING PROGRAM

## 2022-05-14 PROCEDURE — 80061 LIPID PANEL: CPT | Performed by: STUDENT IN AN ORGANIZED HEALTH CARE EDUCATION/TRAINING PROGRAM

## 2022-05-14 PROCEDURE — 84100 ASSAY OF PHOSPHORUS: CPT | Performed by: STUDENT IN AN ORGANIZED HEALTH CARE EDUCATION/TRAINING PROGRAM

## 2022-05-14 PROCEDURE — 80053 COMPREHEN METABOLIC PANEL: CPT | Performed by: STUDENT IN AN ORGANIZED HEALTH CARE EDUCATION/TRAINING PROGRAM

## 2022-05-14 RX ORDER — IBUPROFEN 200 MG
16 TABLET ORAL
Status: DISCONTINUED | OUTPATIENT
Start: 2022-05-14 | End: 2022-05-14 | Stop reason: SDUPTHER

## 2022-05-14 RX ORDER — LANOLIN ALCOHOL/MO/W.PET/CERES
1 CREAM (GRAM) TOPICAL DAILY
Refills: 3 | Status: DISCONTINUED | OUTPATIENT
Start: 2022-05-14 | End: 2022-05-15 | Stop reason: HOSPADM

## 2022-05-14 RX ORDER — ASCORBIC ACID 500 MG
500 TABLET ORAL DAILY
Status: DISCONTINUED | OUTPATIENT
Start: 2022-05-14 | End: 2022-05-15 | Stop reason: HOSPADM

## 2022-05-14 RX ORDER — ATORVASTATIN CALCIUM 10 MG/1
10 TABLET, FILM COATED ORAL EVERY OTHER DAY
Status: DISCONTINUED | OUTPATIENT
Start: 2022-05-14 | End: 2022-05-15 | Stop reason: HOSPADM

## 2022-05-14 RX ORDER — CHOLECALCIFEROL (VITAMIN D3) 25 MCG
1000 TABLET ORAL DAILY
Status: DISCONTINUED | OUTPATIENT
Start: 2022-05-14 | End: 2022-05-15 | Stop reason: HOSPADM

## 2022-05-14 RX ORDER — IBUPROFEN 200 MG
16 TABLET ORAL
Status: DISCONTINUED | OUTPATIENT
Start: 2022-05-14 | End: 2022-05-14

## 2022-05-14 RX ORDER — GLUCAGON 1 MG
1 KIT INJECTION
Status: DISCONTINUED | OUTPATIENT
Start: 2022-05-14 | End: 2022-05-14 | Stop reason: SDUPTHER

## 2022-05-14 RX ORDER — FUROSEMIDE 10 MG/ML
40 INJECTION INTRAMUSCULAR; INTRAVENOUS 2 TIMES DAILY
Status: DISCONTINUED | OUTPATIENT
Start: 2022-05-14 | End: 2022-05-15

## 2022-05-14 RX ORDER — GLUCAGON 1 MG
1 KIT INJECTION
Status: DISCONTINUED | OUTPATIENT
Start: 2022-05-14 | End: 2022-05-15 | Stop reason: HOSPADM

## 2022-05-14 RX ORDER — SODIUM CHLORIDE 0.9 % (FLUSH) 0.9 %
10 SYRINGE (ML) INJECTION
Status: DISCONTINUED | OUTPATIENT
Start: 2022-05-14 | End: 2022-05-15 | Stop reason: HOSPADM

## 2022-05-14 RX ORDER — BUSPIRONE HYDROCHLORIDE 5 MG/1
5 TABLET ORAL 2 TIMES DAILY
Status: DISCONTINUED | OUTPATIENT
Start: 2022-05-14 | End: 2022-05-15 | Stop reason: HOSPADM

## 2022-05-14 RX ORDER — IBUPROFEN 200 MG
24 TABLET ORAL
Status: DISCONTINUED | OUTPATIENT
Start: 2022-05-14 | End: 2022-05-14 | Stop reason: SDUPTHER

## 2022-05-14 RX ORDER — ENOXAPARIN SODIUM 100 MG/ML
40 INJECTION SUBCUTANEOUS EVERY 24 HOURS
Status: DISCONTINUED | OUTPATIENT
Start: 2022-05-14 | End: 2022-05-15 | Stop reason: HOSPADM

## 2022-05-14 RX ORDER — IBUPROFEN 200 MG
24 TABLET ORAL
Status: DISCONTINUED | OUTPATIENT
Start: 2022-05-14 | End: 2022-05-14

## 2022-05-14 RX ORDER — SODIUM CHLORIDE 0.9 % (FLUSH) 0.9 %
10 SYRINGE (ML) INJECTION
Status: DISCONTINUED | OUTPATIENT
Start: 2022-05-14 | End: 2022-05-14

## 2022-05-14 RX ORDER — ACETAMINOPHEN 325 MG/1
650 TABLET ORAL EVERY 6 HOURS PRN
Status: DISCONTINUED | OUTPATIENT
Start: 2022-05-14 | End: 2022-05-15 | Stop reason: HOSPADM

## 2022-05-14 RX ORDER — CALCIUM CARBONATE 200(500)MG
500 TABLET,CHEWABLE ORAL 2 TIMES DAILY PRN
Status: DISCONTINUED | OUTPATIENT
Start: 2022-05-14 | End: 2022-05-15 | Stop reason: HOSPADM

## 2022-05-14 RX ORDER — INSULIN ASPART 100 [IU]/ML
1-10 INJECTION, SOLUTION INTRAVENOUS; SUBCUTANEOUS
Status: DISCONTINUED | OUTPATIENT
Start: 2022-05-14 | End: 2022-05-14

## 2022-05-14 RX ORDER — SIMETHICONE 125 MG
125 TABLET,CHEWABLE ORAL EVERY 6 HOURS PRN
Status: DISCONTINUED | OUTPATIENT
Start: 2022-05-14 | End: 2022-05-15 | Stop reason: HOSPADM

## 2022-05-14 RX ORDER — IBUPROFEN 200 MG
16 TABLET ORAL
Status: DISCONTINUED | OUTPATIENT
Start: 2022-05-14 | End: 2022-05-15 | Stop reason: HOSPADM

## 2022-05-14 RX ORDER — GLUCAGON 1 MG
1 KIT INJECTION
Status: DISCONTINUED | OUTPATIENT
Start: 2022-05-14 | End: 2022-05-14

## 2022-05-14 RX ORDER — ESCITALOPRAM OXALATE 10 MG/1
10 TABLET ORAL DAILY
Status: DISCONTINUED | OUTPATIENT
Start: 2022-05-14 | End: 2022-05-15 | Stop reason: HOSPADM

## 2022-05-14 RX ORDER — TALC
6 POWDER (GRAM) TOPICAL NIGHTLY PRN
Status: DISCONTINUED | OUTPATIENT
Start: 2022-05-14 | End: 2022-05-15 | Stop reason: HOSPADM

## 2022-05-14 RX ORDER — TAMSULOSIN HYDROCHLORIDE 0.4 MG/1
1 CAPSULE ORAL DAILY
Status: DISCONTINUED | OUTPATIENT
Start: 2022-05-14 | End: 2022-05-15 | Stop reason: HOSPADM

## 2022-05-14 RX ORDER — IBUPROFEN 200 MG
24 TABLET ORAL
Status: DISCONTINUED | OUTPATIENT
Start: 2022-05-14 | End: 2022-05-15 | Stop reason: HOSPADM

## 2022-05-14 RX ORDER — INSULIN ASPART 100 [IU]/ML
1-10 INJECTION, SOLUTION INTRAVENOUS; SUBCUTANEOUS
Status: DISCONTINUED | OUTPATIENT
Start: 2022-05-14 | End: 2022-05-15 | Stop reason: HOSPADM

## 2022-05-14 RX ADMIN — ENOXAPARIN SODIUM 40 MG: 100 INJECTION SUBCUTANEOUS at 04:05

## 2022-05-14 RX ADMIN — FERROUS SULFATE TAB 325 MG (65 MG ELEMENTAL FE) 1 EACH: 325 (65 FE) TAB at 10:05

## 2022-05-14 RX ADMIN — FUROSEMIDE 40 MG: 10 INJECTION, SOLUTION INTRAMUSCULAR; INTRAVENOUS at 09:05

## 2022-05-14 RX ADMIN — TAMSULOSIN HYDROCHLORIDE 0.4 MG: 0.4 CAPSULE ORAL at 10:05

## 2022-05-14 RX ADMIN — SACUBITRIL AND VALSARTAN 1 TABLET: 24; 26 TABLET, FILM COATED ORAL at 10:05

## 2022-05-14 RX ADMIN — INSULIN ASPART 6 UNITS: 100 INJECTION, SOLUTION INTRAVENOUS; SUBCUTANEOUS at 12:05

## 2022-05-14 RX ADMIN — ESCITALOPRAM OXALATE 10 MG: 10 TABLET ORAL at 10:05

## 2022-05-14 RX ADMIN — INSULIN ASPART 4 UNITS: 100 INJECTION, SOLUTION INTRAVENOUS; SUBCUTANEOUS at 09:05

## 2022-05-14 RX ADMIN — Medication 6 MG: at 02:05

## 2022-05-14 RX ADMIN — FUROSEMIDE 40 MG: 10 INJECTION, SOLUTION INTRAMUSCULAR; INTRAVENOUS at 10:05

## 2022-05-14 RX ADMIN — BUSPIRONE HYDROCHLORIDE 5 MG: 5 TABLET ORAL at 09:05

## 2022-05-14 RX ADMIN — BUSPIRONE HYDROCHLORIDE 5 MG: 5 TABLET ORAL at 10:05

## 2022-05-14 RX ADMIN — ATORVASTATIN CALCIUM 10 MG: 10 TABLET, FILM COATED ORAL at 10:05

## 2022-05-14 RX ADMIN — ACETAMINOPHEN 650 MG: 325 TABLET ORAL at 10:05

## 2022-05-14 RX ADMIN — CHOLECALCIFEROL TAB 25 MCG (1000 UNIT) 1000 UNITS: 25 TAB at 10:05

## 2022-05-14 RX ADMIN — OXYCODONE HYDROCHLORIDE AND ACETAMINOPHEN 500 MG: 500 TABLET ORAL at 10:05

## 2022-05-14 RX ADMIN — SACUBITRIL AND VALSARTAN 1 TABLET: 24; 26 TABLET, FILM COATED ORAL at 09:05

## 2022-05-14 NOTE — NURSING
Pt admitted to room 481 from [facility] ED. Admission assessment complete. Pt is alert and oriented x 4. Complains of lower back pain. Tele on. VS on graphic. BG checked. MD aware of pt on floor. Bed in lowest position, locked, and side rails up x 3. Bed alarm on. Call light in reach.

## 2022-05-14 NOTE — H&P
[facility] Medicine H&P Note     Admitting Team: \[facility]\"" Hospitalist Team B  Attending Physician: Pito Mancilla MD  Resident: Jamshid   Intern:     Date of Admit: 5/13/2022    Chief Complaint     Shortness of breath x 2 weeks     Subjective:      History of Present Illness:  Katherine Berger is a 72 y.o. female with pmhx of DM2, HTN, CHF (diagnosed per pts cardiologist - no echo data available), iron def anemia, and anxiety who   presented to [facility] on 5/13/2022 with a primary complaint of SOB x 2 weeks.     Patient has experienced shortness of breath with exertion that started 2 weeks ago. It has gradually gotten worse, limiting her activity around the house. She is usually able to walk half a mile before becoming short of breath. She endorses associated lower extremity swelling that has gradually gotten worse, she has missed multiple days of her home lasix regimen. She denies orthopnea (using 2 pillows at night which is unchanged for years) and PND. She lives at home alone, ambulates unassisted and is able to complete all her ADLs. Patient follows with Dr. Arceo who recently told her she has heart failure (limited records on chart review). On ROS, patient denies chest pain, palpitations, cough, wheeze, abdominal pain, n/v, constipation, diarrhea, acute blood loss, headaches, visual changes, fevers, chills. Of note, patient recently treated for UTI two weeks ago and completed course of antibiotics.     Past Medical History:  CHF  DM2  HTN  Iron def anemia   Anxiety     Past Surgical History:  Past Surgical History:   Procedure Laterality Date    COLONOSCOPY  05/23/2013    dr ram - 5 years    FOOT SURGERY Right 2010    bone spur    FOOT SURGERY Left 2010    fx ankle    HYSTERECTOMY      age 47    KNEE SURGERY Left 06/10/2016    TKR    OOPHORECTOMY      TONSILLECTOMY         Allergies:  Review of patient's allergies indicates:  No Known Allergies    Home Medications:  Prior to  Admission medications    Medication Sig Start Date End Date Taking? Authorizing Provider   amitriptyline (ELAVIL) 10 MG tablet TAKE ONE TO THREE TABLETS BY MOUTH AT BEDTIME FOR  HEADACHE 6/15/20  Yes Itz Muse MD   ascorbic acid, vitamin C, (VITAMIN C) 500 MG tablet Take 500 mg by mouth once daily.   Yes Historical Provider   atorvastatin (LIPITOR) 10 MG tablet Take 1 tablet (10 mg total) by mouth every other day. 8/19/21 8/19/22 Yes Itz Muse MD   busPIRone (BUSPAR) 5 MG Tab Take 1 tablet (5 mg total) by mouth 2 (two) times daily. For anxiety 8/19/21 8/19/22 Yes Itz Muse MD   diclofenac sodium (VOLTAREN) 1 % Gel Apply 2 g topically 3 (three) times daily. 10/26/20  Yes Arpit Arita Jr., MD   ENTRESTO 24-26 mg per tablet Take 1 tablet by mouth 2 (two) times daily. 1/23/22  Yes Arnol Dunbar MD   EScitalopram oxalate (LEXAPRO) 10 MG tablet Take 1 tablet (10 mg total) by mouth once daily. 8/19/21  Yes Itz Muse MD   ferrous sulfate 324 mg (65 mg iron) TbEC Take 1 tablet (324 mg total) by mouth daily as needed. 4/25/22  Yes Itz Muse MD   furosemide (LASIX) 20 MG tablet One tablet po daily for swelling/fluid as directed by md 3 to 7 time a week 4/25/22  Yes Itz Muse MD   gabapentin (NEURONTIN) 100 MG capsule Takes on in am and two in pm for back pain radiating down leg. 6/15/20  Yes Itz Muse MD   glimepiride (AMARYL) 2 MG tablet Take 2 mg by mouth 2 (two) times daily. 11/4/20  Yes Historical Provider   hyoscyamine (ANASPAZ,LEVSIN) 0.125 mg Tab Take 1 tablet (125 mcg total) by mouth every 6 (six) hours as needed (P.r.n. bladder spasm). 5/3/22 6/2/22 Yes Itz Muse MD   meloxicam (MOBIC) 15 MG tablet Take 1 tablet (15 mg total) by mouth daily as needed. 8/19/21  Yes Itz Muse MD   metFORMIN (GLUCOPHAGE) 1000 MG tablet Take 1,000 mg by mouth 2 (two) times daily. 4/20/22  Yes Historical Provider  "  MULTIVIT/IRON/FA/K/HERB NO.244 (ALIVE WOMEN'S ENERGY ORAL) Take 1 tablet by mouth daily as needed.    Yes Historical Provider   ondansetron (ZOFRAN) 4 MG tablet Take 1 tablet (4 mg total) by mouth every 6 (six) hours. 22  Yes Arnol Dunbar MD   oxyCODONE-acetaminophen (PERCOCET)  mg per tablet Take 1 tablet by mouth 2 (two) times daily as needed. 18  Yes Historical Provider   tamsulosin (FLOMAX) 0.4 mg Cap Take 1 capsule (0.4 mg total) by mouth once daily. 22  Yes Itz Muse MD   vitamin D (VITAMIN D3) 1000 units Tab Take 1,000 Units by mouth once daily.   Yes Historical Provider   diabetic supplies, miscellan. Kit Use for testing 17   Itz Muse MD       Family History:  Family History   Problem Relation Age of Onset    Heart attack Mother     Seizures Mother         fell in bathtub and drowned    Colon cancer Sister     Breast cancer Sister        Social History:  Social History     Tobacco Use    Smoking status: Never Smoker    Smokeless tobacco: Never Used   Substance Use Topics    Alcohol use: No     Alcohol/week: 0.0 standard drinks    Drug use: No       Review of Systems:   All other systems are reviewed and are negative.    Health Maintaince :   Primary Care Physician: Itz Muse MD    Immunizations:   TDap unknown   Flu UTD  Pna UTD  COVID vaccine x 2     Cancer Screening:  MMG: UTD  Colonoscopy: UTD     Objective:   Last 24 Hour Vital Signs:  BP  Min: 118/68  Max: 159/85  Temp  Av.4 °F (36.9 °C)  Min: 98.4 °F (36.9 °C)  Max: 98.4 °F (36.9 °C)  Pulse  Av.3  Min: 83  Max: 104  Resp  Av.7  Min: 18  Max: 22  SpO2  Av %  Min: 94 %  Max: 100 %  Height  Av' 7" (170.2 cm)  Min: 5' 7" (170.2 cm)  Max: 5' 7" (170.2 cm)  Weight  Av.8 kg (217 lb 13.6 oz)  Min: 98.4 kg (217 lb)  Max: 99.2 kg (218 lb 11.1 oz)  Body mass index is 34.25 kg/m².  No intake/output data recorded.    Physical Examination:  General Appearance: " alert and oriented, no acute distress.  Head: Normocephalic, atraumatic   Eyes: conjunctivae/corneas clear and non-icteric. PERRL, EOMI  Mouth: lips, tongue and mucosa normal, oropharynx clear  Neck: supple, trachea midline, thyroid not enlarged, no LAD  Lungs: CTAB; no crackles or wheezes, no increased work of breathing  Heart: regular rate and rhythm, S1, S2 normal, no murmurs  Abdomen: soft, non-distended, non-tender; bowel sounds normal  Extremities: extremities normal, atraumatic, no cyanosis. 2+ BLE pitting edema   Pulses: 2+ and symmetric   Skin: No obvious ecchymosis, erythema, or rash  Neurologic: AAOx3, no focal neurological deficits noted.    Laboratory:  Most Recent Data:  CBC:   Lab Results   Component Value Date    WBC 9.43 05/13/2022    HGB 10.9 (L) 05/13/2022    HCT 35.4 (L) 05/13/2022     05/13/2022    MCV 71 (L) 05/13/2022    RDW 14.6 (H) 05/13/2022     BMP:   Lab Results   Component Value Date     01/23/2022    K 4.9 01/23/2022     01/23/2022    CO2 21 (L) 01/23/2022    BUN 19 01/23/2022    CREATININE 1.0 01/23/2022     (H) 01/23/2022    CALCIUM 9.0 01/23/2022    MG 1.8 06/12/2016    PHOS 2.1 (L) 06/12/2016     LFTs:   Lab Results   Component Value Date    PROT 6.9 01/23/2022    ALBUMIN 3.6 01/23/2022    BILITOT 0.5 01/23/2022    AST 16 01/23/2022    ALKPHOS 63 01/23/2022    ALT 20 01/23/2022     Coags:   Lab Results   Component Value Date    INR 1.1 05/13/2022     FLP:   Lab Results   Component Value Date    CHOL 145 04/01/2019    HDL 42 04/01/2019    LDLCALC 88.6 04/01/2019    TRIG 72 04/01/2019    CHOLHDL 29.0 04/01/2019     DM:   Lab Results   Component Value Date    HGBA1C 8.1 (A) 01/14/2022    HGBA1C 10.5 (A) 08/26/2021    HGBA1C 6.7 (H) 06/19/2020    LDLCALC 88.6 04/01/2019    CREATININE 1.0 01/23/2022     Thyroid:   Lab Results   Component Value Date    TSH 0.427 04/01/2019    FREET4 1.13 11/10/2015     Anemia:   Lab Results   Component Value Date    IRON 89  07/02/2019    TIBC 358 07/02/2019    FERRITIN 121 07/02/2019    HLGENTOM26 >2000 (H) 03/05/2018     Cardiac:   Lab Results   Component Value Date    TROPONINI 0.046 (H) 05/13/2022     (H) 01/29/2022     Urinalysis:   Lab Results   Component Value Date    LABURIN No significant growth 04/25/2022    COLORU Yellow 01/23/2022    SPECGRAV >1.030 (A) 01/23/2022    NITRITE Negative 01/23/2022    KETONESU 1+ (A) 01/23/2022    UROBILINOGEN Negative 01/23/2022    WBCUA 0 01/23/2022       Trended Lab Data:  Recent Labs   Lab 05/13/22  1657   WBC 9.43   HGB 10.9*   HCT 35.4*      MCV 71*   RDW 14.6*       Trended Cardiac Data:  Recent Labs   Lab 05/13/22  1657   TROPONINI 0.046*       Microbiology Data:  None    Other Results:  EKG (my interpretation): NSR, LAD, low voltage     Radiology:  Imaging Results          X-Ray Chest PA And Lateral (Final result)  Result time 05/13/22 17:21:53    Final result by Jazmine Mayorga MD (05/13/22 17:21:53)                 Impression:      As above      Electronically signed by: Mukesh Lucero  Date:    05/13/2022  Time:    17:21             Narrative:    EXAMINATION:  XR CHEST PA AND LATERAL    CLINICAL HISTORY:  Chest Pain;    TECHNIQUE:  PA and lateral views of the chest were performed.    COMPARISON:  None    FINDINGS:  Cardiomegaly.  Retrocardiac atelectasis.  Mild opacity at the right cardio phrenic angle may relate to right middle lobe atelectasis.  Early pneumonia not excluded.  Mild perihilar haziness may relate to stable CHF.  Recommend clinical correlation and follow-up                                 Assessment:   72 y.o. female with pmhx of DM2, HTN, CHF, iron def anemia, and anxiety who presented to [facility] on 5/13/2022 with MACIAS and BLE edema x 2 weeks. Missed multiple days of lasix dose. Patient follows with Dr. Arceo who recently told her she has heart failure (limited records on chart review). In ED, HDS, sating 96% on RA. EKG NSR, proBNP  3210, Trop 0.046, CXR with perihilar opacities. Patient admitted to [facility] for acute on chronic CHF exacerbation.       Plan:     Acute on chronic CHF exacerbation   - Presenting with MACIAS and LE edema x 2 weeks after missing multiple lasix doses   - Patient follow with Cardiologist Dr. Arceo   - HDS, sating 96% on RA  - CXR with mild blunting of right costophrenic angle, perihilar haziness   - No TTE available per chart review   - Received lasix IV 40 mg in ED   - Daily weights, strict I/Os  - TTE ordered   - IV diuresis     HTN  - Normotensive on admit   - Continue home Entreso    HLD  - Lipid panel 2019 wnl, repeat ordered   - Continue home Atorvastatin 10 mg     DM2 - non insulin dependent   - POC   - A1c 8.1 01/2022, repeat ordered   - Home regimen Glimepiride 2 mg and Metformin 1000 mg BID  - Moderate dose SSI  - Consider basal insulin given uncontrolled DM    Iron def anemia   - H/H 10.9/35.4, MCV 71   - Baseline Hb~ 9-11  - Continue home iron supp     Anxiety/depression   - No active SI/HI  - Continue home Buspirone and Lexapro     DVT ppx: Lovenox   Diet: Diabetic   Dispo: IV diuresis, TTE     Code Status:     Full    Bailey Wang MD  \[facility]\"" Internal Medicine HO-1    \[facility]\"" Medicine Hospitalist Pager numbers:   \[facility]\"" Hospitalist Medicine Team A (Chris/Roel): [phone]  \[facility]\"" Hospitalist Medicine Team B (Laurent/Pinky):  [phone]

## 2022-05-14 NOTE — ED NOTES
APPEARANCE: Pt clean and well groomed with clothes appropriately fastened. No distress is noted.    NEURO: Pt is awake and alert x3, Pt follows commands and affect is appropriate. Pt is moving all extremities well and sensation is intact. Speech is clear.    RESPIRATORY: Respirations are even and unlabored on room air. No accessory muscle use noted. Normal rate and effort is noted. Pt placed on continuous pulse ox with O2 sats noted at  97% on room air    CARDIAC: Pt placed on cardiac monitor. Sinus rhythm noted. Rate is normal. No abnormal heart sounds noted. Radial and dorsalis pedis pulses are palpable and +2. No edema noted. Cap refill is < 3    GASTRO: Pt denies abdominal pain/tenderness. States bowel movements have been regular. Bowel sounds are present and normal.     : Pt denies any pain or frequency with urination.    SKIN: Skin is warm, dry and intact. Skin color is appropriate for ethnicity. Normal skin turgor noted. Mucous membranes are moist.     MUSCULOSKELETAL: No abnormalities are noted.    Pt presents to ER with c/o sob, nonproductive cough, and chest pain when she coughs. Sob worse with exertion. Hx of chf. Denies any fever.

## 2022-05-14 NOTE — PHARMACY MED REC
"  Admission Medication History     The home medication history was taken by Crissy Pantoja CPhT.    Medication history obtained from, Patient Verified    You may go to "Admission" then "Reconcile Home Medications" tabs to review and/or act upon these items.      The home medication list has been updated by the Pharmacy department.    Please read ALL comments highlighted in yellow.    Please address this information as you see fit.     Feel free to contact us if you have any questions or require assistance.      The medications listed below were removed from the home medication list.  Please reorder if appropriate:  Patient reports no longer taking the following medication(s):   Keflex 500 mg   Colace 100 mg        Crissy Pantoja CPhT.  Ext [phone]              .          "

## 2022-05-14 NOTE — PROGRESS NOTES
05/14/22 0118   Admission   Initial VN Admission Questions Complete   Communication Issues? None   Shift   Virtual Nurse - Patient Verbalized Approval Of Camera Use;VN Rounding   Safety/Activity   Patient Rounds visualized patient   Safety Promotion/Fall Prevention instructed to call staff for mobility;Fall Risk reviewed with patient/family;side rails raised x 2   VIRTUAL NURSE: Pt arrived to unit from [facility]. Admission questions completed with patient at this time. Safety precautions reviewed. Pt verbalized no complaints, no needs expressed. Instructed to use call light for assistance, she verbalized understanding. Bedside nurse to notify team of patients arrival to unit.

## 2022-05-14 NOTE — ED PROVIDER NOTES
Encounter Date: 5/13/2022       History     Chief Complaint   Patient presents with    Shortness of Breath     Sob and general weakness x 2 weeks.  Was treated for a UTI 2 weeks ago and patient finished her medication.      HPI: Katherine Berger, a 72 y.o. female  has a past medical history of Arthritis, Diabetes mellitus, Hypertension, and Kidney stone.     She presents to the ED for evaluation of SOB and general weakness x 2 weeks.  States that she has missed some doses of her lasix this week.  Attests to minor leg swelling.  Denies CP.  No treatments tried.  Denies dietary changes.        The history is provided by the patient.     Review of patient's allergies indicates:  No Known Allergies  Past Medical History:   Diagnosis Date    Arthritis     Diabetes mellitus     Hypertension     Kidney stone      Past Surgical History:   Procedure Laterality Date    COLONOSCOPY  05/23/2013    dr ram - 5 years    FOOT SURGERY Right 2010    bone spur    FOOT SURGERY Left 2010    fx ankle    HYSTERECTOMY      age 47    KNEE SURGERY Left 06/10/2016    TKR    OOPHORECTOMY      TONSILLECTOMY       Family History   Problem Relation Age of Onset    Heart attack Mother     Seizures Mother         fell in bathtub and drowned    Colon cancer Sister     Breast cancer Sister      Social History     Tobacco Use    Smoking status: Never Smoker    Smokeless tobacco: Never Used   Substance Use Topics    Alcohol use: No     Alcohol/week: 0.0 standard drinks    Drug use: No     Review of Systems   Constitutional: Negative for fever.   HENT: Negative for congestion.    Respiratory: Positive for shortness of breath.    Cardiovascular: Positive for leg swelling.   Gastrointestinal: Negative for nausea and vomiting.   Allergic/Immunologic: Negative for immunocompromised state.   Neurological: Negative for dizziness.   Psychiatric/Behavioral: Negative for agitation.   All other systems reviewed and are  negative.      Physical Exam     Initial Vitals   BP Pulse Resp Temp SpO2   05/13/22 1624 05/13/22 1624 05/13/22 1627 05/13/22 1624 05/13/22 1624   134/71 88 20 98.4 °F (36.9 °C) 100 %      MAP       --                Physical Exam    Nursing note and vitals reviewed.  Constitutional: She appears well-developed and well-nourished. She is not diaphoretic. No distress.   HENT:   Head: Normocephalic and atraumatic.   Right Ear: External ear normal.   Left Ear: External ear normal.   Nose: Nose normal.   Eyes: Conjunctivae and EOM are normal.   Neck:   Normal range of motion.  Cardiovascular: Normal rate and regular rhythm.   Pulmonary/Chest: Breath sounds normal. No respiratory distress. She has no wheezes. She has no rhonchi. She has no rales.   Abdominal: Abdomen is soft. Bowel sounds are normal. She exhibits no distension. There is no abdominal tenderness. There is no rebound and no guarding.   Musculoskeletal:         General: Edema (1+ bilaterally ) present. Normal range of motion.      Cervical back: Normal range of motion.     Neurological: She is alert and oriented to person, place, and time.   Skin: Skin is warm and dry. Capillary refill takes less than 2 seconds. No rash noted. No erythema.   Psychiatric: She has a normal mood and affect. Thought content normal.         ED Course   Procedures  Labs Reviewed   CBC W/ AUTO DIFFERENTIAL - Abnormal; Notable for the following components:       Result Value    Hemoglobin 10.9 (*)     Hematocrit 35.4 (*)     MCV 71 (*)     MCH 22.0 (*)     MCHC 30.8 (*)     RDW 14.6 (*)     Gran % 74.9 (*)     Lymph % 15.9 (*)     All other components within normal limits   TROPONIN I - Abnormal; Notable for the following components:    Troponin I 0.046 (*)     All other components within normal limits   NT-PRO NATRIURETIC PEPTIDE - Abnormal; Notable for the following components:    NT-proBNP 3210 (*)     All other components within normal limits   PROTIME-INR   SARS-COV-2 RNA  AMPLIFICATION, QUAL    Narrative:     Is the patient symptomatic?->Yes   NT-PRO NATRIURETIC PEPTIDE   URINALYSIS, REFLEX TO URINE CULTURE          Imaging Results          X-Ray Chest PA And Lateral (Final result)  Result time 05/13/22 17:21:53    Final result by Jazmine Mayorga MD (05/13/22 17:21:53)                 Impression:      As above      Electronically signed by: Mukesh Lucero  Date:    05/13/2022  Time:    17:21             Narrative:    EXAMINATION:  XR CHEST PA AND LATERAL    CLINICAL HISTORY:  Chest Pain;    TECHNIQUE:  PA and lateral views of the chest were performed.    COMPARISON:  None    FINDINGS:  Cardiomegaly.  Retrocardiac atelectasis.  Mild opacity at the right cardio phrenic angle may relate to right middle lobe atelectasis.  Early pneumonia not excluded.  Mild perihilar haziness may relate to stable CHF.  Recommend clinical correlation and follow-up                                 Medications   albuterol-ipratropium 2.5 mg-0.5 mg/3 mL nebulizer solution 3 mL (3 mLs Nebulization Given 5/13/22 1753)   furosemide injection 40 mg (40 mg Intravenous Given 5/13/22 2032)     Medical Decision Making:   Initial Assessment:   SOB, fatigue   Differential Diagnosis:   ACS, CHF exacerbation, electrolyte abnormality   Clinical Tests:   Lab Tests: Ordered and Reviewed  The following lab test(s) were unremarkable: CBC, CMP, BNP and Urinalysis  Radiological Study: Ordered and Reviewed  ED Management:  Pt presents to ED for evaluation of SOB x 2 weeks.  Labs concerning for elevated BNP and trop. No other concerning abnormalities.  Pt given 40mg of lasix.  She will be transferred to [facility] for further evaluation and treatment.                        Clinical Impression:   Final diagnoses:  [R06.02] Shortness of breath  [I50.9] Acute on chronic congestive heart failure, unspecified heart failure type (Primary)  [R77.8] Elevated troponin          ED Disposition Condition    Observation                Cassandra Weir PA-C  05/13/22 2050       Cassandra Weir PA-C  05/13/22 2051

## 2022-05-15 VITALS
RESPIRATION RATE: 18 BRPM | BODY MASS INDEX: 34.33 KG/M2 | HEIGHT: 67 IN | SYSTOLIC BLOOD PRESSURE: 118 MMHG | WEIGHT: 218.69 LBS | DIASTOLIC BLOOD PRESSURE: 71 MMHG | HEART RATE: 78 BPM | OXYGEN SATURATION: 93 % | TEMPERATURE: 97 F

## 2022-05-15 PROBLEM — I50.9 ACUTE ON CHRONIC CONGESTIVE HEART FAILURE: Status: RESOLVED | Noted: 2022-05-14 | Resolved: 2022-05-15

## 2022-05-15 LAB
ANION GAP SERPL CALC-SCNC: 16 MMOL/L (ref 8–16)
BASOPHILS # BLD AUTO: 0.04 K/UL (ref 0–0.2)
BASOPHILS NFR BLD: 0.4 % (ref 0–1.9)
BUN SERPL-MCNC: 12 MG/DL (ref 8–23)
CALCIUM SERPL-MCNC: 9.4 MG/DL (ref 8.7–10.5)
CHLORIDE SERPL-SCNC: 99 MMOL/L (ref 95–110)
CO2 SERPL-SCNC: 27 MMOL/L (ref 23–29)
CREAT SERPL-MCNC: 0.9 MG/DL (ref 0.5–1.4)
DIFFERENTIAL METHOD: ABNORMAL
EOSINOPHIL # BLD AUTO: 0.2 K/UL (ref 0–0.5)
EOSINOPHIL NFR BLD: 2.4 % (ref 0–8)
ERYTHROCYTE [DISTWIDTH] IN BLOOD BY AUTOMATED COUNT: 14.5 % (ref 11.5–14.5)
EST. GFR  (AFRICAN AMERICAN): >60 ML/MIN/1.73 M^2
EST. GFR  (NON AFRICAN AMERICAN): >60 ML/MIN/1.73 M^2
GLUCOSE SERPL-MCNC: 311 MG/DL (ref 70–110)
HCT VFR BLD AUTO: 36.7 % (ref 37–48.5)
HGB BLD-MCNC: 11.5 G/DL (ref 12–16)
IMM GRANULOCYTES # BLD AUTO: 0.02 K/UL (ref 0–0.04)
IMM GRANULOCYTES NFR BLD AUTO: 0.2 % (ref 0–0.5)
LYMPHOCYTES # BLD AUTO: 1.8 K/UL (ref 1–4.8)
LYMPHOCYTES NFR BLD: 19.3 % (ref 18–48)
MCH RBC QN AUTO: 22.3 PG (ref 27–31)
MCHC RBC AUTO-ENTMCNC: 31.3 G/DL (ref 32–36)
MCV RBC AUTO: 71 FL (ref 82–98)
MONOCYTES # BLD AUTO: 0.7 K/UL (ref 0.3–1)
MONOCYTES NFR BLD: 7.8 % (ref 4–15)
NEUTROPHILS # BLD AUTO: 6.6 K/UL (ref 1.8–7.7)
NEUTROPHILS NFR BLD: 69.9 % (ref 38–73)
NRBC BLD-RTO: 0 /100 WBC
PLATELET # BLD AUTO: 228 K/UL (ref 150–450)
PMV BLD AUTO: 11.4 FL (ref 9.2–12.9)
POCT GLUCOSE: 231 MG/DL (ref 70–110)
POCT GLUCOSE: 337 MG/DL (ref 70–110)
POCT GLUCOSE: 381 MG/DL (ref 70–110)
POCT GLUCOSE: 413 MG/DL (ref 70–110)
POTASSIUM SERPL-SCNC: 3.8 MMOL/L (ref 3.5–5.1)
RBC # BLD AUTO: 5.16 M/UL (ref 4–5.4)
SODIUM SERPL-SCNC: 142 MMOL/L (ref 136–145)
WBC # BLD AUTO: 9.45 K/UL (ref 3.9–12.7)

## 2022-05-15 PROCEDURE — 80048 BASIC METABOLIC PNL TOTAL CA: CPT | Performed by: STUDENT IN AN ORGANIZED HEALTH CARE EDUCATION/TRAINING PROGRAM

## 2022-05-15 PROCEDURE — 96372 THER/PROPH/DIAG INJ SC/IM: CPT | Performed by: STUDENT IN AN ORGANIZED HEALTH CARE EDUCATION/TRAINING PROGRAM

## 2022-05-15 PROCEDURE — 85025 COMPLETE CBC W/AUTO DIFF WBC: CPT | Performed by: STUDENT IN AN ORGANIZED HEALTH CARE EDUCATION/TRAINING PROGRAM

## 2022-05-15 PROCEDURE — G0378 HOSPITAL OBSERVATION PER HR: HCPCS

## 2022-05-15 PROCEDURE — 25000003 PHARM REV CODE 250: Performed by: STUDENT IN AN ORGANIZED HEALTH CARE EDUCATION/TRAINING PROGRAM

## 2022-05-15 PROCEDURE — 96376 TX/PRO/DX INJ SAME DRUG ADON: CPT

## 2022-05-15 PROCEDURE — 36415 COLL VENOUS BLD VENIPUNCTURE: CPT | Performed by: STUDENT IN AN ORGANIZED HEALTH CARE EDUCATION/TRAINING PROGRAM

## 2022-05-15 PROCEDURE — 63600175 PHARM REV CODE 636 W HCPCS: Performed by: STUDENT IN AN ORGANIZED HEALTH CARE EDUCATION/TRAINING PROGRAM

## 2022-05-15 PROCEDURE — 94761 N-INVAS EAR/PLS OXIMETRY MLT: CPT

## 2022-05-15 PROCEDURE — C9399 UNCLASSIFIED DRUGS OR BIOLOG: HCPCS | Performed by: STUDENT IN AN ORGANIZED HEALTH CARE EDUCATION/TRAINING PROGRAM

## 2022-05-15 RX ORDER — FUROSEMIDE 40 MG/1
40 TABLET ORAL DAILY
Status: DISCONTINUED | OUTPATIENT
Start: 2022-05-15 | End: 2022-05-15 | Stop reason: HOSPADM

## 2022-05-15 RX ORDER — METOPROLOL SUCCINATE 25 MG/1
12.5 TABLET, EXTENDED RELEASE ORAL DAILY
Qty: 45 TABLET | Refills: 3 | Status: SHIPPED | OUTPATIENT
Start: 2022-05-16 | End: 2022-09-14 | Stop reason: SDUPTHER

## 2022-05-15 RX ORDER — FUROSEMIDE 40 MG/1
40 TABLET ORAL DAILY
Qty: 90 TABLET | Refills: 3 | Status: SHIPPED | OUTPATIENT
Start: 2022-05-15 | End: 2022-10-24

## 2022-05-15 RX ORDER — ATORVASTATIN CALCIUM 40 MG/1
40 TABLET, FILM COATED ORAL DAILY
Qty: 90 TABLET | Refills: 3 | Status: SHIPPED | OUTPATIENT
Start: 2022-05-15 | End: 2023-08-13 | Stop reason: SDUPTHER

## 2022-05-15 RX ADMIN — FERROUS SULFATE TAB 325 MG (65 MG ELEMENTAL FE) 1 EACH: 325 (65 FE) TAB at 09:05

## 2022-05-15 RX ADMIN — SACUBITRIL AND VALSARTAN 1 TABLET: 24; 26 TABLET, FILM COATED ORAL at 09:05

## 2022-05-15 RX ADMIN — TAMSULOSIN HYDROCHLORIDE 0.4 MG: 0.4 CAPSULE ORAL at 09:05

## 2022-05-15 RX ADMIN — CHOLECALCIFEROL TAB 25 MCG (1000 UNIT) 1000 UNITS: 25 TAB at 09:05

## 2022-05-15 RX ADMIN — OXYCODONE HYDROCHLORIDE AND ACETAMINOPHEN 500 MG: 500 TABLET ORAL at 09:05

## 2022-05-15 RX ADMIN — INSULIN ASPART 8 UNITS: 100 INJECTION, SOLUTION INTRAVENOUS; SUBCUTANEOUS at 10:05

## 2022-05-15 RX ADMIN — FUROSEMIDE 40 MG: 40 TABLET ORAL at 02:05

## 2022-05-15 RX ADMIN — FUROSEMIDE 40 MG: 10 INJECTION, SOLUTION INTRAMUSCULAR; INTRAVENOUS at 09:05

## 2022-05-15 RX ADMIN — ENOXAPARIN SODIUM 40 MG: 100 INJECTION SUBCUTANEOUS at 04:05

## 2022-05-15 RX ADMIN — INSULIN ASPART 10 UNITS: 100 INJECTION, SOLUTION INTRAVENOUS; SUBCUTANEOUS at 12:05

## 2022-05-15 RX ADMIN — ESCITALOPRAM OXALATE 10 MG: 10 TABLET ORAL at 09:05

## 2022-05-15 RX ADMIN — METOPROLOL SUCCINATE 12.5 MG: 25 TABLET, EXTENDED RELEASE ORAL at 09:05

## 2022-05-15 RX ADMIN — INSULIN DETEMIR 20 UNITS: 100 INJECTION, SOLUTION SUBCUTANEOUS at 09:05

## 2022-05-15 RX ADMIN — BUSPIRONE HYDROCHLORIDE 5 MG: 5 TABLET ORAL at 09:05

## 2022-05-15 NOTE — DISCHARGE SUMMARY
[facility] Hospital Medicine Discharge Summary    Primary Team: [facility] Hospitalist Team B  Attending Physician: Iban Vance MD  Resident: Patrizia  Intern:     Date of Admit: 5/13/2022  Date of Discharge: 5/15/2022    Discharge to: Home  Condition: Stable    Discharge Diagnoses     Patient Active Problem List   Diagnosis    Essential hypertension    Non morbid obesity    Edema    Uncontrolled type 2 diabetes mellitus    RBC microcytosis    Parotid swelling- bilateral parotid swellings    Primary osteoarthritis of left knee    Decreased range of motion of right knee    Weakness of both lower extremities    Hamstring tightness of both lower extremities    Decreased functional mobility    Bilateral carpal tunnel syndrome    Pain in both hands    Finger stiffness, unspecified laterality    Muscle weakness    Acute on chronic congestive heart failure    Elevated troponin    DM2 (diabetes mellitus, type 2)    Anxiety    Iron deficiency anemia     Consultants and Procedures     Consultants:  None    Procedures:   None    Imaging:  CXR 5/13   Cardiomegaly. Retrocardiac atelectasis. Mild opacity at the right cardio phrenic angle may relate to right middle lobe atelectasis. Early pneumonia not excluded. Mild perihilar haziness may relate to stable CHF.     Brief History of Present Illness      72 y.o. female with PMHx of DM2, HTN, CHF, iron def anemia, and anxiety who presented to [facility] on 5/13/2022 with MACIAS and BLE edema x 2 weeks. Missed multiple days of lasix dose. Patient follows with Dr. Arceo who recently told her she has heart failure (limited records on chart review). In ED, HDS, sating 96% on RA. EKG NSR, proBNP 3210, Trop 0.046, CXR with perihilar opacities. Patient admitted to [facility] medicine for acute on chronic CHF exacerbation. IV diuresis with good urine output and improvement of symptoms. Switched back to PO lasix (40mg instead of home 20mg) and started on toprol 12.5mg. Patient  was stable on discharge.     General Appearance: alert and oriented, no acute distress.  Head: Normocephalic, atraumatic   Eyes: conjunctivae/corneas clear and non-icteric. PERRL, EOMI  Mouth: lips, tongue and mucosa normal, oropharynx clear  Neck: supple, trachea midline, thyroid not enlarged, no LAD  Lungs: CTAB; no increased work of breathing on RA  Heart: regular rate and rhythm, S1, S2 normal, no murmurs  Abdomen: soft, non-distended, non-tender; bowel sounds normal  Extremities: extremities normal, atraumatic, no cyanosis. 2+ BLE pitting edema - improved   Pulses: 2+ and symmetric   Skin: No obvious ecchymosis, erythema, or rash  Neurologic: AAOx3, no focal neurological deficits noted.    For the full HPI please refer to the History & Physical from this admission.    Hospital Course By Problem with Pertinent Findings     Acute on chronic CHF exacerbation   - Presenting with MACIAS and LE edema x 2 weeks after missing multiple lasix doses   - Patient follow with Cardiologist Dr. Arceo   - S, sating 96% on RA  - CXR with mild blunting of right costophrenic angle, perihilar haziness   - No TTE available per chart review   - Received lasix IV 40 mg in ED   - Daily weights, strict I/Os  - TTE 5/14 with EF 25%, LV global hypokinesis, grade II diastolic dysfunction, moderate LA enlargement, moderate mitral regurg  - good urine output and improvement of symptoms with IV lasix  - discharged with Lasix 40mg daily, toprol 12.5mg daily, lipitor 40mg daily  - continue entresto 24-26mg BID on discharge  - referral placed to cardiology (Dr. Yadav)      HTN  - Normotensive on admit   - Continue home Entreso  - started toprol 12.5mg BID on discharge  - follow up with PCP and cardiologist     HLD  - Lipid panel 2019 wnl, repeat ordered   - discharged on lipitor 40mg     DM2 - non insulin dependent   - POC   - A1c 8.1 01/2022, repeat ordered   - Home regimen Glimepiride 2 mg and Metformin 1000 mg BID  - Moderate dose  SSI + POCT glucose while inpatient  - continue home medications on discharge  - follow up with PCP  - referral for endocrinology placed     Iron def anemia   - H/H 10.9/35.4, MCV 71   - Baseline Hb~ 9-11  - Continue home iron supp      Anxiety/depression   - No active SI/HI  - Continue home Buspirone and Lexapro     Discharge Medications        Medication List      START taking these medications    metoprolol succinate 25 MG 24 hr tablet  Commonly known as: TOPROL-XL  Take 0.5 tablets (12.5 mg total) by mouth once daily.  Start taking on: May 16, 2022        CHANGE how you take these medications    atorvastatin 40 MG tablet  Commonly known as: LIPITOR  Take 1 tablet (40 mg total) by mouth once daily.  What changed:   · medication strength  · how much to take  · when to take this     furosemide 40 MG tablet  Commonly known as: LASIX  Take 1 tablet (40 mg total) by mouth once daily.  What changed:   · medication strength  · how much to take  · how to take this  · when to take this  · additional instructions        CONTINUE taking these medications    ALIVE WOMEN'S ENERGY ORAL     amitriptyline 10 MG tablet  Commonly known as: ELAVIL  TAKE ONE TO THREE TABLETS BY MOUTH AT BEDTIME FOR  HEADACHE     ascorbic acid (vitamin C) 500 MG tablet  Commonly known as: VITAMIN C     busPIRone 5 MG Tab  Commonly known as: BUSPAR  Take 1 tablet (5 mg total) by mouth 2 (two) times daily. For anxiety     diabetic supplies, miscellan. Kit  Use for testing     diclofenac sodium 1 % Gel  Commonly known as: VOLTAREN  Apply 2 g topically 3 (three) times daily.     ENTRESTO 24-26 mg per tablet  Generic drug: sacubitriL-valsartan  Take 1 tablet by mouth 2 (two) times daily.     EScitalopram oxalate 10 MG tablet  Commonly known as: LEXAPRO  Take 1 tablet (10 mg total) by mouth once daily.     ferrous sulfate 324 mg (65 mg iron) Tbec  Take 1 tablet (324 mg total) by mouth daily as needed.     gabapentin 100 MG capsule  Commonly known as:  NEURONTIN  Takes on in am and two in pm for back pain radiating down leg.     glimepiride 2 MG tablet  Commonly known as: AMARYL     hyoscyamine 0.125 mg Tab  Commonly known as: ANASPAZ,LEVSIN  Take 1 tablet (125 mcg total) by mouth every 6 (six) hours as needed (P.r.n. bladder spasm).     metFORMIN 1000 MG tablet  Commonly known as: GLUCOPHAGE     tamsulosin 0.4 mg Cap  Commonly known as: FLOMAX  Take 1 capsule (0.4 mg total) by mouth once daily.     vitamin D 1000 units Tab  Commonly known as: VITAMIN D3        STOP taking these medications    meloxicam 15 MG tablet  Commonly known as: MOBIC     ondansetron 4 MG tablet  Commonly known as: ZOFRAN     oxyCODONE-acetaminophen  mg per tablet  Commonly known as: PERCOCET           Where to Get Your Medications      These medications were sent to [facility] [address] [facility]  [address] [facility] [address]    Phone: [phone]   · atorvastatin 40 MG tablet  · furosemide 40 MG tablet  · metoprolol succinate 25 MG 24 hr tablet       Discharge Information:     Diet:  Cardiac, diabetic    Physical Activity:  As tolerated             Instructions:  1. Take all medications as prescribed  2. Keep all follow-up appointments  3. Return to the hospital or call your primary care physicians if any worsening symptoms such as fever, chest pain, shortness of breath, return of symptoms, or any other concerns.    Follow-Up Appointments:  PCP  Cardiology (Dr. Yadav)  Endocrinology    Crystal Myles MD  [facility] Internal Medicine HO-I  [facility] Internal Medicine Team B

## 2022-05-15 NOTE — NURSING
Shift assessment complete. Pt is alert and oriented x 4. Denies pain and verbalizes no needs/complaints at this time. Bed in lowest position, locked, and side rails up x 3. Call light in reach. Bed alarm on.

## 2022-05-15 NOTE — PLAN OF CARE
Discharge orders noted. Additional clinical references attached.    Patient's discharge instructions given by bedside RN and reviewed via this VN.  Education provided on new medication, diagnosis, and follow-up appointments.  Teach back method used. Patient verbalized understanding. All questions answered.  Floor nurse notified.

## 2022-05-15 NOTE — PROGRESS NOTES
[facility]                    Pharmacy       Discharge Medication Education    Patient ACCEPTED medication education. Pharmacy has provided education on the name, indication, and possible side effects of the medication(s) prescribed, using teach-back method.     The following medications have also been discussed, during this admission.        Medication List        START taking these medications      metoprolol succinate 25 MG 24 hr tablet  Commonly known as: TOPROL-XL  Take 0.5 tablets (12.5 mg total) by mouth once daily.  Start taking on: May 16, 2022            CHANGE how you take these medications      atorvastatin 40 MG tablet  Commonly known as: LIPITOR  Take 1 tablet (40 mg total) by mouth once daily.  What changed:   medication strength  how much to take  when to take this     furosemide 40 MG tablet  Commonly known as: LASIX  Take 1 tablet (40 mg total) by mouth once daily.  What changed:   medication strength  how much to take  how to take this  when to take this  additional instructions            CONTINUE taking these medications      ALIVE WOMEN'S ENERGY ORAL     amitriptyline 10 MG tablet  Commonly known as: ELAVIL  TAKE ONE TO THREE TABLETS BY MOUTH AT BEDTIME FOR  HEADACHE     ascorbic acid (vitamin C) 500 MG tablet  Commonly known as: VITAMIN C     busPIRone 5 MG Tab  Commonly known as: BUSPAR  Take 1 tablet (5 mg total) by mouth 2 (two) times daily. For anxiety     diabetic supplies, miscellan. Kit  Use for testing     diclofenac sodium 1 % Gel  Commonly known as: VOLTAREN  Apply 2 g topically 3 (three) times daily.     ENTRESTO 24-26 mg per tablet  Generic drug: sacubitriL-valsartan  Take 1 tablet by mouth 2 (two) times daily.     EScitalopram oxalate 10 MG tablet  Commonly known as: LEXAPRO  Take 1 tablet (10 mg total) by mouth once daily.     ferrous sulfate 324 mg (65 mg iron) Tbec  Take 1 tablet (324 mg total) by mouth daily as needed.     gabapentin 100 MG  capsule  Commonly known as: NEURONTIN  Takes on in am and two in pm for back pain radiating down leg.     glimepiride 2 MG tablet  Commonly known as: AMARYL     hyoscyamine 0.125 mg Tab  Commonly known as: ANASPAZ,LEVSIN  Take 1 tablet (125 mcg total) by mouth every 6 (six) hours as needed (P.r.n. bladder spasm).     metFORMIN 1000 MG tablet  Commonly known as: GLUCOPHAGE     tamsulosin 0.4 mg Cap  Commonly known as: FLOMAX  Take 1 capsule (0.4 mg total) by mouth once daily.     vitamin D 1000 units Tab  Commonly known as: VITAMIN D3            STOP taking these medications      meloxicam 15 MG tablet  Commonly known as: MOBIC     ondansetron 4 MG tablet  Commonly known as: ZOFRAN     oxyCODONE-acetaminophen  mg per tablet  Commonly known as: PERCOCET               Where to Get Your Medications        These medications were sent to [facility] [address] [place] [address] [address] [facility]  [address] [facility] [address]      Phone: [phone]   atorvastatin 40 MG tablet  furosemide 40 MG tablet  metoprolol succinate 25 MG 24 hr tablet          Thank you  Cathy Dior, PharmD  [phone]

## 2022-05-17 ENCOUNTER — TELEPHONE (OUTPATIENT)
Dept: FAMILY MEDICINE | Facility: CLINIC | Age: 72
End: 2022-05-17
Payer: MEDICARE

## 2022-05-17 ENCOUNTER — PATIENT OUTREACH (OUTPATIENT)
Dept: ADMINISTRATIVE | Facility: OTHER | Age: 72
End: 2022-05-17
Payer: MEDICARE

## 2022-05-17 DIAGNOSIS — R29.898 WEAKNESS OF BOTH LOWER EXTREMITIES: ICD-10-CM

## 2022-05-17 DIAGNOSIS — M17.12 PRIMARY OSTEOARTHRITIS OF LEFT KNEE: ICD-10-CM

## 2022-05-17 DIAGNOSIS — R26.89 DECREASED FUNCTIONAL MOBILITY: Primary | ICD-10-CM

## 2022-05-17 NOTE — TELEPHONE ENCOUNTER
----- Message from Erica Lawrence sent at 5/17/2022  9:17 AM CDT -----  Type:  Patient Call    Who Called: Ghada/ Idibon   Would the patient rather a call back or a response via [facility]? Call  back   Best Call Back Number: [phone]   Additional Information:  pt is requesting a rollator walker ... will need orders and clinic notes sent to [facility] ... Fax   [phone]         Orders pending

## 2022-05-17 NOTE — PROGRESS NOTES
Health Maintenance Due   Topic Date Due    TETANUS VACCINE  Never done    Shingles Vaccine (1 of 2) Never done    Diabetes Urine Screening  04/01/2020    Foot Exam  04/14/2021    COVID-19 Vaccine (4 - Booster for Moderna series) 04/18/2022     Updates were requested from care everywhere.  Chart was reviewed for overdue Proactive [facility] Encounters (DAVID) topics (CRS, Breast Cancer Screening, Eye exam)  Health Maintenance has been updated.  LINKS immunization registry triggered.  Immunizations were reconciled.

## 2022-05-18 ENCOUNTER — TELEPHONE (OUTPATIENT)
Dept: ADMINISTRATIVE | Facility: OTHER | Age: 72
End: 2022-05-18
Payer: MEDICARE

## 2022-05-19 ENCOUNTER — OFFICE VISIT (OUTPATIENT)
Dept: CARDIOLOGY | Facility: CLINIC | Age: 72
End: 2022-05-19
Payer: MEDICARE

## 2022-05-19 VITALS
HEIGHT: 67 IN | DIASTOLIC BLOOD PRESSURE: 63 MMHG | HEART RATE: 99 BPM | BODY MASS INDEX: 32.7 KG/M2 | WEIGHT: 208.31 LBS | SYSTOLIC BLOOD PRESSURE: 95 MMHG

## 2022-05-19 DIAGNOSIS — E11.65 UNCONTROLLED TYPE 2 DIABETES MELLITUS WITH HYPERGLYCEMIA: ICD-10-CM

## 2022-05-19 DIAGNOSIS — I50.9 ACUTE ON CHRONIC CONGESTIVE HEART FAILURE, UNSPECIFIED HEART FAILURE TYPE: ICD-10-CM

## 2022-05-19 DIAGNOSIS — R06.02 SHORTNESS OF BREATH: Primary | ICD-10-CM

## 2022-05-19 PROCEDURE — 99204 OFFICE O/P NEW MOD 45 MIN: CPT | Mod: S$GLB,,, | Performed by: INTERNAL MEDICINE

## 2022-05-19 PROCEDURE — 4010F PR ACE/ARB THEARPY RXD/TAKEN: ICD-10-PCS | Mod: CPTII,S$GLB,, | Performed by: INTERNAL MEDICINE

## 2022-05-19 PROCEDURE — 3052F HG A1C>EQUAL 8.0%<EQUAL 9.0%: CPT | Mod: CPTII,S$GLB,, | Performed by: INTERNAL MEDICINE

## 2022-05-19 PROCEDURE — 1160F PR REVIEW ALL MEDS BY PRESCRIBER/CLIN PHARMACIST DOCUMENTED: ICD-10-PCS | Mod: CPTII,S$GLB,, | Performed by: INTERNAL MEDICINE

## 2022-05-19 PROCEDURE — 3288F PR FALLS RISK ASSESSMENT DOCUMENTED: ICD-10-PCS | Mod: CPTII,S$GLB,, | Performed by: INTERNAL MEDICINE

## 2022-05-19 PROCEDURE — 3078F PR MOST RECENT DIASTOLIC BLOOD PRESSURE < 80 MM HG: ICD-10-PCS | Mod: CPTII,S$GLB,, | Performed by: INTERNAL MEDICINE

## 2022-05-19 PROCEDURE — 99999 PR PBB SHADOW E&M-EST. PATIENT-LVL IV: ICD-10-PCS | Mod: PBBFAC,,, | Performed by: INTERNAL MEDICINE

## 2022-05-19 PROCEDURE — 1126F AMNT PAIN NOTED NONE PRSNT: CPT | Mod: CPTII,S$GLB,, | Performed by: INTERNAL MEDICINE

## 2022-05-19 PROCEDURE — 3078F DIAST BP <80 MM HG: CPT | Mod: CPTII,S$GLB,, | Performed by: INTERNAL MEDICINE

## 2022-05-19 PROCEDURE — 3288F FALL RISK ASSESSMENT DOCD: CPT | Mod: CPTII,S$GLB,, | Performed by: INTERNAL MEDICINE

## 2022-05-19 PROCEDURE — 3008F PR BODY MASS INDEX (BMI) DOCUMENTED: ICD-10-PCS | Mod: CPTII,S$GLB,, | Performed by: INTERNAL MEDICINE

## 2022-05-19 PROCEDURE — 3074F SYST BP LT 130 MM HG: CPT | Mod: CPTII,S$GLB,, | Performed by: INTERNAL MEDICINE

## 2022-05-19 PROCEDURE — 3008F BODY MASS INDEX DOCD: CPT | Mod: CPTII,S$GLB,, | Performed by: INTERNAL MEDICINE

## 2022-05-19 PROCEDURE — 1101F PR PT FALLS ASSESS DOC 0-1 FALLS W/OUT INJ PAST YR: ICD-10-PCS | Mod: CPTII,S$GLB,, | Performed by: INTERNAL MEDICINE

## 2022-05-19 PROCEDURE — 99999 PR PBB SHADOW E&M-EST. PATIENT-LVL IV: CPT | Mod: PBBFAC,,, | Performed by: INTERNAL MEDICINE

## 2022-05-19 PROCEDURE — 1159F PR MEDICATION LIST DOCUMENTED IN MEDICAL RECORD: ICD-10-PCS | Mod: CPTII,S$GLB,, | Performed by: INTERNAL MEDICINE

## 2022-05-19 PROCEDURE — 3074F PR MOST RECENT SYSTOLIC BLOOD PRESSURE < 130 MM HG: ICD-10-PCS | Mod: CPTII,S$GLB,, | Performed by: INTERNAL MEDICINE

## 2022-05-19 PROCEDURE — 1159F MED LIST DOCD IN RCRD: CPT | Mod: CPTII,S$GLB,, | Performed by: INTERNAL MEDICINE

## 2022-05-19 PROCEDURE — 3052F PR MOST RECENT HEMOGLOBIN A1C LEVEL 8.0 - < 9.0%: ICD-10-PCS | Mod: CPTII,S$GLB,, | Performed by: INTERNAL MEDICINE

## 2022-05-19 PROCEDURE — 1126F PR PAIN SEVERITY QUANTIFIED, NO PAIN PRESENT: ICD-10-PCS | Mod: CPTII,S$GLB,, | Performed by: INTERNAL MEDICINE

## 2022-05-19 PROCEDURE — 4010F ACE/ARB THERAPY RXD/TAKEN: CPT | Mod: CPTII,S$GLB,, | Performed by: INTERNAL MEDICINE

## 2022-05-19 PROCEDURE — 1101F PT FALLS ASSESS-DOCD LE1/YR: CPT | Mod: CPTII,S$GLB,, | Performed by: INTERNAL MEDICINE

## 2022-05-19 PROCEDURE — 99204 PR OFFICE/OUTPT VISIT, NEW, LEVL IV, 45-59 MIN: ICD-10-PCS | Mod: S$GLB,,, | Performed by: INTERNAL MEDICINE

## 2022-05-19 PROCEDURE — 1160F RVW MEDS BY RX/DR IN RCRD: CPT | Mod: CPTII,S$GLB,, | Performed by: INTERNAL MEDICINE

## 2022-05-19 NOTE — PROGRESS NOTES
[facility] Cardiology 701     SUBJECTIVE:     History of Present Illness:  Patient is a 72 y.o. female presents with congestive heart failure. About one year ago, was seen by a doctor for a routine visit and told had an irregular heart beat. Had a recent bladder infection and was weak. She went to the ER and told she had fluid .Was having shortness of breath for 2 weeks prior to ER visit on 22. She has been seen by  for her heart and was told she had an enlarged heart.      Primary Diagnosis:   1. Hypertension  2. DM: positive  3. Nonsmoker  4. Family history of early CAD: son  of congestive heart failure;   5. Heart disease: diagnosed with CHF about one year ago.   ROS  since discharge:  A. Breathing is better; no PND or orthopnea  B. No shortness of breath at rest  C. No palpitations  D. No syncope  E. Activity: lives alone; washes; fixes food; walks outside to put garbage out and  her mail; uses scooter in the store due to weakness   Review of patient's allergies indicates:  No Known Allergies    Past Medical History:   Diagnosis Date    Arthritis     Diabetes mellitus     Hypertension     Kidney stone        Past Surgical History:   Procedure Laterality Date    COLONOSCOPY  2013    dr ram - 5 years    FOOT SURGERY Right 2010    bone spur    FOOT SURGERY Left     fx ankle    HYSTERECTOMY      age 47    KNEE SURGERY Left 06/10/2016    TKR    OOPHORECTOMY      TONSILLECTOMY         Family History   Problem Relation Age of Onset    Heart attack Mother     Seizures Mother         fell in bathtub and drowned    Colon cancer Sister     Breast cancer Sister        Social History     Tobacco Use    Smoking status: Never Smoker    Smokeless tobacco: Never Used   Substance Use Topics    Alcohol use: No     Alcohol/week: 0.0 standard drinks    Drug use: No        Past Hospitalization:   22: diuresed and sent home   Home meds:  Current Outpatient Medications on File  Prior to Visit   Medication Sig Dispense Refill    amitriptyline (ELAVIL) 10 MG tablet TAKE ONE TO THREE TABLETS BY MOUTH AT BEDTIME FOR  HEADACHE 90 tablet 3    ascorbic acid, vitamin C, (VITAMIN C) 500 MG tablet Take 500 mg by mouth once daily.      atorvastatin (LIPITOR) 40 MG tablet Take 1 tablet (40 mg total) by mouth once daily. 90 tablet 3    busPIRone (BUSPAR) 5 MG Tab Take 1 tablet (5 mg total) by mouth 2 (two) times daily. For anxiety 180 tablet 3    diabetic supplies, [employer]. Kit Use for testing 1 kit 1    diclofenac sodium (VOLTAREN) 1 % Gel Apply 2 g topically 3 (three) times daily. 1 Tube 3    ENTRESTO 24-26 mg per tablet Take 1 tablet by mouth 2 (two) times daily. 60 tablet 0    EScitalopram oxalate (LEXAPRO) 10 MG tablet Take 1 tablet (10 mg total) by mouth once daily. 90 tablet 3    ferrous sulfate 324 mg (65 mg iron) TbEC Take 1 tablet (324 mg total) by mouth daily as needed. 90 tablet 3    furosemide (LASIX) 40 MG tablet Take 1 tablet (40 mg total) by mouth once daily. 90 tablet 3    gabapentin (NEURONTIN) 100 MG capsule Takes on in am and two in pm for back pain radiating down leg. 270 capsule 3    glimepiride (AMARYL) 2 MG tablet Take 2 mg by mouth 2 (two) times daily.      hyoscyamine (ANASPAZ,LEVSIN) 0.125 mg Tab Take 1 tablet (125 mcg total) by mouth every 6 (six) hours as needed (P.r.n. bladder spasm). 25 tablet 0    metFORMIN (GLUCOPHAGE) 1000 MG tablet Take 1,000 mg by mouth 2 (two) times daily.      metoprolol succinate (TOPROL-XL) 25 MG 24 hr tablet Take 0.5 tablets (12.5 mg total) by mouth once daily. 45 tablet 3    MULTIVIT/IRON/FA/K/HERB NO.244 (ALIVE WOMEN'S ENERGY ORAL) Take 1 tablet by mouth daily as needed.       tamsulosin (FLOMAX) 0.4 mg Cap Take 1 capsule (0.4 mg total) by mouth once daily. 90 capsule 3    vitamin D (VITAMIN D3) 1000 units Tab Take 1,000 Units by mouth once daily.       No current facility-administered medications on file prior to visit.  "      Cardiac meds:  Atorvastatin 40 mg   entresto 24/26 mg BID  Lasix 40 mg  Metoprolol succinate 25 mg         OBJECTIVE:     Vital Signs (Most Recent)  Vitals:    22 1351   BP: 95/63   Pulse: 99   Weight: 94.5 kg (208 lb 5.4 oz)   Height: 5' 7" (1.702 m)         Physical Exam:    Neck: normal carotids, no bruits; normal JVP  Lungs :clear  Heart: RR, normal S1,S2, no murmurs, no gallops  Abd: no masses; no bruits;   Exts: normal DP and PT pulses bilaterally, no edema noted           LABS    CMP  Recent Labs   Lab Result Units 05/15/22  0743   Potassium mmol/L 3.8       LIPID  Recent Labs   Lab Result Units 22  0639   HDL mg/dL 26*   Cholesterol mg/dL 121   Triglycerides mg/dL 66   LDL Cholesterol mg/dL 81.8   HDL/Cholesterol Ratio % 21.5   Total Cholesterol/HDL Ratio  4.7       CBS: microcytic anemia; GFR >60; lipid panel OK with low HDL , A1c: 8.9  Diagnostic Results:    EK22: NSR, low voltage; nonspecific T wave changes     Echo: : EF 25%; global hypokinesis; LAE, moderate MR, mild TR     Chart review:      Cath per patient at [facility]: no blockages ? Date   ASSESSMENT/PLAN:     1. Cardiomyopathy: why? Nonischemic per patient - is this burned out hypertension? Diabetes? Idiopathic familial?    Plan: continue all medications  Start jardiance 10 mg daily   Get records from   Return 1 month     Jeffery Ydaav MD    "

## 2022-05-23 ENCOUNTER — TELEPHONE (OUTPATIENT)
Dept: FAMILY MEDICINE | Facility: CLINIC | Age: 72
End: 2022-05-23
Payer: MEDICARE

## 2022-05-31 ENCOUNTER — HOSPITAL ENCOUNTER (OUTPATIENT)
Dept: RADIOLOGY | Facility: HOSPITAL | Age: 72
Discharge: HOME | End: 2022-05-31
Attending: FAMILY MEDICINE
Payer: MEDICARE

## 2022-05-31 ENCOUNTER — TELEPHONE (OUTPATIENT)
Dept: FAMILY MEDICINE | Facility: CLINIC | Age: 72
End: 2022-05-31
Payer: MEDICARE

## 2022-05-31 DIAGNOSIS — E11.9 TYPE 2 DIABETES MELLITUS WITHOUT COMPLICATION, WITHOUT LONG-TERM CURRENT USE OF INSULIN: Primary | ICD-10-CM

## 2022-05-31 DIAGNOSIS — Z12.31 ENCOUNTER FOR SCREENING MAMMOGRAM FOR BREAST CANCER: ICD-10-CM

## 2022-05-31 PROCEDURE — 77063 BREAST TOMOSYNTHESIS BI: CPT | Mod: TC,PO

## 2022-05-31 RX ORDER — INSULIN GLARGINE 100 [IU]/ML
INJECTION, SOLUTION SUBCUTANEOUS
Qty: 15 ML | Refills: 11 | Status: SHIPPED | OUTPATIENT
Start: 2022-05-31 | End: 2022-10-24

## 2022-05-31 NOTE — LETTER
June 2, 2022    Katherine Berger  [address] [facility] [address]             [place]  [address] [facility] [place] [address]  Phone: [phone]  Fax: [phone] Dear Ms. Berger:    Below are the results from your recent visit:    Resulted Orders   Microalbumin/creatinine urine ratio   Result Value Ref Range    Microalbumin, Urine 8.0 ug/mL    Creatinine, Urine 30.0 15.0 - 325.0 mg/dL    Microalb/Creat Ratio 26.7 0.0 - 30.0 ug/mg    Narrative    CareTouch Bulk Order  Specimen Source->Urine   Hemoglobin A1c   Result Value Ref Range    Hemoglobin A1C 9.9 (H) 4.0 - 5.6 %      Comment:      ADA Screening Guidelines:  5.7-6.4%  Consistent with prediabetes  >or=6.5%  Consistent with diabetes    High levels of fetal hemoglobin interfere with the HbA1C  assay. Heterozygous hemoglobin variants (HbS, HgC, etc)do  not significantly interfere with this assay.   However, presence of multiple variants may affect accuracy.      Estimated Avg Glucose 237 (H) 68 - 131 mg/dL    Narrative    CareTouch Bulk Order     Your results are abnormal.  Please review the attached information and don't hesitate to call our office if you have any questions or concerns.     Note from provider:     Hemoglobin A1c is elevated-I think you should start using insulin each day.  I would inject 15 units of Lantus daily-you can come to our office and we can show you how to do this.     Repeat hemoglobin A1c in two months. (Please call the office to schedule)     I sent in additional test strips for testing up to 3 times daily.    Sincerely,        Itz Muse MD

## 2022-06-01 ENCOUNTER — TELEPHONE (OUTPATIENT)
Dept: DIABETES | Facility: CLINIC | Age: 72
End: 2022-06-01
Payer: MEDICARE

## 2022-06-01 NOTE — TELEPHONE ENCOUNTER
Hemoglobin A1c is elevated-I think you should start using insulin each day.  I would inject 15 units of Lantus daily-you can come to our office and we can show you how to do this.    Repeat hemoglobin A1c in two months.    I sent in additional test strips for testing up to 3 times daily.

## 2022-06-02 NOTE — TELEPHONE ENCOUNTER
Attempted patient. LVM to call the office back.    This is the 3rd attempt to reach the patient. Mailed letter to patient regarding results.

## 2022-06-03 ENCOUNTER — TELEPHONE (OUTPATIENT)
Dept: FAMILY MEDICINE | Facility: CLINIC | Age: 72
End: 2022-06-03
Payer: MEDICARE

## 2022-06-09 ENCOUNTER — PATIENT OUTREACH (OUTPATIENT)
Dept: ADMINISTRATIVE | Facility: HOSPITAL | Age: 72
End: 2022-06-09
Payer: MEDICARE

## 2022-06-09 NOTE — LETTER
AUTHORIZATION FOR RELEASE OF   CONFIDENTIAL INFORMATION    Dr Sheriff,    We are seeing Katherine Berger, date of birth 1950, in the clinic at [facility]. Itz Muse MD is the patient's PCP. Katherine Berger has an outstanding lab/procedure at the time we reviewed her chart. In order to help keep her health information updated, she has authorized us to request the following medical record(s):                                            ( X )  OUTSIDE LAB RESULTS - A1C and Lipid Panel            Please fax records to [facility], [place], [place], [phone]    If you have any questions, please contact Lesa Stephens at [phone].             Patient Name: Katherine Berger  : 1950  Patient Phone #: [phone]

## 2022-06-20 ENCOUNTER — OFFICE VISIT (OUTPATIENT)
Dept: CARDIOLOGY | Facility: CLINIC | Age: 72
End: 2022-06-20
Payer: MEDICARE

## 2022-06-20 VITALS
HEART RATE: 78 BPM | BODY MASS INDEX: 33.62 KG/M2 | SYSTOLIC BLOOD PRESSURE: 142 MMHG | HEIGHT: 67 IN | WEIGHT: 214.19 LBS | DIASTOLIC BLOOD PRESSURE: 84 MMHG

## 2022-06-20 DIAGNOSIS — E11.65 UNCONTROLLED TYPE 2 DIABETES MELLITUS WITH HYPERGLYCEMIA: ICD-10-CM

## 2022-06-20 DIAGNOSIS — E11.9 TYPE 2 DIABETES MELLITUS WITHOUT COMPLICATION, WITHOUT LONG-TERM CURRENT USE OF INSULIN: ICD-10-CM

## 2022-06-20 DIAGNOSIS — I50.9 ACUTE ON CHRONIC CONGESTIVE HEART FAILURE, UNSPECIFIED HEART FAILURE TYPE: Primary | ICD-10-CM

## 2022-06-20 PROCEDURE — 3079F PR MOST RECENT DIASTOLIC BLOOD PRESSURE 80-89 MM HG: ICD-10-PCS | Mod: CPTII,S$GLB,, | Performed by: INTERNAL MEDICINE

## 2022-06-20 PROCEDURE — 3079F DIAST BP 80-89 MM HG: CPT | Mod: CPTII,S$GLB,, | Performed by: INTERNAL MEDICINE

## 2022-06-20 PROCEDURE — 1160F RVW MEDS BY RX/DR IN RCRD: CPT | Mod: CPTII,S$GLB,, | Performed by: INTERNAL MEDICINE

## 2022-06-20 PROCEDURE — 99214 PR OFFICE/OUTPT VISIT, EST, LEVL IV, 30-39 MIN: ICD-10-PCS | Mod: S$GLB,,, | Performed by: INTERNAL MEDICINE

## 2022-06-20 PROCEDURE — 3061F NEG MICROALBUMINURIA REV: CPT | Mod: CPTII,S$GLB,, | Performed by: INTERNAL MEDICINE

## 2022-06-20 PROCEDURE — 3288F PR FALLS RISK ASSESSMENT DOCUMENTED: ICD-10-PCS | Mod: CPTII,S$GLB,, | Performed by: INTERNAL MEDICINE

## 2022-06-20 PROCEDURE — 1101F PT FALLS ASSESS-DOCD LE1/YR: CPT | Mod: CPTII,S$GLB,, | Performed by: INTERNAL MEDICINE

## 2022-06-20 PROCEDURE — 3008F PR BODY MASS INDEX (BMI) DOCUMENTED: ICD-10-PCS | Mod: CPTII,S$GLB,, | Performed by: INTERNAL MEDICINE

## 2022-06-20 PROCEDURE — 3008F BODY MASS INDEX DOCD: CPT | Mod: CPTII,S$GLB,, | Performed by: INTERNAL MEDICINE

## 2022-06-20 PROCEDURE — 99999 PR PBB SHADOW E&M-EST. PATIENT-LVL IV: CPT | Mod: PBBFAC,,, | Performed by: INTERNAL MEDICINE

## 2022-06-20 PROCEDURE — 1126F AMNT PAIN NOTED NONE PRSNT: CPT | Mod: CPTII,S$GLB,, | Performed by: INTERNAL MEDICINE

## 2022-06-20 PROCEDURE — 1159F MED LIST DOCD IN RCRD: CPT | Mod: CPTII,S$GLB,, | Performed by: INTERNAL MEDICINE

## 2022-06-20 PROCEDURE — 1101F PR PT FALLS ASSESS DOC 0-1 FALLS W/OUT INJ PAST YR: ICD-10-PCS | Mod: CPTII,S$GLB,, | Performed by: INTERNAL MEDICINE

## 2022-06-20 PROCEDURE — 4010F ACE/ARB THERAPY RXD/TAKEN: CPT | Mod: CPTII,S$GLB,, | Performed by: INTERNAL MEDICINE

## 2022-06-20 PROCEDURE — 3061F PR NEG MICROALBUMINURIA RESULT DOCUMENTED/REVIEW: ICD-10-PCS | Mod: CPTII,S$GLB,, | Performed by: INTERNAL MEDICINE

## 2022-06-20 PROCEDURE — 4010F PR ACE/ARB THEARPY RXD/TAKEN: ICD-10-PCS | Mod: CPTII,S$GLB,, | Performed by: INTERNAL MEDICINE

## 2022-06-20 PROCEDURE — 3046F PR MOST RECENT HEMOGLOBIN A1C LEVEL > 9.0%: ICD-10-PCS | Mod: CPTII,S$GLB,, | Performed by: INTERNAL MEDICINE

## 2022-06-20 PROCEDURE — 99214 OFFICE O/P EST MOD 30 MIN: CPT | Mod: S$GLB,,, | Performed by: INTERNAL MEDICINE

## 2022-06-20 PROCEDURE — 3077F SYST BP >= 140 MM HG: CPT | Mod: CPTII,S$GLB,, | Performed by: INTERNAL MEDICINE

## 2022-06-20 PROCEDURE — 3046F HEMOGLOBIN A1C LEVEL >9.0%: CPT | Mod: CPTII,S$GLB,, | Performed by: INTERNAL MEDICINE

## 2022-06-20 PROCEDURE — 1160F PR REVIEW ALL MEDS BY PRESCRIBER/CLIN PHARMACIST DOCUMENTED: ICD-10-PCS | Mod: CPTII,S$GLB,, | Performed by: INTERNAL MEDICINE

## 2022-06-20 PROCEDURE — 3077F PR MOST RECENT SYSTOLIC BLOOD PRESSURE >= 140 MM HG: ICD-10-PCS | Mod: CPTII,S$GLB,, | Performed by: INTERNAL MEDICINE

## 2022-06-20 PROCEDURE — 1159F PR MEDICATION LIST DOCUMENTED IN MEDICAL RECORD: ICD-10-PCS | Mod: CPTII,S$GLB,, | Performed by: INTERNAL MEDICINE

## 2022-06-20 PROCEDURE — 3066F NEPHROPATHY DOC TX: CPT | Mod: CPTII,S$GLB,, | Performed by: INTERNAL MEDICINE

## 2022-06-20 PROCEDURE — 3288F FALL RISK ASSESSMENT DOCD: CPT | Mod: CPTII,S$GLB,, | Performed by: INTERNAL MEDICINE

## 2022-06-20 PROCEDURE — 1126F PR PAIN SEVERITY QUANTIFIED, NO PAIN PRESENT: ICD-10-PCS | Mod: CPTII,S$GLB,, | Performed by: INTERNAL MEDICINE

## 2022-06-20 PROCEDURE — 99999 PR PBB SHADOW E&M-EST. PATIENT-LVL IV: ICD-10-PCS | Mod: PBBFAC,,, | Performed by: INTERNAL MEDICINE

## 2022-06-20 PROCEDURE — 3066F PR DOCUMENTATION OF TREATMENT FOR NEPHROPATHY: ICD-10-PCS | Mod: CPTII,S$GLB,, | Performed by: INTERNAL MEDICINE

## 2022-06-20 RX ORDER — SACUBITRIL AND VALSARTAN 49; 51 MG/1; MG/1
1 TABLET, FILM COATED ORAL 2 TIMES DAILY
Qty: 180 TABLET | Refills: 1 | Status: SHIPPED | OUTPATIENT
Start: 2022-06-20 | End: 2022-12-28

## 2022-06-20 NOTE — PROGRESS NOTES
[facility] 701     SUBJECTIVE:     History of Present Illness:  Patient is a 72 y.o. female presents with congestive heart failure.     Primary Diagnosis:   1. Hypertension  2. DM: positive  3. Nonsmoker  4. Family history of early CAD: son  of congestive heart failure;   5. Chronic systolic heart failure   ROS  since : feels much better  A. Breathing is better; no PND or orthopnea  B. No shortness of breath at rest  C. No palpitations  D. No syncope  E. Activity: lives alone; washes; fixes food; walks outside to put garbage out and  her mail; uses scooter in the store due to weakness   F.  blood sugars are markedly improved  G.  blood pressures at home: 140/60 but usually better ; sometimes 150   Review of patient's allergies indicates:  No Known Allergies    Past Medical History:   Diagnosis Date    Arthritis     Diabetes mellitus     Hypertension     Kidney stone        Past Surgical History:   Procedure Laterality Date    COLONOSCOPY  2013    dr ram - 5 years    FOOT SURGERY Right 2010    bone spur    FOOT SURGERY Left     fx ankle    HYSTERECTOMY      age 47    KNEE SURGERY Left 06/10/2016    TKR    OOPHORECTOMY      TONSILLECTOMY         Family History   Problem Relation Age of Onset    Heart attack Mother     Seizures Mother         fell in bathtub and drowned    Colon cancer Sister     Breast cancer Sister        Social History     Tobacco Use    Smoking status: Never Smoker    Smokeless tobacco: Never Used   Substance Use Topics    Alcohol use: No     Alcohol/week: 0.0 standard drinks    Drug use: No        Past Hospitalization:   22: diuresed and sent home   Home meds:  Current Outpatient Medications on File Prior to Visit   Medication Sig Dispense Refill    amitriptyline (ELAVIL) 10 MG tablet TAKE ONE TO THREE TABLETS BY MOUTH AT BEDTIME FOR  HEADACHE 90 tablet 3    ascorbic acid, vitamin C, (VITAMIN C) 500 MG tablet Take 500 mg by mouth once  daily.      atorvastatin (LIPITOR) 40 MG tablet Take 1 tablet (40 mg total) by mouth once daily. 90 tablet 3    blood sugar diagnostic (BLOOD GLUCOSE TEST) Strp Strips for 3 times a day testing   dispense brand covered by insurance and to match meter brand 300 each 3    busPIRone (BUSPAR) 5 MG Tab Take 1 tablet (5 mg total) by mouth 2 (two) times daily. For anxiety 180 tablet 3    diabetic supplies, miscellan. Misc Lancets for 3 times a day testing    dispense brand covered by insurance t 300 each 3    diclofenac sodium (VOLTAREN) 1 % Gel Apply 2 g topically 3 (three) times daily. 1 Tube 3    empagliflozin (JARDIANCE) 10 mg tablet Take 1 tablet (10 mg total) by mouth once daily. 90 tablet 1    ENTRESTO 24-26 mg per tablet Take 1 tablet by mouth 2 (two) times daily. 60 tablet 0    EScitalopram oxalate (LEXAPRO) 10 MG tablet Take 1 tablet (10 mg total) by mouth once daily. 90 tablet 3    ferrous sulfate 324 mg (65 mg iron) TbEC Take 1 tablet (324 mg total) by mouth daily as needed. 90 tablet 3    furosemide (LASIX) 40 MG tablet Take 1 tablet (40 mg total) by mouth once daily. 90 tablet 3    gabapentin (NEURONTIN) 100 MG capsule Takes on in am and two in pm for back pain radiating down leg. 270 capsule 3    glimepiride (AMARYL) 2 MG tablet Take 2 mg by mouth 2 (two) times daily.      insulin (LANTUS SOLOSTAR U-100 INSULIN) glargine 100 units/mL (3mL) SubQ pen Inject 15 units Subcutaneously daily 15 mL 11    metFORMIN (GLUCOPHAGE) 1000 MG tablet Take 1,000 mg by mouth 2 (two) times daily.      metoprolol succinate (TOPROL-XL) 25 MG 24 hr tablet Take 0.5 tablets (12.5 mg total) by mouth once daily. 45 tablet 3    MULTIVIT/IRON/FA/K/HERB NO.244 (ALIVE WOMEN'S ENERGY ORAL) Take 1 tablet by mouth daily as needed.       tamsulosin (FLOMAX) 0.4 mg Cap Take 1 capsule (0.4 mg total) by mouth once daily. 90 capsule 3    vitamin D (VITAMIN D3) 1000 units Tab Take 1,000 Units by mouth once daily.       "hyoscyamine (ANASPAZ,LEVSIN) 0.125 mg Tab Take 1 tablet (125 mcg total) by mouth every 6 (six) hours as needed (P.r.n. bladder spasm). 25 tablet 0     No current facility-administered medications on file prior to visit.       Cardiac meds:  Atorvastatin 40 mg   entresto 24/26 mg BID  Lasix 40 mg  Metoprolol succinate 25 mg   jardiance 10 mg       OBJECTIVE:     Vital Signs (Most Recent)  Vitals:    22 0908   BP: (!) 142/84   Pulse: 78   Weight: 97.1 kg (214 lb 2.8 oz)   Height: 5' 7" (1.702 m)         Physical Exam:    Neck: normal carotids, no bruits; normal JVP  Lungs :clear  Heart: RR, normal S1,S2, no murmurs, no gallops  Abd: no masses; no bruits;   Exts: normal DP and PT pulses bilaterally, no edema noted           LABS    CMP  Recent Labs   Lab Result Units 05/15/22  0743   Potassium mmol/L 3.8       LIPID  Recent Labs   Lab Result Units 22  0639   HDL mg/dL 26*   Cholesterol mg/dL 121   Triglycerides mg/dL 66   LDL Cholesterol mg/dL 81.8   HDL/Cholesterol Ratio % 21.5   Total Cholesterol/HDL Ratio  4.7       CBS: microcytic anemia; GFR >60; lipid panel OK with low HDL , A1c: 8.9  Diagnostic Results:    EK22: NSR, low voltage; nonspecific T wave changes     Echo: : EF 25%; global hypokinesis; LAE, moderate MR, mild TR     Chart review:      Cath per patient at [facility]: no blockages ? Date   ASSESSMENT/PLAN:     1. Cardiomyopathy: why? Nonischemic per patient - is this burned out hypertension? Diabetes? Idiopathic familial?    Plan: continue all medications  Increase entresto 49/51 mg BID  Return 3 month     Jeffery Yadav MD      "

## 2022-07-07 ENCOUNTER — TELEPHONE (OUTPATIENT)
Dept: FAMILY MEDICINE | Facility: CLINIC | Age: 72
End: 2022-07-07
Payer: MEDICARE

## 2022-07-07 NOTE — TELEPHONE ENCOUNTER
Belgica ABURTO [facility] Staff  Caller: [phone] (Today,  8:49 AM)  Patient is requesting a call back regarding release from Jury Duty.   Fax: [phone]   Would the patient rather a call back or a response via [facility]?     Best Call Back Number:  [phone]   Additional Information:       This message was in twice

## 2022-07-07 NOTE — TELEPHONE ENCOUNTER
----- Message from Misty Zamora sent at 7/7/2022  8:41 AM CDT -----  Contact: [phone]/ Self  Type: Requesting to speak with nurse    Who Called: Pt   Regarding: needs excuse letter for jury duty on 07/18/2022   Would the patient rather a call back or a response via [facility]? Call back  Best Call Back Number: [phone]  Additional Information Pt would like to come  letter today.

## 2022-07-07 NOTE — TELEPHONE ENCOUNTER
Attempted patient. LVM to call the office back in regards for a note to excuse her from Jury duty.

## 2022-07-25 ENCOUNTER — PATIENT OUTREACH (OUTPATIENT)
Dept: ADMINISTRATIVE | Facility: HOSPITAL | Age: 72
End: 2022-07-25
Payer: MEDICARE

## 2022-07-29 LAB
LEFT EYE DM RETINOPATHY: POSITIVE
RIGHT EYE DM RETINOPATHY: POSITIVE

## 2022-08-12 ENCOUNTER — PES CALL (OUTPATIENT)
Dept: ADMINISTRATIVE | Facility: OTHER | Age: 72
End: 2022-08-12
Payer: MEDICARE

## 2022-08-18 RX ORDER — GLIMEPIRIDE 2 MG/1
2 TABLET ORAL 2 TIMES DAILY
Qty: 180 TABLET | Refills: 3 | Status: SHIPPED | OUTPATIENT
Start: 2022-08-18

## 2022-08-18 NOTE — TELEPHONE ENCOUNTER
No new care gaps identified.  [facility] Embedded Care Gaps. Reference number: [id number]. 8/18/2022   9:59:35 AM CDT

## 2022-08-18 NOTE — TELEPHONE ENCOUNTER
----- Message from Belgica Mcduffie sent at 8/18/2022  9:50 AM CDT -----  Regarding: refill  Contact: [phone]  Type:  RX Refill Request    Who Called:  self   Refill or New Rx: refill  RX Name and Strength: glimepiride (AMARYL) 2 MG tablet  How is the patient currently taking it? (ex. 1XDay): Take 2 mg by mouth 2 (two) times daily. - Oral  Is this a 30 day or 90 day RX: 180 tablets   Preferred Pharmacy with phone number: [facility] [address] [place], [place] [address] [address] [facility]  Local or Mail Order: local   Ordering Provider: Dr. Sheriff   Would the patient rather a call back or a response via [facility]?  call  Best Call Back Number: [phone]  Additional Information:  Dr. Sheriff is not returning her call nor filling her rx. She has not taken the medication for 2 weeks

## 2022-09-14 ENCOUNTER — OFFICE VISIT (OUTPATIENT)
Dept: CARDIOLOGY | Facility: CLINIC | Age: 72
End: 2022-09-14
Payer: MEDICARE

## 2022-09-14 VITALS
WEIGHT: 212.63 LBS | SYSTOLIC BLOOD PRESSURE: 130 MMHG | BODY MASS INDEX: 33.37 KG/M2 | DIASTOLIC BLOOD PRESSURE: 81 MMHG | HEIGHT: 67 IN | HEART RATE: 67 BPM

## 2022-09-14 DIAGNOSIS — I50.9 ACUTE ON CHRONIC CONGESTIVE HEART FAILURE, UNSPECIFIED HEART FAILURE TYPE: Primary | ICD-10-CM

## 2022-09-14 DIAGNOSIS — I50.22 CHRONIC SYSTOLIC HEART FAILURE: ICD-10-CM

## 2022-09-14 PROCEDURE — 99214 PR OFFICE/OUTPT VISIT, EST, LEVL IV, 30-39 MIN: ICD-10-PCS | Mod: S$GLB,,, | Performed by: INTERNAL MEDICINE

## 2022-09-14 PROCEDURE — 3046F PR MOST RECENT HEMOGLOBIN A1C LEVEL > 9.0%: ICD-10-PCS | Mod: CPTII,S$GLB,, | Performed by: INTERNAL MEDICINE

## 2022-09-14 PROCEDURE — 3061F NEG MICROALBUMINURIA REV: CPT | Mod: CPTII,S$GLB,, | Performed by: INTERNAL MEDICINE

## 2022-09-14 PROCEDURE — 3046F HEMOGLOBIN A1C LEVEL >9.0%: CPT | Mod: CPTII,S$GLB,, | Performed by: INTERNAL MEDICINE

## 2022-09-14 PROCEDURE — 1126F PR PAIN SEVERITY QUANTIFIED, NO PAIN PRESENT: ICD-10-PCS | Mod: CPTII,S$GLB,, | Performed by: INTERNAL MEDICINE

## 2022-09-14 PROCEDURE — 3079F DIAST BP 80-89 MM HG: CPT | Mod: CPTII,S$GLB,, | Performed by: INTERNAL MEDICINE

## 2022-09-14 PROCEDURE — 1160F PR REVIEW ALL MEDS BY PRESCRIBER/CLIN PHARMACIST DOCUMENTED: ICD-10-PCS | Mod: CPTII,S$GLB,, | Performed by: INTERNAL MEDICINE

## 2022-09-14 PROCEDURE — 99999 PR PBB SHADOW E&M-EST. PATIENT-LVL IV: ICD-10-PCS | Mod: PBBFAC,,, | Performed by: INTERNAL MEDICINE

## 2022-09-14 PROCEDURE — 99499 UNLISTED E&M SERVICE: CPT | Mod: S$GLB,,, | Performed by: INTERNAL MEDICINE

## 2022-09-14 PROCEDURE — 3288F FALL RISK ASSESSMENT DOCD: CPT | Mod: CPTII,S$GLB,, | Performed by: INTERNAL MEDICINE

## 2022-09-14 PROCEDURE — 99999 PR PBB SHADOW E&M-EST. PATIENT-LVL IV: CPT | Mod: PBBFAC,,, | Performed by: INTERNAL MEDICINE

## 2022-09-14 PROCEDURE — 1101F PT FALLS ASSESS-DOCD LE1/YR: CPT | Mod: CPTII,S$GLB,, | Performed by: INTERNAL MEDICINE

## 2022-09-14 PROCEDURE — 4010F PR ACE/ARB THEARPY RXD/TAKEN: ICD-10-PCS | Mod: CPTII,S$GLB,, | Performed by: INTERNAL MEDICINE

## 2022-09-14 PROCEDURE — 1159F PR MEDICATION LIST DOCUMENTED IN MEDICAL RECORD: ICD-10-PCS | Mod: CPTII,S$GLB,, | Performed by: INTERNAL MEDICINE

## 2022-09-14 PROCEDURE — 3066F NEPHROPATHY DOC TX: CPT | Mod: CPTII,S$GLB,, | Performed by: INTERNAL MEDICINE

## 2022-09-14 PROCEDURE — 1159F MED LIST DOCD IN RCRD: CPT | Mod: CPTII,S$GLB,, | Performed by: INTERNAL MEDICINE

## 2022-09-14 PROCEDURE — 1101F PR PT FALLS ASSESS DOC 0-1 FALLS W/OUT INJ PAST YR: ICD-10-PCS | Mod: CPTII,S$GLB,, | Performed by: INTERNAL MEDICINE

## 2022-09-14 PROCEDURE — 3075F PR MOST RECENT SYSTOLIC BLOOD PRESS GE 130-139MM HG: ICD-10-PCS | Mod: CPTII,S$GLB,, | Performed by: INTERNAL MEDICINE

## 2022-09-14 PROCEDURE — 1126F AMNT PAIN NOTED NONE PRSNT: CPT | Mod: CPTII,S$GLB,, | Performed by: INTERNAL MEDICINE

## 2022-09-14 PROCEDURE — 3288F PR FALLS RISK ASSESSMENT DOCUMENTED: ICD-10-PCS | Mod: CPTII,S$GLB,, | Performed by: INTERNAL MEDICINE

## 2022-09-14 PROCEDURE — 3066F PR DOCUMENTATION OF TREATMENT FOR NEPHROPATHY: ICD-10-PCS | Mod: CPTII,S$GLB,, | Performed by: INTERNAL MEDICINE

## 2022-09-14 PROCEDURE — 1160F RVW MEDS BY RX/DR IN RCRD: CPT | Mod: CPTII,S$GLB,, | Performed by: INTERNAL MEDICINE

## 2022-09-14 PROCEDURE — 99499 RISK ADDL DX/OHS AUDIT: ICD-10-PCS | Mod: S$GLB,,, | Performed by: INTERNAL MEDICINE

## 2022-09-14 PROCEDURE — 3061F PR NEG MICROALBUMINURIA RESULT DOCUMENTED/REVIEW: ICD-10-PCS | Mod: CPTII,S$GLB,, | Performed by: INTERNAL MEDICINE

## 2022-09-14 PROCEDURE — 3008F BODY MASS INDEX DOCD: CPT | Mod: CPTII,S$GLB,, | Performed by: INTERNAL MEDICINE

## 2022-09-14 PROCEDURE — 3075F SYST BP GE 130 - 139MM HG: CPT | Mod: CPTII,S$GLB,, | Performed by: INTERNAL MEDICINE

## 2022-09-14 PROCEDURE — 3008F PR BODY MASS INDEX (BMI) DOCUMENTED: ICD-10-PCS | Mod: CPTII,S$GLB,, | Performed by: INTERNAL MEDICINE

## 2022-09-14 PROCEDURE — 4010F ACE/ARB THERAPY RXD/TAKEN: CPT | Mod: CPTII,S$GLB,, | Performed by: INTERNAL MEDICINE

## 2022-09-14 PROCEDURE — 3079F PR MOST RECENT DIASTOLIC BLOOD PRESSURE 80-89 MM HG: ICD-10-PCS | Mod: CPTII,S$GLB,, | Performed by: INTERNAL MEDICINE

## 2022-09-14 PROCEDURE — 99214 OFFICE O/P EST MOD 30 MIN: CPT | Mod: S$GLB,,, | Performed by: INTERNAL MEDICINE

## 2022-09-14 RX ORDER — METOPROLOL SUCCINATE 25 MG/1
25 TABLET, EXTENDED RELEASE ORAL DAILY
Qty: 90 TABLET | Refills: 3 | Status: SHIPPED | OUTPATIENT
Start: 2022-09-14 | End: 2022-12-12 | Stop reason: SDUPTHER

## 2022-09-14 NOTE — PROGRESS NOTES
[facility] Card[facility] 701     SUBJECTIVE:     History of Present Illness:  Patient is a 72 y.o. female presents with congestive heart failure. Feels great     Primary Diagnosis:   1. Hypertension  2. DM: positive  3. Nonsmoker  4. Family history of early CAD: son  of congestive heart failure;   5. Chronic systolic heart failure   ROS  since : feels much better  A. Breathing is better; no PND or orthopnea  B. No shortness of breath at rest  C. No palpitations  D. No syncope  E. Activity: much improved; walking in store; pushing the lawnmower to cut grass without issues  F.  blood sugars are markedly improved  G.  blood pressures at home: normal  Review of patient's allergies indicates:  No Known Allergies    Past Medical History:   Diagnosis Date    Arthritis     Diabetes mellitus     Hypertension     Kidney stone        Past Surgical History:   Procedure Laterality Date    COLONOSCOPY  2013    dr ram - 5 years    FOOT SURGERY Right 2010    bone spur    FOOT SURGERY Left     fx ankle    HYSTERECTOMY      age 47    KNEE SURGERY Left 06/10/2016    TKR    OOPHORECTOMY      TONSILLECTOMY         Family History   Problem Relation Age of Onset    Heart attack Mother     Seizures Mother         fell in bathtub and drowned    Colon cancer Sister     Breast cancer Sister        Social History     Tobacco Use    Smoking status: Never    Smokeless tobacco: Never   Substance Use Topics    Alcohol use: No     Alcohol/week: 0.0 standard drinks    Drug use: No        Past Hospitalization:   22: diuresed and sent home   Home meds:  Current Outpatient Medications on File Prior to Visit   Medication Sig Dispense Refill    amitriptyline (ELAVIL) 10 MG tablet TAKE ONE TO THREE TABLETS BY MOUTH AT BEDTIME FOR  HEADACHE 90 tablet 3    ascorbic acid, vitamin C, (VITAMIN C) 500 MG tablet Take 500 mg by mouth once daily.      atorvastatin (LIPITOR) 40 MG tablet Take 1 tablet (40 mg total) by mouth once daily. 90  tablet 3    blood sugar diagnostic (BLOOD GLUCOSE TEST) Strp Strips for 3 times a day testing   dispense brand covered by insurance and to match meter brand 300 each 3    diabetic supplies, miscellan. Misc Lancets for 3 times a day testing    dispense brand covered by insurance t 300 each 3    diclofenac sodium (VOLTAREN) 1 % Gel Apply 2 g topically 3 (three) times daily. 1 Tube 3    empagliflozin (JARDIANCE) 10 mg tablet Take 1 tablet (10 mg total) by mouth once daily. 90 tablet 1    EScitalopram oxalate (LEXAPRO) 10 MG tablet Take 1 tablet (10 mg total) by mouth once daily. 90 tablet 3    ferrous sulfate 324 mg (65 mg iron) TbEC Take 1 tablet (324 mg total) by mouth daily as needed. 90 tablet 3    furosemide (LASIX) 40 MG tablet Take 1 tablet (40 mg total) by mouth once daily. 90 tablet 3    gabapentin (NEURONTIN) 100 MG capsule Takes on in am and two in pm for back pain radiating down leg. 270 capsule 3    glimepiride (AMARYL) 2 MG tablet Take 1 tablet (2 mg total) by mouth 2 (two) times daily. Take 2 mg by mouth 2 (two) times daily. 180 tablet 3    insulin (LANTUS SOLOSTAR U-100 INSULIN) glargine 100 units/mL (3mL) SubQ pen Inject 15 units Subcutaneously daily 15 mL 11    metFORMIN (GLUCOPHAGE) 1000 MG tablet Take 1,000 mg by mouth 2 (two) times daily.      metoprolol succinate (TOPROL-XL) 25 MG 24 hr tablet Take 0.5 tablets (12.5 mg total) by mouth once daily. 45 tablet 3    MULTIVIT/IRON/FA/K/HERB NO.244 (ALIVE WOMEN'S ENERGY ORAL) Take 1 tablet by mouth daily as needed.       sacubitriL-valsartan (ENTRESTO) 49-51 mg per tablet Take 1 tablet by mouth 2 (two) times daily. 180 tablet 1    tamsulosin (FLOMAX) 0.4 mg Cap Take 1 capsule (0.4 mg total) by mouth once daily. 90 capsule 3    vitamin D (VITAMIN D3) 1000 units Tab Take 1,000 Units by mouth once daily.      busPIRone (BUSPAR) 5 MG Tab Take 1 tablet (5 mg total) by mouth 2 (two) times daily. For anxiety 180 tablet 3    hyoscyamine (ANASPAZ,LEVSIN) 0.125 mg  "Tab Take 1 tablet (125 mcg total) by mouth every 6 (six) hours as needed (P.r.n. bladder spasm). 25 tablet 0     No current facility-administered medications on file prior to visit.       Cardiac meds:  Atorvastatin 40 mg   entresto 49/51 mg BID  Lasix 40 mg  Metoprolol succinate 25 mg   jardiance 10 mg       OBJECTIVE:     Vital Signs (Most Recent)  Vitals:    22 1100   BP: 130/81   Pulse: 67   Weight: 96.5 kg (212 lb 10.1 oz)   Height: 5' 7" (1.702 m)           Physical Exam:    Neck: normal carotids, no bruits; normal JVP  Lungs :clear  Heart: RR, normal S1,S2, no murmurs, no gallops  Abd: no masses; no bruits;   Exts: normal DP and PT pulses bilaterally, no edema noted           LABS    CMP  No results for input(s): K in the last 2160 hours.    Invalid input(s): GFR      LIPID  No results for input(s): HDL, CHOL, TRIG, LDLCALC, CHOLHDL, NONHDL, TOTALCHOLEST in the last 2160 hours.      CBS: microcytic anemia; GFR >60; lipid panel OK with low HDL , A1c: 8.9  Diagnostic Results:    EK22: NSR, low voltage; nonspecific T wave changes     Echo: : EF 25%; global hypokinesis; LAE, moderate MR, mild TR     Chart review:      Cath per patient at [facility]: no blockages ? Date   ASSESSMENT/PLAN:     1. Cardiomyopathy: why? Nonischemic per patient - is this burned out hypertension? Diabetes? Idiopathic familial? - marked improvement in symptoms     Plan: continue all medications  Echocardiogram to check EF and need for ICD   Return 3 month     Jeffery Yadav MD        "

## 2022-09-26 ENCOUNTER — HOSPITAL ENCOUNTER (OUTPATIENT)
Dept: CARDIOLOGY | Facility: HOSPITAL | Age: 72
Discharge: HOME | End: 2022-09-26
Attending: INTERNAL MEDICINE
Payer: MEDICARE

## 2022-09-26 VITALS — WEIGHT: 212 LBS | HEIGHT: 67 IN | BODY MASS INDEX: 33.27 KG/M2

## 2022-09-26 DIAGNOSIS — I50.9 ACUTE ON CHRONIC CONGESTIVE HEART FAILURE, UNSPECIFIED HEART FAILURE TYPE: ICD-10-CM

## 2022-09-26 LAB
AV INDEX (PROSTH): 0.88
AV MEAN GRADIENT: 2 MMHG
AV PEAK GRADIENT: 3 MMHG
AV VALVE AREA: 4.06 CM2
AV VELOCITY RATIO: 0.83
BSA FOR ECHO PROCEDURE: 2.13 M2
CV ECHO LV RWT: 0.42 CM
DOP CALC AO PEAK VEL: 0.87 M/S
DOP CALC AO VTI: 15.2 CM
DOP CALC LVOT AREA: 4.6 CM2
DOP CALC LVOT DIAMETER: 2.43 CM
DOP CALC LVOT PEAK VEL: 0.72 M/S
DOP CALC LVOT STROKE VOLUME: 61.65 CM3
DOP CALC MV VTI: 14.2 CM
DOP CALCLVOT PEAK VEL VTI: 13.3 CM
E WAVE DECELERATION TIME: 155.52 MSEC
E/A RATIO: 0.54
ECHO LV POSTERIOR WALL: 1.14 CM (ref 0.6–1.1)
EJECTION FRACTION: 30 %
FRACTIONAL SHORTENING: 12 % (ref 28–44)
INTERVENTRICULAR SEPTUM: 1.28 CM (ref 0.6–1.1)
LA MAJOR: 5.72 CM
LA MINOR: 5.93 CM
LEFT ATRIUM SIZE: 4.6 CM
LEFT INTERNAL DIMENSION IN SYSTOLE: 4.81 CM (ref 2.1–4)
LEFT VENTRICLE DIASTOLIC VOLUME INDEX: 70.27 ML/M2
LEFT VENTRICLE DIASTOLIC VOLUME: 145.45 ML
LEFT VENTRICLE MASS INDEX: 132 G/M2
LEFT VENTRICLE SYSTOLIC VOLUME INDEX: 52.3 ML/M2
LEFT VENTRICLE SYSTOLIC VOLUME: 108.3 ML
LEFT VENTRICULAR INTERNAL DIMENSION IN DIASTOLE: 5.47 CM (ref 3.5–6)
LEFT VENTRICULAR MASS: 273.08 G
LVOT MG: 1.42 MMHG
LVOT MV: 0.58 CM/S
MV PEAK A VEL: 0.84 M/S
MV PEAK E VEL: 0.45 M/S
MV PEAK GRADIENT: 3 MMHG
MV STENOSIS PRESSURE HALF TIME: 45.1 MS
MV VALVE AREA BY CONTINUITY EQUATION: 4.34 CM2
MV VALVE AREA P 1/2 METHOD: 4.88 CM2
PISA TR MAX VEL: 1.57 M/S
RA MAJOR: 4.37 CM
RA PRESSURE: 3 MMHG
TR MAX PG: 10 MMHG
TV REST PULMONARY ARTERY PRESSURE: 13 MMHG

## 2022-09-26 PROCEDURE — 93306 ECHO (CUPID ONLY): ICD-10-PCS | Mod: 26,,, | Performed by: INTERNAL MEDICINE

## 2022-09-26 PROCEDURE — 93306 TTE W/DOPPLER COMPLETE: CPT | Mod: 26,,, | Performed by: INTERNAL MEDICINE

## 2022-09-26 PROCEDURE — 93306 TTE W/DOPPLER COMPLETE: CPT | Mod: PO

## 2022-10-10 ENCOUNTER — PATIENT OUTREACH (OUTPATIENT)
Dept: ADMINISTRATIVE | Facility: HOSPITAL | Age: 72
End: 2022-10-10
Payer: MEDICARE

## 2022-10-10 NOTE — PROGRESS NOTES
Care Everywhere updates requested and reviewed.  Immunizations reconciled. Media reports reviewed.  Duplicate HM overrides and  orders removed.  Overdue HM topic chart audit and/or requested.  Overdue lab testing linked to upcoming lab appointments if applies.    Lab jose rafael and [facility] reviewed for  Lab testing       Efax sent to Bucky Szymanski for eye exam.    Health Maintenance Due   Topic Date Due    TETANUS VACCINE  Never done    Shingles Vaccine (1 of 2) Never done    Foot Exam  2021    COVID-19 Vaccine (5 - Booster for Moderna series) 2022    Influenza Vaccine (1) 2022

## 2022-10-10 NOTE — LETTER
AUTHORIZATION FOR RELEASE OF   CONFIDENTIAL INFORMATION    Bucky Szymanski,    We are seeing Katherine Berger, date of birth 1950, in the clinic at [facility]. Itz Mues MD is the patient's PCP. Katherine Berger has an outstanding lab/procedure at the time we reviewed her chart. In order to help keep her health information updated, she has authorized us to request the following medical record(s):                                     ( X )  EYE EXAM                 Please fax records to [facility], [facility] [place], [place], [phone]    If you have any questions, please contact Lesa Kat at [phone].             Patient Name: Katherine Berger  : 1950  Patient Phone #: [phone]

## 2022-10-13 ENCOUNTER — PATIENT OUTREACH (OUTPATIENT)
Dept: ADMINISTRATIVE | Facility: HOSPITAL | Age: 72
End: 2022-10-13
Payer: MEDICARE

## 2022-10-17 ENCOUNTER — TELEPHONE (OUTPATIENT)
Dept: FAMILY MEDICINE | Facility: CLINIC | Age: 72
End: 2022-10-17
Payer: MEDICARE

## 2022-10-17 DIAGNOSIS — R05.9 COUGH, UNSPECIFIED TYPE: Primary | ICD-10-CM

## 2022-10-17 RX ORDER — BENZONATATE 100 MG/1
100 CAPSULE ORAL 3 TIMES DAILY PRN
Qty: 30 CAPSULE | Refills: 0 | Status: SHIPPED | OUTPATIENT
Start: 2022-10-17 | End: 2022-10-27

## 2022-10-17 NOTE — TELEPHONE ENCOUNTER
----- Message from Connor Cummins sent at 10/17/2022  1:01 PM CDT -----  Type:  Needs Medical Advice    Who Called: self  Reason:jacqui she has the flu ad she wants something called in   Would the patient rather a call back or a response via [facility]? call  Best Call Back Number: [phone]  Additional Information: none      Sore throat  Dry cough  Started yesterday  Taking tylenol for the fever ( hold cold)  Taking coricidin but not helping   Can you call in something or make suggestions  [facility]

## 2022-10-24 ENCOUNTER — OFFICE VISIT (OUTPATIENT)
Dept: FAMILY MEDICINE | Facility: CLINIC | Age: 72
End: 2022-10-24
Payer: MEDICARE

## 2022-10-24 VITALS
SYSTOLIC BLOOD PRESSURE: 114 MMHG | OXYGEN SATURATION: 97 % | DIASTOLIC BLOOD PRESSURE: 66 MMHG | HEIGHT: 67 IN | BODY MASS INDEX: 33.12 KG/M2 | WEIGHT: 211 LBS | HEART RATE: 88 BPM | TEMPERATURE: 98 F

## 2022-10-24 DIAGNOSIS — F41.9 ANXIETY: ICD-10-CM

## 2022-10-24 PROCEDURE — 3074F SYST BP LT 130 MM HG: CPT | Mod: CPTII,S$GLB,, | Performed by: FAMILY MEDICINE

## 2022-10-24 PROCEDURE — 3288F FALL RISK ASSESSMENT DOCD: CPT | Mod: CPTII,S$GLB,, | Performed by: FAMILY MEDICINE

## 2022-10-24 PROCEDURE — 3061F NEG MICROALBUMINURIA REV: CPT | Mod: CPTII,S$GLB,, | Performed by: FAMILY MEDICINE

## 2022-10-24 PROCEDURE — 1101F PT FALLS ASSESS-DOCD LE1/YR: CPT | Mod: CPTII,S$GLB,, | Performed by: FAMILY MEDICINE

## 2022-10-24 PROCEDURE — 99213 OFFICE O/P EST LOW 20 MIN: CPT | Mod: S$GLB,,, | Performed by: FAMILY MEDICINE

## 2022-10-24 PROCEDURE — 4010F ACE/ARB THERAPY RXD/TAKEN: CPT | Mod: CPTII,S$GLB,, | Performed by: FAMILY MEDICINE

## 2022-10-24 PROCEDURE — 3066F NEPHROPATHY DOC TX: CPT | Mod: CPTII,S$GLB,, | Performed by: FAMILY MEDICINE

## 2022-10-24 PROCEDURE — 1101F PR PT FALLS ASSESS DOC 0-1 FALLS W/OUT INJ PAST YR: ICD-10-PCS | Mod: CPTII,S$GLB,, | Performed by: FAMILY MEDICINE

## 2022-10-24 PROCEDURE — 1125F AMNT PAIN NOTED PAIN PRSNT: CPT | Mod: CPTII,S$GLB,, | Performed by: FAMILY MEDICINE

## 2022-10-24 PROCEDURE — 3288F PR FALLS RISK ASSESSMENT DOCUMENTED: ICD-10-PCS | Mod: CPTII,S$GLB,, | Performed by: FAMILY MEDICINE

## 2022-10-24 PROCEDURE — 3061F PR NEG MICROALBUMINURIA RESULT DOCUMENTED/REVIEW: ICD-10-PCS | Mod: CPTII,S$GLB,, | Performed by: FAMILY MEDICINE

## 2022-10-24 PROCEDURE — 1125F PR PAIN SEVERITY QUANTIFIED, PAIN PRESENT: ICD-10-PCS | Mod: CPTII,S$GLB,, | Performed by: FAMILY MEDICINE

## 2022-10-24 PROCEDURE — 3074F PR MOST RECENT SYSTOLIC BLOOD PRESSURE < 130 MM HG: ICD-10-PCS | Mod: CPTII,S$GLB,, | Performed by: FAMILY MEDICINE

## 2022-10-24 PROCEDURE — 3078F DIAST BP <80 MM HG: CPT | Mod: CPTII,S$GLB,, | Performed by: FAMILY MEDICINE

## 2022-10-24 PROCEDURE — 3078F PR MOST RECENT DIASTOLIC BLOOD PRESSURE < 80 MM HG: ICD-10-PCS | Mod: CPTII,S$GLB,, | Performed by: FAMILY MEDICINE

## 2022-10-24 PROCEDURE — 4010F PR ACE/ARB THEARPY RXD/TAKEN: ICD-10-PCS | Mod: CPTII,S$GLB,, | Performed by: FAMILY MEDICINE

## 2022-10-24 PROCEDURE — 1159F MED LIST DOCD IN RCRD: CPT | Mod: CPTII,S$GLB,, | Performed by: FAMILY MEDICINE

## 2022-10-24 PROCEDURE — 3066F PR DOCUMENTATION OF TREATMENT FOR NEPHROPATHY: ICD-10-PCS | Mod: CPTII,S$GLB,, | Performed by: FAMILY MEDICINE

## 2022-10-24 PROCEDURE — 3046F PR MOST RECENT HEMOGLOBIN A1C LEVEL > 9.0%: ICD-10-PCS | Mod: CPTII,S$GLB,, | Performed by: FAMILY MEDICINE

## 2022-10-24 PROCEDURE — 1159F PR MEDICATION LIST DOCUMENTED IN MEDICAL RECORD: ICD-10-PCS | Mod: CPTII,S$GLB,, | Performed by: FAMILY MEDICINE

## 2022-10-24 PROCEDURE — 99213 PR OFFICE/OUTPT VISIT, EST, LEVL III, 20-29 MIN: ICD-10-PCS | Mod: S$GLB,,, | Performed by: FAMILY MEDICINE

## 2022-10-24 PROCEDURE — 3046F HEMOGLOBIN A1C LEVEL >9.0%: CPT | Mod: CPTII,S$GLB,, | Performed by: FAMILY MEDICINE

## 2022-10-24 RX ORDER — METFORMIN HYDROCHLORIDE 1000 MG/1
1000 TABLET ORAL 2 TIMES DAILY
Status: CANCELLED | OUTPATIENT
Start: 2022-10-24

## 2022-10-24 RX ORDER — BUSPIRONE HYDROCHLORIDE 5 MG/1
5 TABLET ORAL 2 TIMES DAILY
Qty: 180 TABLET | Refills: 3 | Status: SHIPPED | OUTPATIENT
Start: 2022-10-24 | End: 2023-09-28 | Stop reason: SDUPTHER

## 2022-10-24 RX ORDER — OXYCODONE AND ACETAMINOPHEN 10; 325 MG/1; MG/1
TABLET ORAL
COMMUNITY
Start: 2022-09-19

## 2022-10-24 RX ORDER — METFORMIN HYDROCHLORIDE 500 MG/1
1000 TABLET, EXTENDED RELEASE ORAL
Qty: 180 TABLET | Refills: 3 | Status: SHIPPED | OUTPATIENT
Start: 2022-10-24 | End: 2024-01-03 | Stop reason: SDUPTHER

## 2022-10-24 RX ORDER — FERROUS SULFATE 324(65)MG
325 TABLET, DELAYED RELEASE (ENTERIC COATED) ORAL DAILY PRN
Qty: 90 TABLET | Refills: 3 | Status: SHIPPED | OUTPATIENT
Start: 2022-10-24

## 2022-10-24 RX ORDER — HYOSCYAMINE SULFATE 0.125 MG
125 TABLET ORAL EVERY 6 HOURS PRN
Qty: 25 TABLET | Refills: 0 | Status: SHIPPED | OUTPATIENT
Start: 2022-10-24 | End: 2024-03-21 | Stop reason: SDUPTHER

## 2022-10-24 RX ORDER — ESCITALOPRAM OXALATE 10 MG/1
10 TABLET ORAL DAILY
Qty: 90 TABLET | Refills: 3 | Status: SHIPPED | OUTPATIENT
Start: 2022-10-24

## 2022-10-30 NOTE — PROGRESS NOTES
" Patient ID: Katherine Berger is a 72 y.o. female.    Chief Complaint: Follow-up and Low-back Pain    HPI      Katherine Breger is a 72 y.o. female co lower back pain with no red flags.  Otc meds have not relieved her pain.    DM- stable no co of hypo or hyper glycemia  Mood stable on current meds.  Stress persists.          Vitals:    10/24/22 0909   BP: 114/66   BP Location: Left arm   Patient Position: Sitting   Pulse: 88   Temp: 98.2 °F (36.8 °C)   TempSrc: Oral   SpO2: 97%   Weight: 95.7 kg (210 lb 15.7 oz)   Height: 5' 7" (1.702 m)            Review of Symptoms      Physical Exam    Constitutional:  Oriented to person, place, and time.appears well-developed and well-nourished.  No distress.      HENT  Head: Normocephalic and atraumatic  Right Ear: External ear normal.   Left Ear: External ear normal.   Nose: External nose normal.   Mouth:  Moist mucus membranes.    Eyes:  Conjunctivae are normal. Right eye exhibits no discharge.  Left eye exhibits no discharge. No scleral icterus.  No periorbital edema    Cardiovascular:  Regular rate and rhythm with normal S1 and S2     Pulmonary/Chest:   Clear to auscultation bilaterally without wheezes, rhonchi or rales      Musculoskeletal:  No edema. No obvious deformity No wasting       Neurological:  Alert and oriented to person, place, and time.   Coordination normal.     Skin:   Skin is warm and dry.  No diaphoresis.   No rash noted.     Psychiatric: Normal mood and affect. Behavior is normal.  Judgment and thought content normal.     Complete Blood Count  Lab Results   Component Value Date    RBC 5.16 05/15/2022    HGB 11.5 (L) 05/15/2022    HCT 36.7 (L) 05/15/2022    MCV 71 (L) 05/15/2022    MCH 22.3 (L) 05/15/2022    MCHC 31.3 (L) 05/15/2022    RDW 14.5 05/15/2022     05/15/2022    MPV 11.4 05/15/2022    GRAN 6.6 05/15/2022    GRAN 69.9 05/15/2022    LYMPH 1.8 05/15/2022    LYMPH 19.3 05/15/2022    MONO 0.7 05/15/2022    MONO 7.8 05/15/2022    EOS 0.2 05/15/2022 "    BASO 0.04 05/15/2022    EOSINOPHIL 2.4 05/15/2022    BASOPHIL 0.4 05/15/2022    DIFFMETHOD Automated 05/15/2022       Comprehensive Metabolic Panel  Lab Results   Component Value Date     (H) 05/15/2022    BUN 12 05/15/2022    CREATININE 0.9 05/15/2022     05/15/2022    K 3.8 05/15/2022    CL 99 05/15/2022    PROT 6.6 05/14/2022    ALBUMIN 3.4 (L) 05/14/2022    BILITOT 0.5 05/14/2022    AST 14 05/14/2022    ALKPHOS 62 05/14/2022    CO2 27 05/15/2022    ALT 14 05/14/2022    ANIONGAP 16 05/15/2022    EGFRNONAA >60 05/15/2022    ESTGFRAFRICA >60 05/15/2022       TSH  No results found for: TSH    Assessment / Plan:      ICD-10-CM ICD-9-CM   1. Anxiety  F41.9 300.00     Anxiety  -     EScitalopram oxalate (LEXAPRO) 10 MG tablet; Take 1 tablet (10 mg total) by mouth once daily.  Dispense: 90 tablet; Refill: 3    Other orders  -     ferrous sulfate 324 mg (65 mg iron) TbEC; Take 1 tablet (324 mg total) by mouth daily as needed.  Dispense: 90 tablet; Refill: 3  -     busPIRone (BUSPAR) 5 MG Tab; Take 1 tablet (5 mg total) by mouth 2 (two) times daily. For anxiety  Dispense: 180 tablet; Refill: 3  -     hyoscyamine (ANASPAZ,LEVSIN) 0.125 mg Tab; Take 1 tablet (125 mcg total) by mouth every 6 (six) hours as needed (P.r.n. bladder spasm).  Dispense: 25 tablet; Refill: 0  -     metFORMIN (GLUCOPHAGE-XR) 500 MG ER 24hr tablet; Take 2 tablets (1,000 mg total) by mouth daily with breakfast.  Dispense: 180 tablet; Refill: 3

## 2022-11-10 ENCOUNTER — TELEPHONE (OUTPATIENT)
Dept: FAMILY MEDICINE | Facility: CLINIC | Age: 72
End: 2022-11-10
Payer: MEDICARE

## 2022-11-10 RX ORDER — FUROSEMIDE 40 MG/1
40 TABLET ORAL DAILY
Qty: 90 TABLET | Refills: 3 | Status: SHIPPED | OUTPATIENT
Start: 2022-11-10 | End: 2023-09-19

## 2022-11-10 NOTE — TELEPHONE ENCOUNTER
----- Message from Erica Lawrence sent at 11/10/2022  2:17 PM CST -----  Type:  RX Refill Request    Who Called: Pt   Refill or New Rx:RX   RX Name and Strength:furosemide (LASIX) 40 MG tablet   How is the patient currently taking it? (ex. 1XDay):1XDay   Is this a 30 day or 90 day RX:30  Preferred Pharmacy with phone number:[facility] [address] [place] [address] [address] [facility]   Phone: [phone]  Fax:  [phone]  Would the patient rather a call back or a response via [facility]? Call back   Best Call Back Number:[phone]  Additional Information: medication was cancelled but pt states she still take this medciation

## 2022-11-17 ENCOUNTER — TELEPHONE (OUTPATIENT)
Dept: FAMILY MEDICINE | Facility: CLINIC | Age: 72
End: 2022-11-17
Payer: MEDICARE

## 2022-11-17 DIAGNOSIS — E11.9 TYPE 2 DIABETES MELLITUS WITHOUT COMPLICATION, WITHOUT LONG-TERM CURRENT USE OF INSULIN: Primary | ICD-10-CM

## 2022-11-17 RX ORDER — DULAGLUTIDE 0.75 MG/.5ML
0.75 INJECTION, SOLUTION SUBCUTANEOUS WEEKLY
Qty: 12 PEN | Refills: 1 | Status: SHIPPED | OUTPATIENT
Start: 2022-11-17 | End: 2023-02-17

## 2022-11-17 NOTE — TELEPHONE ENCOUNTER
Trulicity is too expensive. Pt can not afford medication. Any recommendations? Metformin causes excessive diarrhea.

## 2022-11-17 NOTE — TELEPHONE ENCOUNTER
DC the metformin.  I sent in a new prescription called Trulicity.  Do inject once each week to help reduce your glucose readings.  You need to check a hemoglobin A1c   Soon/weekso we can see your starting point.

## 2022-11-17 NOTE — TELEPHONE ENCOUNTER
----- Message from Eliz Almonte, Patient Care Assistant sent at 11/17/2022  1:50 PM CST -----  Type:  Needs Medical Advice    Who Called:  pt  Symptoms (please be specific):  Patient would like a call back regarding the  price of the dulaglutide (TRULICITY) 0.75 mg/0.5 mL pen injector the cost is still 200 dollars  Would the patient rather a call back or a response via [facility]?  Call   Best Call Back Number:  [phone]   Additional Information:

## 2022-11-17 NOTE — TELEPHONE ENCOUNTER
----- Message from Belgica Mcduffie sent at 11/17/2022  1:00 PM CST -----  Regarding: advice  Contact: [phone]  Type:  Needs Medical Advice    Who Called:  self   Symptoms (please be specific):  still having diarrhea due to the metFORMIN (GLUCOPHAGE-XR) 500 MG ER 24hr tablets. Would to replace this medication as well.   How long has patient had these symptoms:     Pharmacy name and phone #:   [facility] [address] [place] [address] [address] [facility];  Would the patient rather a call back or a response via [facility]?    Best Call Back Number:  [phone]  Additional Information:

## 2022-11-17 NOTE — TELEPHONE ENCOUNTER
Pt notified to discontinue taking Metformin and begin using Trulicity 0.75 mg weekly. Pt stated that she will call her insurance to see how much she's going to be responsible for. If it's something that she can afford, she'll  prescription. If not, she will request something else. Pt will call back regardless, to tell you if plan needs to be adjusted.

## 2022-11-18 ENCOUNTER — TELEPHONE (OUTPATIENT)
Dept: FAMILY MEDICINE | Facility: CLINIC | Age: 72
End: 2022-11-18
Payer: MEDICARE

## 2022-11-18 RX ORDER — INSULIN DETEMIR 100 [IU]/ML
INJECTION, SOLUTION SUBCUTANEOUS
Qty: 15 ML | Refills: 11 | Status: SHIPPED | OUTPATIENT
Start: 2022-11-18 | End: 2024-03-21

## 2022-11-18 NOTE — TELEPHONE ENCOUNTER
----- Message from Teena Mojica sent at 11/18/2022 10:00 AM CST -----  Type:  Needs Medical Advice    Who Called: pt  Symptoms (please be specific): glands swollen   How long has patient had these symptoms:  2 weeks     Would the patient rather a call back or a response via [employer]? Call   Best Call Back Number: [phone]  Additional Information: Pt states her doctor yesterday told her and she needs to see a specialist.

## 2022-11-18 NOTE — TELEPHONE ENCOUNTER
Pt is not very receptive to beginning insulin. She said that she'll run this by Dr. Sheriff and get his opinion. She believes her sugar can be controlled by taking oral medications. She has an appt with Dr. Sheriff 11/22/22.

## 2022-11-18 NOTE — TELEPHONE ENCOUNTER
I sent in insulin - that is the next step    Inject 15 units each day   we can show you how to do this if you are not sure how to inject yourself each day    Also sent diabetic education

## 2022-11-21 ENCOUNTER — TELEPHONE (OUTPATIENT)
Dept: CARDIOLOGY | Facility: CLINIC | Age: 72
End: 2022-11-21
Payer: MEDICARE

## 2022-11-21 NOTE — TELEPHONE ENCOUNTER
----- Message from Bandar Jairo sent at 11/21/2022  8:43 AM CST -----  Contact: Pt  .Type:  RX Refill Request    Who Called: Rx  Refill or New Rx:Refill  RX Name and Strength:empagliflozin (JARDIANCE) 10 mg tablet  Preferred Pharmacy with phone number:  [facility] [address] [place] [address] [address] [facility]   Phone: [phone]  Fax:  [phone]  Local or Mail Order:Local  Ordering Provider:[facility]  Would the patient rather a call back or a response via [facility]? Call  Best Call Back Number:[phone]  Sugar is up 500.  Pt said it spicked up 20 minutes ago   Pt would like to get it filled this morning so she can take medication

## 2022-11-21 NOTE — TELEPHONE ENCOUNTER
Called in verbal. Jardiance 10 mg 1 tab by mouth daily. Qty 90  refill 1. Dr Yadav is out this week and patient was completely out of medication.

## 2022-11-22 LAB
CHOLEST SERPL-MSCNC: 132 MG/DL (ref 0–200)
HBA1C MFR BLD: 9.3 %
HDLC SERPL-MCNC: 45 MG/DL (ref 35–70)
LDLC SERPL CALC-MCNC: 144 MG/DL (ref 0–160)
TRIGL SERPL-MCNC: 108 MG/DL (ref 40–160)

## 2022-12-06 ENCOUNTER — PATIENT OUTREACH (OUTPATIENT)
Dept: ADMINISTRATIVE | Facility: HOSPITAL | Age: 72
End: 2022-12-06
Payer: MEDICARE

## 2022-12-06 LAB
LEFT EYE DM RETINOPATHY: POSITIVE
RIGHT EYE DM RETINOPATHY: POSITIVE

## 2022-12-12 ENCOUNTER — OFFICE VISIT (OUTPATIENT)
Dept: CARDIOLOGY | Facility: CLINIC | Age: 72
End: 2022-12-12
Payer: MEDICARE

## 2022-12-12 VITALS
BODY MASS INDEX: 33.9 KG/M2 | SYSTOLIC BLOOD PRESSURE: 136 MMHG | DIASTOLIC BLOOD PRESSURE: 87 MMHG | OXYGEN SATURATION: 97 % | HEART RATE: 77 BPM | HEIGHT: 67 IN | WEIGHT: 216 LBS

## 2022-12-12 DIAGNOSIS — I50.22 CHRONIC SYSTOLIC HEART FAILURE: Primary | ICD-10-CM

## 2022-12-12 PROCEDURE — 3046F HEMOGLOBIN A1C LEVEL >9.0%: CPT | Mod: CPTII,S$GLB,, | Performed by: INTERNAL MEDICINE

## 2022-12-12 PROCEDURE — 3061F PR NEG MICROALBUMINURIA RESULT DOCUMENTED/REVIEW: ICD-10-PCS | Mod: CPTII,S$GLB,, | Performed by: INTERNAL MEDICINE

## 2022-12-12 PROCEDURE — 4010F PR ACE/ARB THEARPY RXD/TAKEN: ICD-10-PCS | Mod: CPTII,S$GLB,, | Performed by: INTERNAL MEDICINE

## 2022-12-12 PROCEDURE — 4010F ACE/ARB THERAPY RXD/TAKEN: CPT | Mod: CPTII,S$GLB,, | Performed by: INTERNAL MEDICINE

## 2022-12-12 PROCEDURE — 1159F MED LIST DOCD IN RCRD: CPT | Mod: CPTII,S$GLB,, | Performed by: INTERNAL MEDICINE

## 2022-12-12 PROCEDURE — 1126F PR PAIN SEVERITY QUANTIFIED, NO PAIN PRESENT: ICD-10-PCS | Mod: CPTII,S$GLB,, | Performed by: INTERNAL MEDICINE

## 2022-12-12 PROCEDURE — 3046F PR MOST RECENT HEMOGLOBIN A1C LEVEL > 9.0%: ICD-10-PCS | Mod: CPTII,S$GLB,, | Performed by: INTERNAL MEDICINE

## 2022-12-12 PROCEDURE — 3079F PR MOST RECENT DIASTOLIC BLOOD PRESSURE 80-89 MM HG: ICD-10-PCS | Mod: CPTII,S$GLB,, | Performed by: INTERNAL MEDICINE

## 2022-12-12 PROCEDURE — 3075F PR MOST RECENT SYSTOLIC BLOOD PRESS GE 130-139MM HG: ICD-10-PCS | Mod: CPTII,S$GLB,, | Performed by: INTERNAL MEDICINE

## 2022-12-12 PROCEDURE — 3079F DIAST BP 80-89 MM HG: CPT | Mod: CPTII,S$GLB,, | Performed by: INTERNAL MEDICINE

## 2022-12-12 PROCEDURE — 3008F BODY MASS INDEX DOCD: CPT | Mod: CPTII,S$GLB,, | Performed by: INTERNAL MEDICINE

## 2022-12-12 PROCEDURE — 3008F PR BODY MASS INDEX (BMI) DOCUMENTED: ICD-10-PCS | Mod: CPTII,S$GLB,, | Performed by: INTERNAL MEDICINE

## 2022-12-12 PROCEDURE — 99214 PR OFFICE/OUTPT VISIT, EST, LEVL IV, 30-39 MIN: ICD-10-PCS | Mod: S$GLB,,, | Performed by: INTERNAL MEDICINE

## 2022-12-12 PROCEDURE — 99999 PR PBB SHADOW E&M-EST. PATIENT-LVL IV: CPT | Mod: PBBFAC,,, | Performed by: INTERNAL MEDICINE

## 2022-12-12 PROCEDURE — 3075F SYST BP GE 130 - 139MM HG: CPT | Mod: CPTII,S$GLB,, | Performed by: INTERNAL MEDICINE

## 2022-12-12 PROCEDURE — 3066F NEPHROPATHY DOC TX: CPT | Mod: CPTII,S$GLB,, | Performed by: INTERNAL MEDICINE

## 2022-12-12 PROCEDURE — 1160F PR REVIEW ALL MEDS BY PRESCRIBER/CLIN PHARMACIST DOCUMENTED: ICD-10-PCS | Mod: CPTII,S$GLB,, | Performed by: INTERNAL MEDICINE

## 2022-12-12 PROCEDURE — 1160F RVW MEDS BY RX/DR IN RCRD: CPT | Mod: CPTII,S$GLB,, | Performed by: INTERNAL MEDICINE

## 2022-12-12 PROCEDURE — 3061F NEG MICROALBUMINURIA REV: CPT | Mod: CPTII,S$GLB,, | Performed by: INTERNAL MEDICINE

## 2022-12-12 PROCEDURE — 1159F PR MEDICATION LIST DOCUMENTED IN MEDICAL RECORD: ICD-10-PCS | Mod: CPTII,S$GLB,, | Performed by: INTERNAL MEDICINE

## 2022-12-12 PROCEDURE — 99214 OFFICE O/P EST MOD 30 MIN: CPT | Mod: S$GLB,,, | Performed by: INTERNAL MEDICINE

## 2022-12-12 PROCEDURE — 3066F PR DOCUMENTATION OF TREATMENT FOR NEPHROPATHY: ICD-10-PCS | Mod: CPTII,S$GLB,, | Performed by: INTERNAL MEDICINE

## 2022-12-12 PROCEDURE — 99999 PR PBB SHADOW E&M-EST. PATIENT-LVL IV: ICD-10-PCS | Mod: PBBFAC,,, | Performed by: INTERNAL MEDICINE

## 2022-12-12 PROCEDURE — 1126F AMNT PAIN NOTED NONE PRSNT: CPT | Mod: CPTII,S$GLB,, | Performed by: INTERNAL MEDICINE

## 2022-12-12 RX ORDER — SPIRONOLACTONE 25 MG/1
25 TABLET ORAL DAILY
Qty: 90 TABLET | Refills: 1 | Status: SHIPPED | OUTPATIENT
Start: 2022-12-12 | End: 2023-06-22

## 2022-12-12 RX ORDER — METOPROLOL SUCCINATE 50 MG/1
50 TABLET, EXTENDED RELEASE ORAL DAILY
Qty: 90 TABLET | Refills: 3 | Status: SHIPPED | OUTPATIENT
Start: 2022-12-12 | End: 2024-01-08 | Stop reason: SDUPTHER

## 2022-12-12 NOTE — PROGRESS NOTES
[facility]     SUBJECTIVE:     History of Present Illness:  Patient is a 72 y.o. female presents with congestive heart failure. Feels great     Primary Diagnosis:   1. Hypertension  2. DM: positive  3. Nonsmoker  4. Family history of early CAD: son  of congestive heart failure;   5. Chronic systolic heart failure   ROS  Since last visit :   A. Breathing is better; no PND or orthopnea  B. No shortness of breath at rest  C. No palpitations  D. No syncope  E. Activity: much improved; walking in store; pushing the lawnmower to cut grass without issues  F.  blood sugars are markedly improved  G.  blood pressures at home: normal  Review of patient's allergies indicates:  No Known Allergies    Past Medical History:   Diagnosis Date    Arthritis     Diabetes mellitus     Hypertension     Kidney stone        Past Surgical History:   Procedure Laterality Date    COLONOSCOPY  2013    dr ram - 5 years    FOOT SURGERY Right 2010    bone spur    FOOT SURGERY Left     fx ankle    HYSTERECTOMY      age 47    KNEE SURGERY Left 06/10/2016    TKR    OOPHORECTOMY      TONSILLECTOMY         Family History   Problem Relation Age of Onset    Heart attack Mother     Seizures Mother         fell in bathtub and drowned    Colon cancer Sister     Breast cancer Sister        Social History     Tobacco Use    Smoking status: Never    Smokeless tobacco: Never   Substance Use Topics    Alcohol use: No     Alcohol/week: 0.0 standard drinks    Drug use: No        Past Hospitalization:   24: diuresed and sent home   Home meds:  Current Outpatient Medications on File Prior to Visit   Medication Sig Dispense Refill    amitriptyline (ELAVIL) 10 MG tablet TAKE ONE TO THREE TABLETS BY MOUTH AT BEDTIME FOR  HEADACHE 90 tablet 3    ascorbic acid, vitamin C, (VITAMIN C) 500 MG tablet Take 500 mg by mouth once daily.      atorvastatin (LIPITOR) 40 MG tablet Take 1 tablet (40 mg total) by mouth once daily. 90 tablet 3     busPIRone (BUSPAR) 5 MG Tab Take 1 tablet (5 mg total) by mouth 2 (two) times daily. For anxiety 180 tablet 3    diclofenac sodium (VOLTAREN) 1 % Gel Apply 2 g topically 3 (three) times daily. 1 Tube 3    EScitalopram oxalate (LEXAPRO) 10 MG tablet Take 1 tablet (10 mg total) by mouth once daily. 90 tablet 3    ferrous sulfate 324 mg (65 mg iron) TbEC Take 1 tablet (324 mg total) by mouth daily as needed. 90 tablet 3    furosemide (LASIX) 40 MG tablet Take 1 tablet (40 mg total) by mouth once daily. 90 tablet 3    gabapentin (NEURONTIN) 100 MG capsule Takes on in am and two in pm for back pain radiating down leg. 270 capsule 3    glimepiride (AMARYL) 2 MG tablet Take 1 tablet (2 mg total) by mouth 2 (two) times daily. Take 2 mg by mouth 2 (two) times daily. 180 tablet 3    hyoscyamine (ANASPAZ,LEVSIN) 0.125 mg Tab Take 1 tablet (125 mcg total) by mouth every 6 (six) hours as needed (P.r.n. bladder spasm). 25 tablet 0    insulin detemir U-100 (LEVEMIR FLEXTOUCH U-100 INSULN) 100 unit/mL (3 mL) InPn pen 15 units sq daily.  Please disp needles to match 15 mL 11    JARDIANCE 10 mg tablet Take 1 tablet by mouth once daily 90 tablet 0    MULTIVIT/IRON/FA/K/HERB NO.244 (ALIVE WOMEN'S ENERGY ORAL) Take 1 tablet by mouth daily as needed.       sacubitriL-valsartan (ENTRESTO) 49-51 mg per tablet Take 1 tablet by mouth 2 (two) times daily. 180 tablet 1    tamsulosin (FLOMAX) 0.4 mg Cap Take 1 capsule (0.4 mg total) by mouth once daily. 90 capsule 3    vitamin D (VITAMIN D3) 1000 units Tab Take 1,000 Units by mouth once daily.      [DISCONTINUED] metoprolol succinate (TOPROL-XL) 25 MG 24 hr tablet Take 1 tablet (25 mg total) by mouth once daily. 90 tablet 3    blood sugar diagnostic (BLOOD GLUCOSE TEST) Strp Strips for 3 times a day testing   dispense brand covered by insurance and to match meter brand 300 each 3    diabetic supplies, miscellan. Misc Lancets for 3 times a day testing    dispense brand covered by insurance t 300  "each 3    dulaglutide (TRULICITY) 0.75 mg/0.5 mL pen injector Inject 0.75 mg into the skin once a week. (Patient not taking: Reported on 2022) 12 pen 1    metFORMIN (GLUCOPHAGE-XR) 500 MG ER 24hr tablet Take 2 tablets (1,000 mg total) by mouth daily with breakfast. (Patient not taking: Reported on 2022) 180 tablet 3    oxyCODONE-acetaminophen (PERCOCET)  mg per tablet        No current facility-administered medications on file prior to visit.       Cardiac meds:  Atorvastatin 40 mg   entresto 49/51 mg BID  Lasix 40 mg  Metoprolol succinate 25 mg   jardiance 10 mg       OBJECTIVE:     Vital Signs (Most Recent)  Vitals:    22 0914   BP: 136/87   Pulse: 77   SpO2: 97%   Weight: 98 kg (216 lb)   Height: 5' 7" (1.702 m)             Physical Exam:    Neck: normal carotids, no bruits; normal JVP  Lungs :clear  Heart: RR, normal S1,S2, no murmurs, no gallops  Abd: no masses; no bruits;   Exts: normal DP and PT pulses bilaterally, no edema noted           LABS    CMP  No results for input(s): K in the last 2160 hours.    Invalid input(s): GFR      LIPID  No results for input(s): HDL, CHOL, TRIG, LDLCALC, CHOLHDL, NONHDL, TOTALCHOLEST in the last 2160 hours.      CBS: microcytic anemia; GFR >60; lipid panel OK with low HDL , A1c: 8.9  : GFR normal; K normal   Diagnostic Results:    EK22: NSR, low voltage; nonspecific T wave changes     Echo: : EF 25%; global hypokinesis; LAE, moderate MR, mild TR   Echo: : EF 30 %; mild MR, LAE     Chart review:      Cath per patient at [facility]: no blockages ? Date   ASSESSMENT/PLAN:     1. Cardiomyopathy: why? Nonischemic per patient - is this burned out hypertension? Diabetes? Idiopathic familial? - marked improvement in symptoms ; last EF around 30%  2. Will maximize drugs and see if there is an improvement and if ICD is really needed.     Plan: continue all medications  Start aldactone 25 mg daily   Increase metoprolol succinate to 50 mg   Return 4 " months and will check MUGA scan then   Jeffery Yadav MD

## 2022-12-13 ENCOUNTER — PATIENT OUTREACH (OUTPATIENT)
Dept: ADMINISTRATIVE | Facility: HOSPITAL | Age: 72
End: 2022-12-13
Payer: MEDICARE

## 2022-12-14 ENCOUNTER — TELEPHONE (OUTPATIENT)
Dept: FAMILY MEDICINE | Facility: CLINIC | Age: 72
End: 2022-12-14
Payer: MEDICARE

## 2022-12-14 NOTE — TELEPHONE ENCOUNTER
Spoke with pt and informed her and scheduled her lab appointment      Pt was not interested in Trulicity due to the price

## 2022-12-14 NOTE — TELEPHONE ENCOUNTER
Hemoglobin A1c is elevated.  Please increase Levemir to 25 units each day.    I suggest starting Trulicity-we can call in a new prescription if you wish.    Please schedule hemoglobin A1c in two months.

## 2022-12-16 ENCOUNTER — TELEPHONE (OUTPATIENT)
Dept: FAMILY MEDICINE | Facility: CLINIC | Age: 72
End: 2022-12-16
Payer: MEDICARE

## 2023-01-07 ENCOUNTER — TELEPHONE (OUTPATIENT)
Dept: FAMILY MEDICINE | Facility: CLINIC | Age: 73
End: 2023-01-07
Payer: MEDICARE

## 2023-01-17 ENCOUNTER — HOSPITAL ENCOUNTER (OUTPATIENT)
Dept: RADIOLOGY | Facility: HOSPITAL | Age: 73
Discharge: HOME | End: 2023-01-17
Attending: INTERNAL MEDICINE
Payer: MEDICARE

## 2023-01-17 DIAGNOSIS — I50.22 CHRONIC SYSTOLIC HEART FAILURE: ICD-10-CM

## 2023-01-17 PROCEDURE — 78472 GATED HEART PLANAR SINGLE: CPT | Mod: 26,HCNC,, | Performed by: STUDENT IN AN ORGANIZED HEALTH CARE EDUCATION/TRAINING PROGRAM

## 2023-01-17 PROCEDURE — 78472 GATED HEART PLANAR SINGLE: CPT | Mod: TC,HCNC,PO

## 2023-01-17 PROCEDURE — 78472 NM CARDIAC BLOOD POOL MUGA: ICD-10-PCS | Mod: 26,HCNC,, | Performed by: STUDENT IN AN ORGANIZED HEALTH CARE EDUCATION/TRAINING PROGRAM

## 2023-01-17 RX ORDER — HEPARIN 100 UNIT/ML
30 SYRINGE INTRAVENOUS
Status: DISCONTINUED | OUTPATIENT
Start: 2023-01-17 | End: 2023-01-18 | Stop reason: HOSPADM

## 2023-02-09 DIAGNOSIS — Z00.00 ENCOUNTER FOR MEDICARE ANNUAL WELLNESS EXAM: ICD-10-CM

## 2023-02-14 ENCOUNTER — LAB VISIT (OUTPATIENT)
Dept: LAB | Facility: HOSPITAL | Age: 73
End: 2023-02-14
Attending: FAMILY MEDICINE
Payer: MEDICARE

## 2023-02-14 DIAGNOSIS — E11.9 TYPE 2 DIABETES MELLITUS WITHOUT COMPLICATION, WITHOUT LONG-TERM CURRENT USE OF INSULIN: ICD-10-CM

## 2023-02-14 LAB
ESTIMATED AVG GLUCOSE: 209 MG/DL (ref 68–131)
HBA1C MFR BLD: 8.9 % (ref 4–5.6)

## 2023-02-14 PROCEDURE — 83036 HEMOGLOBIN GLYCOSYLATED A1C: CPT | Mod: HCNC | Performed by: FAMILY MEDICINE

## 2023-02-14 PROCEDURE — 36415 COLL VENOUS BLD VENIPUNCTURE: CPT | Mod: HCNC,PO | Performed by: FAMILY MEDICINE

## 2023-02-17 ENCOUNTER — TELEPHONE (OUTPATIENT)
Dept: FAMILY MEDICINE | Facility: CLINIC | Age: 73
End: 2023-02-17
Payer: MEDICARE

## 2023-02-17 RX ORDER — DULAGLUTIDE 1.5 MG/.5ML
1.5 INJECTION, SOLUTION SUBCUTANEOUS WEEKLY
Qty: 12 PEN | Refills: 3 | Status: SHIPPED | OUTPATIENT
Start: 2023-02-17 | End: 2023-02-17

## 2023-02-17 RX ORDER — DULAGLUTIDE 0.75 MG/.5ML
0.75 INJECTION, SOLUTION SUBCUTANEOUS WEEKLY
Qty: 12 PEN | Refills: 1 | Status: SHIPPED | OUTPATIENT
Start: 2023-02-17 | End: 2023-02-17 | Stop reason: SDUPTHER

## 2023-02-17 RX ORDER — DULAGLUTIDE 0.75 MG/.5ML
0.75 INJECTION, SOLUTION SUBCUTANEOUS WEEKLY
Qty: 4 PEN | Refills: 3 | Status: SHIPPED | OUTPATIENT
Start: 2023-02-17 | End: 2024-03-21

## 2023-02-17 NOTE — TELEPHONE ENCOUNTER
----- Message from Destinee Sargent sent at 2/17/2023  2:22 PM CST -----  Type:  Needs Medical Advice    Who Called: pt  Symptoms (please be specific): pt is requesting to get a return call pt has some information to give to the nurse in regards to the medication  dulaglutide (TRULICITY) 0.75 mg/0.5 mL pen injector    Would the patient rather a call back or a response via [employer]? call  Best Call Back Number: [phone]  Additional Information:

## 2023-02-17 NOTE — TELEPHONE ENCOUNTER
Care Due:                  Date            Visit Type   Department     Provider  --------------------------------------------------------------------------------                                EP -                              PRIMARY      [facility]  Last Visit: 10-      CARE ([facility])   MEDICINE       Itz Muse                               -                              PRIMARY      [facility]  Next Visit: 04-      CARE ([facility])   [facility]       Itz Muse                                                            Last  Test          Frequency    Reason                     Performed    Due Date  --------------------------------------------------------------------------------    CMP.........  12 months..  furosemide, glimepiride,   05-   05-                             insulin, metFORMIN.......    [facility] Embedded Care Gaps. Reference number: [id number]. 2/17/2023   3:17:06 PM CST

## 2023-02-17 NOTE — TELEPHONE ENCOUNTER
Spoke to pt. Pt stated that if she receives the 90 day supply is to expensive and is asking to only have the 30 day supply sent in as she can pay the co-pay without any problems.

## 2023-02-17 NOTE — TELEPHONE ENCOUNTER
Spoke to pt about starting the trulicity.    Pt said she will call her pharmacy to see how much it will cost her to get it.    Then let us know if it will cost too much.

## 2023-02-17 NOTE — TELEPHONE ENCOUNTER
Your hemoglobin A1c is elevated.  I suggest increasing Trulicity from 0.75-1.5 each week.  Also increase dietary control can help.  Repeat hemoglobin A1c last week of March.  Ordered hemoglobin A1c.

## 2023-03-12 NOTE — PROGRESS NOTES
[facility]     SUBJECTIVE:     History of Present Illness:  Patient is a 73 y.o. female presents with congestive heart failure. Feels great     Primary Diagnosis:   1. Hypertension  2. DM: positive  3. Nonsmoker  4. Family history of early CAD: son  of congestive heart failure;   5. Chronic systolic heart failure   ROS  Since last visit :   A. Breathing is better; no PND or orthopnea  B. No shortness of breath at rest  C. No palpitations  D. No syncope  E. Activity: much improved; walking in store; pushing the lawnmower to cut grass without issues  F.  blood sugars are markedly improved  G.  blood pressures at home: normal  Review of patient's allergies indicates:  No Known Allergies    Past Medical History:   Diagnosis Date    Arthritis     Diabetes mellitus     Hypertension     Kidney stone        Past Surgical History:   Procedure Laterality Date    COLONOSCOPY  2013    dr ram - 5 years    FOOT SURGERY Right 2010    bone spur    FOOT SURGERY Left     fx ankle    HYSTERECTOMY      age 47    KNEE SURGERY Left 06/10/2016    TKR    OOPHORECTOMY      TONSILLECTOMY         Family History   Problem Relation Age of Onset    Heart attack Mother     Seizures Mother         fell in bathtub and drowned    Colon cancer Sister     Breast cancer Sister        Social History     Tobacco Use    Smoking status: Never    Smokeless tobacco: Never   Substance Use Topics    Alcohol use: No     Alcohol/week: 0.0 standard drinks    Drug use: No        Past Hospitalization:   22: diuresed and sent home   Home meds:  Current Outpatient Medications on File Prior to Visit   Medication Sig Dispense Refill    amitriptyline (ELAVIL) 10 MG tablet TAKE ONE TO THREE TABLETS BY MOUTH AT BEDTIME FOR  HEADACHE 90 tablet 3    ascorbic acid, vitamin C, (VITAMIN C) 500 MG tablet Take 500 mg by mouth once daily.      atorvastatin (LIPITOR) 40 MG tablet Take 1 tablet (40 mg total) by mouth once daily. 90 tablet 3     busPIRone (BUSPAR) 5 MG Tab Take 1 tablet (5 mg total) by mouth 2 (two) times daily. For anxiety 180 tablet 3    diclofenac sodium (VOLTAREN) 1 % Gel Apply 2 g topically 3 (three) times daily. 1 Tube 3    dulaglutide (TRULICITY) 0.75 mg/0.5 mL pen injector Inject 0.75 mg into the skin once a week. 4 pen 3    ENTRESTO 49-51 mg per tablet Take 1 tablet by mouth twice daily 180 tablet 0    EScitalopram oxalate (LEXAPRO) 10 MG tablet Take 1 tablet (10 mg total) by mouth once daily. 90 tablet 3    ferrous sulfate 324 mg (65 mg iron) TbEC Take 1 tablet (324 mg total) by mouth daily as needed. 90 tablet 3    furosemide (LASIX) 40 MG tablet Take 1 tablet (40 mg total) by mouth once daily. 90 tablet 3    gabapentin (NEURONTIN) 100 MG capsule Takes on in am and two in pm for back pain radiating down leg. 270 capsule 3    glimepiride (AMARYL) 2 MG tablet Take 1 tablet (2 mg total) by mouth 2 (two) times daily. Take 2 mg by mouth 2 (two) times daily. 180 tablet 3    hyoscyamine (ANASPAZ,LEVSIN) 0.125 mg Tab Take 1 tablet (125 mcg total) by mouth every 6 (six) hours as needed (P.r.n. bladder spasm). 25 tablet 0    insulin detemir U-100 (LEVEMIR FLEXTOUCH U-100 INSULN) 100 unit/mL (3 mL) InPn pen 15 units sq daily.  Please disp needles to match 15 mL 11    JARDIANCE 10 mg tablet Take 1 tablet by mouth once daily 90 tablet 0    metFORMIN (GLUCOPHAGE-XR) 500 MG ER 24hr tablet Take 2 tablets (1,000 mg total) by mouth daily with breakfast. (Patient taking differently: Take 1,000 mg by mouth daily with breakfast. prn) 180 tablet 3    metoprolol succinate (TOPROL-XL) 50 MG 24 hr tablet Take 1 tablet (50 mg total) by mouth once daily. 90 tablet 3    MULTIVIT/IRON/FA/K/HERB NO.244 (ALIVE WOMEN'S ENERGY ORAL) Take 1 tablet by mouth daily as needed.       oxyCODONE-acetaminophen (PERCOCET)  mg per tablet       spironolactone (ALDACTONE) 25 MG tablet Take 1 tablet (25 mg total) by mouth once daily. 90 tablet 1    tamsulosin  "(FLOMAX) 0.4 mg Cap Take 1 capsule (0.4 mg total) by mouth once daily. 90 capsule 3    vitamin D (VITAMIN D3) 1000 units Tab Take 1,000 Units by mouth once daily.      blood sugar diagnostic (BLOOD GLUCOSE TEST) Strp Strips for 3 times a day testing   dispense brand covered by insurance and to match meter brand (Patient not taking: Reported on 3/13/2023) 300 each 3    diabetic supplies, miscellan. Misc Lancets for 3 times a day testing    dispense brand covered by insurance t 300 each 3     No current facility-administered medications on file prior to visit.       Cardiac meds:  Atorvastatin 40 mg   entresto 49/51 mg BID  Lasix 40 mg  Metoprolol succinate 50 mg   Aldactone 25 mg   jardiance 10 mg       OBJECTIVE:     Vital Signs (Most Recent)  Vitals:    23 0903   BP: 102/63   Pulse: 89   SpO2: (!) 93%   Weight: 94.8 kg (209 lb)   Height: 5' 7" (1.702 m)               Physical Exam:    Neck: normal carotids, no bruits; normal JVP  Lungs :clear  Heart: RR, normal S1,S2, no murmurs, no gallops  Abd: no masses; no bruits;   Exts: normal DP and PT pulses bilaterally, no edema noted           LABS    CMP  No results for input(s): K in the last 2160 hours.    Invalid input(s): GFR      LIPID  No results for input(s): HDL, CHOL, TRIG, LDLCALC, CHOLHDL, NONHDL, TOTALCHOLEST in the last 2160 hours.      CBS: microcytic anemia; GFR >60; lipid panel OK with low HDL , A1c: 8.9  : GFR normal; K normal   Diagnostic Results:    EK22: NSR, low voltage; nonspecific T wave changes     Echo: : EF 25%; global hypokinesis; LAE, moderate MR, mild TR   Echo: : EF 30 %; mild MR, LAE   MUGA: EF 45%   Chart review:      Cath per patient at [facility]: no blockages ? Date   ASSESSMENT/PLAN:     1. Cardiomyopathy: why? Nonischemic per patient - is this burned out hypertension? Diabetes? Idiopathic familial? - marked improvement in symptoms ; last EF around 45%      Plan: continue all medications   Return 4 months   Pramilla " XIMENA Yadav MD

## 2023-03-13 ENCOUNTER — OFFICE VISIT (OUTPATIENT)
Dept: CARDIOLOGY | Facility: CLINIC | Age: 73
End: 2023-03-13
Payer: MEDICARE

## 2023-03-13 VITALS
OXYGEN SATURATION: 93 % | BODY MASS INDEX: 32.8 KG/M2 | DIASTOLIC BLOOD PRESSURE: 63 MMHG | HEIGHT: 67 IN | SYSTOLIC BLOOD PRESSURE: 102 MMHG | WEIGHT: 209 LBS | HEART RATE: 89 BPM

## 2023-03-13 DIAGNOSIS — I50.22 CHRONIC SYSTOLIC HEART FAILURE: Primary | ICD-10-CM

## 2023-03-13 DIAGNOSIS — E11.9 TYPE 2 DIABETES MELLITUS WITHOUT COMPLICATION, WITHOUT LONG-TERM CURRENT USE OF INSULIN: ICD-10-CM

## 2023-03-13 PROCEDURE — 1160F PR REVIEW ALL MEDS BY PRESCRIBER/CLIN PHARMACIST DOCUMENTED: ICD-10-PCS | Mod: HCNC,CPTII,S$GLB, | Performed by: INTERNAL MEDICINE

## 2023-03-13 PROCEDURE — 3052F HG A1C>EQUAL 8.0%<EQUAL 9.0%: CPT | Mod: HCNC,CPTII,S$GLB, | Performed by: INTERNAL MEDICINE

## 2023-03-13 PROCEDURE — 3288F FALL RISK ASSESSMENT DOCD: CPT | Mod: HCNC,CPTII,S$GLB, | Performed by: INTERNAL MEDICINE

## 2023-03-13 PROCEDURE — 99999 PR PBB SHADOW E&M-EST. PATIENT-LVL IV: CPT | Mod: PBBFAC,HCNC,, | Performed by: INTERNAL MEDICINE

## 2023-03-13 PROCEDURE — 1160F RVW MEDS BY RX/DR IN RCRD: CPT | Mod: HCNC,CPTII,S$GLB, | Performed by: INTERNAL MEDICINE

## 2023-03-13 PROCEDURE — 3052F PR MOST RECENT HEMOGLOBIN A1C LEVEL 8.0 - < 9.0%: ICD-10-PCS | Mod: HCNC,CPTII,S$GLB, | Performed by: INTERNAL MEDICINE

## 2023-03-13 PROCEDURE — 3078F PR MOST RECENT DIASTOLIC BLOOD PRESSURE < 80 MM HG: ICD-10-PCS | Mod: HCNC,CPTII,S$GLB, | Performed by: INTERNAL MEDICINE

## 2023-03-13 PROCEDURE — 99213 PR OFFICE/OUTPT VISIT, EST, LEVL III, 20-29 MIN: ICD-10-PCS | Mod: HCNC,S$GLB,, | Performed by: INTERNAL MEDICINE

## 2023-03-13 PROCEDURE — 1125F AMNT PAIN NOTED PAIN PRSNT: CPT | Mod: HCNC,CPTII,S$GLB, | Performed by: INTERNAL MEDICINE

## 2023-03-13 PROCEDURE — 3074F PR MOST RECENT SYSTOLIC BLOOD PRESSURE < 130 MM HG: ICD-10-PCS | Mod: HCNC,CPTII,S$GLB, | Performed by: INTERNAL MEDICINE

## 2023-03-13 PROCEDURE — 99213 OFFICE O/P EST LOW 20 MIN: CPT | Mod: HCNC,S$GLB,, | Performed by: INTERNAL MEDICINE

## 2023-03-13 PROCEDURE — 3074F SYST BP LT 130 MM HG: CPT | Mod: HCNC,CPTII,S$GLB, | Performed by: INTERNAL MEDICINE

## 2023-03-13 PROCEDURE — 3008F PR BODY MASS INDEX (BMI) DOCUMENTED: ICD-10-PCS | Mod: HCNC,CPTII,S$GLB, | Performed by: INTERNAL MEDICINE

## 2023-03-13 PROCEDURE — 3008F BODY MASS INDEX DOCD: CPT | Mod: HCNC,CPTII,S$GLB, | Performed by: INTERNAL MEDICINE

## 2023-03-13 PROCEDURE — 1159F PR MEDICATION LIST DOCUMENTED IN MEDICAL RECORD: ICD-10-PCS | Mod: HCNC,CPTII,S$GLB, | Performed by: INTERNAL MEDICINE

## 2023-03-13 PROCEDURE — 3288F PR FALLS RISK ASSESSMENT DOCUMENTED: ICD-10-PCS | Mod: HCNC,CPTII,S$GLB, | Performed by: INTERNAL MEDICINE

## 2023-03-13 PROCEDURE — 99999 PR PBB SHADOW E&M-EST. PATIENT-LVL IV: ICD-10-PCS | Mod: PBBFAC,HCNC,, | Performed by: INTERNAL MEDICINE

## 2023-03-13 PROCEDURE — 1101F PR PT FALLS ASSESS DOC 0-1 FALLS W/OUT INJ PAST YR: ICD-10-PCS | Mod: HCNC,CPTII,S$GLB, | Performed by: INTERNAL MEDICINE

## 2023-03-13 PROCEDURE — 3078F DIAST BP <80 MM HG: CPT | Mod: HCNC,CPTII,S$GLB, | Performed by: INTERNAL MEDICINE

## 2023-03-13 PROCEDURE — 1159F MED LIST DOCD IN RCRD: CPT | Mod: HCNC,CPTII,S$GLB, | Performed by: INTERNAL MEDICINE

## 2023-03-13 PROCEDURE — 1125F PR PAIN SEVERITY QUANTIFIED, PAIN PRESENT: ICD-10-PCS | Mod: HCNC,CPTII,S$GLB, | Performed by: INTERNAL MEDICINE

## 2023-03-13 PROCEDURE — 1101F PT FALLS ASSESS-DOCD LE1/YR: CPT | Mod: HCNC,CPTII,S$GLB, | Performed by: INTERNAL MEDICINE

## 2023-03-13 NOTE — PATIENT INSTRUCTIONS
Atorvastatin 40 mg   entresto 49/51 mg BID  Lasix 40 mg  Metoprolol succinate 50 mg   Aldactone 25 mg   jardiance 10 mg

## 2023-05-04 ENCOUNTER — TELEPHONE (OUTPATIENT)
Dept: FAMILY MEDICINE | Facility: CLINIC | Age: 73
End: 2023-05-04
Payer: MEDICARE

## 2023-05-04 NOTE — TELEPHONE ENCOUNTER
Spoke to pt. I explained that her insurance is wanting her to complete an AWV with a nurse practitioner. Sent message through [facility] with the number that she would call.

## 2023-05-04 NOTE — TELEPHONE ENCOUNTER
----- Message from Huber Soares sent at 5/4/2023  8:35 AM CDT -----  Contact: pt  .Type:  Needs Medical Advice    Who Called: pt  Would the patient rather a call back or a response via [facility]?  Call back  Best Call Back Number: [phone]  Additional Information:  Pt.  is calling to schedule her annual wellness visit per [facility]

## 2023-06-14 ENCOUNTER — TELEPHONE (OUTPATIENT)
Dept: FAMILY MEDICINE | Facility: CLINIC | Age: 73
End: 2023-06-14
Payer: MEDICARE

## 2023-06-14 DIAGNOSIS — Z12.39 ENCOUNTER FOR SCREENING FOR MALIGNANT NEOPLASM OF BREAST, UNSPECIFIED SCREENING MODALITY: Primary | ICD-10-CM

## 2023-06-14 DIAGNOSIS — Z12.31 ENCOUNTER FOR SCREENING MAMMOGRAM FOR MALIGNANT NEOPLASM OF BREAST: ICD-10-CM

## 2023-06-14 NOTE — TELEPHONE ENCOUNTER
----- Message from Silvina Peter sent at 6/14/2023  8:26 AM CDT -----  Type:  Mammogram    Caller is requesting to schedule their annual mammogram appointment.  Order is not listed in EPIC.  Please enter order and contact patient to schedule.  Name of Caller: pt  Where would they like the mammogram performed? [facility]  Would the patient rather a call back or a response via [facility]? Call   Best Call Back Number:[phone]  Additional Information: screening mammo

## 2023-06-22 RX ORDER — SPIRONOLACTONE 25 MG/1
TABLET ORAL
Qty: 90 TABLET | Refills: 0 | Status: SHIPPED | OUTPATIENT
Start: 2023-06-22 | End: 2024-01-08 | Stop reason: SDUPTHER

## 2023-06-27 ENCOUNTER — TELEPHONE (OUTPATIENT)
Dept: FAMILY MEDICINE | Facility: CLINIC | Age: 73
End: 2023-06-27
Payer: MEDICARE

## 2023-06-27 NOTE — TELEPHONE ENCOUNTER
----- Message from Alejandro Gamboa sent at 6/27/2023  2:55 PM CDT -----  Pt Requesting Call Back    Who called: [employer]   Who called for pt:  Best call back #: [phone] (Tal fax) [phone] (Tal call)  Add notes: caller wants to check status of rx form they faxed over and chart note request; says for chart note he need a current chart note stating the diagnosis of diabetes and treatment plan under the history of present illness for assessment and plan      Do you have this paper work?   details

## 2023-06-28 NOTE — TELEPHONE ENCOUNTER
Message  Received: Today   Same Day Access Requested, Pt Advice  Gary Soriano [facility] Staff  Caller: Unspecified (Today,  8:53 AM)  Type:  Needs Medical Advice     Who Called: Tal khan   Would the patient rather a call back or a response via [facility]? call   Best Call Back Number: [phone] fax [phone]   Additional Information: call regarding continue glucose monitor and octavio su would like a fax to be sent of last clinic notes last 6 months

## 2023-07-12 ENCOUNTER — HOSPITAL ENCOUNTER (OUTPATIENT)
Dept: RADIOLOGY | Facility: HOSPITAL | Age: 73
Discharge: HOME | End: 2023-07-12
Attending: FAMILY MEDICINE
Payer: MEDICARE

## 2023-07-12 DIAGNOSIS — Z12.39 ENCOUNTER FOR SCREENING FOR MALIGNANT NEOPLASM OF BREAST, UNSPECIFIED SCREENING MODALITY: ICD-10-CM

## 2023-07-12 DIAGNOSIS — Z12.31 ENCOUNTER FOR SCREENING MAMMOGRAM FOR MALIGNANT NEOPLASM OF BREAST: ICD-10-CM

## 2023-07-12 PROCEDURE — 77067 SCR MAMMO BI INCL CAD: CPT | Mod: TC,HCNC,PO

## 2023-07-12 PROCEDURE — 77063 BREAST TOMOSYNTHESIS BI: CPT | Mod: 26,HCNC,, | Performed by: RADIOLOGY

## 2023-07-12 PROCEDURE — 77067 MAMMO DIGITAL SCREENING BILAT WITH TOMO: ICD-10-PCS | Mod: 26,HCNC,, | Performed by: RADIOLOGY

## 2023-07-12 PROCEDURE — 77067 SCR MAMMO BI INCL CAD: CPT | Mod: 26,HCNC,, | Performed by: RADIOLOGY

## 2023-07-12 PROCEDURE — 77063 MAMMO DIGITAL SCREENING BILAT WITH TOMO: ICD-10-PCS | Mod: 26,HCNC,, | Performed by: RADIOLOGY

## 2023-07-12 RX ORDER — SACUBITRIL AND VALSARTAN 49; 51 MG/1; MG/1
TABLET, FILM COATED ORAL
Qty: 180 TABLET | Refills: 0 | Status: SHIPPED | OUTPATIENT
Start: 2023-07-12 | End: 2023-10-16

## 2023-08-08 ENCOUNTER — TELEPHONE (OUTPATIENT)
Dept: FAMILY MEDICINE | Facility: CLINIC | Age: 73
End: 2023-08-08
Payer: MEDICARE

## 2023-08-13 RX ORDER — ATORVASTATIN CALCIUM 40 MG/1
40 TABLET, FILM COATED ORAL DAILY
Qty: 90 TABLET | Refills: 3 | Status: SHIPPED | OUTPATIENT
Start: 2023-08-13 | End: 2024-01-08 | Stop reason: SDUPTHER

## 2023-08-14 LAB
LEFT EYE DM RETINOPATHY: POSITIVE
RIGHT EYE DM RETINOPATHY: POSITIVE

## 2023-08-15 ENCOUNTER — TELEPHONE (OUTPATIENT)
Dept: FAMILY MEDICINE | Facility: CLINIC | Age: 73
End: 2023-08-15
Payer: MEDICARE

## 2023-08-15 DIAGNOSIS — G45.3 AMAUROSIS FUGAX: ICD-10-CM

## 2023-08-15 DIAGNOSIS — H35.033 HYPERTENSIVE RETINOPATHY OF BOTH EYES: Primary | ICD-10-CM

## 2023-08-15 DIAGNOSIS — H53.129 TRANSIENT VISUAL LOSS: ICD-10-CM

## 2023-08-15 NOTE — TELEPHONE ENCOUNTER
Pt stated she seen Eye doctor yesterday and they gave her a referral to see pulmonary cardiovascular specialist. Pt is coming to clinic to give referral    ----- Message from Natan Kitchen sent at 8/15/2023  8:21 AM CDT -----  Contact: pt  Type: Requesting to speak with nurse        Who Called: PT  Regarding: would like to speak to doctor or nurse about the symptoms she has from the eye doctor as soon as possible.  Would the patient rather a call back or a response via [facility]? Call back  Best Call Back Number: [phone]  Additional Information:   codes: [phone]

## 2023-08-17 ENCOUNTER — HOSPITAL ENCOUNTER (OUTPATIENT)
Dept: RADIOLOGY | Facility: HOSPITAL | Age: 73
Discharge: HOME | End: 2023-08-17
Attending: OPHTHALMOLOGY
Payer: MEDICARE

## 2023-08-17 DIAGNOSIS — G45.3 AMAUROSIS FUGAX: ICD-10-CM

## 2023-08-17 PROCEDURE — 93880 US CAROTID BILATERAL: ICD-10-PCS | Mod: 26,HCNC,, | Performed by: RADIOLOGY

## 2023-08-17 PROCEDURE — 93880 EXTRACRANIAL BILAT STUDY: CPT | Mod: 26,HCNC,, | Performed by: RADIOLOGY

## 2023-08-17 PROCEDURE — 93880 EXTRACRANIAL BILAT STUDY: CPT | Mod: TC,HCNC,PO

## 2023-08-22 ENCOUNTER — PATIENT OUTREACH (OUTPATIENT)
Dept: ADMINISTRATIVE | Facility: HOSPITAL | Age: 73
End: 2023-08-22
Payer: MEDICARE

## 2023-09-05 ENCOUNTER — OFFICE VISIT (OUTPATIENT)
Dept: CARDIOLOGY | Facility: CLINIC | Age: 73
End: 2023-09-05
Payer: MEDICARE

## 2023-09-05 VITALS
SYSTOLIC BLOOD PRESSURE: 102 MMHG | HEART RATE: 97 BPM | BODY MASS INDEX: 32.33 KG/M2 | WEIGHT: 206 LBS | DIASTOLIC BLOOD PRESSURE: 68 MMHG | OXYGEN SATURATION: 98 % | HEIGHT: 67 IN

## 2023-09-05 DIAGNOSIS — G45.3 AMAUROSIS FUGAX: ICD-10-CM

## 2023-09-05 DIAGNOSIS — E11.9 TYPE 2 DIABETES MELLITUS WITHOUT COMPLICATION, WITHOUT LONG-TERM CURRENT USE OF INSULIN: ICD-10-CM

## 2023-09-05 DIAGNOSIS — I50.22 CHRONIC SYSTOLIC HEART FAILURE: Primary | ICD-10-CM

## 2023-09-05 PROCEDURE — 99999 PR PBB SHADOW E&M-EST. PATIENT-LVL III: ICD-10-PCS | Mod: PBBFAC,HCNC,, | Performed by: INTERNAL MEDICINE

## 2023-09-05 PROCEDURE — 3074F SYST BP LT 130 MM HG: CPT | Mod: HCNC,CPTII,S$GLB, | Performed by: INTERNAL MEDICINE

## 2023-09-05 PROCEDURE — 1159F MED LIST DOCD IN RCRD: CPT | Mod: HCNC,CPTII,S$GLB, | Performed by: INTERNAL MEDICINE

## 2023-09-05 PROCEDURE — 3052F PR MOST RECENT HEMOGLOBIN A1C LEVEL 8.0 - < 9.0%: ICD-10-PCS | Mod: HCNC,CPTII,S$GLB, | Performed by: INTERNAL MEDICINE

## 2023-09-05 PROCEDURE — 1160F PR REVIEW ALL MEDS BY PRESCRIBER/CLIN PHARMACIST DOCUMENTED: ICD-10-PCS | Mod: HCNC,CPTII,S$GLB, | Performed by: INTERNAL MEDICINE

## 2023-09-05 PROCEDURE — 3008F BODY MASS INDEX DOCD: CPT | Mod: HCNC,CPTII,S$GLB, | Performed by: INTERNAL MEDICINE

## 2023-09-05 PROCEDURE — 3074F PR MOST RECENT SYSTOLIC BLOOD PRESSURE < 130 MM HG: ICD-10-PCS | Mod: HCNC,CPTII,S$GLB, | Performed by: INTERNAL MEDICINE

## 2023-09-05 PROCEDURE — 99213 PR OFFICE/OUTPT VISIT, EST, LEVL III, 20-29 MIN: ICD-10-PCS | Mod: HCNC,S$GLB,, | Performed by: INTERNAL MEDICINE

## 2023-09-05 PROCEDURE — 99999 PR PBB SHADOW E&M-EST. PATIENT-LVL III: CPT | Mod: PBBFAC,HCNC,, | Performed by: INTERNAL MEDICINE

## 2023-09-05 PROCEDURE — 3052F HG A1C>EQUAL 8.0%<EQUAL 9.0%: CPT | Mod: HCNC,CPTII,S$GLB, | Performed by: INTERNAL MEDICINE

## 2023-09-05 PROCEDURE — 3288F FALL RISK ASSESSMENT DOCD: CPT | Mod: HCNC,CPTII,S$GLB, | Performed by: INTERNAL MEDICINE

## 2023-09-05 PROCEDURE — 4010F PR ACE/ARB THEARPY RXD/TAKEN: ICD-10-PCS | Mod: HCNC,CPTII,S$GLB, | Performed by: INTERNAL MEDICINE

## 2023-09-05 PROCEDURE — 1159F PR MEDICATION LIST DOCUMENTED IN MEDICAL RECORD: ICD-10-PCS | Mod: HCNC,CPTII,S$GLB, | Performed by: INTERNAL MEDICINE

## 2023-09-05 PROCEDURE — 3008F PR BODY MASS INDEX (BMI) DOCUMENTED: ICD-10-PCS | Mod: HCNC,CPTII,S$GLB, | Performed by: INTERNAL MEDICINE

## 2023-09-05 PROCEDURE — 4010F ACE/ARB THERAPY RXD/TAKEN: CPT | Mod: HCNC,CPTII,S$GLB, | Performed by: INTERNAL MEDICINE

## 2023-09-05 PROCEDURE — 3078F PR MOST RECENT DIASTOLIC BLOOD PRESSURE < 80 MM HG: ICD-10-PCS | Mod: HCNC,CPTII,S$GLB, | Performed by: INTERNAL MEDICINE

## 2023-09-05 PROCEDURE — 1126F AMNT PAIN NOTED NONE PRSNT: CPT | Mod: HCNC,CPTII,S$GLB, | Performed by: INTERNAL MEDICINE

## 2023-09-05 PROCEDURE — 1101F PT FALLS ASSESS-DOCD LE1/YR: CPT | Mod: HCNC,CPTII,S$GLB, | Performed by: INTERNAL MEDICINE

## 2023-09-05 PROCEDURE — 1160F RVW MEDS BY RX/DR IN RCRD: CPT | Mod: HCNC,CPTII,S$GLB, | Performed by: INTERNAL MEDICINE

## 2023-09-05 PROCEDURE — 1126F PR PAIN SEVERITY QUANTIFIED, NO PAIN PRESENT: ICD-10-PCS | Mod: HCNC,CPTII,S$GLB, | Performed by: INTERNAL MEDICINE

## 2023-09-05 PROCEDURE — 99213 OFFICE O/P EST LOW 20 MIN: CPT | Mod: HCNC,S$GLB,, | Performed by: INTERNAL MEDICINE

## 2023-09-05 PROCEDURE — 1101F PR PT FALLS ASSESS DOC 0-1 FALLS W/OUT INJ PAST YR: ICD-10-PCS | Mod: HCNC,CPTII,S$GLB, | Performed by: INTERNAL MEDICINE

## 2023-09-05 PROCEDURE — 3078F DIAST BP <80 MM HG: CPT | Mod: HCNC,CPTII,S$GLB, | Performed by: INTERNAL MEDICINE

## 2023-09-05 PROCEDURE — 3288F PR FALLS RISK ASSESSMENT DOCUMENTED: ICD-10-PCS | Mod: HCNC,CPTII,S$GLB, | Performed by: INTERNAL MEDICINE

## 2023-09-05 NOTE — PROGRESS NOTES
[facility] Cardiology 701     SUBJECTIVE:     History of Present Illness:  Patient is a 73 y.o. female presents with congestive heart failure. Feels great     Primary Diagnosis:   1. Hypertension  2. DM: positive  3. Nonsmoker  4. Family history of early CAD: son  of congestive heart failure;   5. Chronic systolic heart failure   ROS  Since last visit 3/23:no change   A. Breathing is better; no PND or orthopnea  B. No shortness of breath at rest  C. No palpitations  D. No syncope  E. Activity: much improved; walking in store; pushing the lawnmower to cut grass without issues  F.  blood sugars are markedly improved  G.  blood pressures at home: normal  Review of patient's allergies indicates:  No Known Allergies    Past Medical History:   Diagnosis Date    Arthritis     Diabetes mellitus     Hypertension     Kidney stone        Past Surgical History:   Procedure Laterality Date    COLONOSCOPY  2013    dr ram - 5 years    FOOT SURGERY Right 2010    bone spur    FOOT SURGERY Left     fx ankle    HYSTERECTOMY      age 47    KNEE SURGERY Left 06/10/2016    TKR    OOPHORECTOMY      TONSILLECTOMY         Family History   Problem Relation Age of Onset    Heart attack Mother     Seizures Mother         fell in bathtub and drowned    Colon cancer Sister     Breast cancer Sister        Social History     Tobacco Use    Smoking status: Never    Smokeless tobacco: Never   Substance Use Topics    Alcohol use: No     Alcohol/week: 0.0 standard drinks of alcohol    Drug use: No        Past Hospitalization:   24: diuresed and sent home   Home meds:  Current Outpatient Medications on File Prior to Visit   Medication Sig Dispense Refill    amitriptyline (ELAVIL) 10 MG tablet TAKE ONE TO THREE TABLETS BY MOUTH AT BEDTIME FOR  HEADACHE 90 tablet 3    ascorbic acid, vitamin C, (VITAMIN C) 500 MG tablet Take 500 mg by mouth once daily.      atorvastatin (LIPITOR) 40 MG tablet Take 1 tablet (40 mg total) by mouth once  daily. 90 tablet 3    blood sugar diagnostic (BLOOD GLUCOSE TEST) Strp Strips for 3 times a day testing   dispense brand covered by insurance and to match meter brand (Patient not taking: Reported on 3/13/2023) 300 each 3    busPIRone (BUSPAR) 5 MG Tab Take 1 tablet (5 mg total) by mouth 2 (two) times daily. For anxiety 180 tablet 3    diabetic supplies, miscellan. Misc Lancets for 3 times a day testing    dispense brand covered by insurance t 300 each 3    diclofenac sodium (VOLTAREN) 1 % Gel Apply 2 g topically 3 (three) times daily. 1 Tube 3    dulaglutide (TRULICITY) 0.75 mg/0.5 mL pen injector Inject 0.75 mg into the skin once a week. 4 pen 3    ENTRESTO 49-51 mg per tablet Take 1 tablet by mouth twice daily 180 tablet 0    EScitalopram oxalate (LEXAPRO) 10 MG tablet Take 1 tablet (10 mg total) by mouth once daily. 90 tablet 3    ferrous sulfate 324 mg (65 mg iron) TbEC Take 1 tablet (324 mg total) by mouth daily as needed. 90 tablet 3    furosemide (LASIX) 40 MG tablet Take 1 tablet (40 mg total) by mouth once daily. 90 tablet 3    gabapentin (NEURONTIN) 100 MG capsule Takes on in am and two in pm for back pain radiating down leg. 270 capsule 3    glimepiride (AMARYL) 2 MG tablet Take 1 tablet (2 mg total) by mouth 2 (two) times daily. Take 2 mg by mouth 2 (two) times daily. 180 tablet 3    hyoscyamine (ANASPAZ,LEVSIN) 0.125 mg Tab Take 1 tablet (125 mcg total) by mouth every 6 (six) hours as needed (P.r.n. bladder spasm). 25 tablet 0    insulin detemir U-100 (LEVEMIR FLEXTOUCH U-100 INSULN) 100 unit/mL (3 mL) InPn pen 15 units sq daily.  Please disp needles to match 15 mL 11    JARDIANCE 10 mg tablet Take 1 tablet by mouth once daily 90 tablet 0    metFORMIN (GLUCOPHAGE-XR) 500 MG ER 24hr tablet Take 2 tablets (1,000 mg total) by mouth daily with breakfast. (Patient taking differently: Take 1,000 mg by mouth daily with breakfast. prn) 180 tablet 3    metoprolol succinate (TOPROL-XL) 50 MG 24 hr tablet Take 1  "tablet (50 mg total) by mouth once daily. 90 tablet 3    MULTIVIT/IRON/FA/K/HERB NO.244 (ALIVE WOMEN'S ENERGY ORAL) Take 1 tablet by mouth daily as needed.       oxyCODONE-acetaminophen (PERCOCET)  mg per tablet       spironolactone (ALDACTONE) 25 MG tablet Take 1 tablet by mouth once daily 90 tablet 0    tamsulosin (FLOMAX) 0.4 mg Cap Take 1 capsule (0.4 mg total) by mouth once daily. 90 capsule 3    vitamin D (VITAMIN D3) 1000 units Tab Take 1,000 Units by mouth once daily.       No current facility-administered medications on file prior to visit.       Cardiac meds:  Atorvastatin 40 mg (had been off this)    entresto 49/51 mg BID  Lasix 40 mg  Metoprolol succinate 50 mg   Aldactone 25 mg   jardiance 10 mg       OBJECTIVE:     Vital Signs (Most Recent)  Vitals:    23 1446   BP: 102/68   Pulse: 97   SpO2: 98%   Weight: 93.5 kg (206 lb 0.3 oz)   Height: 5' 7" (1.702 m)                 Physical Exam:    Neck: normal carotids, no bruits; normal JVP  Lungs :clear  Heart: RR, normal S1,S2, no murmurs, no gallops  Abd: no masses; no bruits;   Exts: normal DP and PT pulses bilaterally, no edema noted           LABS    : GFR normal; K normal ; LDL 81  : CBS: microcytic anemia; GFR >60; lipid panel OK with low HDL , A1c: 8.9      Diagnostic Results:    1.EK22: NSR, low voltage; nonspecific T wave changes   2.Echo: : EF 25%; global hypokinesis; LAE, moderate MR, mild TR   2b.Echo: : EF 30 %; mild MR, LAE   3.MUGA: EF 45%   4.Carotid US: : no significant stenosis   Chart review:      Cath per patient at [facility]: no blockages ? Date   ASSESSMENT/PLAN:     1. Cardiomyopathy: why? Nonischemic per patient - is this burned out hypertension? Diabetes? Idiopathic familial? - marked improvement in symptoms ; last EF around 45%      Plan: continue all medications   Return 4 months   Jeffery Yadav MD            "

## 2023-09-19 RX ORDER — FUROSEMIDE 40 MG/1
TABLET ORAL
Qty: 90 TABLET | Refills: 0 | Status: SHIPPED | OUTPATIENT
Start: 2023-09-19 | End: 2023-12-19

## 2023-09-19 NOTE — TELEPHONE ENCOUNTER
Refill Routing Note   Medication(s) are not appropriate for processing by [facility] for the following reason(s):      Required labs outdated    [facility] action(s):  Defer Care Due:  Appointment due  Labs due            Appointments  past 12m or future 3m with PCP    Date Provider   Last Visit   10/24/2022 Itz Muse MD   Next Visit   Visit date not found Itz Muse MD   ED visits in past 90 days: 0        Note composed:7:42 AM 09/19/2023

## 2023-09-19 NOTE — TELEPHONE ENCOUNTER
Care Due:                  Date            Visit Type   Department     Provider  --------------------------------------------------------------------------------                                EP -                              PRIMARY      [facility]  Last Visit: 10-      CARE ([facility])   MEDICINE       Itz Muse  Next Visit: None Scheduled  None         None Found                                                            Last  Test          Frequency    Reason                     Performed    Due Date  --------------------------------------------------------------------------------    Office Visit  12 months..  EScitalopram,              10-   10-                             atorvastatin,                             dulaglutide, furosemide,                             glimepiride, insulin,                             metFORMIN, tamsulosin....    CMP.........  12 months..  atorvastatin, furosemide,   05- 05-                             glimepiride, insulin,                             metFORMIN................    HBA1C.......  6 months...  dulaglutide, glimepiride,   02- 08-                             insulin, metFORMIN.......    Lipid Panel.  12 months..  atorvastatin.............  11- 11-    [facility] Embedded Care Due Messages. Reference number: [id number].   9/19/2023 6:17:03 AM CDT

## 2023-09-28 RX ORDER — BUSPIRONE HYDROCHLORIDE 5 MG/1
5 TABLET ORAL 2 TIMES DAILY
Qty: 180 TABLET | Refills: 3 | Status: SHIPPED | OUTPATIENT
Start: 2023-09-28 | End: 2024-09-27

## 2023-09-28 NOTE — TELEPHONE ENCOUNTER
----- Message from Belgica Mcduffie sent at 9/28/2023 10:37 AM CDT -----  Regarding: refill/change pharm  Contact: [phone]  Type:  RX Refill Request/change pharm     Who Called:  pt   Refill or New Rx: refill   RX Name and Strength: busPIRone (BUSPAR) 5 MG Tab  How is the patient currently taking it? (ex. 1XDay): Take 1 tablet (5 mg total) by mouth 2 (two) times daily. For anxiety - Oral  Is this a 30 day or 90 day RX: 180/3 refills   Preferred Pharmacy with phone number: [facility], [place] - [address] [[id number]]  Local or Mail Order: local   Ordering Provider:   Would the patient rather a call back or a response via [facility]?  Call   Best Call Back Number: [phone]  Additional Information:

## 2023-10-13 ENCOUNTER — TELEPHONE (OUTPATIENT)
Dept: FAMILY MEDICINE | Facility: CLINIC | Age: 73
End: 2023-10-13
Payer: MEDICARE

## 2023-10-16 RX ORDER — SACUBITRIL AND VALSARTAN 49; 51 MG/1; MG/1
TABLET, FILM COATED ORAL
Qty: 180 TABLET | Refills: 0 | Status: SHIPPED | OUTPATIENT
Start: 2023-10-16 | End: 2024-01-08 | Stop reason: SDUPTHER

## 2023-12-17 ENCOUNTER — HOSPITAL ENCOUNTER (EMERGENCY)
Facility: HOSPITAL | Age: 73
Discharge: HOME | End: 2023-12-17
Attending: FAMILY MEDICINE
Payer: MEDICARE

## 2023-12-17 VITALS
TEMPERATURE: 98 F | BODY MASS INDEX: 29.82 KG/M2 | SYSTOLIC BLOOD PRESSURE: 156 MMHG | WEIGHT: 190 LBS | HEIGHT: 67 IN | DIASTOLIC BLOOD PRESSURE: 66 MMHG | RESPIRATION RATE: 20 BRPM | OXYGEN SATURATION: 97 % | HEART RATE: 64 BPM

## 2023-12-17 DIAGNOSIS — R05.9 COUGH: ICD-10-CM

## 2023-12-17 DIAGNOSIS — J10.1 INFLUENZA A: Primary | ICD-10-CM

## 2023-12-17 LAB
INFLUENZA A, MOLECULAR: POSITIVE
INFLUENZA B, MOLECULAR: NEGATIVE
SARS-COV-2 RDRP RESP QL NAA+PROBE: NEGATIVE
SPECIMEN SOURCE: ABNORMAL

## 2023-12-17 PROCEDURE — 99284 EMERGENCY DEPT VISIT MOD MDM: CPT | Mod: 25,ER

## 2023-12-17 PROCEDURE — 25000003 PHARM REV CODE 250: Mod: ER | Performed by: FAMILY MEDICINE

## 2023-12-17 PROCEDURE — U0002 COVID-19 LAB TEST NON-CDC: HCPCS | Mod: ER | Performed by: FAMILY MEDICINE

## 2023-12-17 PROCEDURE — 87502 INFLUENZA DNA AMP PROBE: CPT | Mod: ER | Performed by: FAMILY MEDICINE

## 2023-12-17 RX ORDER — BENZONATATE 200 MG/1
200 CAPSULE ORAL 3 TIMES DAILY PRN
Qty: 30 CAPSULE | Refills: 0 | Status: SHIPPED | OUTPATIENT
Start: 2023-12-17 | End: 2023-12-27

## 2023-12-17 RX ORDER — BENZONATATE 100 MG/1
200 CAPSULE ORAL
Status: COMPLETED | OUTPATIENT
Start: 2023-12-17 | End: 2023-12-17

## 2023-12-17 RX ORDER — OSELTAMIVIR PHOSPHATE 75 MG/1
75 CAPSULE ORAL 2 TIMES DAILY
Qty: 10 CAPSULE | Refills: 0 | Status: SHIPPED | OUTPATIENT
Start: 2023-12-17 | End: 2023-12-17

## 2023-12-17 RX ORDER — OSELTAMIVIR PHOSPHATE 75 MG/1
75 CAPSULE ORAL 2 TIMES DAILY
Qty: 10 CAPSULE | Refills: 0 | Status: SHIPPED | OUTPATIENT
Start: 2023-12-17 | End: 2023-12-22

## 2023-12-17 RX ORDER — BENZONATATE 200 MG/1
200 CAPSULE ORAL 3 TIMES DAILY PRN
Qty: 30 CAPSULE | Refills: 0 | Status: SHIPPED | OUTPATIENT
Start: 2023-12-17 | End: 2023-12-17

## 2023-12-17 RX ORDER — ALBUTEROL SULFATE 90 UG/1
2 AEROSOL, METERED RESPIRATORY (INHALATION) EVERY 6 HOURS PRN
Qty: 18 G | Refills: 0 | Status: SHIPPED | OUTPATIENT
Start: 2023-12-17

## 2023-12-17 RX ADMIN — BENZONATATE 200 MG: 100 CAPSULE ORAL at 09:12

## 2023-12-17 NOTE — ED PROVIDER NOTES
Encounter Date: 12/17/2023       History     Chief Complaint   Patient presents with    Cough     Pt reports cough x 3 days.     73-year-old female complains of cough for last 3 days.  Nonproductive.  While congestion.  No respiratory distress.    The history is provided by the patient.     Review of patient's allergies indicates:  No Known Allergies  Past Medical History:   Diagnosis Date    Arthritis     Diabetes mellitus     Hypertension     Kidney stone      Past Surgical History:   Procedure Laterality Date    COLONOSCOPY  05/23/2013    dr ram - 5 years    FOOT SURGERY Right 2010    bone spur    FOOT SURGERY Left 2010    fx ankle    HYSTERECTOMY      age 47    KNEE SURGERY Left 06/10/2016    TKR    OOPHORECTOMY      TONSILLECTOMY       Family History   Problem Relation Age of Onset    Heart attack Mother     Seizures Mother         fell in bathtub and drowned    Colon cancer Sister     Breast cancer Sister      Social History     Tobacco Use    Smoking status: Never    Smokeless tobacco: Never   Substance Use Topics    Alcohol use: No     Alcohol/week: 0.0 standard drinks of alcohol    Drug use: No     Review of Systems   HENT:  Positive for congestion.    Respiratory:  Positive for cough.    All other systems reviewed and are negative.      Physical Exam     Initial Vitals [12/17/23 0821]   BP Pulse Resp Temp SpO2   (!) 156/66 64 20 98.4 °F (36.9 °C) 97 %      MAP       --         Physical Exam    Nursing note and vitals reviewed.  Constitutional: Vital signs are normal. She appears well-developed and well-nourished. She is active. No distress.   HENT:   Head: Normocephalic.   Nose: Nose normal.   Mouth/Throat: Oropharynx is clear and moist and mucous membranes are normal.   Eyes: Conjunctivae, EOM and lids are normal.   Neck: Neck supple.   Normal range of motion.  Cardiovascular:  Normal rate, regular rhythm, S1 normal, S2 normal and normal heart sounds.           Pulmonary/Chest: Breath sounds  normal. No respiratory distress. She has no wheezes. She has no rhonchi. She has no rales. She exhibits no tenderness.   Abdominal: Abdomen is soft. Bowel sounds are normal. She exhibits no distension. There is no abdominal tenderness.   Musculoskeletal:      Right upper arm: Normal.      Left upper arm: Normal.      Cervical back: Normal range of motion and neck supple.      Right lower leg: Normal.      Left lower leg: Normal.     Neurological: She is alert and oriented to person, place, and time. She has normal strength. GCS score is 15. GCS eye subscore is 4. GCS verbal subscore is 5. GCS motor subscore is 6.   Skin: Skin is warm. Capillary refill takes less than 2 seconds.   Psychiatric: She has a normal mood and affect. Her speech is normal and behavior is normal. Thought content normal. Cognition and memory are normal.         ED Course   Procedures  Labs Reviewed   INFLUENZA A & B BY MOLECULAR - Abnormal; Notable for the following components:       Result Value    Influenza A, Molecular Positive (*)     All other components within normal limits   SARS-COV-2 RNA AMPLIFICATION, QUAL    Narrative:     Is the patient symptomatic?->Yes          Imaging Results              X-Ray Chest PA And Lateral (Final result)  Result time 12/17/23 08:42:08      Final result by Jean-Claude Mascorro MD (12/17/23 08:42:08)                   Impression:      Stable chest x-ray.      Electronically signed by: Jean-Claude Mascorro MD  Date:    12/17/2023  Time:    08:42               Narrative:    EXAMINATION:  XR CHEST PA AND LATERAL    CLINICAL HISTORY:  Cough, unspecified    COMPARISON:  05/13/2022.    FINDINGS:  No change.  Mildly enlarged heart.  No congestion.  Lungs are clear.                                       Medications   benzonatate capsule 200 mg (200 mg Oral Given 12/17/23 8730)     Medical Decision Making  DD- URI, pneumonia, CHF, Bronchitis.    Normal lung examination.  No respiratory distress.  Oxygenation  97%.      Positive for influenzaA  Chest x-ray no acute findings  Tessalon and Paxlovid.  Follow-up ED immediately with worsening symptoms such as shortness of breath worsening cough.    Amount and/or Complexity of Data Reviewed  Labs: ordered. Decision-making details documented in ED Course.     Details: Positive for influenza A  Radiology: ordered and independent interpretation performed. Decision-making details documented in ED Course.     Details: Chest x-ray no acute findings    Risk  Prescription drug management.                                      Clinical Impression:  Final diagnoses:  [R05.9] Cough  [J10.1] Influenza A (Primary)          ED Disposition Condition    Discharge Stable          ED Prescriptions       Medication Sig Dispense Start Date End Date Auth. Provider    oseltamivir (TAMIFLU) 75 MG capsule  (Status: Discontinued) Take 1 capsule (75 mg total) by mouth 2 (two) times daily. for 5 days 10 capsule 12/17/2023 12/17/2023 Luke Estes MD    benzonatate (TESSALON) 200 MG capsule  (Status: Discontinued) Take 1 capsule (200 mg total) by mouth 3 (three) times daily as needed. 30 capsule 12/17/2023 12/17/2023 Luke Estes MD    benzonatate (TESSALON) 200 MG capsule Take 1 capsule (200 mg total) by mouth 3 (three) times daily as needed for Cough. 30 capsule 12/17/2023 12/27/2023 Luke Estes MD    oseltamivir (TAMIFLU) 75 MG capsule Take 1 capsule (75 mg total) by mouth 2 (two) times daily. for 5 days 10 capsule 12/17/2023 12/22/2023 Luke Estes MD    albuterol (VENTOLIN HFA) 90 mcg/actuation inhaler Inhale 2 puffs into the lungs every 6 (six) hours as needed for Wheezing. Rescue 18 g 12/17/2023 -- Luke Estes MD          Follow-up Information       Follow up With Specialties Details Why Contact Info    Itz Muse MD Family Medicine Schedule an appointment as soon as possible for a visit in 3 days For  re-check [address] [facility]  [id number]  [phone]               Luke Estes MD  12/19/23 0702

## 2023-12-19 RX ORDER — FUROSEMIDE 40 MG/1
TABLET ORAL
Qty: 90 TABLET | Refills: 0 | Status: SHIPPED | OUTPATIENT
Start: 2023-12-19 | End: 2024-01-08 | Stop reason: SDUPTHER

## 2023-12-19 NOTE — TELEPHONE ENCOUNTER
Care Due:                  Date            Visit Type   Department     Provider  --------------------------------------------------------------------------------                                EP -                              PRIMARY      [facility]  Last Visit: 10-      CARE ([facility])   MEDICINE       Itz Muse  Next Visit: None Scheduled  None         None Found                                                            Last  Test          Frequency    Reason                     Performed    Due Date  --------------------------------------------------------------------------------    Office Visit  12 months..  EScitalopram,              10-   10-                             atorvastatin, busPIRone,                             dulaglutide, glimepiride,                             hyoscyamine, insulin,                             metFORMIN, tamsulosin....    CMP.........  12 months..  atorvastatin,              05- 05-                             glimepiride, insulin,                             metFORMIN................    HBA1C.......  6 months...  dulaglutide, glimepiride,   02- 08-                             insulin, metFORMIN.......    Lipid Panel.  12 months..  atorvastatin.............  11- 11-    [facility] Embedded Care Due Messages. Reference number: [id number].   12/19/2023 10:58:05 AM CST

## 2023-12-19 NOTE — TELEPHONE ENCOUNTER
----- Message from Cathi Fernandes sent at 12/19/2023 11:00 AM CST -----  Type:  RX Refill Request    Who Called:  Pt  Refill or New Rx: Refill  RX Name and Strength:   furosemide (LASIX) 40 MG tablet [3295   How is the patient currently taking it? (ex. 1XDay):Take 1 tablet (40 mg total) by mouth once daily.  Is this a 30 day or 90 day RX: 90  Preferred Pharmacy with phone number: [facility] [address] [place] [address] [facility]   Phone: [phone]  Fax: [phone]  Local or Mail Order: local  Ordering Provider: [place]  Would the patient rather a call back or a response via [facility]?  call  Best Call Back Number: [phone]  Additional Information:

## 2024-01-03 RX ORDER — METFORMIN HYDROCHLORIDE 500 MG/1
1000 TABLET, EXTENDED RELEASE ORAL
Qty: 180 TABLET | Refills: 3 | Status: SHIPPED | OUTPATIENT
Start: 2024-01-03 | End: 2025-01-02

## 2024-01-03 NOTE — TELEPHONE ENCOUNTER
----- Message from Silvina Green sent at 1/3/2024  8:49 AM CST -----  .Type:  RX Refill Request    Who Called:        Disp Refills Start End KIMBERLY  metFORMIN (GLUCOPHAGE-XR) 500 MG ER 24hr tablet 180 tablet 3 10/24/2022 10/24/2023 No  Sig - Route: Take 2 tablets (1,000 mg total) by mouth daily with breakfast. - Oral  Patient taking differently: Take 1,000 mg by mouth daily with breakfast. prn  Sent to pharmacy as: metFORMIN (GLUCOPHAGE-XR) 500 MG ER 24hr tablet  Class: Normal  Order: [id number]  Date/Time Signed: 10/24/2022 09:44      E-Prescribing Status: Receipt confirmed by pharmacy (10/24/2022  9:45 AM CDT)    Pharmacy  [facility] [address] [place] [address] [address] [facility]   Simple Generic Medication Notes  METFORMIN HCL  Itz Muse MD   11/10/2015  2:06 PM  Cr 1.3        Would the patient rather a call back or a response via [facility]?   Best Call Back Number:  Additional Information:

## 2024-01-03 NOTE — TELEPHONE ENCOUNTER
No care due was identified.  [facility] Embedded Care Due Messages. Reference number: [id number].   1/03/2024 8:58:46 AM CST

## 2024-01-05 NOTE — PROGRESS NOTES
[facility] Cardiology 701     SUBJECTIVE:     History of Present Illness:  Patient is a 73 y.o. female presents with congestive heart failure. Feels great     Primary Diagnosis:   1. Hypertension  2. DM: positive  3. Nonsmoker  4. Family history of early CAD: son  of congestive heart failure;   5. Chronic systolic heart failure   ROS  Since last visit :feeling better   A. Breathing is better; no PND or orthopnea  B. No shortness of breath at rest  C. No palpitations  D. No syncope  E. Activity: much improved; walking in store; pushing the lawnmower to cut grass without issues; exercises   F.  blood sugars are markedly improved  G.  blood pressures at home: normal  Review of patient's allergies indicates:  No Known Allergies    Past Medical History:   Diagnosis Date    Arthritis     Diabetes mellitus     Hypertension     Kidney stone        Past Surgical History:   Procedure Laterality Date    COLONOSCOPY  2013    dr ram - 5 years    FOOT SURGERY Right 2010    bone spur    FOOT SURGERY Left     fx ankle    HYSTERECTOMY      age 47    KNEE SURGERY Left 06/10/2016    TKR    OOPHORECTOMY      TONSILLECTOMY         Family History   Problem Relation Age of Onset    Heart attack Mother     Seizures Mother         fell in bathtub and drowned    Colon cancer Sister     Breast cancer Sister        Social History     Tobacco Use    Smoking status: Never    Smokeless tobacco: Never   Substance Use Topics    Alcohol use: No     Alcohol/week: 0.0 standard drinks of alcohol    Drug use: No        Past Hospitalization:   22: diuresed and sent home   Home meds:  Current Outpatient Medications on File Prior to Visit   Medication Sig Dispense Refill    albuterol (VENTOLIN HFA) 90 mcg/actuation inhaler Inhale 2 puffs into the lungs every 6 (six) hours as needed for Wheezing. Rescue 18 g 0    amitriptyline (ELAVIL) 10 MG tablet TAKE ONE TO THREE TABLETS BY MOUTH AT BEDTIME FOR  HEADACHE 90 tablet 3    ascorbic  acid, vitamin C, (VITAMIN C) 500 MG tablet Take 500 mg by mouth once daily.      atorvastatin (LIPITOR) 40 MG tablet Take 1 tablet (40 mg total) by mouth once daily. 90 tablet 3    blood sugar diagnostic (BLOOD GLUCOSE TEST) Strp Strips for 3 times a day testing   dispense brand covered by insurance and to match meter brand (Patient not taking: Reported on 3/13/2023) 300 each 3    busPIRone (BUSPAR) 5 MG Tab Take 1 tablet (5 mg total) by mouth 2 (two) times daily. For anxiety 180 tablet 3    diabetic supplies, miscellan. Misc Lancets for 3 times a day testing    dispense brand covered by insurance t 300 each 3    diclofenac sodium (VOLTAREN) 1 % Gel Apply 2 g topically 3 (three) times daily. 1 Tube 3    dulaglutide (TRULICITY) 0.75 mg/0.5 mL pen injector Inject 0.75 mg into the skin once a week. 4 pen 3    ENTRESTO 49-51 mg per tablet Take 1 tablet by mouth twice daily 180 tablet 0    EScitalopram oxalate (LEXAPRO) 10 MG tablet Take 1 tablet (10 mg total) by mouth once daily. 90 tablet 3    ferrous sulfate 324 mg (65 mg iron) TbEC Take 1 tablet (324 mg total) by mouth daily as needed. 90 tablet 3    furosemide (LASIX) 40 MG tablet Take 1 tablet by mouth once daily 90 tablet 0    gabapentin (NEURONTIN) 100 MG capsule Takes on in am and two in pm for back pain radiating down leg. 270 capsule 3    glimepiride (AMARYL) 2 MG tablet Take 1 tablet (2 mg total) by mouth 2 (two) times daily. Take 2 mg by mouth 2 (two) times daily. 180 tablet 3    hyoscyamine (ANASPAZ,LEVSIN) 0.125 mg Tab Take 1 tablet (125 mcg total) by mouth every 6 (six) hours as needed (P.r.n. bladder spasm). 25 tablet 0    insulin detemir U-100 (LEVEMIR FLEXTOUCH U-100 INSULN) 100 unit/mL (3 mL) InPn pen 15 units sq daily.  Please disp needles to match 15 mL 11    JARDIANCE 10 mg tablet Take 1 tablet by mouth once daily 90 tablet 0    metFORMIN (GLUCOPHAGE-XR) 500 MG ER 24hr tablet Take 2 tablets (1,000 mg total) by mouth daily with breakfast. prn 180  "tablet 3    metoprolol succinate (TOPROL-XL) 50 MG 24 hr tablet Take 1 tablet (50 mg total) by mouth once daily. 90 tablet 3    MULTIVIT/IRON/FA/K/HERB NO.244 (ALIVE WOMEN'S ENERGY ORAL) Take 1 tablet by mouth daily as needed.       oxyCODONE-acetaminophen (PERCOCET)  mg per tablet       spironolactone (ALDACTONE) 25 MG tablet Take 1 tablet by mouth once daily 90 tablet 0    tamsulosin (FLOMAX) 0.4 mg Cap Take 1 capsule (0.4 mg total) by mouth once daily. 90 capsule 3    vitamin D (VITAMIN D3) 1000 units Tab Take 1,000 Units by mouth once daily.       No current facility-administered medications on file prior to visit.       Cardiac meds:  Atorvastatin 40 mg     entresto 49/51 mg BID  Lasix 40 mg  Metoprolol succinate 50 mg   Aldactone 25 mg   jardiance 10 mg       OBJECTIVE:     Vital Signs (Most Recent)  Vitals:    24 0842   BP: 118/81   Pulse: 82   SpO2: 98%   Weight: 94.2 kg (207 lb 10.1 oz)   Height: 5' 7" (1.702 m)                   Physical Exam:    Neck: normal carotids, no bruits; normal JVP  Lungs :clear  Heart: RR, normal S1,S2, no murmurs, no gallops  Abd: no masses; no bruits;   Exts: normal DP and PT pulses bilaterally, no edema noted           LABS    : GFR normal; K normal ; LDL 81  : CBS: microcytic anemia; GFR >60; lipid panel OK with low HDL , A1c: 8.9      Diagnostic Results:    1.EK22: NSR, low voltage; nonspecific T wave changes   2.Echo: : EF 25%; global hypokinesis; LAE, moderate MR, mild TR   2b.Echo: : EF 30 %; mild MR, LAE   3.MUGA: EF 45%   4.Carotid US: : no significant stenosis   Chart review:      Cath per patient at [facility]: no blockages ? Date   ASSESSMENT/PLAN:     1. Cardiomyopathy: why? Nonischemic per patient - is this burned out hypertension? Diabetes? Idiopathic familial? - marked improvement in symptoms ; last EF around 45%      Plan: continue all medications   Return 6 months   Jeffery Yadav MD            "

## 2024-01-08 ENCOUNTER — OFFICE VISIT (OUTPATIENT)
Dept: CARDIOLOGY | Facility: CLINIC | Age: 74
End: 2024-01-08
Payer: MEDICARE

## 2024-01-08 VITALS
BODY MASS INDEX: 32.59 KG/M2 | HEART RATE: 82 BPM | HEIGHT: 67 IN | DIASTOLIC BLOOD PRESSURE: 81 MMHG | SYSTOLIC BLOOD PRESSURE: 118 MMHG | WEIGHT: 207.63 LBS | OXYGEN SATURATION: 98 %

## 2024-01-08 DIAGNOSIS — E11.9 TYPE 2 DIABETES MELLITUS WITHOUT COMPLICATION, WITHOUT LONG-TERM CURRENT USE OF INSULIN: ICD-10-CM

## 2024-01-08 DIAGNOSIS — I50.9 ACUTE ON CHRONIC CONGESTIVE HEART FAILURE, UNSPECIFIED HEART FAILURE TYPE: Primary | ICD-10-CM

## 2024-01-08 PROCEDURE — 99214 OFFICE O/P EST MOD 30 MIN: CPT | Mod: S$GLB,,, | Performed by: INTERNAL MEDICINE

## 2024-01-08 PROCEDURE — 99999 PR PBB SHADOW E&M-EST. PATIENT-LVL III: CPT | Mod: PBBFAC,,, | Performed by: INTERNAL MEDICINE

## 2024-01-08 RX ORDER — ATORVASTATIN CALCIUM 40 MG/1
40 TABLET, FILM COATED ORAL DAILY
Qty: 90 TABLET | Refills: 3 | Status: SHIPPED | OUTPATIENT
Start: 2024-01-08 | End: 2025-01-07

## 2024-01-08 RX ORDER — FUROSEMIDE 40 MG/1
TABLET ORAL
Qty: 90 TABLET | Refills: 0 | Status: SHIPPED | OUTPATIENT
Start: 2024-01-08

## 2024-01-08 RX ORDER — METOPROLOL SUCCINATE 50 MG/1
50 TABLET, EXTENDED RELEASE ORAL DAILY
Qty: 90 TABLET | Refills: 3 | Status: SHIPPED | OUTPATIENT
Start: 2024-01-08 | End: 2025-01-07

## 2024-01-08 RX ORDER — SACUBITRIL AND VALSARTAN 49; 51 MG/1; MG/1
1 TABLET, FILM COATED ORAL 2 TIMES DAILY
Qty: 180 TABLET | Refills: 0 | Status: SHIPPED | OUTPATIENT
Start: 2024-01-08

## 2024-01-08 RX ORDER — SPIRONOLACTONE 25 MG/1
25 TABLET ORAL DAILY
Qty: 90 TABLET | Refills: 1 | Status: SHIPPED | OUTPATIENT
Start: 2024-01-08

## 2024-01-09 DIAGNOSIS — Z78.0 MENOPAUSE: ICD-10-CM

## 2024-01-18 ENCOUNTER — TELEPHONE (OUTPATIENT)
Dept: FAMILY MEDICINE | Facility: CLINIC | Age: 74
End: 2024-01-18
Payer: MEDICARE

## 2024-01-18 DIAGNOSIS — E11.9 TYPE 2 DIABETES MELLITUS WITHOUT COMPLICATION, WITHOUT LONG-TERM CURRENT USE OF INSULIN: Primary | ICD-10-CM

## 2024-01-18 DIAGNOSIS — D50.9 IRON DEFICIENCY ANEMIA, UNSPECIFIED IRON DEFICIENCY ANEMIA TYPE: ICD-10-CM

## 2024-01-18 DIAGNOSIS — I10 ESSENTIAL HYPERTENSION: ICD-10-CM

## 2024-01-18 NOTE — TELEPHONE ENCOUNTER
----- Message from Anabell Spears sent at 1/18/2024 11:21 AM CST -----  Contact: pt  Type:  Orders     Who Called:pt     Does the patient know what this is regarding?:Lab orders for her Sugar levels   Would the patient rather a call back or a response via [facility]? Call   Best Call Back Number: [phone]  Additional Information:

## 2024-01-19 NOTE — TELEPHONE ENCOUNTER
Lab work ordered including blood test for anemia-also microalbumin for check of protein in urine due to diabetes

## 2024-02-02 ENCOUNTER — PATIENT MESSAGE (OUTPATIENT)
Dept: ADMINISTRATIVE | Facility: HOSPITAL | Age: 74
End: 2024-02-02
Payer: MEDICARE

## 2024-02-02 ENCOUNTER — PATIENT OUTREACH (OUTPATIENT)
Dept: ADMINISTRATIVE | Facility: HOSPITAL | Age: 74
End: 2024-02-02
Payer: MEDICARE

## 2024-02-02 NOTE — LETTER
February 6, 2024    Katherine Rising  [address] [address]             [facility]  [address]  [place] [address]  Phone: [phone] Dear Cecile, [facility] is committed to your overall health. Currently, we have Itz Muse MD listed as your primary care provider. If this is incorrect, please let us know by emailing or contacting our office at [phone] so we can update our records.     Our records show you have not been seen in twelve months by your primary care provider. Please schedule online through your [email or web address]  account or call our office at [phone].  You may also be due for the following test and/or procedures:        Hemoglobin A1C  Dexa Scan   Foot Exam  Diabetes Urine Screening  Lipid Panel  Colon cancer screening     If you already have any of the above scheduled, please disregard this message.  If you have had any of the above done at a non-[facility] facility, please notify us so that we can obtain a copy or bring a copy to your next Primary Care visit.     Your [facility] care team is committed to supporting your overall health. We look forward to seeing you soon and appreciate the opportunity to serve you.        Sincerely,     Itz Muse MD and your [facility] Primary Care Team

## 2024-02-29 LAB
LEFT EYE DM RETINOPATHY: POSITIVE
RIGHT EYE DM RETINOPATHY: POSITIVE

## 2024-03-05 ENCOUNTER — PATIENT OUTREACH (OUTPATIENT)
Dept: ADMINISTRATIVE | Facility: HOSPITAL | Age: 74
End: 2024-03-05
Payer: MEDICARE

## 2024-03-05 NOTE — PROGRESS NOTES
Population Health Chart Review & Patient Outreach Details      Additional Pop Health Notes:               Updates Requested / Reviewed:      External Sources: Bucky Szymanski         Health Maintenance Topics Overdue:    Health Maintenance Due   Topic Date Due    Shingles Vaccine (1 of 2) Never done    RSV Vaccine (Age 60+ and Pregnant patients) (1 - 1-dose 60+ series) Never done    Foot Exam  04/14/2021    Diabetes Urine Screening  05/31/2023    Influenza Vaccine (1) 09/01/2023    COVID-19 Vaccine (5 - 2023-24 season) 09/01/2023    Colorectal Cancer Screening  09/13/2023    Lipid Panel  11/22/2023    DEXA Scan  12/08/2023    Hemoglobin A1c  01/04/2024         Health Maintenance Topic(s) Outreach Outcomes & Actions Taken:    Eye Exam - Outreach Outcomes & Actions Taken  : Diabetic Eye External Records Uploaded, Care Team & History Updated if Applicable

## 2024-03-21 ENCOUNTER — OFFICE VISIT (OUTPATIENT)
Dept: FAMILY MEDICINE | Facility: CLINIC | Age: 74
End: 2024-03-21
Payer: MEDICARE

## 2024-03-21 ENCOUNTER — LAB VISIT (OUTPATIENT)
Dept: LAB | Facility: HOSPITAL | Age: 74
End: 2024-03-21
Attending: INTERNAL MEDICINE
Payer: MEDICARE

## 2024-03-21 VITALS
SYSTOLIC BLOOD PRESSURE: 120 MMHG | WEIGHT: 204.94 LBS | HEIGHT: 67 IN | BODY MASS INDEX: 32.17 KG/M2 | HEART RATE: 86 BPM | DIASTOLIC BLOOD PRESSURE: 60 MMHG | TEMPERATURE: 98 F | OXYGEN SATURATION: 98 %

## 2024-03-21 DIAGNOSIS — I70.0 ATHEROSCLEROSIS OF AORTA: ICD-10-CM

## 2024-03-21 DIAGNOSIS — E87.0 TYPE I DIABETES MELLITUS WITH HYPEROSMOLAR COMA: ICD-10-CM

## 2024-03-21 DIAGNOSIS — E66.01 MORBID (SEVERE) OBESITY DUE TO EXCESS CALORIES: ICD-10-CM

## 2024-03-21 DIAGNOSIS — F11.20 OPIOID DEPENDENCE, UNCOMPLICATED: ICD-10-CM

## 2024-03-21 DIAGNOSIS — E11.9 TYPE 2 DIABETES MELLITUS WITHOUT COMPLICATION, WITHOUT LONG-TERM CURRENT USE OF INSULIN: Primary | ICD-10-CM

## 2024-03-21 DIAGNOSIS — E10.65 TYPE I DIABETES MELLITUS WITH HYPEROSMOLAR COMA: ICD-10-CM

## 2024-03-21 DIAGNOSIS — E11.3493: ICD-10-CM

## 2024-03-21 DIAGNOSIS — E03.9 MYXEDEMA HEART DISEASE: ICD-10-CM

## 2024-03-21 DIAGNOSIS — E10.69 TYPE I DIABETES MELLITUS WITH HYPEROSMOLAR COMA: ICD-10-CM

## 2024-03-21 DIAGNOSIS — I10 ESSENTIAL HYPERTENSION: ICD-10-CM

## 2024-03-21 DIAGNOSIS — Z12.11 SCREENING FOR COLON CANCER: ICD-10-CM

## 2024-03-21 DIAGNOSIS — Z12.31 ENCOUNTER FOR SCREENING MAMMOGRAM FOR BREAST CANCER: ICD-10-CM

## 2024-03-21 DIAGNOSIS — E11.65 TYPE 2 DIABETES MELLITUS WITH HYPERGLYCEMIA, UNSPECIFIED WHETHER LONG TERM INSULIN USE: ICD-10-CM

## 2024-03-21 DIAGNOSIS — I50.9 CONGESTIVE HEART FAILURE, UNSPECIFIED HF CHRONICITY, UNSPECIFIED HEART FAILURE TYPE: Primary | ICD-10-CM

## 2024-03-21 DIAGNOSIS — I10 ESSENTIAL HYPERTENSION, MALIGNANT: ICD-10-CM

## 2024-03-21 DIAGNOSIS — I51.9 MYXEDEMA HEART DISEASE: ICD-10-CM

## 2024-03-21 DIAGNOSIS — E78.5 HYPERLIPEMIA: Primary | ICD-10-CM

## 2024-03-21 LAB
ALBUMIN SERPL BCP-MCNC: 4 G/DL (ref 3.5–5.2)
ALBUMIN/CREAT UR: 10 UG/MG (ref 0–30)
ALP SERPL-CCNC: 66 U/L (ref 38–126)
ALT SERPL W/O P-5'-P-CCNC: 17 U/L (ref 10–44)
ANION GAP SERPL CALC-SCNC: 12 MMOL/L (ref 8–16)
AST SERPL-CCNC: 19 U/L (ref 15–46)
BACTERIA #/AREA URNS AUTO: ABNORMAL /HPF
BASOPHILS # BLD AUTO: 0.03 K/UL (ref 0–0.2)
BASOPHILS NFR BLD: 0.3 % (ref 0–1.9)
BILIRUB SERPL-MCNC: 0.5 MG/DL (ref 0.1–1)
BILIRUB UR QL STRIP: NEGATIVE
CALCIUM SERPL-MCNC: 10 MG/DL (ref 8.7–10.5)
CHLORIDE SERPL-SCNC: 103 MMOL/L (ref 95–110)
CHOLEST SERPL-MCNC: 125 MG/DL (ref 120–199)
CHOLEST/HDLC SERPL: 3.5 {RATIO} (ref 2–5)
CLARITY UR REFRACT.AUTO: CLEAR
CO2 SERPL-SCNC: 26 MMOL/L (ref 23–29)
COLOR UR AUTO: YELLOW
CREAT SERPL-MCNC: 0.98 MG/DL (ref 0.5–1.4)
CREAT UR-MCNC: 60 MG/DL (ref 15–325)
DIFFERENTIAL METHOD BLD: ABNORMAL
EOSINOPHIL # BLD AUTO: 0.1 K/UL (ref 0–0.5)
EOSINOPHIL NFR BLD: 1.4 % (ref 0–8)
ERYTHROCYTE [DISTWIDTH] IN BLOOD BY AUTOMATED COUNT: 14.9 % (ref 11.5–14.5)
EST. GFR  (NO RACE VARIABLE): >60 ML/MIN/1.73 M^2
ESTIMATED AVG GLUCOSE: 206 MG/DL (ref 68–131)
GLUCOSE SERPL-MCNC: 298 MG/DL (ref 70–110)
GLUCOSE UR QL STRIP: ABNORMAL
HBA1C MFR BLD: 8.8 % (ref 4–5.6)
HCT VFR BLD AUTO: 40.4 % (ref 37–48.5)
HDLC SERPL-MCNC: 36 MG/DL (ref 40–75)
HDLC SERPL: 28.8 % (ref 20–50)
HGB BLD-MCNC: 12.3 G/DL (ref 12–16)
HGB UR QL STRIP: NEGATIVE
IMM GRANULOCYTES # BLD AUTO: 0.13 K/UL (ref 0–0.04)
IMM GRANULOCYTES NFR BLD AUTO: 1.3 % (ref 0–0.5)
KETONES UR QL STRIP: NEGATIVE
LDLC SERPL CALC-MCNC: 71.2 MG/DL (ref 63–159)
LEUKOCYTE ESTERASE UR QL STRIP: NEGATIVE
LYMPHOCYTES # BLD AUTO: 2.1 K/UL (ref 1–4.8)
LYMPHOCYTES NFR BLD: 21.2 % (ref 18–48)
MCH RBC QN AUTO: 22.9 PG (ref 27–31)
MCHC RBC AUTO-ENTMCNC: 30.4 G/DL (ref 32–36)
MCV RBC AUTO: 75 FL (ref 82–98)
MICROALBUMIN UR DL<=1MG/L-MCNC: 6 UG/ML
MICROSCOPIC COMMENT: ABNORMAL
MONOCYTES # BLD AUTO: 0.9 K/UL (ref 0.3–1)
MONOCYTES NFR BLD: 9.1 % (ref 4–15)
NEUTROPHILS # BLD AUTO: 6.6 K/UL (ref 1.8–7.7)
NEUTROPHILS NFR BLD: 66.7 % (ref 38–73)
NITRITE UR QL STRIP: NEGATIVE
NONHDLC SERPL-MCNC: 89 MG/DL
NRBC BLD-RTO: 0 /100 WBC
OHS QRS DURATION: 122 MS
OHS QTC CALCULATION: 481 MS
PH UR STRIP: 6 [PH] (ref 5–8)
PLATELET # BLD AUTO: 229 K/UL (ref 150–450)
PMV BLD AUTO: 12.4 FL (ref 9.2–12.9)
POTASSIUM SERPL-SCNC: 5.1 MMOL/L (ref 3.5–5.1)
PROT SERPL-MCNC: 6.9 G/DL (ref 6–8.4)
PROT UR QL STRIP: NEGATIVE
RBC # BLD AUTO: 5.38 M/UL (ref 4–5.4)
RBC #/AREA URNS AUTO: 1 /HPF (ref 0–4)
SODIUM SERPL-SCNC: 141 MMOL/L (ref 136–145)
SP GR UR STRIP: 1.01 (ref 1–1.03)
T4 FREE SERPL-MCNC: 1.06 NG/DL (ref 0.71–1.51)
TRIGL SERPL-MCNC: 89 MG/DL (ref 30–150)
TSH SERPL DL<=0.005 MIU/L-ACNC: 0.24 UIU/ML (ref 0.4–4)
URN SPEC COLLECT METH UR: ABNORMAL
UROBILINOGEN UR STRIP-ACNC: NEGATIVE EU/DL
UUN UR-MCNC: 32 MG/DL (ref 7–17)
WBC # BLD AUTO: 9.95 K/UL (ref 3.9–12.7)
WBC #/AREA URNS AUTO: 1 /HPF (ref 0–5)
YEAST UR QL AUTO: ABNORMAL

## 2024-03-21 PROCEDURE — 99214 OFFICE O/P EST MOD 30 MIN: CPT | Mod: S$GLB,,, | Performed by: FAMILY MEDICINE

## 2024-03-21 PROCEDURE — 84443 ASSAY THYROID STIM HORMONE: CPT | Mod: PN | Performed by: INTERNAL MEDICINE

## 2024-03-21 PROCEDURE — 80053 COMPREHEN METABOLIC PANEL: CPT | Mod: PN | Performed by: INTERNAL MEDICINE

## 2024-03-21 PROCEDURE — 82043 UR ALBUMIN QUANTITATIVE: CPT | Mod: PN | Performed by: INTERNAL MEDICINE

## 2024-03-21 PROCEDURE — 93010 ELECTROCARDIOGRAM REPORT: CPT | Mod: S$GLB,,, | Performed by: INTERNAL MEDICINE

## 2024-03-21 PROCEDURE — 80061 LIPID PANEL: CPT | Performed by: INTERNAL MEDICINE

## 2024-03-21 PROCEDURE — 83036 HEMOGLOBIN GLYCOSYLATED A1C: CPT | Performed by: INTERNAL MEDICINE

## 2024-03-21 PROCEDURE — 85025 COMPLETE CBC W/AUTO DIFF WBC: CPT | Mod: PN | Performed by: INTERNAL MEDICINE

## 2024-03-21 PROCEDURE — 84439 ASSAY OF FREE THYROXINE: CPT | Performed by: INTERNAL MEDICINE

## 2024-03-21 PROCEDURE — 81000 URINALYSIS NONAUTO W/SCOPE: CPT | Mod: PN | Performed by: INTERNAL MEDICINE

## 2024-03-21 PROCEDURE — 93005 ELECTROCARDIOGRAM TRACING: CPT | Mod: S$GLB,,, | Performed by: FAMILY MEDICINE

## 2024-03-21 PROCEDURE — 36415 COLL VENOUS BLD VENIPUNCTURE: CPT | Mod: PN | Performed by: INTERNAL MEDICINE

## 2024-03-21 RX ORDER — EMPAGLIFLOZIN 25 MG/1
TABLET, FILM COATED ORAL
COMMUNITY
Start: 2024-02-27

## 2024-03-21 RX ORDER — NEOMYCIN SULFATE, POLYMYXIN B SULFATE, AND DEXAMETHASONE 3.5; 10000; 1 MG/G; [USP'U]/G; MG/G
OINTMENT OPHTHALMIC
COMMUNITY
Start: 2024-02-29

## 2024-03-21 RX ORDER — SITAGLIPTIN AND METFORMIN HYDROCHLORIDE 500; 50 MG/1; MG/1
TABLET, FILM COATED ORAL
COMMUNITY
Start: 2024-02-27 | End: 2024-03-21

## 2024-03-21 RX ORDER — HYOSCYAMINE SULFATE 0.125 MG
125 TABLET ORAL EVERY 6 HOURS PRN
Qty: 25 TABLET | Refills: 0 | Status: SHIPPED | OUTPATIENT
Start: 2024-03-21 | End: 2024-04-20

## 2024-03-21 RX ORDER — TAMSULOSIN HYDROCHLORIDE 0.4 MG/1
1 CAPSULE ORAL DAILY
Qty: 90 CAPSULE | Refills: 3 | Status: SHIPPED | OUTPATIENT
Start: 2024-03-21

## 2024-03-21 RX ORDER — TIRZEPATIDE 5 MG/.5ML
5 INJECTION, SOLUTION SUBCUTANEOUS
Qty: 12 PEN | Refills: 1 | Status: SHIPPED | OUTPATIENT
Start: 2024-04-20 | End: 2024-04-08

## 2024-03-21 RX ORDER — TIRZEPATIDE 5 MG/.5ML
5 INJECTION, SOLUTION SUBCUTANEOUS
Qty: 12 PEN | Refills: 1 | Status: SHIPPED | OUTPATIENT
Start: 2024-03-21 | End: 2024-03-21 | Stop reason: SDUPTHER

## 2024-03-21 RX ORDER — TIRZEPATIDE 2.5 MG/.5ML
2.5 INJECTION, SOLUTION SUBCUTANEOUS
Qty: 4 PEN | Refills: 1 | Status: SHIPPED | OUTPATIENT
Start: 2024-03-21 | End: 2024-04-08

## 2024-03-21 NOTE — PROGRESS NOTES
" Patient ID: Katherine Berger is a 74 y.o. female.    Chief Complaint: Blood Sugar Problem    HPI      Katherine Berger is a 74 y.o. female here for evaluation due to diabetes mellitus.  Patient has diabetes out of control.  It just recently has been seen by ophthalmologist who said retina is experiencing changes due to diabetes.  Here for closer control of glucose.  Patient with inability afford GLP ones.  Need to find additional medications that may work for it.    States that insurance will pay for Jardiance-she has been placed on Jardiance due to cardiovascular concerns.    Vitals:    03/21/24 1159   BP: 120/60   BP Location: Left arm   Patient Position: Sitting   Pulse: 86   Temp: 97.8 °F (36.6 °C)   TempSrc: Oral   SpO2: 98%   Weight: 92.9 kg (204 lb 14.7 oz)   Height: 5' 7" (1.702 m)            Review of Symptoms      Physical Exam    Constitutional:  Oriented to person, place, and time.appears well-developed and well-nourished.  No distress.      HENT  Head: Normocephalic and atraumatic  Right Ear: External ear normal.   Left Ear: External ear normal.   Nose: External nose normal.   Mouth:  Moist mucus membranes.    Eyes:  Conjunctivae are normal. Right eye exhibits no discharge.  Left eye exhibits no discharge. No scleral icterus.  No periorbital edema    Cardiovascular:  Regular rate with rhythm with what appears appears to be PVCs    Pulmonary/Chest:   Clear to auscultation bilaterally without wheezes, rhonchi or rales      Musculoskeletal:  No edema. No obvious deformity No wasting       Neurological:  Alert and oriented to person, place, and time.   Coordination normal.     Skin:   Skin is warm and dry.  No diaphoresis.   No rash noted.     Psychiatric: Normal mood and affect. Behavior is normal.  Judgment and thought content normal.     Complete Blood Count  Lab Results   Component Value Date    RBC 5.38 03/21/2024    HGB 12.3 03/21/2024    HCT 40.4 03/21/2024    MCV 75 (L) 03/21/2024    MCH 22.9 (L) " "03/21/2024    MCHC 30.4 (L) 03/21/2024    RDW 14.9 (H) 03/21/2024     03/21/2024    MPV 12.4 03/21/2024    GRAN 6.6 03/21/2024    GRAN 66.7 03/21/2024    LYMPH 2.1 03/21/2024    LYMPH 21.2 03/21/2024    MONO 0.9 03/21/2024    MONO 9.1 03/21/2024    EOS 0.1 03/21/2024    BASO 0.03 03/21/2024    EOSINOPHIL 1.4 03/21/2024    BASOPHIL 0.3 03/21/2024    DIFFMETHOD Automated 03/21/2024       Comprehensive Metabolic Panel  No results found for: "GLU", "BUN", "CREATININE", "NA", "K", "CL", "PROT", "ALBUMIN", "BILITOT", "AST", "ALKPHOS", "CO2", "ALT", "ANIONGAP", "EGFRNONAA", "ESTGFRAFRICA"    TSH  Lab Results   Component Value Date    TSH 0.240 (L) 03/21/2024       Assessment / Plan:      ICD-10-CM ICD-9-CM   1. Type 2 diabetes mellitus without complication, without long-term current use of insulin  E11.9 250.00   2. Screening for colon cancer  Z12.11 V76.51   3. Encounter for screening mammogram for breast cancer  Z12.31 V76.12   4. 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 Morbid (severe) obesity due to excess calories  E66.01 278.01   6. Opioid dependence, uncomplicated  F11.20 304.00   7. Type 2 diabetes mellitus with hyperglycemia, unspecified whether long term insulin use  E11.65 250.00   8. Atherosclerosis of aorta  I70.0 440.0   9. Essential hypertension  I10 401.9     Type 2 diabetes mellitus without complication, without long-term current use of insulin  -     tirzepatide (MOUNJARO) 2.5 mg/0.5 mL PnIj; Inject 2.5 mg into the skin every 7 days.  Dispense: 4 Pen; Refill: 1  -     Discontinue: tirzepatide (MOUNJARO) 5 mg/0.5 mL PnIj; Inject 5 mg into the skin every 7 days. Okay to compound medication  Dispense: 12 Pen; Refill: 1  -     tirzepatide (MOUNJARO) 5 mg/0.5 mL PnIj; Inject 5 mg into the skin every 7 days. 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 bladder spasm).  Dispense: 25 tablet; Refill: 0  -     tamsulosin (FLOMAX) 0.4 mg Cap; Take 1 capsule (0.4 mg total) by mouth once daily.  Dispense: 90 capsule; Refill: 3    Patient with complains of urinary problems Flomax was working in the past would like to restart.  Opioid dependence-seen by pain management   Obesity-trying to control this along with diabetes   Athero sclerosis of aorta-on lipid lowering medication  Heart rate regular with occasional irregular beats.  EKG to rule out AFib-PVCs no AFib

## 2024-03-22 ENCOUNTER — NURSE TRIAGE (OUTPATIENT)
Dept: ADMINISTRATIVE | Facility: CLINIC | Age: 74
End: 2024-03-22
Payer: MEDICARE

## 2024-03-22 ENCOUNTER — TELEPHONE (OUTPATIENT)
Dept: CARDIOLOGY | Facility: CLINIC | Age: 74
End: 2024-03-22
Payer: MEDICARE

## 2024-03-22 NOTE — TELEPHONE ENCOUNTER
Rt call to pt regarding she was calling to get scheduled for her 6 mth f/u appt I lvm for pt to call back regarding her appt that I scheduled for her .

## 2024-03-22 NOTE — TELEPHONE ENCOUNTER
----- Message from Jannette Portillo sent at 3/22/2024 10:11 AM CDT -----  Type:  Sooner Appointment Request    Caller is requesting a sooner appointment.  Caller declined first available appointment listed below.  Caller will not accept being placed on the waitlist and is requesting a message be sent to doctor.  Name of Caller:pt  When is the first available appointment?in May  Symptoms:slow heart rate  Would the patient rather a call back or a response via [facility]? call  Best Call Back Number: [phone]  Additional Information:   Refused to see someone else prefers her Dr only  Transferred to NOC

## 2024-03-22 NOTE — TELEPHONE ENCOUNTER
Call received from  states pt c/o slow heart rate. Katherine reports no symptoms at this time. Was instructed by PCP to f/u with cardiologist for slow heart rate during OV yesterday. Pt requesting appt with Cardiologist for next week. Pt reports  informed pt that no available appts until May. Pt requesting call back by office for sooner appt. Advised pt per triage protocol that a high a priority message will be sent to provider's office now with request for call back to pt today. V/u.   Reason for Disposition   Nursing judgment    Protocols used: Information Only Call - No Triage-A-OH

## 2024-03-25 ENCOUNTER — DOCUMENTATION ONLY (OUTPATIENT)
Dept: CARDIOLOGY | Facility: CLINIC | Age: 74
End: 2024-03-25
Payer: MEDICARE

## 2024-03-28 ENCOUNTER — PATIENT OUTREACH (OUTPATIENT)
Dept: ADMINISTRATIVE | Facility: HOSPITAL | Age: 74
End: 2024-03-28
Payer: MEDICARE

## 2024-03-28 NOTE — PROGRESS NOTES
Population Health Chart Review & Patient Outreach Details      Additional [facility] Health Notes:               Updates Requested / Reviewed:      Updated Care Coordination Note, Care Everywhere, , Care Team Updated, Removed  or Duplicate Orders, and Immunizations Reconciliation Completed or Queried: [facility] Topics Overdue:      [facility] Score: 2     Colon Cancer Screening  Foot Exam                       Health Maintenance Topic(s) Outreach Outcomes & Actions Taken:    Colorectal Cancer Screening - Outreach Outcomes & Actions Taken  : Colonoscopy Case Request / Referral / Home Test Order Placed

## 2024-04-05 ENCOUNTER — TELEPHONE (OUTPATIENT)
Dept: FAMILY MEDICINE | Facility: CLINIC | Age: 74
End: 2024-04-05
Payer: MEDICARE

## 2024-04-08 ENCOUNTER — TELEPHONE (OUTPATIENT)
Dept: FAMILY MEDICINE | Facility: CLINIC | Age: 74
End: 2024-04-08
Payer: MEDICARE

## 2024-04-08 DIAGNOSIS — E11.9 TYPE 2 DIABETES MELLITUS WITHOUT COMPLICATION, WITHOUT LONG-TERM CURRENT USE OF INSULIN: Primary | ICD-10-CM

## 2024-04-08 NOTE — TELEPHONE ENCOUNTER
----- Message from Erica Lawrence sent at 4/8/2024  1:16 PM CDT -----  Type:  Patient Returning Call    Who Called:Pt   Would the patient rather a call back or a response via [facility]? Call back   Best Call Back Number: [phone]  Additional Information: tirzepatide (MOUNJARO) 5 mg/0.5 mL PnIj.. need dosage changed... current dosage is not lowering sugar... need it changed before Thursday

## 2024-04-08 NOTE — TELEPHONE ENCOUNTER
----- Message from Reta De La Paz sent at 4/8/2024  2:00 PM CDT -----  Type:  Patient Returning Call    Who Called:pt  Who Left Message for Patient:kasey  Does the patient know what this is regarding?:returning call/medication  Would the patient rather a call back or a response via [facility]? call  Best Call Back Number: [phone]  Additional Information:

## 2024-04-08 NOTE — TELEPHONE ENCOUNTER
Left message for pt to call back- need to inform her of increase in Mounjaro. Also, 6 month lab and follow up with Dr. Abbasi has been mailed.

## 2024-04-10 ENCOUNTER — TELEPHONE (OUTPATIENT)
Dept: FAMILY MEDICINE | Facility: CLINIC | Age: 74
End: 2024-04-10
Payer: MEDICARE

## 2024-04-10 NOTE — TELEPHONE ENCOUNTER
----- Message from Gary Diggs sent at 4/10/2024  8:47 AM CDT -----  Type:  Patient Returning Call    Who Called:pt  Who Left Message for Patient:Aylin Lima,  Does the patient know what this is regarding?:Medication  Would the patient rather a call back or a response via [facility]? call  Best Call Back Number:[phone]  Additional Information: pt would also like to disuses Therapy for her legs

## 2024-04-11 ENCOUNTER — TELEPHONE (OUTPATIENT)
Dept: FAMILY MEDICINE | Facility: CLINIC | Age: 74
End: 2024-04-11
Payer: MEDICARE

## 2024-04-11 NOTE — TELEPHONE ENCOUNTER
----- Message from Reji Patrick sent at 4/11/2024  4:56 PM CDT -----  .Type:  Needs Medical Advice    Who Called: pt    Would the patient rather a call back or a response via [facility]? Call back  Best Call Back Number: [phone]  Additional Information:     Pt stated she missed a call and would like a call back

## 2024-04-12 ENCOUNTER — TELEPHONE (OUTPATIENT)
Dept: FAMILY MEDICINE | Facility: CLINIC | Age: 74
End: 2024-04-12
Payer: MEDICARE

## 2024-04-12 DIAGNOSIS — M79.606 PAIN OF LOWER EXTREMITY, UNSPECIFIED LATERALITY: Primary | ICD-10-CM

## 2024-04-12 DIAGNOSIS — E11.9 TYPE 2 DIABETES MELLITUS WITHOUT COMPLICATION, WITHOUT LONG-TERM CURRENT USE OF INSULIN: ICD-10-CM

## 2024-04-12 NOTE — TELEPHONE ENCOUNTER
Bovie, Lakia B., LPN routed conversation to [facility]15 hours ago (5:00 PM)     Lakia William LPN15 hours ago (5:00 PM)     RYAN  Attempted to contact pt. Phone rang several times and then went completely silent.         Note     Lakia William LPN  Katherine Berger [phone] hours ago (4:58 PM)     Lakia William LPN16 hours ago (4:58 PM)     RYAN  ----- Message from Reji Patrick sent at 4/11/2024  4:56 PM CDT -----  .Type:  Needs Medical Advice     Who Called: pt     Would the patient rather a call back or a response via [facility]? Call back  Best Call Back Number: [phone]  Additional Information:      Pt stated she missed a call and would like a call back

## 2024-04-12 NOTE — TELEPHONE ENCOUNTER
Spoke with pt she stated that she is taking mounjaro 2.5 but she feels is not lowering her sugar and would like a increase pt also stated that she would like referral for PT for her leg

## 2024-04-12 NOTE — TELEPHONE ENCOUNTER
----- Message from Erica Lawrence sent at 4/12/2024  8:18 AM CDT -----  Type:  Patient Returning Call    Who Called:Pt   Who Left Message for Patient:  Does the patient know what this is regarding?:yes   Would the patient rather a call back or a response via [facility]? Call back  Best Call Back Number:[phone]  Additional Information: calling to discuss medication

## 2024-04-14 NOTE — TELEPHONE ENCOUNTER
7.5 milligrams Mounjaro sent to the pharmacy last week    Physical therapy ordered through [facility]

## 2024-04-15 NOTE — TELEPHONE ENCOUNTER
Notified pt of rx sent and PT.    Pt states her pharmacy is out of the 7.5 mg Mounjaro, they will inform pt when they receive the 7.5mg

## 2024-04-28 NOTE — PROGRESS NOTES
[facility] Cardiology [address]     SUBJECTIVE:     History of Present Illness:  Patient is a 74 y.o. female presents with congestive heart failure. Feels great .     Primary Diagnosis:   1. Hypertension  2. DM: positive  3. Nonsmoker  4. Family history of early CAD: son  of congestive heart failure;   5. Chronic systolic heart failure   ROS  Since last visit :feeling better   A. Breathing is better; no PND or orthopnea  B. No shortness of breath at rest  C. No palpitations  D. No syncope  E. Activity: much improved; walking in store; pushing the lawnmower to cut grass without issues; exercises ; walked all over [facility] and did well   F.  blood sugars are markedly improved  G.  blood pressures at home: normal; sometimes as low as 100's   Review of patient's allergies indicates:  No Known Allergies    Past Medical History:   Diagnosis Date    Arthritis     Diabetes mellitus     does she will take it I want to you both of     Hypertension     Kidney stone        Past Surgical History:   Procedure Laterality Date    COLONOSCOPY  2013    dr ram - 5 years    FOOT SURGERY Right 2010    bone spur    FOOT SURGERY Left     fx ankle    HYSTERECTOMY      age 47    KNEE SURGERY Left 06/10/2016    TKR    OOPHORECTOMY      TONSILLECTOMY         Family History   Problem Relation Name Age of Onset    Heart attack Mother age 50's     Seizures Mother age 50's         fell in bathtub and drowned    Colon cancer Sister      Breast cancer Sister         Social History     Tobacco Use    Smoking status: Never     Passive exposure: Never    Smokeless tobacco: Never   Substance Use Topics    Alcohol use: No     Alcohol/week: 0.0 standard drinks of alcohol    Drug use: No        Past Hospitalization:   24: diuresed and sent home   Home meds:  Current Outpatient Medications on File Prior to Visit   Medication Sig Dispense Refill    albuterol (VENTOLIN HFA) 90 mcg/actuation inhaler Inhale 2 puffs into the lungs every  6 (six) hours as needed for Wheezing. Rescue 18 g 0    amitriptyline (ELAVIL) 10 MG tablet TAKE ONE TO THREE TABLETS BY MOUTH AT BEDTIME FOR  HEADACHE 90 tablet 3    ascorbic acid, vitamin C, (VITAMIN C) 500 MG tablet Take 500 mg by mouth once daily.      atorvastatin (LIPITOR) 40 MG tablet Take 1 tablet (40 mg total) by mouth once daily. 90 tablet 3    blood sugar diagnostic (BLOOD GLUCOSE TEST) Strp Strips for 3 times a day testing   dispense brand covered by insurance and to match meter brand 300 each 3    busPIRone (BUSPAR) 5 MG Tab Take 1 tablet (5 mg total) by mouth 2 (two) times daily. For anxiety 180 tablet 3    diabetic supplies, miscellan. Misc Lancets for 3 times a day testing    dispense brand covered by insurance t 300 each 3    diclofenac sodium (VOLTAREN) 1 % Gel Apply 2 g topically 3 (three) times daily. 1 Tube 3    EScitalopram oxalate (LEXAPRO) 10 MG tablet Take 1 tablet (10 mg total) by mouth once daily. 90 tablet 3    ferrous sulfate 324 mg (65 mg iron) TbEC Take 1 tablet (324 mg total) by mouth daily as needed. 90 tablet 3    furosemide (LASIX) 40 MG tablet Take 1 tablet by mouth once daily 90 tablet 0    gabapentin (NEURONTIN) 100 MG capsule Takes on in am and two in pm for back pain radiating down leg. 270 capsule 3    glimepiride (AMARYL) 2 MG tablet Take 1 tablet (2 mg total) by mouth 2 (two) times daily. Take 2 mg by mouth 2 (two) times daily. 180 tablet 3    hyoscyamine (ANASPAZ,LEVSIN) 0.125 mg Tab Take 1 tablet (125 mcg total) by mouth every 6 (six) hours as needed (P.r.n. bladder spasm). 25 tablet 0    JARDIANCE 25 mg tablet       metFORMIN (GLUCOPHAGE-XR) 500 MG ER 24hr tablet Take 2 tablets (1,000 mg total) by mouth daily with breakfast. prn 180 tablet 3    metoprolol succinate (TOPROL-XL) 50 MG 24 hr tablet Take 1 tablet (50 mg total) by mouth once daily. 90 tablet 3    MULTIVIT/IRON/FA/K/HERB NO.244 (ALIVE WOMEN'S ENERGY ORAL) Take 1 tablet by mouth daily as needed.        "neomycin-polymyxin-dexamethasone (DEXACINE) 3.5 mg/g-10,000 unit/g-0.1 % Oint APPLY 1/4 INCH OF OINTMENT ON EYELID EVERY NIGHT AT BEDTIME      oxyCODONE-acetaminophen (PERCOCET)  mg per tablet       sacubitriL-valsartan (ENTRESTO) 49-51 mg per tablet Take 1 tablet by mouth 2 (two) times daily. 180 tablet 0    tamsulosin (FLOMAX) 0.4 mg Cap Take 1 capsule (0.4 mg total) by mouth once daily. 90 capsule 3    tirzepatide 7.5 mg/0.5 mL PnIj Inject 7.5 mg into the skin every 7 days. 12 Pen 1    vitamin D (VITAMIN D3) 1000 units Tab Take 1,000 Units by mouth once daily.       No current facility-administered medications on file prior to visit.       Cardiac meds:  Atorvastatin 40 mg     entresto 49/51 mg BID  Lasix 40 mg  Metoprolol succinate 50 mg   Aldactone 25 mg   jardiance 10 mg       OBJECTIVE:     Vital Signs (Most Recent)  Vitals:    24 1002   BP: 108/73   Pulse: 84   SpO2: 99%   Weight: 90 kg (198 lb 6.6 oz)   Height: 5' 7" (1.702 m)                     Physical Exam:    Neck: normal carotids, no bruits; normal JVP  Lungs :clear  Heart: RR, normal S1,S2, no murmurs, no gallops  Abd: no masses; no bruits;   Exts: normal DP and PT pulses bilaterally, no edema noted           LABS    : GFR normal; K normal ; LDL 81  : CBS: microcytic anemia; GFR >60; lipid panel OK with low HDL , A1c: 8.9  3/24: A1c 8.8, TSH low but new ; lipids great; GFR normal; LFT's normal     Diagnostic Results:    1.EK22: NSR, low voltage; nonspecific T wave changes   1b. EKG: 3/24: sinus; IVCD, PVC  2.Echo: : EF 25%; global hypokinesis; LAE, moderate MR, mild TR   2b.Echo: : EF 30 %; mild MR, LAE   3.MUGA: : EF 45%   4.Carotid US: : no significant stenosis   Chart review:      Cath per patient at [facility]: no blockages ? Date   ASSESSMENT/PLAN:     1. Cardiomyopathy: why? Nonischemic per patient - is this burned out hypertension? Diabetes? Idiopathic familial? - marked improvement in symptoms ; last EF " around 45%  2. Feels weak at times when her blood pressures go down    Plan: 1.continue all medications  2. Decrease entresto 24/26 bid    3. Return 6 months   Jeffery Yadav MD

## 2024-04-30 ENCOUNTER — OFFICE VISIT (OUTPATIENT)
Dept: CARDIOLOGY | Facility: CLINIC | Age: 74
End: 2024-04-30
Payer: MEDICARE

## 2024-04-30 VITALS
DIASTOLIC BLOOD PRESSURE: 73 MMHG | HEART RATE: 84 BPM | OXYGEN SATURATION: 99 % | WEIGHT: 198.44 LBS | SYSTOLIC BLOOD PRESSURE: 108 MMHG | HEIGHT: 67 IN | BODY MASS INDEX: 31.15 KG/M2

## 2024-04-30 DIAGNOSIS — I50.9 CONGESTIVE HEART FAILURE, UNSPECIFIED HF CHRONICITY, UNSPECIFIED HEART FAILURE TYPE: ICD-10-CM

## 2024-04-30 DIAGNOSIS — E11.9 TYPE 2 DIABETES MELLITUS WITHOUT COMPLICATION, WITHOUT LONG-TERM CURRENT USE OF INSULIN: Primary | ICD-10-CM

## 2024-04-30 PROCEDURE — 1159F MED LIST DOCD IN RCRD: CPT | Mod: CPTII,S$GLB,, | Performed by: INTERNAL MEDICINE

## 2024-04-30 PROCEDURE — 3078F DIAST BP <80 MM HG: CPT | Mod: CPTII,S$GLB,, | Performed by: INTERNAL MEDICINE

## 2024-04-30 PROCEDURE — 3052F HG A1C>EQUAL 8.0%<EQUAL 9.0%: CPT | Mod: CPTII,S$GLB,, | Performed by: INTERNAL MEDICINE

## 2024-04-30 PROCEDURE — 1160F RVW MEDS BY RX/DR IN RCRD: CPT | Mod: CPTII,S$GLB,, | Performed by: INTERNAL MEDICINE

## 2024-04-30 PROCEDURE — 3008F BODY MASS INDEX DOCD: CPT | Mod: CPTII,S$GLB,, | Performed by: INTERNAL MEDICINE

## 2024-04-30 PROCEDURE — 3066F NEPHROPATHY DOC TX: CPT | Mod: CPTII,S$GLB,, | Performed by: INTERNAL MEDICINE

## 2024-04-30 PROCEDURE — 3074F SYST BP LT 130 MM HG: CPT | Mod: CPTII,S$GLB,, | Performed by: INTERNAL MEDICINE

## 2024-04-30 PROCEDURE — 3288F FALL RISK ASSESSMENT DOCD: CPT | Mod: CPTII,S$GLB,, | Performed by: INTERNAL MEDICINE

## 2024-04-30 PROCEDURE — 1101F PT FALLS ASSESS-DOCD LE1/YR: CPT | Mod: CPTII,S$GLB,, | Performed by: INTERNAL MEDICINE

## 2024-04-30 PROCEDURE — 99999 PR PBB SHADOW E&M-EST. PATIENT-LVL II: CPT | Mod: PBBFAC,,, | Performed by: INTERNAL MEDICINE

## 2024-04-30 PROCEDURE — 99214 OFFICE O/P EST MOD 30 MIN: CPT | Mod: S$GLB,,, | Performed by: INTERNAL MEDICINE

## 2024-04-30 PROCEDURE — 3061F NEG MICROALBUMINURIA REV: CPT | Mod: CPTII,S$GLB,, | Performed by: INTERNAL MEDICINE

## 2024-04-30 PROCEDURE — 4010F ACE/ARB THERAPY RXD/TAKEN: CPT | Mod: CPTII,S$GLB,, | Performed by: INTERNAL MEDICINE

## 2024-04-30 RX ORDER — SACUBITRIL AND VALSARTAN 24; 26 MG/1; MG/1
1 TABLET, FILM COATED ORAL 2 TIMES DAILY
Qty: 180 TABLET | Refills: 1 | Status: SHIPPED | OUTPATIENT
Start: 2024-04-30

## 2024-05-17 ENCOUNTER — TELEPHONE (OUTPATIENT)
Dept: FAMILY MEDICINE | Facility: CLINIC | Age: 74
End: 2024-05-17
Payer: MEDICARE

## 2024-05-17 NOTE — TELEPHONE ENCOUNTER
She has Janumet she can take this however she needs to stop the metformin extended release.    How much medicine does she have?  How long with the last

## 2024-05-17 NOTE — TELEPHONE ENCOUNTER
Spoke with patient, Mounjaro on back order with her pharmacy.  Patient advised she will take Janumet 50mg/500ml in meantime until Mounjaro becomes available. Patient want to know do you have any other recommendations?    ----- Message from Rachel Pan sent at 5/17/2024  9:10 AM CDT -----  Type:  Needs Medical Advice    Who Called: Pt  Would the patient rather a call back or a response via [facility]? Call back  Best Call Back Number: [phone]  Additional Information: Pt having trouble with Rx

## 2024-05-17 NOTE — TELEPHONE ENCOUNTER
I spoke with the pt     [facility] has mounjaro 2.5 mg - please send rx  She does not have janumet   She is taking the metformin currently

## 2024-05-20 ENCOUNTER — TELEPHONE (OUTPATIENT)
Dept: FAMILY MEDICINE | Facility: CLINIC | Age: 74
End: 2024-05-20
Payer: MEDICARE

## 2024-05-20 DIAGNOSIS — F41.9 ANXIETY: ICD-10-CM

## 2024-05-20 NOTE — TELEPHONE ENCOUNTER
"----- Message from Alejandro Gamboa sent at 5/20/2024  1:47 PM CDT -----  Type:  RX Refill Request    Who Called: pt  Refill or New Rx:  RX Name and Strength: Janumet 50mg/500mg (pt spelled, not seen on med list); anxiety med (pt said she does not know the name of this med but that "he know what it is"    Preferred Pharmacy:    Ordering Provider:  Reach pt via:  Best Call Back Number:  [phone]  Additional Information:  "

## 2024-05-21 ENCOUNTER — TELEPHONE (OUTPATIENT)
Dept: FAMILY MEDICINE | Facility: CLINIC | Age: 74
End: 2024-05-21
Payer: MEDICARE

## 2024-05-21 RX ORDER — ESCITALOPRAM OXALATE 10 MG/1
10 TABLET ORAL DAILY
Qty: 90 TABLET | Refills: 3 | Status: SHIPPED | OUTPATIENT
Start: 2024-05-21

## 2024-05-21 NOTE — TELEPHONE ENCOUNTER
You no longer need to take Janumet    You are taking metformin by itself and also an injection/GLP one Mounjaro so this medicine was discontinued.    Should be taking Jardiance 25 milligrams

## 2024-05-21 NOTE — TELEPHONE ENCOUNTER
Spoke with pt and informed her pt also stated that she would like anxiety medication sent to pharmacy

## 2024-06-12 DIAGNOSIS — E11.9 TYPE 2 DIABETES MELLITUS WITHOUT COMPLICATION, WITHOUT LONG-TERM CURRENT USE OF INSULIN: ICD-10-CM

## 2024-06-12 NOTE — TELEPHONE ENCOUNTER
No care due was identified.  Health [facility] Embedded Care Due Messages. Reference number: [id number].   6/12/2024 1:44:09 PM CDT

## 2024-06-12 NOTE — TELEPHONE ENCOUNTER
----- Message from Petty Ryan sent at 6/12/2024  1:29 PM CDT -----  Regarding: michelle  Contact: self/ walked in  Please send the rx for the Radhaaro 7.5 to [facility] for her.  Its hard to find and [facility] has it and is holding it for her.    She can be reached at [phone]

## 2024-07-09 RX ORDER — FUROSEMIDE 40 MG/1
TABLET ORAL
Qty: 90 TABLET | Refills: 0 | Status: SHIPPED | OUTPATIENT
Start: 2024-07-09

## 2024-08-27 ENCOUNTER — TELEPHONE (OUTPATIENT)
Dept: CARDIOLOGY | Facility: CLINIC | Age: 74
End: 2024-08-27
Payer: MEDICARE

## 2024-08-27 NOTE — TELEPHONE ENCOUNTER
Left message to patient advice patient to reach out to her pcp for refill rx request.     Analilia HALL     ----- Message from Crystal Garrison sent at 8/27/2024  2:55 PM CDT -----  Regarding: Refills  Contact: [phone]  Type:  RX Refill Request    Who Called: PT   Refill or New Rx: Refills   RX Name and Strength: JARDIANCE 25 mg tablet  How is the patient currently taking it? (ex. 1XDay): 1 x a day   Is this a 30 day or 90 day RX: 90   Preferred Pharmacy with phone number: [facility] [address] [place] [address] [address] [facility] Phone: [phone] Fax: [phone]

## 2024-09-19 ENCOUNTER — TELEPHONE (OUTPATIENT)
Dept: CARDIOLOGY | Facility: CLINIC | Age: 74
End: 2024-09-19
Payer: MEDICARE

## 2024-09-19 NOTE — TELEPHONE ENCOUNTER
----- Message from Jorge Elder sent at 9/19/2024 10:36 AM CDT -----  Contact: pt  Type:  RX Refill Request    Who Called: pt  Refill or New Rx:refill  RX Name and Strength:JARDIANCE 25 mg tablet  Preferred Pharmacy with phone number:[facility] [address] [facility] [address] [facility]  [address] [facility] [address]  Phone: [phone] Fax: [phone]  Local or Mail Order:local  Ordering Provider:mendy  Would the patient rather a call back or a response via [facility]? call  Best Call Back Number:[phone]  Additional Information:

## 2024-09-24 ENCOUNTER — TELEPHONE (OUTPATIENT)
Dept: ADMINISTRATIVE | Facility: CLINIC | Age: 74
End: 2024-09-24
Payer: MEDICARE

## 2024-09-26 ENCOUNTER — TELEPHONE (OUTPATIENT)
Dept: ADMINISTRATIVE | Facility: CLINIC | Age: 74
End: 2024-09-26
Payer: MEDICARE

## 2024-09-27 ENCOUNTER — PATIENT MESSAGE (OUTPATIENT)
Dept: ADMINISTRATIVE | Facility: CLINIC | Age: 74
End: 2024-09-27
Payer: MEDICARE

## 2024-10-01 DIAGNOSIS — R60.9 EDEMA, UNSPECIFIED TYPE: Primary | ICD-10-CM

## 2024-10-02 ENCOUNTER — LAB VISIT (OUTPATIENT)
Dept: LAB | Facility: HOSPITAL | Age: 74
End: 2024-10-02
Attending: INTERNAL MEDICINE
Payer: MEDICARE

## 2024-10-02 DIAGNOSIS — I51.9 MYXEDEMA HEART DISEASE: ICD-10-CM

## 2024-10-02 DIAGNOSIS — E11.9 TYPE 2 DIABETES MELLITUS WITHOUT COMPLICATION, WITHOUT LONG-TERM CURRENT USE OF INSULIN: ICD-10-CM

## 2024-10-02 DIAGNOSIS — E03.9 MYXEDEMA HEART DISEASE: ICD-10-CM

## 2024-10-02 DIAGNOSIS — I10 ESSENTIAL HYPERTENSION, MALIGNANT: ICD-10-CM

## 2024-10-02 DIAGNOSIS — E78.5 HYPERLIPEMIA: ICD-10-CM

## 2024-10-02 DIAGNOSIS — E11.65 INADEQUATELY CONTROLLED DIABETES MELLITUS: Primary | ICD-10-CM

## 2024-10-02 LAB
ALBUMIN SERPL BCP-MCNC: 4.1 G/DL (ref 3.5–5.2)
ALBUMIN/CREAT UR: 16.4 UG/MG (ref 0–30)
ALP SERPL-CCNC: 54 U/L (ref 38–126)
ALT SERPL W/O P-5'-P-CCNC: 17 U/L (ref 10–44)
ANION GAP SERPL CALC-SCNC: 9 MMOL/L (ref 8–16)
AST SERPL-CCNC: 25 U/L (ref 15–46)
BASOPHILS # BLD AUTO: 0.04 K/UL (ref 0–0.2)
BASOPHILS NFR BLD: 0.4 % (ref 0–1.9)
BILIRUB SERPL-MCNC: 0.6 MG/DL (ref 0.1–1)
CALCIUM SERPL-MCNC: 9.4 MG/DL (ref 8.7–10.5)
CHLORIDE SERPL-SCNC: 101 MMOL/L (ref 95–110)
CHOLEST SERPL-MCNC: 110 MG/DL (ref 120–199)
CHOLEST/HDLC SERPL: 3.3 {RATIO} (ref 2–5)
CO2 SERPL-SCNC: 30 MMOL/L (ref 23–29)
CREAT SERPL-MCNC: 0.72 MG/DL (ref 0.5–1.4)
CREAT UR-MCNC: 269 MG/DL (ref 15–325)
DIFFERENTIAL METHOD BLD: ABNORMAL
EOSINOPHIL # BLD AUTO: 0.1 K/UL (ref 0–0.5)
EOSINOPHIL NFR BLD: 1.5 % (ref 0–8)
ERYTHROCYTE [DISTWIDTH] IN BLOOD BY AUTOMATED COUNT: 14.7 % (ref 11.5–14.5)
EST. GFR  (NO RACE VARIABLE): >60 ML/MIN/1.73 M^2
ESTIMATED AVG GLUCOSE: 171 MG/DL (ref 68–131)
GLUCOSE SERPL-MCNC: 185 MG/DL (ref 70–110)
HBA1C MFR BLD: 7.6 % (ref 4–5.6)
HCT VFR BLD AUTO: 36.6 % (ref 37–48.5)
HDLC SERPL-MCNC: 33 MG/DL (ref 40–75)
HDLC SERPL: 30 % (ref 20–50)
HGB BLD-MCNC: 11 G/DL (ref 12–16)
IMM GRANULOCYTES # BLD AUTO: 0.02 K/UL (ref 0–0.04)
IMM GRANULOCYTES NFR BLD AUTO: 0.2 % (ref 0–0.5)
LDLC SERPL CALC-MCNC: 57.4 MG/DL (ref 63–159)
LIPASE SERPL-CCNC: 330 U/L (ref 23–300)
LYMPHOCYTES # BLD AUTO: 2.2 K/UL (ref 1–4.8)
LYMPHOCYTES NFR BLD: 23.2 % (ref 18–48)
MCH RBC QN AUTO: 22.5 PG (ref 27–31)
MCHC RBC AUTO-ENTMCNC: 30.1 G/DL (ref 32–36)
MCV RBC AUTO: 75 FL (ref 82–98)
MICROALBUMIN UR DL<=1MG/L-MCNC: 44 UG/ML
MONOCYTES # BLD AUTO: 0.8 K/UL (ref 0.3–1)
MONOCYTES NFR BLD: 8.4 % (ref 4–15)
NEUTROPHILS # BLD AUTO: 6.2 K/UL (ref 1.8–7.7)
NEUTROPHILS NFR BLD: 66.3 % (ref 38–73)
NONHDLC SERPL-MCNC: 77 MG/DL
NRBC BLD-RTO: 0 /100 WBC
PLATELET # BLD AUTO: 219 K/UL (ref 150–450)
PMV BLD AUTO: 11 FL (ref 9.2–12.9)
POTASSIUM SERPL-SCNC: 4.2 MMOL/L (ref 3.5–5.1)
PROT SERPL-MCNC: 7.3 G/DL (ref 6–8.4)
RBC # BLD AUTO: 4.88 M/UL (ref 4–5.4)
SODIUM SERPL-SCNC: 140 MMOL/L (ref 136–145)
T4 FREE SERPL-MCNC: 1.11 NG/DL (ref 0.71–1.51)
TRIGL SERPL-MCNC: 98 MG/DL (ref 30–150)
TSH SERPL DL<=0.005 MIU/L-ACNC: 0.27 UIU/ML (ref 0.4–4)
UUN UR-MCNC: 24 MG/DL (ref 7–17)
WBC # BLD AUTO: 9.39 K/UL (ref 3.9–12.7)

## 2024-10-02 PROCEDURE — 36415 COLL VENOUS BLD VENIPUNCTURE: CPT | Mod: PN | Performed by: FAMILY MEDICINE

## 2024-10-02 PROCEDURE — 80053 COMPREHEN METABOLIC PANEL: CPT | Mod: PN | Performed by: INTERNAL MEDICINE

## 2024-10-02 PROCEDURE — 83036 HEMOGLOBIN GLYCOSYLATED A1C: CPT | Performed by: FAMILY MEDICINE

## 2024-10-02 PROCEDURE — 80061 LIPID PANEL: CPT | Performed by: INTERNAL MEDICINE

## 2024-10-02 PROCEDURE — 83690 ASSAY OF LIPASE: CPT | Mod: PN | Performed by: INTERNAL MEDICINE

## 2024-10-02 PROCEDURE — 82043 UR ALBUMIN QUANTITATIVE: CPT | Mod: 91,PN | Performed by: INTERNAL MEDICINE

## 2024-10-02 PROCEDURE — 85025 COMPLETE CBC W/AUTO DIFF WBC: CPT | Mod: PN | Performed by: INTERNAL MEDICINE

## 2024-10-02 PROCEDURE — 84443 ASSAY THYROID STIM HORMONE: CPT | Mod: PN | Performed by: INTERNAL MEDICINE

## 2024-10-02 PROCEDURE — 84439 ASSAY OF FREE THYROXINE: CPT | Performed by: INTERNAL MEDICINE

## 2024-10-02 RX ORDER — FUROSEMIDE 40 MG/1
TABLET ORAL
Qty: 90 TABLET | Refills: 0 | Status: SHIPPED | OUTPATIENT
Start: 2024-10-02

## 2024-10-08 ENCOUNTER — TELEPHONE (OUTPATIENT)
Dept: FAMILY MEDICINE | Facility: CLINIC | Age: 74
End: 2024-10-08

## 2024-10-08 ENCOUNTER — OFFICE VISIT (OUTPATIENT)
Dept: FAMILY MEDICINE | Facility: CLINIC | Age: 74
End: 2024-10-08
Payer: MEDICARE

## 2024-10-08 VITALS
HEIGHT: 67 IN | SYSTOLIC BLOOD PRESSURE: 120 MMHG | DIASTOLIC BLOOD PRESSURE: 76 MMHG | BODY MASS INDEX: 29.86 KG/M2 | OXYGEN SATURATION: 97 % | TEMPERATURE: 98 F | HEART RATE: 91 BPM | WEIGHT: 190.25 LBS

## 2024-10-08 DIAGNOSIS — R60.9 EDEMA, UNSPECIFIED TYPE: ICD-10-CM

## 2024-10-08 DIAGNOSIS — E11.9 TYPE 2 DIABETES MELLITUS WITHOUT COMPLICATION, WITHOUT LONG-TERM CURRENT USE OF INSULIN: Primary | ICD-10-CM

## 2024-10-08 DIAGNOSIS — F41.9 ANXIETY: ICD-10-CM

## 2024-10-08 DIAGNOSIS — I10 ESSENTIAL HYPERTENSION: ICD-10-CM

## 2024-10-08 DIAGNOSIS — Z12.11 ENCOUNTER FOR COLORECTAL CANCER SCREENING: ICD-10-CM

## 2024-10-08 DIAGNOSIS — Z12.12 ENCOUNTER FOR COLORECTAL CANCER SCREENING: ICD-10-CM

## 2024-10-08 DIAGNOSIS — D50.9 IRON DEFICIENCY ANEMIA, UNSPECIFIED IRON DEFICIENCY ANEMIA TYPE: ICD-10-CM

## 2024-10-08 DIAGNOSIS — N95.9 MENOPAUSAL AND POSTMENOPAUSAL DISORDER: ICD-10-CM

## 2024-10-08 DIAGNOSIS — Z12.31 ENCOUNTER FOR SCREENING MAMMOGRAM FOR BREAST CANCER: ICD-10-CM

## 2024-10-08 PROCEDURE — 4010F ACE/ARB THERAPY RXD/TAKEN: CPT | Mod: CPTII,S$GLB,, | Performed by: FAMILY MEDICINE

## 2024-10-08 PROCEDURE — 3074F SYST BP LT 130 MM HG: CPT | Mod: CPTII,S$GLB,, | Performed by: FAMILY MEDICINE

## 2024-10-08 PROCEDURE — 3061F NEG MICROALBUMINURIA REV: CPT | Mod: CPTII,S$GLB,, | Performed by: FAMILY MEDICINE

## 2024-10-08 PROCEDURE — 3078F DIAST BP <80 MM HG: CPT | Mod: CPTII,S$GLB,, | Performed by: FAMILY MEDICINE

## 2024-10-08 PROCEDURE — 1159F MED LIST DOCD IN RCRD: CPT | Mod: CPTII,S$GLB,, | Performed by: FAMILY MEDICINE

## 2024-10-08 PROCEDURE — 1160F RVW MEDS BY RX/DR IN RCRD: CPT | Mod: CPTII,S$GLB,, | Performed by: FAMILY MEDICINE

## 2024-10-08 PROCEDURE — 1125F AMNT PAIN NOTED PAIN PRSNT: CPT | Mod: CPTII,S$GLB,, | Performed by: FAMILY MEDICINE

## 2024-10-08 PROCEDURE — 3066F NEPHROPATHY DOC TX: CPT | Mod: CPTII,S$GLB,, | Performed by: FAMILY MEDICINE

## 2024-10-08 PROCEDURE — 3008F BODY MASS INDEX DOCD: CPT | Mod: CPTII,S$GLB,, | Performed by: FAMILY MEDICINE

## 2024-10-08 PROCEDURE — 1101F PT FALLS ASSESS-DOCD LE1/YR: CPT | Mod: CPTII,S$GLB,, | Performed by: FAMILY MEDICINE

## 2024-10-08 PROCEDURE — 99214 OFFICE O/P EST MOD 30 MIN: CPT | Mod: S$GLB,,, | Performed by: FAMILY MEDICINE

## 2024-10-08 PROCEDURE — 3288F FALL RISK ASSESSMENT DOCD: CPT | Mod: CPTII,S$GLB,, | Performed by: FAMILY MEDICINE

## 2024-10-08 PROCEDURE — 3051F HG A1C>EQUAL 7.0%<8.0%: CPT | Mod: CPTII,S$GLB,, | Performed by: FAMILY MEDICINE

## 2024-10-08 RX ORDER — TIRZEPATIDE 10 MG/.5ML
INJECTION, SOLUTION SUBCUTANEOUS
COMMUNITY
Start: 2024-09-03

## 2024-10-08 RX ORDER — ALBUTEROL SULFATE 90 UG/1
2 INHALANT RESPIRATORY (INHALATION) EVERY 6 HOURS PRN
Qty: 18 G | Refills: 1 | Status: SHIPPED | OUTPATIENT
Start: 2024-10-08

## 2024-10-08 RX ORDER — AMOXICILLIN 500 MG/1
500 TABLET, FILM COATED ORAL 3 TIMES DAILY
COMMUNITY
Start: 2024-10-03

## 2024-10-08 RX ORDER — FERROUS SULFATE 324(65)MG
325 TABLET, DELAYED RELEASE (ENTERIC COATED) ORAL DAILY PRN
Qty: 90 TABLET | Refills: 3 | Status: SHIPPED | OUTPATIENT
Start: 2024-10-08

## 2024-10-08 RX ORDER — FUROSEMIDE 40 MG/1
TABLET ORAL
Qty: 90 TABLET | Refills: 3 | Status: SHIPPED | OUTPATIENT
Start: 2024-10-08

## 2024-10-14 NOTE — PROGRESS NOTES
" Patient ID: Katherine Berger is a 74 y.o. female.    Chief Complaint: Follow-up    HPI      Katherine Berger is a 74 y.o. female here for follow-up.  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  Anemia anxiety all well-controlled.    Vitals:    10/08/24 1353   BP: 120/76   BP Location: Left arm   Patient Position: Sitting   Pulse: 91   Temp: 98 °F (36.7 °C)   TempSrc: Oral   SpO2: 97%   Weight: 86.3 kg (190 lb 4.1 oz)   Height: 5' 7" (1.702 m)            Review of Symptoms      Physical Exam    Constitutional:  Oriented to person, place, and time.appears well-developed and well-nourished.  No distress.      HENT  Head: Normocephalic and atraumatic  Right Ear: External ear normal.   Left Ear: External ear normal.   Nose: External nose normal.   Mouth:  Moist mucus membranes.    Eyes:  Conjunctivae are normal. Right eye exhibits no discharge.  Left eye exhibits no discharge. No scleral icterus.  No periorbital edema    Cardiovascular:  Regular rate and rhythm with normal S1 and S2     Pulmonary/Chest:   Clear to auscultation bilaterally without wheezes, rhonchi or rales      Musculoskeletal:  No edema. No obvious deformity No wasting       Neurological:  Alert and oriented to person, place, and time.   Coordination normal.     Skin:   Skin is warm and dry.  No diaphoresis.   No rash noted.     Psychiatric: Normal mood and affect. Behavior is normal.  Judgment and thought content normal.     Complete Blood Count  Lab Results   Component Value Date    RBC 4.88 10/02/2024    RBC 4.85 10/02/2024    HGB 11.0 (L) 10/02/2024    HGB 11.0 (L) 10/02/2024    HCT 36.6 (L) 10/02/2024    HCT 36.3 (L) 10/02/2024    MCV 75 (L) 10/02/2024    MCV 75 (L) 10/02/2024    MCH 22.5 (L) 10/02/2024    MCH 22.7 (L) 10/02/2024    MCHC 30.1 (L) 10/02/2024    MCHC 30.3 (L) 10/02/2024    RDW 14.7 (H) 10/02/2024    RDW 15.0 (H) 10/02/2024     10/02/2024     10/02/2024    MPV 11.0 10/02/2024    " MPV 11.2 10/02/2024    GRAN 6.2 10/02/2024    GRAN 66.3 10/02/2024    GRAN 6.5 10/02/2024    GRAN 67.3 10/02/2024    LYMPH 2.2 10/02/2024    LYMPH 23.2 10/02/2024    LYMPH 2.2 10/02/2024    LYMPH 22.2 10/02/2024    MONO 0.8 10/02/2024    MONO 8.4 10/02/2024    MONO 0.9 10/02/2024    MONO 8.7 10/02/2024    EOS 0.1 10/02/2024    EOS 0.1 10/02/2024    BASO 0.04 10/02/2024    BASO 0.03 10/02/2024    EOSINOPHIL 1.5 10/02/2024    EOSINOPHIL 1.2 10/02/2024    BASOPHIL 0.4 10/02/2024    BASOPHIL 0.3 10/02/2024    DIFFMETHOD Automated 10/02/2024    DIFFMETHOD Automated 10/02/2024       Comprehensive Metabolic Panel  Lab Results   Component Value Date     (H) 10/02/2024     (H) 10/02/2024    BUN 24 (H) 10/02/2024    BUN 24 (H) 10/02/2024    CREATININE 0.72 10/02/2024    CREATININE 0.73 10/02/2024     10/02/2024     10/02/2024    K 4.2 10/02/2024    K 4.2 10/02/2024     10/02/2024     10/02/2024    PROT 7.3 10/02/2024    PROT 7.2 10/02/2024    ALBUMIN 4.1 10/02/2024    ALBUMIN 4.2 10/02/2024    BILITOT 0.6 10/02/2024    BILITOT 0.6 10/02/2024    AST 25 10/02/2024    AST 26 10/02/2024    ALKPHOS 54 10/02/2024    ALKPHOS 53 10/02/2024    CO2 30 (H) 10/02/2024    CO2 29 10/02/2024    ALT 17 10/02/2024    ALT 17 10/02/2024    ANIONGAP 9 10/02/2024    ANIONGAP 10 10/02/2024       TSH  Lab Results   Component Value Date    TSH 0.266 (L) 10/02/2024    TSH 0.266 (L) 10/02/2024       Assessment / Plan:      ICD-10-CM ICD-9-CM   1. Type 2 diabetes mellitus without complication, without long-term current use of insulin  E11.9 250.00   2. Encounter for screening mammogram for breast cancer  Z12.31 V76.12   3. Encounter for colorectal cancer screening  Z12.11 V76.51    Z12.12 V76.41   4. Edema, unspecified type  R60.9 782.3   5. Menopausal and postmenopausal disorder  N95.9 627.9   6. Anxiety  F41.9 300.00   7. Essential hypertension  I10 401.9   8. Iron deficiency anemia, unspecified iron deficiency  anemia type  D50.9 280.9     Type 2 diabetes mellitus without complication, without long-term current use of insulin    Encounter for screening mammogram for breast cancer  -     Mammo Digital Screening Bilat w/ Navjot; Future; Expected date: 10/08/2024    Encounter for colorectal cancer screening  -     Ambulatory referral/consult to Endo Procedure ; Future; Expected date: 10/09/2024    Edema, unspecified type  -     furosemide (LASIX) 40 MG tablet; Take 1 tablet by mouth once daily  Dispense: 90 tablet; Refill: 3    Menopausal and postmenopausal disorder  -     DXA Bone Density Axial Skeleton 1 or more sites; Future; Expected date: 10/08/2024    Anxiety    Essential hypertension    Iron deficiency anemia, unspecified iron deficiency anemia type    Other orders  -     albuterol (VENTOLIN HFA) 90 mcg/actuation inhaler; Inhale 2 puffs into the lungs every 6 (six) hours as needed for Wheezing. Rescue  Dispense: 18 g; Refill: 1  -     ferrous sulfate 324 mg (65 mg iron) TbEC; Take 1 tablet (324 mg total) by mouth daily as needed.  Dispense: 90 tablet; Refill: 3

## 2024-10-16 ENCOUNTER — HOSPITAL ENCOUNTER (OUTPATIENT)
Dept: RADIOLOGY | Facility: HOSPITAL | Age: 74
Discharge: HOME | End: 2024-10-16
Attending: FAMILY MEDICINE
Payer: MEDICARE

## 2024-10-16 DIAGNOSIS — Z12.31 ENCOUNTER FOR SCREENING MAMMOGRAM FOR BREAST CANCER: ICD-10-CM

## 2024-10-16 PROCEDURE — 77067 SCR MAMMO BI INCL CAD: CPT | Mod: 26,,, | Performed by: RADIOLOGY

## 2024-10-16 PROCEDURE — 77067 SCR MAMMO BI INCL CAD: CPT | Mod: TC,PN

## 2024-10-16 PROCEDURE — 77063 BREAST TOMOSYNTHESIS BI: CPT | Mod: 26,,, | Performed by: RADIOLOGY

## 2024-10-22 ENCOUNTER — HOSPITAL ENCOUNTER (OUTPATIENT)
Dept: RADIOLOGY | Facility: HOSPITAL | Age: 74
Discharge: HOME | End: 2024-10-22
Attending: FAMILY MEDICINE
Payer: MEDICARE

## 2024-10-22 DIAGNOSIS — N95.9 MENOPAUSAL AND POSTMENOPAUSAL DISORDER: ICD-10-CM

## 2024-10-22 PROCEDURE — 77080 DXA BONE DENSITY AXIAL: CPT | Mod: 26,,, | Performed by: RADIOLOGY

## 2024-10-22 PROCEDURE — 77080 DXA BONE DENSITY AXIAL: CPT | Mod: TC,PN

## 2024-11-05 RX ORDER — SACUBITRIL AND VALSARTAN 24; 26 MG/1; MG/1
1 TABLET, FILM COATED ORAL 2 TIMES DAILY
Qty: 180 TABLET | Refills: 3 | Status: SHIPPED | OUTPATIENT
Start: 2024-11-05

## 2024-11-15 ENCOUNTER — HOSPITAL ENCOUNTER (INPATIENT)
Facility: HOSPITAL | Age: 74
LOS: 2 days | Discharge: HOME | DRG: 280 | End: 2024-11-18
Attending: STUDENT IN AN ORGANIZED HEALTH CARE EDUCATION/TRAINING PROGRAM | Admitting: INTERNAL MEDICINE
Payer: MEDICARE

## 2024-11-15 DIAGNOSIS — I50.23 ACUTE ON CHRONIC SYSTOLIC CONGESTIVE HEART FAILURE: ICD-10-CM

## 2024-11-15 DIAGNOSIS — J18.9 MULTIFOCAL PNEUMONIA: Primary | ICD-10-CM

## 2024-11-15 DIAGNOSIS — J18.9 PNEUMONIA OF RIGHT LUNG DUE TO INFECTIOUS ORGANISM, UNSPECIFIED PART OF LUNG: ICD-10-CM

## 2024-11-15 DIAGNOSIS — R06.02 SHORTNESS OF BREATH: ICD-10-CM

## 2024-11-15 DIAGNOSIS — D50.9 IRON DEFICIENCY ANEMIA, UNSPECIFIED IRON DEFICIENCY ANEMIA TYPE: ICD-10-CM

## 2024-11-15 DIAGNOSIS — I50.9 ACUTE ON CHRONIC CONGESTIVE HEART FAILURE, UNSPECIFIED HEART FAILURE TYPE: ICD-10-CM

## 2024-11-15 DIAGNOSIS — I10 ESSENTIAL HYPERTENSION: ICD-10-CM

## 2024-11-15 DIAGNOSIS — J96.01 ACUTE HYPOXIC RESPIRATORY FAILURE: ICD-10-CM

## 2024-11-15 DIAGNOSIS — J90 PLEURAL EFFUSION: ICD-10-CM

## 2024-11-15 LAB
ALBUMIN SERPL BCP-MCNC: 3.3 G/DL (ref 3.5–5.2)
ALP SERPL-CCNC: 65 U/L (ref 40–150)
ALT SERPL W/O P-5'-P-CCNC: 33 U/L (ref 10–44)
ANION GAP SERPL CALC-SCNC: 11 MMOL/L (ref 8–16)
AST SERPL-CCNC: 42 U/L (ref 10–40)
BASOPHILS # BLD AUTO: 0.06 K/UL (ref 0–0.2)
BASOPHILS NFR BLD: 0.4 % (ref 0–1.9)
BILIRUB SERPL-MCNC: 0.7 MG/DL (ref 0.1–1)
BNP SERPL-MCNC: 1450 PG/ML (ref 0–99)
BUN SERPL-MCNC: 20 MG/DL (ref 8–23)
CALCIUM SERPL-MCNC: 9.4 MG/DL (ref 8.7–10.5)
CHLORIDE SERPL-SCNC: 106 MMOL/L (ref 95–110)
CO2 SERPL-SCNC: 24 MMOL/L (ref 23–29)
CREAT SERPL-MCNC: 1.2 MG/DL (ref 0.5–1.4)
D DIMER PPP IA.FEU-MCNC: 2.65 MG/L FEU
DIFFERENTIAL METHOD BLD: ABNORMAL
EOSINOPHIL # BLD AUTO: 0.1 K/UL (ref 0–0.5)
EOSINOPHIL NFR BLD: 0.8 % (ref 0–8)
ERYTHROCYTE [DISTWIDTH] IN BLOOD BY AUTOMATED COUNT: 15.4 % (ref 11.5–14.5)
EST. GFR  (NO RACE VARIABLE): 48 ML/MIN/1.73 M^2
GLUCOSE SERPL-MCNC: 310 MG/DL (ref 70–110)
HCT VFR BLD AUTO: 26.4 % (ref 37–48.5)
HGB BLD-MCNC: 8.2 G/DL (ref 12–16)
IMM GRANULOCYTES # BLD AUTO: 0.17 K/UL (ref 0–0.04)
IMM GRANULOCYTES NFR BLD AUTO: 1 % (ref 0–0.5)
LYMPHOCYTES # BLD AUTO: 1.8 K/UL (ref 1–4.8)
LYMPHOCYTES NFR BLD: 11 % (ref 18–48)
MCH RBC QN AUTO: 23.6 PG (ref 27–31)
MCHC RBC AUTO-ENTMCNC: 31.1 G/DL (ref 32–36)
MCV RBC AUTO: 76 FL (ref 82–98)
MONOCYTES # BLD AUTO: 0.8 K/UL (ref 0.3–1)
MONOCYTES NFR BLD: 4.9 % (ref 4–15)
NEUTROPHILS # BLD AUTO: 13.3 K/UL (ref 1.8–7.7)
NEUTROPHILS NFR BLD: 81.9 % (ref 38–73)
NRBC BLD-RTO: 0 /100 WBC
PLATELET # BLD AUTO: 135 K/UL (ref 150–450)
PMV BLD AUTO: 12.6 FL (ref 9.2–12.9)
POTASSIUM SERPL-SCNC: 4.9 MMOL/L (ref 3.5–5.1)
PROT SERPL-MCNC: 7.4 G/DL (ref 6–8.4)
RBC # BLD AUTO: 3.47 M/UL (ref 4–5.4)
SODIUM SERPL-SCNC: 141 MMOL/L (ref 136–145)
TROPONIN I SERPL DL<=0.01 NG/ML-MCNC: 0.02 NG/ML (ref 0–0.03)
WBC # BLD AUTO: 16.25 K/UL (ref 3.9–12.7)

## 2024-11-15 PROCEDURE — 99900035 HC TECH TIME PER 15 MIN (STAT)

## 2024-11-15 PROCEDURE — 85025 COMPLETE CBC W/AUTO DIFF WBC: CPT | Performed by: STUDENT IN AN ORGANIZED HEALTH CARE EDUCATION/TRAINING PROGRAM

## 2024-11-15 PROCEDURE — 93005 ELECTROCARDIOGRAM TRACING: CPT

## 2024-11-15 PROCEDURE — 84484 ASSAY OF TROPONIN QUANT: CPT | Performed by: STUDENT IN AN ORGANIZED HEALTH CARE EDUCATION/TRAINING PROGRAM

## 2024-11-15 PROCEDURE — 63600175 PHARM REV CODE 636 W HCPCS: Performed by: STUDENT IN AN ORGANIZED HEALTH CARE EDUCATION/TRAINING PROGRAM

## 2024-11-15 PROCEDURE — 5A09357 ASSISTANCE WITH RESPIRATORY VENTILATION, LESS THAN 24 CONSECUTIVE HOURS, CONTINUOUS POSITIVE AIRWAY PRESSURE: ICD-10-PCS | Performed by: STUDENT IN AN ORGANIZED HEALTH CARE EDUCATION/TRAINING PROGRAM

## 2024-11-15 PROCEDURE — 85379 FIBRIN DEGRADATION QUANT: CPT | Performed by: STUDENT IN AN ORGANIZED HEALTH CARE EDUCATION/TRAINING PROGRAM

## 2024-11-15 PROCEDURE — 83880 ASSAY OF NATRIURETIC PEPTIDE: CPT | Performed by: STUDENT IN AN ORGANIZED HEALTH CARE EDUCATION/TRAINING PROGRAM

## 2024-11-15 PROCEDURE — 94761 N-INVAS EAR/PLS OXIMETRY MLT: CPT

## 2024-11-15 PROCEDURE — 5A0935A ASSISTANCE WITH RESPIRATORY VENTILATION, LESS THAN 24 CONSECUTIVE HOURS, HIGH NASAL FLOW/VELOCITY: ICD-10-PCS | Performed by: STUDENT IN AN ORGANIZED HEALTH CARE EDUCATION/TRAINING PROGRAM

## 2024-11-15 PROCEDURE — 94660 CPAP INITIATION&MGMT: CPT

## 2024-11-15 PROCEDURE — 80053 COMPREHEN METABOLIC PANEL: CPT | Performed by: STUDENT IN AN ORGANIZED HEALTH CARE EDUCATION/TRAINING PROGRAM

## 2024-11-15 PROCEDURE — 27000190 HC CPAP FULL FACE MASK W/VALVE

## 2024-11-15 PROCEDURE — 96375 TX/PRO/DX INJ NEW DRUG ADDON: CPT

## 2024-11-15 PROCEDURE — 99291 CRITICAL CARE FIRST HOUR: CPT

## 2024-11-15 PROCEDURE — 27100171 HC OXYGEN HIGH FLOW UP TO 24 HOURS

## 2024-11-15 PROCEDURE — 93010 ELECTROCARDIOGRAM REPORT: CPT | Mod: ,,, | Performed by: STUDENT IN AN ORGANIZED HEALTH CARE EDUCATION/TRAINING PROGRAM

## 2024-11-15 RX ORDER — CEFTRIAXONE 2 G/1
2 INJECTION, POWDER, FOR SOLUTION INTRAMUSCULAR; INTRAVENOUS ONCE
Status: COMPLETED | OUTPATIENT
Start: 2024-11-16 | End: 2024-11-16

## 2024-11-15 RX ORDER — FUROSEMIDE 10 MG/ML
80 INJECTION INTRAMUSCULAR; INTRAVENOUS
Status: COMPLETED | OUTPATIENT
Start: 2024-11-15 | End: 2024-11-15

## 2024-11-15 RX ADMIN — FUROSEMIDE 80 MG: 10 INJECTION, SOLUTION INTRAMUSCULAR; INTRAVENOUS at 09:11

## 2024-11-15 NOTE — Clinical Note
Diagnosis: Acute on chronic congestive heart failure, unspecified heart failure type [[id number]]   Future Attending Provider: ALFREDO JOHNSON [[id number]]

## 2024-11-16 PROBLEM — R06.02 SHORTNESS OF BREATH: Status: ACTIVE | Noted: 2024-11-16

## 2024-11-16 PROBLEM — J18.9 MULTIFOCAL PNEUMONIA: Status: ACTIVE | Noted: 2024-11-16

## 2024-11-16 LAB
ALBUMIN SERPL BCP-MCNC: 3.1 G/DL (ref 3.5–5.2)
ALP SERPL-CCNC: 62 U/L (ref 40–150)
ALT SERPL W/O P-5'-P-CCNC: 31 U/L (ref 10–44)
ANION GAP SERPL CALC-SCNC: 17 MMOL/L (ref 8–16)
APICAL FOUR CHAMBER EJECTION FRACTION: 23 %
APICAL TWO CHAMBER EJECTION FRACTION: 31 %
APTT PPP: 36.2 SEC (ref 21–32)
ASCENDING AORTA: 2.73 CM
AST SERPL-CCNC: 32 U/L (ref 10–40)
AV INDEX (PROSTH): 0.56
AV MEAN GRADIENT: 3.1 MMHG
AV PEAK GRADIENT: 4.8 MMHG
AV VALVE AREA BY VELOCITY RATIO: 1.6 CM²
AV VALVE AREA: 1.9 CM²
AV VELOCITY RATIO: 0.45
BASOPHILS # BLD AUTO: 0.05 K/UL (ref 0–0.2)
BASOPHILS NFR BLD: 0.3 % (ref 0–1.9)
BILIRUB SERPL-MCNC: 0.7 MG/DL (ref 0.1–1)
BSA FOR ECHO PROCEDURE: 2.02 M2
BUN SERPL-MCNC: 22 MG/DL (ref 8–23)
CALCIUM SERPL-MCNC: 9.3 MG/DL (ref 8.7–10.5)
CHLORIDE SERPL-SCNC: 102 MMOL/L (ref 95–110)
CO2 SERPL-SCNC: 21 MMOL/L (ref 23–29)
CREAT SERPL-MCNC: 1.1 MG/DL (ref 0.5–1.4)
CREAT UR-MCNC: 12 MG/DL (ref 15–325)
CV ECHO LV RWT: 0.56 CM
DIFFERENTIAL METHOD BLD: ABNORMAL
DOP CALC AO PEAK VEL: 1.1 M/S
DOP CALC AO VTI: 21.7 CM
DOP CALC LVOT AREA: 3.5 CM2
DOP CALC LVOT DIAMETER: 2.1 CM
DOP CALC LVOT PEAK VEL: 0.5 M/S
DOP CALC LVOT STROKE VOLUME: 42.2 CM3
DOP CALC MV VTI: 17.6 CM
DOP CALCLVOT PEAK VEL VTI: 12.2 CM
E WAVE DECELERATION TIME: 301.46 MSEC
E/A RATIO: 0.55
E/E' RATIO: 5.29 M/S
ECHO LV POSTERIOR WALL: 1.4 CM (ref 0.6–1.1)
EOSINOPHIL # BLD AUTO: 0 K/UL (ref 0–0.5)
EOSINOPHIL NFR BLD: 0 % (ref 0–8)
ERYTHROCYTE [DISTWIDTH] IN BLOOD BY AUTOMATED COUNT: 15 % (ref 11.5–14.5)
EST. GFR  (NO RACE VARIABLE): 53 ML/MIN/1.73 M^2
FERRITIN SERPL-MCNC: 147 NG/ML (ref 20–300)
FIO2: 50 %
FOLATE SERPL-MCNC: 11 NG/ML (ref 4–24)
FRACTIONAL SHORTENING: 12 % (ref 28–44)
GLUCOSE SERPL-MCNC: 271 MG/DL (ref 70–110)
HAV IGM SERPL QL IA: NORMAL
HBV CORE IGM SERPL QL IA: NORMAL
HBV SURFACE AG SERPL QL IA: NORMAL
HCT VFR BLD AUTO: 39.1 % (ref 37–48.5)
HCV AB SERPL QL IA: NORMAL
HGB BLD-MCNC: 12.3 G/DL (ref 12–16)
HIV 1+2 AB+HIV1 P24 AG SERPL QL IA: NORMAL
IMM GRANULOCYTES # BLD AUTO: 0.16 K/UL (ref 0–0.04)
IMM GRANULOCYTES NFR BLD AUTO: 0.8 % (ref 0–0.5)
INFLUENZA A, MOLECULAR: NOT DETECTED
INFLUENZA B, MOLECULAR: NOT DETECTED
INR PPP: 1.1 (ref 0.8–1.2)
INTERVENTRICULAR SEPTUM: 1.2 CM (ref 0.6–1.1)
IRON SERPL-MCNC: 10 UG/DL (ref 30–160)
IVC DIAMETER: 1.37 CM
LACTATE SERPL-SCNC: 1.9 MMOL/L (ref 0.5–2.2)
LDH SERPL L TO P-CCNC: 2.6 MMOL/L (ref 0.5–2.2)
LEFT ATRIUM AREA SYSTOLIC (APICAL 2 CHAMBER): 20.03 CM2
LEFT ATRIUM AREA SYSTOLIC (APICAL 4 CHAMBER): 20.98 CM2
LEFT ATRIUM VOLUME INDEX MOD: 30 ML/M2
LEFT ATRIUM VOLUME MOD: 59.35 ML
LEFT INTERNAL DIMENSION IN SYSTOLE: 4.4 CM (ref 2.1–4)
LEFT VENTRICLE DIASTOLIC VOLUME INDEX: 60.58 ML/M2
LEFT VENTRICLE DIASTOLIC VOLUME: 119.94 ML
LEFT VENTRICLE END DIASTOLIC VOLUME APICAL 2 CHAMBER: 97.47 ML
LEFT VENTRICLE END DIASTOLIC VOLUME APICAL 4 CHAMBER: 80.66 ML
LEFT VENTRICLE END SYSTOLIC VOLUME APICAL 2 CHAMBER: 57.14 ML
LEFT VENTRICLE END SYSTOLIC VOLUME APICAL 4 CHAMBER: 61.63 ML
LEFT VENTRICLE MASS INDEX: 132.2 G/M2
LEFT VENTRICLE SYSTOLIC VOLUME INDEX: 45 ML/M2
LEFT VENTRICLE SYSTOLIC VOLUME: 89.14 ML
LEFT VENTRICULAR INTERNAL DIMENSION IN DIASTOLE: 5 CM (ref 3.5–6)
LEFT VENTRICULAR MASS: 261.8 G
LV LATERAL E/E' RATIO: 3.7 M/S
LV SEPTAL E/E' RATIO: 9.25 M/S
LVED V (TEICH): 119.94 ML
LVES V (TEICH): 89.14 ML
LVOT MG: 0.6 MMHG
LVOT MV: 0.36 CM/S
LYMPHOCYTES # BLD AUTO: 1.3 K/UL (ref 1–4.8)
LYMPHOCYTES NFR BLD: 6.8 % (ref 18–48)
MAGNESIUM SERPL-MCNC: 1.9 MG/DL (ref 1.6–2.6)
MCH RBC QN AUTO: 23.3 PG (ref 27–31)
MCHC RBC AUTO-ENTMCNC: 31.5 G/DL (ref 32–36)
MCV RBC AUTO: 74 FL (ref 82–98)
MONOCYTES # BLD AUTO: 0.8 K/UL (ref 0.3–1)
MONOCYTES NFR BLD: 4.3 % (ref 4–15)
MV MEAN GRADIENT: 1 MMHG
MV PEAK A VEL: 0.67 M/S
MV PEAK E VEL: 0.37 M/S
MV PEAK GRADIENT: 2 MMHG
MV VALVE AREA BY CONTINUITY EQUATION: 2.4 CM2
NEUTROPHILS # BLD AUTO: 17.1 K/UL (ref 1.8–7.7)
NEUTROPHILS NFR BLD: 87.8 % (ref 38–73)
NRBC BLD-RTO: 0 /100 WBC
OHS LV EJECTION FRACTION SIMPSONS BIPLANE MOD: 26 %
PATH REV BLD -IMP: NORMAL
PHOSPHATE SERPL-MCNC: 4.1 MG/DL (ref 2.7–4.5)
PISA MRMAX VEL: 3.36 M/S
PISA TR MAX VEL: 2.22 M/S
PLATELET # BLD AUTO: 199 K/UL (ref 150–450)
PMV BLD AUTO: 10.9 FL (ref 9.2–12.9)
POC PERFORMED BY: ABNORMAL
POCT GLUCOSE: 213 MG/DL (ref 70–110)
POCT GLUCOSE: 226 MG/DL (ref 70–110)
POCT GLUCOSE: 233 MG/DL (ref 70–110)
POCT GLUCOSE: 256 MG/DL (ref 70–110)
POTASSIUM SERPL-SCNC: 4.6 MMOL/L (ref 3.5–5.1)
PROT SERPL-MCNC: 7.3 G/DL (ref 6–8.4)
PROTHROMBIN TIME: 12.1 SEC (ref 9–12.5)
PV MV: 0.41 M/S
PV PEAK GRADIENT: 2 MMHG
PV PEAK VELOCITY: 0.75 M/S
RA PRESSURE ESTIMATED: 3 MMHG
RA VOL SYS: 24.78 ML
RBC # BLD AUTO: 5.28 M/UL (ref 4–5.4)
RIGHT ATRIAL AREA: 11.4 CM2
RIGHT ATRIUM VOLUME AREA LENGTH APICAL 4 CHAMBER: 24.49 ML
RSV AG BY MOLECULAR METHOD: NOT DETECTED
RV TB RVSP: 5 MMHG
RV TISSUE DOPPLER FREE WALL SYSTOLIC VELOCITY 1 (APICAL 4 CHAMBER VIEW): 9.44 CM/S
SARS-COV-2 RNA RESP QL NAA+PROBE: NOT DETECTED
SATURATED IRON: 4 % (ref 20–50)
SINUS: 2.51 CM
SODIUM SERPL-SCNC: 140 MMOL/L (ref 136–145)
SPECIMEN SOURCE: ABNORMAL
STJ: 2.54 CM
TDI LATERAL: 0.1 M/S
TDI SEPTAL: 0.04 M/S
TDI: 0.07 M/S
TOTAL IRON BINDING CAPACITY: 258 UG/DL (ref 250–450)
TR MAX PG: 20 MMHG
TRANSFERRIN SERPL-MCNC: 174 MG/DL (ref 200–375)
TRICUSPID ANNULAR PLANE SYSTOLIC EXCURSION: 2.5 CM
TROPONIN I SERPL DL<=0.01 NG/ML-MCNC: 0.09 NG/ML (ref 0–0.03)
TROPONIN I SERPL DL<=0.01 NG/ML-MCNC: 0.09 NG/ML (ref 0–0.03)
TV REST PULMONARY ARTERY PRESSURE: 23 MMHG
UUN UR-MCNC: 116 MG/DL (ref 140–1050)
VIT B12 SERPL-MCNC: 380 PG/ML (ref 210–950)
WBC # BLD AUTO: 19.48 K/UL (ref 3.9–12.7)
Z-SCORE OF LEFT VENTRICULAR DIMENSION IN END DIASTOLE: -1.34
Z-SCORE OF LEFT VENTRICULAR DIMENSION IN END SYSTOLE: 1.78

## 2024-11-16 PROCEDURE — 96372 THER/PROPH/DIAG INJ SC/IM: CPT

## 2024-11-16 PROCEDURE — 87389 HIV-1 AG W/HIV-1&-2 AB AG IA: CPT

## 2024-11-16 PROCEDURE — 96365 THER/PROPH/DIAG IV INF INIT: CPT

## 2024-11-16 PROCEDURE — 83735 ASSAY OF MAGNESIUM: CPT

## 2024-11-16 PROCEDURE — 85060 BLOOD SMEAR INTERPRETATION: CPT | Mod: ,,, | Performed by: PATHOLOGY

## 2024-11-16 PROCEDURE — 25000003 PHARM REV CODE 250: Performed by: STUDENT IN AN ORGANIZED HEALTH CARE EDUCATION/TRAINING PROGRAM

## 2024-11-16 PROCEDURE — 83605 ASSAY OF LACTIC ACID: CPT

## 2024-11-16 PROCEDURE — 99900035 HC TECH TIME PER 15 MIN (STAT)

## 2024-11-16 PROCEDURE — 82962 GLUCOSE BLOOD TEST: CPT

## 2024-11-16 PROCEDURE — 25000003 PHARM REV CODE 250

## 2024-11-16 PROCEDURE — 83540 ASSAY OF IRON: CPT

## 2024-11-16 PROCEDURE — 80074 ACUTE HEPATITIS PANEL: CPT

## 2024-11-16 PROCEDURE — 96376 TX/PRO/DX INJ SAME DRUG ADON: CPT

## 2024-11-16 PROCEDURE — 84100 ASSAY OF PHOSPHORUS: CPT

## 2024-11-16 PROCEDURE — 84484 ASSAY OF TROPONIN QUANT: CPT

## 2024-11-16 PROCEDURE — 80053 COMPREHEN METABOLIC PANEL: CPT

## 2024-11-16 PROCEDURE — 63600175 PHARM REV CODE 636 W HCPCS

## 2024-11-16 PROCEDURE — 85610 PROTHROMBIN TIME: CPT

## 2024-11-16 PROCEDURE — 82570 ASSAY OF URINE CREATININE: CPT

## 2024-11-16 PROCEDURE — 87040 BLOOD CULTURE FOR BACTERIA: CPT | Performed by: STUDENT IN AN ORGANIZED HEALTH CARE EDUCATION/TRAINING PROGRAM

## 2024-11-16 PROCEDURE — 82746 ASSAY OF FOLIC ACID SERUM: CPT

## 2024-11-16 PROCEDURE — 94761 N-INVAS EAR/PLS OXIMETRY MLT: CPT

## 2024-11-16 PROCEDURE — 82607 VITAMIN B-12: CPT

## 2024-11-16 PROCEDURE — 94660 CPAP INITIATION&MGMT: CPT

## 2024-11-16 PROCEDURE — 84540 ASSAY OF URINE/UREA-N: CPT

## 2024-11-16 PROCEDURE — 25500020 PHARM REV CODE 255: Performed by: STUDENT IN AN ORGANIZED HEALTH CARE EDUCATION/TRAINING PROGRAM

## 2024-11-16 PROCEDURE — 84484 ASSAY OF TROPONIN QUANT: CPT | Mod: 91

## 2024-11-16 PROCEDURE — 85730 THROMBOPLASTIN TIME PARTIAL: CPT

## 2024-11-16 PROCEDURE — 27000221 HC OXYGEN, UP TO 24 HOURS

## 2024-11-16 PROCEDURE — 0241U SARS-COV2 (COVID) WITH FLU/RSV BY PCR: CPT

## 2024-11-16 PROCEDURE — 85025 COMPLETE CBC W/AUTO DIFF WBC: CPT

## 2024-11-16 PROCEDURE — 82728 ASSAY OF FERRITIN: CPT

## 2024-11-16 PROCEDURE — 94760 N-INVAS EAR/PLS OXIMETRY 1: CPT

## 2024-11-16 PROCEDURE — 11000001 HC ACUTE MED/SURG PRIVATE ROOM

## 2024-11-16 PROCEDURE — 63600175 PHARM REV CODE 636 W HCPCS: Performed by: STUDENT IN AN ORGANIZED HEALTH CARE EDUCATION/TRAINING PROGRAM

## 2024-11-16 PROCEDURE — 96375 TX/PRO/DX INJ NEW DRUG ADDON: CPT

## 2024-11-16 RX ORDER — GLUCAGON 1 MG
1 KIT INJECTION
Status: DISCONTINUED | OUTPATIENT
Start: 2024-11-16 | End: 2024-11-18 | Stop reason: HOSPADM

## 2024-11-16 RX ORDER — OXYCODONE AND ACETAMINOPHEN 10; 325 MG/1; MG/1
1 TABLET ORAL EVERY 6 HOURS PRN
Status: DISCONTINUED | OUTPATIENT
Start: 2024-11-16 | End: 2024-11-18 | Stop reason: HOSPADM

## 2024-11-16 RX ORDER — NEOMYCIN SULFATE, POLYMYXIN B SULFATE, AND DEXAMETHASONE 3.5; 10000; 1 MG/G; [USP'U]/G; MG/G
OINTMENT OPHTHALMIC NIGHTLY
Status: DISCONTINUED | OUTPATIENT
Start: 2024-11-16 | End: 2024-11-18 | Stop reason: HOSPADM

## 2024-11-16 RX ORDER — LANOLIN ALCOHOL/MO/W.PET/CERES
1 CREAM (GRAM) TOPICAL DAILY
Status: DISCONTINUED | OUTPATIENT
Start: 2024-11-16 | End: 2024-11-18 | Stop reason: HOSPADM

## 2024-11-16 RX ORDER — BUSPIRONE HYDROCHLORIDE 5 MG/1
5 TABLET ORAL 2 TIMES DAILY
Status: DISCONTINUED | OUTPATIENT
Start: 2024-11-16 | End: 2024-11-18 | Stop reason: HOSPADM

## 2024-11-16 RX ORDER — CHOLECALCIFEROL (VITAMIN D3) 25 MCG
1000 TABLET ORAL DAILY
Status: DISCONTINUED | OUTPATIENT
Start: 2024-11-16 | End: 2024-11-18 | Stop reason: HOSPADM

## 2024-11-16 RX ORDER — INSULIN ASPART 100 [IU]/ML
0-10 INJECTION, SOLUTION INTRAVENOUS; SUBCUTANEOUS
Status: DISCONTINUED | OUTPATIENT
Start: 2024-11-16 | End: 2024-11-18 | Stop reason: HOSPADM

## 2024-11-16 RX ORDER — ACETAMINOPHEN 325 MG/1
650 TABLET ORAL EVERY 6 HOURS PRN
Status: DISCONTINUED | OUTPATIENT
Start: 2024-11-16 | End: 2024-11-18 | Stop reason: HOSPADM

## 2024-11-16 RX ORDER — SODIUM CHLORIDE 0.9 % (FLUSH) 0.9 %
10 SYRINGE (ML) INJECTION EVERY 12 HOURS PRN
Status: DISCONTINUED | OUTPATIENT
Start: 2024-11-16 | End: 2024-11-18 | Stop reason: HOSPADM

## 2024-11-16 RX ORDER — IBUPROFEN 200 MG
16 TABLET ORAL
Status: DISCONTINUED | OUTPATIENT
Start: 2024-11-16 | End: 2024-11-18 | Stop reason: HOSPADM

## 2024-11-16 RX ORDER — OXYCODONE AND ACETAMINOPHEN 10; 325 MG/1; MG/1
1 TABLET ORAL EVERY 6 HOURS PRN
Status: DISCONTINUED | OUTPATIENT
Start: 2024-11-16 | End: 2024-11-16

## 2024-11-16 RX ORDER — AMITRIPTYLINE HYDROCHLORIDE 10 MG/1
10 TABLET, FILM COATED ORAL NIGHTLY PRN
Status: DISCONTINUED | OUTPATIENT
Start: 2024-11-16 | End: 2024-11-18 | Stop reason: HOSPADM

## 2024-11-16 RX ORDER — GABAPENTIN 100 MG/1
100 CAPSULE ORAL 2 TIMES DAILY
Status: DISCONTINUED | OUTPATIENT
Start: 2024-11-16 | End: 2024-11-18 | Stop reason: HOSPADM

## 2024-11-16 RX ORDER — ENOXAPARIN SODIUM 100 MG/ML
40 INJECTION SUBCUTANEOUS EVERY 24 HOURS
Status: DISCONTINUED | OUTPATIENT
Start: 2024-11-16 | End: 2024-11-18 | Stop reason: HOSPADM

## 2024-11-16 RX ORDER — METOPROLOL SUCCINATE 50 MG/1
50 TABLET, EXTENDED RELEASE ORAL DAILY
Status: DISCONTINUED | OUTPATIENT
Start: 2024-11-16 | End: 2024-11-18 | Stop reason: HOSPADM

## 2024-11-16 RX ORDER — ACETAMINOPHEN 325 MG/1
650 TABLET ORAL EVERY 6 HOURS PRN
Status: DISCONTINUED | OUTPATIENT
Start: 2024-11-16 | End: 2024-11-16

## 2024-11-16 RX ORDER — TAMSULOSIN HYDROCHLORIDE 0.4 MG/1
1 CAPSULE ORAL DAILY
Status: DISCONTINUED | OUTPATIENT
Start: 2024-11-16 | End: 2024-11-18 | Stop reason: HOSPADM

## 2024-11-16 RX ORDER — ATORVASTATIN CALCIUM 40 MG/1
40 TABLET, FILM COATED ORAL DAILY
Status: DISCONTINUED | OUTPATIENT
Start: 2024-11-16 | End: 2024-11-18 | Stop reason: HOSPADM

## 2024-11-16 RX ORDER — ASCORBIC ACID 500 MG
500 TABLET ORAL DAILY
Status: DISCONTINUED | OUTPATIENT
Start: 2024-11-16 | End: 2024-11-18 | Stop reason: HOSPADM

## 2024-11-16 RX ORDER — CEFTRIAXONE 2 G/1
2 INJECTION, POWDER, FOR SOLUTION INTRAMUSCULAR; INTRAVENOUS
Status: DISCONTINUED | OUTPATIENT
Start: 2024-11-17 | End: 2024-11-18 | Stop reason: HOSPADM

## 2024-11-16 RX ORDER — ALBUTEROL SULFATE 90 UG/1
2 INHALANT RESPIRATORY (INHALATION) EVERY 4 HOURS PRN
Status: DISCONTINUED | OUTPATIENT
Start: 2024-11-16 | End: 2024-11-18 | Stop reason: HOSPADM

## 2024-11-16 RX ORDER — FUROSEMIDE 10 MG/ML
80 INJECTION INTRAMUSCULAR; INTRAVENOUS ONCE
Status: COMPLETED | OUTPATIENT
Start: 2024-11-16 | End: 2024-11-16

## 2024-11-16 RX ORDER — ESCITALOPRAM OXALATE 10 MG/1
10 TABLET ORAL DAILY
Status: DISCONTINUED | OUTPATIENT
Start: 2024-11-16 | End: 2024-11-18 | Stop reason: HOSPADM

## 2024-11-16 RX ORDER — IBUPROFEN 200 MG
24 TABLET ORAL
Status: DISCONTINUED | OUTPATIENT
Start: 2024-11-16 | End: 2024-11-18 | Stop reason: HOSPADM

## 2024-11-16 RX ADMIN — ESCITALOPRAM OXALATE 10 MG: 10 TABLET ORAL at 09:11

## 2024-11-16 RX ADMIN — TAMSULOSIN HYDROCHLORIDE 0.4 MG: 0.4 CAPSULE ORAL at 09:11

## 2024-11-16 RX ADMIN — METOPROLOL SUCCINATE 50 MG: 50 TABLET, EXTENDED RELEASE ORAL at 09:11

## 2024-11-16 RX ADMIN — GABAPENTIN 100 MG: 100 CAPSULE ORAL at 09:11

## 2024-11-16 RX ADMIN — ACETAMINOPHEN 650 MG: 325 TABLET ORAL at 05:11

## 2024-11-16 RX ADMIN — AMITRIPTYLINE HYDROCHLORIDE 10 MG: 10 TABLET, FILM COATED ORAL at 04:11

## 2024-11-16 RX ADMIN — SACUBITRIL AND VALSARTAN 1 TABLET: 24; 26 TABLET, FILM COATED ORAL at 09:11

## 2024-11-16 RX ADMIN — INSULIN ASPART 4 UNITS: 100 INJECTION, SOLUTION INTRAVENOUS; SUBCUTANEOUS at 04:11

## 2024-11-16 RX ADMIN — ENOXAPARIN SODIUM 40 MG: 40 INJECTION SUBCUTANEOUS at 04:11

## 2024-11-16 RX ADMIN — OXYCODONE AND ACETAMINOPHEN 1 TABLET: 10; 325 TABLET ORAL at 04:11

## 2024-11-16 RX ADMIN — INSULIN ASPART 4 UNITS: 100 INJECTION, SOLUTION INTRAVENOUS; SUBCUTANEOUS at 09:11

## 2024-11-16 RX ADMIN — OXYCODONE HYDROCHLORIDE AND ACETAMINOPHEN 500 MG: 500 TABLET ORAL at 09:11

## 2024-11-16 RX ADMIN — ATORVASTATIN CALCIUM 40 MG: 40 TABLET, FILM COATED ORAL at 09:11

## 2024-11-16 RX ADMIN — BUSPIRONE HYDROCHLORIDE 5 MG: 5 TABLET ORAL at 09:11

## 2024-11-16 RX ADMIN — FERROUS SULFATE TAB 325 MG (65 MG ELEMENTAL FE) 1 EACH: 325 (65 FE) TAB at 09:11

## 2024-11-16 RX ADMIN — CEFTRIAXONE SODIUM 2 G: 2 INJECTION, POWDER, FOR SOLUTION INTRAMUSCULAR; INTRAVENOUS at 12:11

## 2024-11-16 RX ADMIN — IOHEXOL 100 ML: 350 INJECTION, SOLUTION INTRAVENOUS at 01:11

## 2024-11-16 RX ADMIN — AZITHROMYCIN MONOHYDRATE 500 MG: 500 INJECTION, POWDER, LYOPHILIZED, FOR SOLUTION INTRAVENOUS at 12:11

## 2024-11-16 RX ADMIN — FUROSEMIDE 80 MG: 10 INJECTION, SOLUTION INTRAMUSCULAR; INTRAVENOUS at 03:11

## 2024-11-16 RX ADMIN — Medication 1000 UNITS: at 09:11

## 2024-11-16 RX ADMIN — INSULIN ASPART 2 UNITS: 100 INJECTION, SOLUTION INTRAVENOUS; SUBCUTANEOUS at 09:11

## 2024-11-16 NOTE — H&P
[facility] Medicine H&P Note     Admitting Team: \[facility]\"" Hospitalist Team A  Attending Physician: Nicola Perez MD  Resident: Tanna  Intern: Tiffany    Date of Admit: 11/15/2024    Chief Complaint     SOB for 3-4 days    Subjective:      History of Present Illness:  Katherine Berger is a 74 y.o. female who has a PMHX HFrEF (EF 30% 9/22), T2DM (A1c 7.6 10/24), HTN, JORGE, anxiety. The patient presented to [facility] on 11/15/2024 with a primary complaint of SOB x3-4 days.     The patient was in their usual state of health until approximately 3-4 days ago after she came home from a trip to [place]. She notes that she was in her usual state of health until the day after the trip when she noticed a nonproductive cough and progressive SOB. These symptoms progressed to the point that she was unable to complete her ADLs, which triggered her presentation. She notes that she was out of her home lasix for ~3 days prior to this presentation as well. Notes chest pain with coughing. Notes some abdominopelvic pain on interview, though attributes to needed to void. She denies any fevers, chills, headache, diarrhea, constipation, dysuria.       Past Medical History:  Past Medical History:   Diagnosis Date    Arthritis     Diabetes mellitus     does she will take it I want to you both of     Hypertension     Kidney stone        Past Surgical History:  Past Surgical History:   Procedure Laterality Date    COLONOSCOPY  05/23/2013    dr ram - 5 years    FOOT SURGERY Right 2010    bone spur    FOOT SURGERY Left 2010    fx ankle    HYSTERECTOMY      age 47    KNEE SURGERY Left 06/10/2016    TKR    OOPHORECTOMY      TONSILLECTOMY         Allergies:  Review of patient's allergies indicates:  No Known Allergies    Home Medications:  Prior to Admission medications    Medication Sig Start Date End Date Taking? Authorizing Provider   albuterol (VENTOLIN HFA) 90 mcg/actuation inhaler Inhale 2 puffs into the lungs every 6  (six) hours as needed for Wheezing. Rescue 10/8/24  Yes Itz Muse MD   amitriptyline (ELAVIL) 10 MG tablet TAKE ONE TO THREE TABLETS BY MOUTH AT BEDTIME FOR  HEADACHE 6/15/20  Yes Itz Muse MD   ascorbic acid, vitamin C, (VITAMIN C) 500 MG tablet Take 500 mg by mouth once daily.   Yes Provider, Historical   atorvastatin (LIPITOR) 40 MG tablet Take 1 tablet (40 mg total) by mouth once daily. 1/8/24 1/7/25 Yes Jeffery Yadav MD   blood sugar diagnostic (BLOOD GLUCOSE TEST) Strp Strips for 3 times a day testing   dispense brand covered by insurance and to match meter brand 5/31/22  Yes Itz Muse MD   busPIRone (BUSPAR) 5 MG Tab Take 1 tablet (5 mg total) by mouth 2 (two) times daily. For anxiety 9/28/23  Yes Itz Muse MD   diabetic supplies, miscellan. Misc Lancets for 3 times a day testing    dispense brand covered by insurance t 5/31/22  Yes Itz Muse MD   diclofenac sodium (VOLTAREN) 1 % Gel Apply 2 g topically 3 (three) times daily. 10/26/20  Yes Arpit Arita Jr., MD   ENTRESTO 24-26 mg per tablet Take 1 tablet by mouth twice daily 11/5/24  Yes Jeffery Yadav MD   EScitalopram oxalate (LEXAPRO) 10 MG tablet Take 1 tablet (10 mg total) by mouth once daily. 5/21/24  Yes Itz Muse MD   ferrous sulfate 324 mg (65 mg iron) TbEC Take 1 tablet (324 mg total) by mouth daily as needed. 10/8/24  Yes Itz Muse MD   furosemide (LASIX) 40 MG tablet Take 1 tablet by mouth once daily 10/8/24  Yes Itz Muse MD   gabapentin (NEURONTIN) 100 MG capsule Takes on in am and two in pm for back pain radiating down leg. 6/15/20  Yes Itz Muse MD   glimepiride (AMARYL) 2 MG tablet Take 1 tablet (2 mg total) by mouth 2 (two) times daily. Take 2 mg by mouth 2 (two) times daily. 8/18/22  Yes Itz Muse MD   hyoscyamine (ANASPAZ,LEVSIN) 0.125 mg Tab Take 1 tablet (125 mcg total) by mouth every 6 (six) hours  as needed (P.r.n. bladder spasm). 3/21/24  Yes Itz Muse MD   JARDIANCE 25 mg tablet  2/27/24  Yes Provider, Historical   metFORMIN (GLUCOPHAGE-XR) 500 MG ER 24hr tablet Take 2 tablets (1,000 mg total) by mouth daily with breakfast. prn 1/3/24 1/2/25 Yes Itz Muse MD   metoprolol succinate (TOPROL-XL) 50 MG 24 hr tablet Take 1 tablet (50 mg total) by mouth once daily. 1/8/24 1/7/25 Yes Jeffery Yadav MD   MOUNJARO 10 mg/0.5 mL PnIj SMARTSIG:10 Milligram(s) SUB-Q Once a Week 9/3/24  Yes Provider, Historical   MULTIVIT/IRON/FA/K/HERB NO.244 (ALIVE WOMEN'S ENERGY ORAL) Take 1 tablet by mouth daily as needed.    Yes Provider, Historical   neomycin-polymyxin-dexamethasone (DEXACINE) 3.5 mg/g-10,000 unit/g-0.1 % Oint APPLY 1/4 INCH OF OINTMENT ON EYELID EVERY NIGHT AT BEDTIME 2/29/24  Yes Provider, Historical   oxyCODONE-acetaminophen (PERCOCET)  mg per tablet  9/19/22  Yes Provider, Historical   tamsulosin (FLOMAX) 0.4 mg Cap Take 1 capsule (0.4 mg total) by mouth once daily. 3/21/24  Yes Itz Muse MD   vitamin D (VITAMIN D3) 1000 units Tab Take 1,000 Units by mouth once daily.   Yes Provider, Historical   amoxicillin (AMOXIL) 500 MG Tab Take 500 mg by mouth 3 (three) times daily. 10/3/24 11/16/24 Yes Provider, Historical   tirzepatide 2.5 mg/0.5 mL PnIj Inject 2.5 mg into the skin every 7 days. 5/17/24   Itz Muse MD   tirzepatide 7.5 mg/0.5 mL PnIj Inject 7.5 mg into the skin every 7 days. 6/12/24   Itz Muse MD       Family History:  Family History   Problem Relation Name Age of Onset    Heart attack Mother age 50's     Seizures Mother age 50's         fell in bathtub and drowned    Colon cancer Sister      Breast cancer Sister         Social History:  Social History     Tobacco Use    Smoking status: Never     Passive exposure: Never    Smokeless tobacco: Never   Substance Use Topics    Alcohol use: No     Alcohol/week: 0.0 standard drinks of  "alcohol    Drug use: No       Review of Systems:  Pertinent items are noted in HPI. All other systems are reviewed and are negative.    Health Maintaince :   Primary Care Physician: Itz Muse MD    Immunizations:   Immunization History   Administered Date(s) Administered    COVID-19 MRNA, LN-S PF (MODERNA HALF 0.25 ML DOSE) 2021    COVID-19 Vaccine 2022    COVID-19, MRNA, LN-S, PF (MODERNA FULL 0.5 ML DOSE) 2021, 2021, 2022    COVID-19, mRNA, LNP-S, PF (Moderna) Ages 12+ 2024    Influenza 2016    Influenza - Quadrivalent 11/10/2015    Influenza - Quadrivalent - High Dose - PF (65 years and older) 10/05/2020, 10/07/2021    Influenza - Quadrivalent - PF *Preferred* (6 months and older) 2016    Influenza - Trivalent - Fluad - Adjuvanted - PF (65 years and older 2017, 10/11/2018    Influenza - Trivalent - Fluarix, Flulaval, Fluzone, Afluria - PF 2016    Influenza - Trivalent - Fluzone High Dose - PF (65 years and older) 2019, 2024    Pneumococcal Conjugate - 13 Valent 2017    Pneumococcal Polysaccharide - 23 Valent 11/10/2015, 2020    RSVpreF (Arexvy) 2024    Zoster Recombinant 2024, 2024       Cancer Screening:  PAP: s/p total hysterectomy   MMG: BiRADS 1   Colonoscopy: not utd      Objective:   Last 24 Hour Vital Signs:  BP  Min: 109/74  Max: 133/92  Temp  Av.8 °F (36.6 °C)  Min: 97.8 °F (36.6 °C)  Max: 97.8 °F (36.6 °C)  Pulse  Av.5  Min: 78  Max: 104  Resp  Av.8  Min: 6  Max: 62  SpO2  Av %  Min: 84 %  Max: 99 %  Height  Av' 7" (170.2 cm)  Min: 5' 7" (170.2 cm)  Max: 5' 7" (170.2 cm)  Weight  Av.2 kg (190 lb)  Min: 86.2 kg (190 lb)  Max: 86.2 kg (190 lb)  Body mass index is 29.76 kg/m².  No intake/output data recorded.    Physical Examination:  General: Alert, cooperative, NC in place   HEENT: normocephalic, atraumatic, EOMI  Neck: No thyromegaly or masses   Cardiac: " tachycardic, regular rhythm, no murmurs appreciated, no extra heart sounds  Pulmonary/Chest: rales throughout lung fields, decreased breath sounds in right middle and lower lung fields   GI: Soft, tender midline in lower quadrants, non distended, normoactive bowel sounds  Extremities: no edema or cyanosis, 1+ pulses to b/l feet   Skin: dry, warm, no wounds noted  Neuro: AAO x 3; no focal deficits      Laboratory:  Most Recent Data:  CBC:   Lab Results   Component Value Date    WBC 16.25 (H) 11/15/2024    HGB 8.2 (L) 11/15/2024    HCT 26.4 (L) 11/15/2024     (L) 11/15/2024    MCV 76 (L) 11/15/2024    RDW 15.4 (H) 11/15/2024     BMP:   Lab Results   Component Value Date     11/15/2024    K 4.9 11/15/2024     11/15/2024    CO2 24 11/15/2024    BUN 20 11/15/2024    CREATININE 1.2 11/15/2024     (H) 11/15/2024    CALCIUM 9.4 11/15/2024    MG 1.7 05/14/2022    PHOS 3.2 05/14/2022     LFTs:   Lab Results   Component Value Date    PROT 7.4 11/15/2024    ALBUMIN 3.3 (L) 11/15/2024    BILITOT 0.7 11/15/2024    AST 42 (H) 11/15/2024    ALKPHOS 65 11/15/2024    ALT 33 11/15/2024     Coags:   Lab Results   Component Value Date    INR 1.1 05/13/2022     FLP:   Lab Results   Component Value Date    CHOL 110 (L) 10/02/2024    CHOL 109 (L) 10/02/2024    HDL 33 (L) 10/02/2024    HDL 34 (L) 10/02/2024    LDLCALC 57.4 (L) 10/02/2024    LDLCALC 55.6 (L) 10/02/2024    TRIG 98 10/02/2024    TRIG 97 10/02/2024    CHOLHDL 30.0 10/02/2024    CHOLHDL 31.2 10/02/2024     DM:   Lab Results   Component Value Date    HGBA1C 7.6 (H) 10/02/2024    HGBA1C 7.6 (H) 10/02/2024    HGBA1C 8.8 (H) 03/21/2024    LDLCALC 57.4 (L) 10/02/2024    LDLCALC 55.6 (L) 10/02/2024    CREATININE 1.2 11/15/2024     Thyroid:   Lab Results   Component Value Date    TSH 0.266 (L) 10/02/2024    TSH 0.266 (L) 10/02/2024    FREET4 1.11 10/02/2024    FREET4 1.14 10/02/2024     Anemia:   Lab Results   Component Value Date    IRON 68 10/02/2024    TIBC  305 10/02/2024    FERRITIN 121 07/02/2019    JUOOPPGW15 >2000 (H) 03/05/2018     Cardiac:   Lab Results   Component Value Date    TROPONINI 0.017 11/15/2024    BNP 1,450 (H) 11/15/2024     Urinalysis:   Lab Results   Component Value Date    LABURIN No significant growth 04/25/2022    COLORU Yellow 03/21/2024    SPECGRAV 1.010 03/21/2024    NITRITE Negative 03/21/2024    KETONESU Negative 03/21/2024    UROBILINOGEN Negative 03/21/2024    WBCUA 1 03/21/2024       Trended Lab Data:  Recent Labs   Lab 11/15/24  2130 11/15/24  2212   WBC 16.25*  --    HGB 8.2*  --    HCT 26.4*  --    *  --    MCV 76*  --    RDW 15.4*  --    NA  --  141   K  --  4.9   CL  --  106   CO2  --  24   BUN  --  20   CREATININE  --  1.2   GLU  --  310*   PROT  --  7.4   ALBUMIN  --  3.3*   BILITOT  --  0.7   AST  --  42*   ALKPHOS  --  65   ALT  --  33       Trended Cardiac Data:  Recent Labs   Lab 11/15/24  2130 11/15/24  2132   TROPONINI 0.017  --    BNP  --  1,450*       Microbiology Data:  Microbiology Results (last 7 days)       Procedure Component Value Units Date/Time    Blood culture x two cultures. Draw prior to antibiotics. [[id number]] Collected: 11/16/24 0016    Order Status: Sent Specimen: Blood from Peripheral, Upper Arm, Right     Blood culture x two cultures. Draw prior to antibiotics. [[id number]] Collected: 11/16/24 0016    Order Status: Sent Specimen: Blood from Peripheral, Upper Arm, Right             Other Results:  EKG (my interpretation): accelerated junctional rhythm w/ PVCs, low voltage      Radiology:  Imaging Results              CTA Chest Non-Coronary (PE Studies) (Final result)  Result time 11/16/24 01:45:14      Final result by Orville Noguera MD (11/16/24 01:45:14)                   Impression:      No evidence of pulmonary thromboembolism.    Diffuse bilateral patchy ground-glass and confluent airspace opacities throughout the lungs, right greater than left.  Findings are favored to reflect edema,  although additional considerations would include multifocal infection, hemorrhage, aspiration, or noninfectious inflammatory process.    Layering bilateral pleural effusions, right greater than left.    Cardiomegaly.    Additional incidental findings as above.      Electronically signed by: Orville Noguera MD  Date:    11/16/2024  Time:    01:45               Narrative:    EXAMINATION:  CTA CHEST NON CORONARY (PE STUDIES)    CLINICAL HISTORY:  Pulmonary embolism (PE) suspected, positive D-dimer;    TECHNIQUE:  Low dose axial images, sagittal and coronal reformations were obtained from the thoracic inlet to the lung bases following the IV administration of 100 mL of Omnipaque 350.  Contrast timing was optimized to evaluate the pulmonary arteries.  MIP images were performed.    COMPARISON:  Chest radiograph 11/15/2024, CT chest 05/25/2016    FINDINGS:  The visualized soft tissue structures at the base of the neck appear within normal limits allowing from streak artifact from dense contrast bolus.    The thoracic aorta maintains normal caliber, contour, and course with mild atherosclerotic calcification within its course.  Incidental note is made of a shared origin of the right brachiocephalic and left common carotid arteries.  There is no evidence of aneurysmal dilation.  The heart is enlarged and there is scattered coronary artery calcification.  No significant pericardial effusion.  The esophagus maintains a normal course and caliber. There is no bulky axillary or mediastinal lymph node enlargement.    The trachea is midline and the proximal airways are patent. Detailed evaluation of the lung parenchyma is limited by respiratory motion artifact. There is no pneumothorax. There are patchy ground-glass and somewhat more consolidative opacities throughout the lungs, right greater than left.  There are bilateral layering pleural effusions, right greater than left.    There is no evidence of pulmonary  thromboembolism.    Limited images of the upper abdomen obtained during the course of this dedicated thoracic CT demonstrate no acute abnormalities.    The osseous structures demonstrate degenerative changes of the visualized spine.  There are degenerative changes of bilateral shoulders, right greater than left.                                       X-Ray Chest AP Portable (Final result)  Result time 11/15/24 23:49:38      Final result by Ottoniel Escobar MD (11/15/24 23:49:38)                   Impression:      Prominent opacity involving the right hemithorax consistent with confluent infiltrates/consolidation.    Bilateral pleural effusion the possibly of mild infiltrate at the left base is also considered.      Electronically signed by: Ottoniel Escobar  Date:    11/15/2024  Time:    23:49               Narrative:    EXAMINATION:  XR CHEST AP PORTABLE    CLINICAL HISTORY:  CHF;    TECHNIQUE:  Single frontal view of the chest was performed.    COMPARISON:  Chest radiograph December 17, 2023    FINDINGS:  Single portable chest radiograph is submitted.  There is diminished depth of inspiration, when accounting for position and technique and depth of inspiration the appearance of the cardiomediastinal silhouette is stable.    The visualized pulmonary vasculature appears stable as well.  There is prominent confluent opacity involving the right hemithorax, with relative sparing at the right apex, and peripheral aspect of the mid to upper hemithorax, however prominent confluent opacity consistent with confluent infiltrates/airspace disease and consolidation with air bronchograms noted centrally at the perihilar region.    There is also appearance likely representing bilateral pleural effusion, right greater than left, the possibly of superimposed infiltrate at the left base is to be considered.    There is no evidence for pneumothorax bilaterally.    The osseous structures demonstrate chronic change.                                        Assessment:     Katherine Berger is a 74 y.o. female who has a PMHX HFrEF (EF 30% 9/22), T2DM (A1c 7.6 10/24), HTN, JORGE, anxiety who presented on 11/15/2024 with a primary complaint of SOB x3-4 days in the setting of multifocal pneumonia w/ concurrent concern for AoCHFrEF. [facility]mitted to [facility] medicine for further management.        Plan:     Shortness of breath   Multifocal pneumonia   - Initially on CPAP then BiPAP in ED, weaned to NC prior to admission   - CBC w/ granulocytic leukocytosis, BNP elevated, trop negative   - S/p rocephin and azithro in ED, continued daily dosing   - D dimer ordered and increased, CTPE w/o findings c/w PE but c/w pneumonia on CXR  - Diuresis as below   - Oxygen sats stable on NC, downtitrate as able   - Continue home albuterol q4hrs prn for wheezing   - Trend electrolytes and replete prn     AoCHFrEF (EF 30% 9/22)   HTN  - Initial BNP elevated, troponin negative   - TTE ordered  - Given 80 mg IV lasix, redose as appropriate   - Strict I/Os   - Continue home atorvastatin 40 mg daily   - Continue home toprolol XL 50 mg daily   - Continue home entresto 24-26 bid   - Continue home tamsulosin 0.4 mg daily     MANI vs CKD   - Cr 1.2, increased from 0.72  - Likely fluid overloaded, trend renal function while diuresis occurs  - FeUrea ordered given home lasix use and is concerning for intrinsic renal disease     T2DM (A1c 7.6 10/24)  - Repeat A1c pending   - Holding home glimepizide, jardiance, metformin, munjaro while inpatient, ok to continue on discharge  - MDSSI while inpatient     JORGE  Thrombocytopenia   - Hemoglobin 8.2, decreased from basleine 11   - Continue home iron supplementation   - Repeat iron, ferritin, B12, folate   - Platelets 135, decreased from baseline 220s   - Check HIV, hep panel, PT/INR, aPTT, peripheral smear   - Trend CBCs     Chronic pain  - Continue home gabapentin 100 mg bid  - Continue home Percocet 10 mg q6hrs prn     Anxiety   - Continue home  amitriptyline 10 mg qhs prn   - Continue home buspirone 5 mg bid   - Continue home lexapro 10 gm daily     Vitamin deficiencies     - Continue home vitamin D, vitamin C       FEN: diabetic diet   PPX: lovenox   Dispo: pending improvement in respiratory status       Code Status:     FULL    Trell Marrufo MD, MPH  [facility] Internal Medicine-Pediatrics, PGY-3  11/16/2024 2:59 AM    [facility] Medicine Hospitalist Pager numbers:   [facility] Hospitalist Medicine Team A (Chris/Roel): [phone]  [facility] Hospitalist Medicine Team B (Laurent/Pinky):  [phone]

## 2024-11-16 NOTE — ED NOTES
Pt stated she is feeling very anxious and uncomfortable.  Amitriptyline and pain med administered.

## 2024-11-16 NOTE — ED PROVIDER NOTES
ED Provider Note - 11/15/2024    History     Chief Complaint   Patient presents with    Shortness of Breath     Patient presents C/o SOB, presents on Bipap, 70% room air, hx CHF, Presents in 80's on CPAP. Out of lasix for 3 days. Crackles in all field, 4 baby asa, if nitro,        HPI     Katherine Berger is a 74 y.o. year old female with past medical and surgical history as seen below, presenting with chief complaint of shortness of breath.  History of CHF and has been out of Lasix for 3 days.  Worsening shortness of breath throughout that time.  Found to be 70% on room air by EMS once they arrived.  Increasing into the 80s on CPAP.  One nitro tab prior to arrival.        Past Medical History:   Diagnosis Date    Arthritis     Diabetes mellitus          Hypertension     Kidney stone      Past Surgical History:   Procedure Laterality Date    COLONOSCOPY  05/23/2013    dr ram - 5 years    FOOT SURGERY Right 2010    bone spur    FOOT SURGERY Left 2010    fx ankle    HYSTERECTOMY      age 47    KNEE SURGERY Left 06/10/2016    TKR    OOPHORECTOMY      TONSILLECTOMY           Family History   Problem Relation Name Age of Onset    Heart attack Mother age 50's     Seizures Mother age 50's         fell in bathtub and drowned    Colon cancer Sister      Breast cancer Sister       Social History     Tobacco Use    Smoking status: Never     Passive exposure: Never    Smokeless tobacco: Never   Substance Use Topics    Alcohol use: No     Alcohol/week: 0.0 standard drinks of alcohol    Drug use: No     Social Drivers of Health with Concerns     Alcohol Use: Not on file   Financial Resource Strain: Not on file   Food Insecurity: Not on file   Transportation Needs: Not on file   Physical Activity: Not on file   Stress: Not on file   Housing Stability: Not on file   Utilities: Not on file   Health Literacy: Not on file   Social Isolation: Not on file      Review of patient's allergies indicates:  No Known Allergies    Review of  Systems     A full Review of Systems (ROS) was performed and was negative unless otherwise stated in the HPI.      Physical Exam     Vitals:    11/15/24 2144 11/15/24 2203 11/15/24 2334 11/15/24 2340   BP:  (!) 133/92 130/81    Pulse: 91 96 85 87   Resp: (!) 36 (!) 36 (!) 28 (!) 62   Temp:       TempSrc:       SpO2: 96% (!) 92% 99% 97%   Weight:       Height:            Physical Exam    Nursing note and vitals reviewed.  Constitutional: She appears well-developed and well-nourished. She appears distressed.   HENT:   Head: Normocephalic and atraumatic.   Right Ear: External ear normal.   Left Ear: External ear normal.   Nose: Nose normal. Mouth/Throat: Oropharynx is clear and moist.   Eyes: Conjunctivae and EOM are normal. Pupils are equal, round, and reactive to light.   Neck: Neck supple.   Normal range of motion.  Cardiovascular:  Normal rate, regular rhythm, normal heart sounds and intact distal pulses.           Pulmonary/Chest: No stridor. She is in respiratory distress. She has no wheezes. She has no rhonchi. She has rales.   Abdominal: Abdomen is soft. Bowel sounds are normal. There is no abdominal tenderness.   Musculoskeletal:         General: Edema present. No tenderness. Normal range of motion.      Cervical back: Normal range of motion and neck supple.     Neurological: She is alert and oriented to person, place, and time. She has normal strength. No cranial nerve deficit or sensory deficit.   Skin: Skin is warm and dry. No rash noted.   Psychiatric: She has a normal mood and affect. Thought content normal.         Lab Results- Independently reviewed by myself      Labs Reviewed   CBC W/ AUTO DIFFERENTIAL - Abnormal       Result Value    WBC 16.25 (*)     RBC 3.47 (*)     Hemoglobin 8.2 (*)     Hematocrit 26.4 (*)     MCV 76 (*)     MCH 23.6 (*)     MCHC 31.1 (*)     RDW 15.4 (*)     Platelets 135 (*)     MPV 12.6      Immature Granulocytes 1.0 (*)     Gran # (ANC) 13.3 (*)     Immature Grans (Abs) 0.17  (*)     Lymph # 1.8      Mono # 0.8      Eos # 0.1      Baso # 0.06      nRBC 0      Gran % 81.9 (*)     Lymph % 11.0 (*)     Mono % 4.9      Eosinophil % 0.8      Basophil % 0.4      Differential Method Automated     B-TYPE NATRIURETIC PEPTIDE - Abnormal    BNP 1,450 (*)    COMPREHENSIVE METABOLIC PANEL - Abnormal    Sodium 141      Potassium 4.9      Chloride 106      CO2 24      Glucose 310 (*)     BUN 20      Creatinine 1.2      Calcium 9.4      Total Protein 7.4      Albumin 3.3 (*)     Total Bilirubin 0.7      Alkaline Phosphatase 65      AST 42 (*)     ALT 33      eGFR 48 (*)     Anion Gap 11     D DIMER, QUANTITATIVE - Abnormal    D-Dimer 2.65 (*)    CULTURE, BLOOD   CULTURE, BLOOD   TROPONIN I    Troponin I 0.017     D DIMER, QUANTITATIVE           Imaging     Imaging Results              X-Ray Chest AP Portable (Final result)  Result time 11/15/24 23:49:38      Final result by Ottoniel Escobar MD (11/15/24 23:49:38)                   Impression:      Prominent opacity involving the right hemithorax consistent with confluent infiltrates/consolidation.    Bilateral pleural effusion the possibly of mild infiltrate at the left base is also considered.      Electronically signed by: Ottoniel Escobar  Date:    11/15/2024  Time:    23:49               Narrative:    EXAMINATION:  XR CHEST AP PORTABLE    CLINICAL HISTORY:  CHF;    TECHNIQUE:  Single frontal view of the chest was performed.    COMPARISON:  Chest radiograph December 17, 2023    FINDINGS:  Single portable chest radiograph is submitted.  There is diminished depth of inspiration, when accounting for position and technique and depth of inspiration the appearance of the cardiomediastinal silhouette is stable.    The visualized pulmonary vasculature appears stable as well.  There is prominent confluent opacity involving the right hemithorax, with relative sparing at the right apex, and peripheral aspect of the mid to upper hemithorax, however prominent  confluent opacity consistent with confluent infiltrates/airspace disease and consolidation with air bronchograms noted centrally at the perihilar region.    There is also appearance likely representing bilateral pleural effusion, right greater than left, the possibly of superimposed infiltrate at the left base is to be considered.    There is no evidence for pneumothorax bilaterally.    The osseous structures demonstrate chronic change.                                    X-Rays:   Independently Interpreted Readings:   Chest X-Ray: Cardiomegaly present.  Increased vascular markings consistent with CHF are present. There is an infiltrate in the RML and RLL.      EKG Readings: (Independently Interpreted)   Junctional rhythm   Rate 77   Left axis      No STEMI           ED Course         Critical Care    Date/Time: 11/16/2024 2:21 AM    Performed by: Aryan Bella MD  Authorized by: Aryan Bella MD  Direct patient critical care time: 14 minutes  Additional history critical care time: 7 minutes  Ordering / reviewing critical care time: 7 minutes  Documentation critical care time: 8 minutes  Consulting other physicians critical care time: 7 minutes  Total critical care time (exclusive of procedural time) : 43 minutes  Critical care time was exclusive of separately billable procedures and treating other patients and teaching time.  Critical care was necessary to treat or prevent imminent or life-threatening deterioration of the following conditions: cardiac failure and sepsis.  Critical care was time spent personally by me on the following activities: blood draw for specimens, discussions with consultants, interpretation of cardiac output measurements, evaluation of patient's response to treatment, examination of patient, obtaining history from patient or surrogate, ordering and review of laboratory studies, ordering and performing treatments and interventions, ordering and review of radiographic studies, pulse  oximetry, re-evaluation of patient's condition and review of old charts.         This patient does have evidence of infective focus  My overall impression is sepsis.  Source: Respiratory  Antibiotics given-   Antibiotics (72h ago, onward)      Start     Stop Route Frequency Ordered    11/17/24 0030  azithromycin (ZITHROMAX) 500 mg in D5W 250 mL  IVPB (admixture device)         -- IV Every 24 hours (non-standard times) 11/16/24 0257    11/17/24 0030  cefTRIAXone injection 2 g         -- IV Every 24 hours (non-standard times) 11/16/24 0257 11/16/24 2100  neomycin-polymyxin-dexamethasone ophthalmic ointment         -- Both Eyes Nightly 11/16/24 0257          Latest lactate reviewed-  Recent Labs   Lab 11/16/24  0019 11/16/24  0400   LACTATE  --  1.9   POCLAC 2.6*  --      Organ dysfunction indicated by Acute respiratory failure    Fluid challenge Contraindicated- Fluid bolus is contraindicated in this patient due to Congestive Heart Failure     Post- resuscitation assessment No - Post resuscitation assessment not needed       Will Not start Pressors- Levophed for MAP of 65  Source control achieved by:  Antibiotics        Orders Placed This Encounter    Blood culture x two cultures. Draw prior to antibiotics.    X-Ray Chest AP Portable    CTA Chest Non-Coronary (PE Studies)    CBC auto differential    Troponin I    Brain natriuretic peptide    Comprehensive metabolic panel    D dimer, quantitative    D-Dimer, Quantitative    Lactic acid, plasma #2    Confirm Patient is not Eligible for Congestive Heart Failure Pathway    Vital signs    Cardiac Monitoring - Adult    Strict intake and output Monitor and record urine output (in I's & O's section) every hour after initial furosemide injection given in ED    ED Preference List Used to Initiate Sepsis Orders    Pulse Oximetry Continuous    Bipap Continuous    POCT Venous Blood Gas - Lactate #1    EKG 12-lead    Insert peripheral IV    Possible Hospitalization    furosemide  injection 80 mg    iohexoL (OMNIPAQUE 350) injection 100 mL    cefTRIAXone injection 2 g    azithromycin (ZITHROMAX) 500 mg in D5W 250 mL  IVPB (admixture device)    Bed rest          ED Course as of 11/16/24 0519   Fri Nov 15, 2024   2352 Possible infiltrate on the right. Dimer elevated so will require CTA to r/o PE. Will obtain cultures and cover for CAP. [KB]   Sat Nov 16, 2024   0023 POC Lactate(!): 2.6 [KB]      ED Course User Index  [KB] Aryan Bella MD              Medical Decision Making       The patient's list of active medical problems, social history, medications, and allergies as documented per RN staff has been reviewed.           Medical Decision Making  74-year-old female presents via EMS for shortness of breath.  70% on room air and started on CPAP prior to arrival.  Transitioned to BiPAP which she tolerated well.  Diuresed with furosemide at double home dose with good output.  CTA obtained due to elevated dimer showing edema versus pneumonia with effusion.  Hospital medicine consulted to continue patient's evaluation and management.    Amount and/or Complexity of Data Reviewed  Independent Historian: EMS  External Data Reviewed: labs and notes.  Labs: ordered. Decision-making details documented in ED Course.  Radiology: ordered and independent interpretation performed.  ECG/medicine tests: ordered and independent interpretation performed.    Risk  Prescription drug management.  Decision regarding hospitalization.                        Clinical Impression         Disposition   ED Disposition Condition    Observation               Final diagnoses:  [R06.02] Shortness of breath  [I50.9] Acute on chronic congestive heart failure, unspecified heart failure type  [J18.9] Pneumonia of right lung due to infectious organism, unspecified part of lung (Primary)  [J90] Pleural effusion        Aryan Bella MD        11/15/2024          DISCLAIMER: This note was prepared with M*modal voice recognition  transcription software. Garbled syntax, mangled pronouns, and other bizarre constructions may be attributed to that software system.       Aryan Bella MD  11/16/24 0586

## 2024-11-16 NOTE — PLAN OF CARE
11/16/24 1534   Admission   Initial VN Admission Questions Complete   Communication Issues? None   Shift   Virtual Nurse - Patient Verbalized Approval Of Camera Use   Safety/Activity   Patient Rounds visualized patient;placement of personal items at bedside;ID band on;bed in low position;bed wheels locked;call light in patient/parent reach;clutter free environment maintained

## 2024-11-16 NOTE — ED NOTES
This patient had am in patient lab orders placed that were put in for a lab collect.  The labs have still not been drawn.  The lab was called and she said the phlebotomist was still on the floor sticking, but would be in the ED when completed on floor.

## 2024-11-16 NOTE — CARE UPDATE
Inpatient Upgrade Note    Katherine Berger has warranted treatment spanning two or more midnights of hospital level care for the management of pneumonia. She continues to require IV antibiotics. Her condition is also complicated by the following comorbidities: Hypertension, Diabetes, and Heart failure.    Lori Allen MD  [facility] IM PGY-1

## 2024-11-16 NOTE — ED NOTES
Pt arrived via EMS from home.  Pt has a history of CHF and stated she developed SOB around [id number].  When EMS arrived she was saturating at 70%.  Placed on O2 and it increased to 80%.  They placed patient on CPAP and her O2 sat continued to increase.  Upon arrival to ER Pt connected to Bipap per RT.  Placed on cardiac monitor and in a gown.  Pt alert and oriented.  Skin cool to touch.  Dr. Bella at bedside upon patient arrival.

## 2024-11-16 NOTE — PROGRESS NOTES
"[facility] Medicine Progress Note    Primary Team: [facility] Hospitalist Team A  Attending Physician: Nicola Perez MD  Resident: Tanna  Intern: Tiffany    Subjective:      PADDY. Patient reports feeling better this morning. Denies HA, no CP, no n/v/d, SOB improved. Eating breakfast and drinking coffee without issue.     Review of Systems:  Pertinent items are noted in HPI.      Objective:     Last 24 Hour Vital Signs:  BP  Min: 109/56  Max: 160/74  Temp  Av °F (36.7 °C)  Min: 97.8 °F (36.6 °C)  Max: 98.4 °F (36.9 °C)  Pulse  Av.3  Min: 77  Max: 105  Resp  Av.7  Min: 6  Max: 62  SpO2  Av.9 %  Min: 84 %  Max: 99 %  Height  Av' 7" (170.2 cm)  Min: 5' 7" (170.2 cm)  Max: 5' 7" (170.2 cm)  Weight  Av.2 kg (190 lb)  Min: 86.2 kg (190 lb)  Max: 86.2 kg (190 lb)  I/O last 3 completed shifts:  In: 262.5 [IV Piggyback:262.5]  Out: 2000 [Urine:2000]    Physical Examination:  Physical Exam  Constitutional:       General: She is not in acute distress.     Appearance: Normal appearance. She is obese. She is not ill-appearing, toxic-appearing or diaphoretic.   HENT:      Head: Normocephalic and atraumatic.      Right Ear: External ear normal.      Left Ear: External ear normal.      Nose: Nose normal.      Mouth/Throat:      Mouth: Mucous membranes are moist.      Pharynx: Oropharynx is clear.   Eyes:      Extraocular Movements: Extraocular movements intact.      Conjunctiva/sclera: Conjunctivae normal.      Pupils: Pupils are equal, round, and reactive to light.   Cardiovascular:      Rate and Rhythm: Normal rate and regular rhythm.      Pulses: Normal pulses.      Heart sounds: Normal heart sounds.   Pulmonary:      Effort: Pulmonary effort is normal.      Breath sounds: Wheezing present.      Comments: B/L crackles  Abdominal:      General: Abdomen is flat. Bowel sounds are normal.      Palpations: Abdomen is soft.   Musculoskeletal:      Cervical back: Normal range of motion.      Right lower leg: No " edema.      Left lower leg: No edema.   Skin:     General: Skin is warm and dry.   Neurological:      General: No focal deficit present.      Mental Status: She is alert and oriented to person, place, and time. Mental status is at baseline.   Psychiatric:         Mood and Affect: Mood normal.         Behavior: Behavior normal.         Thought Content: Thought content normal.       Laboratory:  Recent Labs   Lab 11/15/24  2130 11/15/24  2212 11/16/24  0400 11/16/24  0710   WBC 16.25*  --   --  19.48*   HGB 8.2*  --   --  12.3   HCT 26.4*  --   --  39.1   *  --   --  199   MCV 76*  --   --  74*   RDW 15.4*  --   --  15.0*   NA  --  141 140  --    K  --  4.9 4.6  --    CL  --  106 102  --    CO2  --  24 21*  --    BUN  --  20 22  --    CREATININE  --  1.2 1.1  --    GLU  --  310* 271*  --    PROT  --  7.4 7.3  --    ALBUMIN  --  3.3* 3.1*  --    BILITOT  --  0.7 0.7  --    AST  --  42* 32  --    ALKPHOS  --  65 62  --    ALT  --  33 31  --        Microbiology Data Reviewed:  Pertinent Findings:  Blood Cx pending    Other Results:  EKG (my interpretation): no new EKG    Radiology Data Reviewed:   Pertinent Findings:  CTA Chest Non-Coronary (PE Studies)   Final Result      No evidence of pulmonary thromboembolism.      Diffuse bilateral patchy ground-glass and confluent airspace opacities throughout the lungs, right greater than left.  Findings are favored to reflect edema, although additional considerations would include multifocal infection, hemorrhage, aspiration, or noninfectious inflammatory process.      Layering bilateral pleural effusions, right greater than left.      Cardiomegaly.      Additional incidental findings as above.         Electronically signed by: Orville Noguera MD   Date:    11/16/2024   Time:    01:45      X-Ray Chest AP Portable   Final Result      Prominent opacity involving the right hemithorax consistent with confluent infiltrates/consolidation.      Bilateral pleural effusion the  possibly of mild infiltrate at the left base is also considered.         Electronically signed by: Ottoniel Escobar   Date:    11/15/2024   Time:    23:49            Current Medications:     Infusions:       Scheduled:   ascorbic acid (vitamin C)  500 mg Oral Daily    atorvastatin  40 mg Oral Daily    [START ON 11/17/2024] azithromycin  500 mg Intravenous Q24H    busPIRone  5 mg Oral BID    [START ON 11/17/2024] cefTRIAXone (Rocephin) IV (PEDS and ADULTS)  2 g Intravenous Q24H    enoxparin  40 mg Subcutaneous Daily    EScitalopram oxalate  10 mg Oral Daily    ferrous sulfate  1 tablet Oral Daily    gabapentin  100 mg Oral BID    metoprolol succinate  50 mg Oral Daily    neomycin-polymyxin-dexamethasone   Both Eyes QHS    sacubitriL-valsartan  1 tablet Oral BID    tamsulosin  1 capsule Oral Daily    vitamin D  1,000 Units Oral Daily        PRN:    Current Facility-Administered Medications:     albuterol, 2 puff, Inhalation, Q4H PRN    amitriptyline, 10 mg, Oral, Nightly PRN    dextrose 10%, 12.5 g, Intravenous, PRN    dextrose 10%, 25 g, Intravenous, PRN    glucagon (human recombinant), 1 mg, Intramuscular, PRN    glucose, 16 g, Oral, PRN    glucose, 24 g, Oral, PRN    insulin aspart U-100, 0-10 Units, Subcutaneous, QID (AC + HS) PRN    oxyCODONE-acetaminophen, 1 tablet, Oral, Q6H PRN    sodium chloride 0.9%, 10 mL, Intravenous, Q12H PRN    Antibiotics and Day Number of Therapy:  CTX/Azithro    Lines and Day Number of Therapy:  PIVx2    Assessment:     Katherine Berger is a 74 y.o. female who has a PMHX HFrEF (EF 30% 9/22), T2DM (A1c 7.6 10/24), HTN, JOGRE, anxiety who presented on 11/15/2024 with a primary complaint of SOB x3-4 days in the setting of multifocal pneumonia w/ concurrent concern for AoCHFrEF. [facility]mitted to [facility] medicine for further management.     Plan:     Acute Hypoxic Respiratory Failure  Multifocal pneumonia   - Initially on CPAP then BiPAP in ED, weaned to NC prior to admission, now satting 98% on  2L NC  - CBC w/ granulocytic leukocytosis, BNP elevated, trop negative   - S/p rocephin and azithro in ED, continued daily dosing   - D dimer ordered and increased, CTPE w/o findings c/w PE but c/w pneumonia on CXR  - Diuresis as below   - Oxygen sats stable on NC, downtitrate as able   - Continue home albuterol q4hrs prn for wheezing   - Trend electrolytes and replete prn   - Continue Azithro/CTX  - Follow blood cx     AoCHFrEF (EF 30% 9/22)   HTN  - Initial BNP elevated 1450 (baseline 600), troponin 0.09, will trend   - TTE ordered  - Given 80 mg IV lasix, redose as appropriate, 1.7L UOP  - Strict I/Os   - Continue home atorvastatin 40 mg daily   - Continue home toprolol XL 50 mg daily   - Continue home entresto 24-26 bid   - Continue home tamsulosin 0.4 mg daily      MNAI vs CKD   - Cr 1.2, increased from 0.72  - Likely fluid overloaded, trend renal function while diuresis occurs  - FeUrea 48%, intrinsic   - Continue diuresis  - CTM Cr.       T2DM (A1c 7.6 10/24)  - Repeat A1c pending   - Holding home glimepizide, jardiance, metformin, mounjaro while inpatient, ok to continue on discharge  - MDSSI while inpatient      JORGE  Thrombocytopenia   - Hemoglobin 8.2, decreased from basleine 11   - Continue home iron supplementation   - Repeat iron, ferritin, B12, folate   - Platelets 135, decreased from baseline 220s   - Check HIV, hep panel, PT/INR, aPTT, peripheral smear   - Trend CBCs      Chronic pain  - Continue home gabapentin 100 mg bid  - Continue home Percocet 10 mg q6hrs prn      Anxiety   - Continue home amitriptyline 10 mg qhs prn   - Continue home buspirone 5 mg bid   - Continue home lexapro 10 gm daily      Vitamin deficiencies     - Continue home vitamin D, vitamin C     Diet: Renal  DVT Prophylaxis: lovenox  IVF: none  Code: Full    Dispo: pending symptom improvement    Zita Cisse MD  [facility] HO-II    [facility] Hospitalist Pager numbers:   [facility] Hospitalist Medicine Team A  (Chris/Roel): [phone]  [facility] Hospitalist Medicine Team B (Laurent/Pinky):  [phone]

## 2024-11-16 NOTE — CARE UPDATE
"Virtual Sepsis Screening Note        Chart Reviewed: 2024, 8:52 AM    MRN: [id number]  : 1950    Bed: ED 26/EXAM 26      The sepsis screening tool has been completed on this patient by this virtual RN.       Was the Sepsis Protocol initiated for this patient?: Yes; prior to this screening per other provider.  If "Yes", was an initial Lactate ordered?: Yes; prior to this screening per other provider.    Was a secure chat sent to the patient's current provider?: No  If "Yes", to whom was the secure chat sent?:               Please, contact Saeed Rodríguez RN via secure chat with any questions or concerns.        "

## 2024-11-17 LAB
ALBUMIN SERPL BCP-MCNC: 2.7 G/DL (ref 3.5–5.2)
ALP SERPL-CCNC: 48 U/L (ref 40–150)
ALT SERPL W/O P-5'-P-CCNC: 20 U/L (ref 10–44)
ANION GAP SERPL CALC-SCNC: 10 MMOL/L (ref 8–16)
AST SERPL-CCNC: 21 U/L (ref 10–40)
BASOPHILS # BLD AUTO: 0.02 K/UL (ref 0–0.2)
BASOPHILS NFR BLD: 0.2 % (ref 0–1.9)
BILIRUB SERPL-MCNC: 0.4 MG/DL (ref 0.1–1)
BUN SERPL-MCNC: 22 MG/DL (ref 8–23)
CALCIUM SERPL-MCNC: 9.1 MG/DL (ref 8.7–10.5)
CHLORIDE SERPL-SCNC: 103 MMOL/L (ref 95–110)
CO2 SERPL-SCNC: 28 MMOL/L (ref 23–29)
CREAT SERPL-MCNC: 1 MG/DL (ref 0.5–1.4)
DIFFERENTIAL METHOD BLD: ABNORMAL
EOSINOPHIL # BLD AUTO: 0.1 K/UL (ref 0–0.5)
EOSINOPHIL NFR BLD: 1.1 % (ref 0–8)
ERYTHROCYTE [DISTWIDTH] IN BLOOD BY AUTOMATED COUNT: 15.1 % (ref 11.5–14.5)
EST. GFR  (NO RACE VARIABLE): 60 ML/MIN/1.73 M^2
GLUCOSE SERPL-MCNC: 153 MG/DL (ref 70–110)
HCT VFR BLD AUTO: 34.5 % (ref 37–48.5)
HGB BLD-MCNC: 10.8 G/DL (ref 12–16)
IMM GRANULOCYTES # BLD AUTO: 0.03 K/UL (ref 0–0.04)
IMM GRANULOCYTES NFR BLD AUTO: 0.3 % (ref 0–0.5)
LYMPHOCYTES # BLD AUTO: 1.8 K/UL (ref 1–4.8)
LYMPHOCYTES NFR BLD: 17.6 % (ref 18–48)
MAGNESIUM SERPL-MCNC: 2.1 MG/DL (ref 1.6–2.6)
MCH RBC QN AUTO: 22.8 PG (ref 27–31)
MCHC RBC AUTO-ENTMCNC: 31.3 G/DL (ref 32–36)
MCV RBC AUTO: 73 FL (ref 82–98)
MONOCYTES # BLD AUTO: 0.9 K/UL (ref 0.3–1)
MONOCYTES NFR BLD: 8.4 % (ref 4–15)
NEUTROPHILS # BLD AUTO: 7.6 K/UL (ref 1.8–7.7)
NEUTROPHILS NFR BLD: 72.4 % (ref 38–73)
NRBC BLD-RTO: 0 /100 WBC
PHOSPHATE SERPL-MCNC: 2.7 MG/DL (ref 2.7–4.5)
PLATELET # BLD AUTO: 232 K/UL (ref 150–450)
PMV BLD AUTO: 11.9 FL (ref 9.2–12.9)
POCT GLUCOSE: 188 MG/DL (ref 70–110)
POCT GLUCOSE: 194 MG/DL (ref 70–110)
POCT GLUCOSE: 204 MG/DL (ref 70–110)
POCT GLUCOSE: 225 MG/DL (ref 70–110)
POTASSIUM SERPL-SCNC: 3.3 MMOL/L (ref 3.5–5.1)
PROT SERPL-MCNC: 6.1 G/DL (ref 6–8.4)
RBC # BLD AUTO: 4.74 M/UL (ref 4–5.4)
SODIUM SERPL-SCNC: 141 MMOL/L (ref 136–145)
TROPONIN I SERPL DL<=0.01 NG/ML-MCNC: 0.1 NG/ML (ref 0–0.03)
TROPONIN I SERPL DL<=0.01 NG/ML-MCNC: 0.13 NG/ML (ref 0–0.03)
WBC # BLD AUTO: 10.44 K/UL (ref 3.9–12.7)

## 2024-11-17 PROCEDURE — 85025 COMPLETE CBC W/AUTO DIFF WBC: CPT

## 2024-11-17 PROCEDURE — 99900035 HC TECH TIME PER 15 MIN (STAT)

## 2024-11-17 PROCEDURE — 25000003 PHARM REV CODE 250

## 2024-11-17 PROCEDURE — 36415 COLL VENOUS BLD VENIPUNCTURE: CPT | Mod: XB

## 2024-11-17 PROCEDURE — 27000646 HC AEROBIKA DEVICE

## 2024-11-17 PROCEDURE — 27100098 HC SPACER

## 2024-11-17 PROCEDURE — 25000242 PHARM REV CODE 250 ALT 637 W/ HCPCS

## 2024-11-17 PROCEDURE — 84484 ASSAY OF TROPONIN QUANT: CPT

## 2024-11-17 PROCEDURE — 84484 ASSAY OF TROPONIN QUANT: CPT | Mod: 91

## 2024-11-17 PROCEDURE — 11000001 HC ACUTE MED/SURG PRIVATE ROOM

## 2024-11-17 PROCEDURE — 63600175 PHARM REV CODE 636 W HCPCS

## 2024-11-17 PROCEDURE — 94664 DEMO&/EVAL PT USE INHALER: CPT

## 2024-11-17 PROCEDURE — 83735 ASSAY OF MAGNESIUM: CPT

## 2024-11-17 PROCEDURE — 36415 COLL VENOUS BLD VENIPUNCTURE: CPT

## 2024-11-17 PROCEDURE — 84100 ASSAY OF PHOSPHORUS: CPT

## 2024-11-17 PROCEDURE — 27000221 HC OXYGEN, UP TO 24 HOURS

## 2024-11-17 PROCEDURE — 94761 N-INVAS EAR/PLS OXIMETRY MLT: CPT

## 2024-11-17 PROCEDURE — 94640 AIRWAY INHALATION TREATMENT: CPT

## 2024-11-17 PROCEDURE — 80053 COMPREHEN METABOLIC PANEL: CPT

## 2024-11-17 RX ORDER — FUROSEMIDE 10 MG/ML
80 INJECTION INTRAMUSCULAR; INTRAVENOUS ONCE
Status: COMPLETED | OUTPATIENT
Start: 2024-11-17 | End: 2024-11-17

## 2024-11-17 RX ADMIN — ENOXAPARIN SODIUM 40 MG: 40 INJECTION SUBCUTANEOUS at 04:11

## 2024-11-17 RX ADMIN — Medication 1000 UNITS: at 10:11

## 2024-11-17 RX ADMIN — ACETAMINOPHEN 650 MG: 325 TABLET ORAL at 06:11

## 2024-11-17 RX ADMIN — AZITHROMYCIN MONOHYDRATE 500 MG: 500 INJECTION, POWDER, LYOPHILIZED, FOR SOLUTION INTRAVENOUS at 01:11

## 2024-11-17 RX ADMIN — INSULIN ASPART 2 UNITS: 100 INJECTION, SOLUTION INTRAVENOUS; SUBCUTANEOUS at 09:11

## 2024-11-17 RX ADMIN — INSULIN ASPART 2 UNITS: 100 INJECTION, SOLUTION INTRAVENOUS; SUBCUTANEOUS at 06:11

## 2024-11-17 RX ADMIN — NEOMYCIN AND POLYMYXIN B SULFATES AND DEXAMETHASONE: 3.5; 10000; 1 OINTMENT OPHTHALMIC at 08:11

## 2024-11-17 RX ADMIN — ESCITALOPRAM OXALATE 10 MG: 10 TABLET ORAL at 09:11

## 2024-11-17 RX ADMIN — GABAPENTIN 100 MG: 100 CAPSULE ORAL at 10:11

## 2024-11-17 RX ADMIN — CEFTRIAXONE SODIUM 2 G: 2 INJECTION, POWDER, FOR SOLUTION INTRAMUSCULAR; INTRAVENOUS at 12:11

## 2024-11-17 RX ADMIN — ATORVASTATIN CALCIUM 40 MG: 40 TABLET, FILM COATED ORAL at 09:11

## 2024-11-17 RX ADMIN — SACUBITRIL AND VALSARTAN 1 TABLET: 24; 26 TABLET, FILM COATED ORAL at 08:11

## 2024-11-17 RX ADMIN — OXYCODONE HYDROCHLORIDE AND ACETAMINOPHEN 500 MG: 500 TABLET ORAL at 09:11

## 2024-11-17 RX ADMIN — GABAPENTIN 100 MG: 100 CAPSULE ORAL at 08:11

## 2024-11-17 RX ADMIN — INSULIN ASPART 4 UNITS: 100 INJECTION, SOLUTION INTRAVENOUS; SUBCUTANEOUS at 12:11

## 2024-11-17 RX ADMIN — INSULIN ASPART 2 UNITS: 100 INJECTION, SOLUTION INTRAVENOUS; SUBCUTANEOUS at 04:11

## 2024-11-17 RX ADMIN — NEOMYCIN AND POLYMYXIN B SULFATES AND DEXAMETHASONE: 3.5; 10000; 1 OINTMENT OPHTHALMIC at 12:11

## 2024-11-17 RX ADMIN — POTASSIUM BICARBONATE 25 MEQ: 978 TABLET, EFFERVESCENT ORAL at 09:11

## 2024-11-17 RX ADMIN — FUROSEMIDE 80 MG: 10 INJECTION, SOLUTION INTRAMUSCULAR; INTRAVENOUS at 11:11

## 2024-11-17 RX ADMIN — ALBUTEROL SULFATE 2 PUFF: 90 AEROSOL, METERED RESPIRATORY (INHALATION) at 08:11

## 2024-11-17 RX ADMIN — FERROUS SULFATE TAB 325 MG (65 MG ELEMENTAL FE) 1 EACH: 325 (65 FE) TAB at 09:11

## 2024-11-17 RX ADMIN — TAMSULOSIN HYDROCHLORIDE 0.4 MG: 0.4 CAPSULE ORAL at 09:11

## 2024-11-17 RX ADMIN — BUSPIRONE HYDROCHLORIDE 5 MG: 5 TABLET ORAL at 10:11

## 2024-11-17 RX ADMIN — SACUBITRIL AND VALSARTAN 1 TABLET: 24; 26 TABLET, FILM COATED ORAL at 09:11

## 2024-11-17 RX ADMIN — BUSPIRONE HYDROCHLORIDE 5 MG: 5 TABLET ORAL at 08:11

## 2024-11-17 RX ADMIN — METOPROLOL SUCCINATE 50 MG: 50 TABLET, EXTENDED RELEASE ORAL at 09:11

## 2024-11-17 NOTE — PLAN OF CARE
Pt on documented O2, no respiratory distress noted. Will continue to monitor. MDI, aerobika tx. given

## 2024-11-17 NOTE — PROGRESS NOTES
"[facility] Medicine Progress Note    Primary Team: [facility] [place] Hospitalist Team A  Attending Physician: Nicola Perez MD  Resident: Tanna  Intern: Tiffany    Subjective:      PADDY. Saturating at 91% on 2L. Desaturated to mid 80s when on RA. Continues to have mild SOB at baseline and a productive cough. No other complaints at this time.     Review of Systems:  Pertinent items are noted in HPI.      Objective:     Last 24 Hour Vital Signs:  BP  Min: 80/50  Max: 123/69  Temp  Av.8 °F (36.6 °C)  Min: 97.4 °F (36.3 °C)  Max: 98.3 °F (36.8 °C)  Pulse  Av.2  Min: 65  Max: 86  Resp  Av.2  Min: 16  Max: 20  SpO2  Av %  Min: 91 %  Max: 97 %  Height  Av' 7" (170.2 cm)  Min: 5' 7" (170.2 cm)  Max: 5' 7" (170.2 cm)  Weight  Av.3 kg (196 lb 12.6 oz)  Min: 86.2 kg (190 lb)  Max: 93 kg (205 lb 0.4 oz)  I/O last 3 completed shifts:  In: 262.5 [IV Piggyback:262.5]  Out: 2000 [Urine:2000]    Physical Examination:  Physical Exam  Constitutional:       General: She is not in acute distress.     Appearance: Normal appearance. She is obese. She is not ill-appearing, toxic-appearing or diaphoretic.   HENT:      Head: Normocephalic and atraumatic.      Right Ear: External ear normal.      Left Ear: External ear normal.      Nose: Nose normal.      Mouth/Throat:      Mouth: Mucous membranes are moist.      Pharynx: Oropharynx is clear.   Eyes:      Extraocular Movements: Extraocular movements intact.      Conjunctiva/sclera: Conjunctivae normal.      Pupils: Pupils are equal, round, and reactive to light.   Cardiovascular:      Rate and Rhythm: Normal rate and regular rhythm.      Pulses: Normal pulses.      Heart sounds: Normal heart sounds.   Pulmonary:      Effort: Pulmonary effort is normal.      Breath sounds: Wheezing present.      Comments: B/L crackles  Abdominal:      General: Abdomen is flat. Bowel sounds are normal.      Palpations: Abdomen is soft.   Musculoskeletal:      Cervical back: Normal range of " motion.      Right lower leg: No edema.      Left lower leg: No edema.   Skin:     General: Skin is warm and dry.   Neurological:      General: No focal deficit present.      Mental Status: She is alert and oriented to person, place, and time. Mental status is at baseline.   Psychiatric:         Mood and Affect: Mood normal.         Behavior: Behavior normal.         Thought Content: Thought content normal.       Laboratory:  Recent Labs   Lab 11/15/24  2130 11/15/24  2212 11/16/24  0400 11/16/24  0710 11/17/24  0342 11/17/24  0720   WBC 16.25*  --   --  19.48* 10.44  --    HGB 8.2*  --   --  12.3 10.8*  --    HCT 26.4*  --   --  39.1 34.5*  --    *  --   --  199 232  --    MCV 76*  --   --  74* 73*  --    RDW 15.4*  --   --  15.0* 15.1*  --    NA  --  141 140  --   --  141   K  --  4.9 4.6  --   --  3.3*   CL  --  106 102  --   --  103   CO2  --  24 21*  --   --  28   BUN  --  20 22  --   --  22   CREATININE  --  1.2 1.1  --   --  1.0   GLU  --  310* 271*  --   --  153*   PROT  --  7.4 7.3  --   --  6.1   ALBUMIN  --  3.3* 3.1*  --   --  2.7*   BILITOT  --  0.7 0.7  --   --  0.4   AST  --  42* 32  --   --  21   ALKPHOS  --  65 62  --   --  48   ALT  --  33 31  --   --  20       Microbiology Data Reviewed:  Pertinent Findings:  Blood Cx NGTD    Other Results:  EKG (my interpretation): no new EKG    Radiology Data Reviewed:   Pertinent Findings:  CTA Chest Non-Coronary (PE Studies)   Final Result      No evidence of pulmonary thromboembolism.      Diffuse bilateral patchy ground-glass and confluent airspace opacities throughout the lungs, right greater than left.  Findings are favored to reflect edema, although additional considerations would include multifocal infection, hemorrhage, aspiration, or noninfectious inflammatory process.      Layering bilateral pleural effusions, right greater than left.      Cardiomegaly.      Additional incidental findings as above.         Electronically signed by: Orville  MD Chuckie   Date:    11/16/2024   Time:    01:45      X-Ray Chest AP Portable   Final Result      Prominent opacity involving the right hemithorax consistent with confluent infiltrates/consolidation.      Bilateral pleural effusion the possibly of mild infiltrate at the left base is also considered.         Electronically signed by: Ottoniel Escobar   Date:    11/15/2024   Time:    23:49            Current Medications:     Infusions:       Scheduled:   ascorbic acid (vitamin C)  500 mg Oral Daily    atorvastatin  40 mg Oral Daily    azithromycin  500 mg Intravenous Q24H    busPIRone  5 mg Oral BID    cefTRIAXone (Rocephin) IV (PEDS and ADULTS)  2 g Intravenous Q24H    enoxparin  40 mg Subcutaneous Daily    EScitalopram oxalate  10 mg Oral Daily    ferrous sulfate  1 tablet Oral Daily    gabapentin  100 mg Oral BID    metoprolol succinate  50 mg Oral Daily    neomycin-polymyxin-dexamethasone   Both Eyes QHS    sacubitriL-valsartan  1 tablet Oral BID    tamsulosin  1 capsule Oral Daily    vitamin D  1,000 Units Oral Daily        PRN:    Current Facility-Administered Medications:     acetaminophen, 650 mg, Oral, Q6H PRN    albuterol, 2 puff, Inhalation, Q4H PRN    amitriptyline, 10 mg, Oral, Nightly PRN    dextrose 10%, 12.5 g, Intravenous, PRN    dextrose 10%, 25 g, Intravenous, PRN    glucagon (human recombinant), 1 mg, Intramuscular, PRN    glucose, 16 g, Oral, PRN    glucose, 24 g, Oral, PRN    insulin aspart U-100, 0-10 Units, Subcutaneous, QID (AC + HS) PRN    oxyCODONE-acetaminophen, 1 tablet, Oral, Q6H PRN    sodium chloride 0.9%, 10 mL, Intravenous, Q12H PRN    Antibiotics and Day Number of Therapy:  CTX/Azithro    Lines and Day Number of Therapy:  PIVx2    Assessment:     Katherine Berger is a 74 y.o. female who has a PMHX HFrEF (EF 30% 9/22), T2DM (A1c 7.6 10/24), HTN, JORGE, anxiety who presented on 11/15/2024 with a primary complaint of SOB x3-4 days in the setting of multifocal pneumonia w/ concurrent concern  for AoCHFrEF. Admitted to [facility] medicine for further management.     Plan:     Acute Hypoxic Respiratory Failure  Multifocal pneumonia   - Initially on CPAP then BiPAP in ED, weaned to NC prior to admission, now satting 98% on 2L NC  -CURB 65 score of 2; inpatient admission  - CBC w/ granulocytic leukocytosis, BNP elevated, trop negative   - S/p rocephin and azithro in ED, continued daily dosing   - D dimer ordered and increased, CTPE w/o findings c/w PE but c/w pneumonia on CXR  - Diuresis as below   - Oxygen sats stable on NC, downtitrate as able   - Continue home albuterol q4hrs prn for wheezing   - Trend electrolytes and replete prn   - Continue Azithro/CTX  - Bcx NGTD     AoCHFrEF (EF 30% 9/22)   HTN  - Initial BNP elevated 1450 (baseline 600), troponin 0.09, will trend   - TTE reveals severe global hypokinesis with EF 20-25%  - Given 80 mg IV lasix, redose as appropriate, 1.7L UOP  - Strict I/Os   - Continue home atorvastatin 40 mg daily   - Continue home toprolol XL 50 mg daily   - Continue home entresto 24-26 bid   - Continue home tamsulosin 0.4 mg daily   - Will check troponin given EKG findings and rising tropinin 11/16     MANI vs CKD   - Cr 1.2, increased from 0.72  - Likely fluid overloaded, trend renal function while diuresis occurs  - FeUrea 48%, intrinsic   - Continue diuresis  - Cr 1.0, resolved       T2DM (A1c 7.6 10/24)  - Repeat A1c pending   - Holding home glimepizide, jardiance, metformin, mounjaro while inpatient, ok to continue on discharge  - MDSSI while inpatient      JORGE  Thrombocytopenia   - Hemoglobin 8.2, decreased from basleine 11   - Continue home iron supplementation   - Repeat iron, ferritin, B12, folate   - Platelets 135, decreased from baseline 220s   - Check HIV, hep panel, PT/INR, aPTT, peripheral smear   - Trend CBCs      Chronic pain  - Continue home gabapentin 100 mg bid  - Continue home Percocet 10 mg q6hrs prn      Anxiety   - Continue home amitriptyline 10 mg qhs prn    - Continue home buspirone 5 mg bid   - Continue home lexapro 10 gm daily      Vitamin deficiencies     - Continue home vitamin D, vitamin C     Diet: Renal  DVT Prophylaxis: lovenox  IVF: none  Code: Full    Dispo: pending symptom improvement    Lily Campos MD  [facility] HO-I    [facility] Medicine Hospitalist Pager numbers:   [facility] Hospitalist Medicine Team A (Chris/Roel): [phone]  [facility] Hospitalist Medicine Team B (Laurent/Pinky):  [phone]

## 2024-11-18 ENCOUNTER — TELEPHONE (OUTPATIENT)
Dept: FAMILY MEDICINE | Facility: CLINIC | Age: 74
End: 2024-11-18
Payer: MEDICARE

## 2024-11-18 VITALS
DIASTOLIC BLOOD PRESSURE: 56 MMHG | RESPIRATION RATE: 18 BRPM | TEMPERATURE: 98 F | HEIGHT: 67 IN | OXYGEN SATURATION: 91 % | WEIGHT: 205 LBS | BODY MASS INDEX: 32.18 KG/M2 | HEART RATE: 73 BPM | SYSTOLIC BLOOD PRESSURE: 116 MMHG

## 2024-11-18 PROBLEM — J96.01 ACUTE HYPOXIC RESPIRATORY FAILURE: Status: ACTIVE | Noted: 2024-11-18

## 2024-11-18 LAB
ALBUMIN SERPL BCP-MCNC: 2.7 G/DL (ref 3.5–5.2)
ALP SERPL-CCNC: 51 U/L (ref 40–150)
ALT SERPL W/O P-5'-P-CCNC: 18 U/L (ref 10–44)
ANION GAP SERPL CALC-SCNC: 11 MMOL/L (ref 8–16)
AST SERPL-CCNC: 21 U/L (ref 10–40)
BASOPHILS # BLD AUTO: 0.03 K/UL (ref 0–0.2)
BASOPHILS NFR BLD: 0.4 % (ref 0–1.9)
BILIRUB SERPL-MCNC: 0.4 MG/DL (ref 0.1–1)
BUN SERPL-MCNC: 18 MG/DL (ref 8–23)
CALCIUM SERPL-MCNC: 8.9 MG/DL (ref 8.7–10.5)
CHLORIDE SERPL-SCNC: 101 MMOL/L (ref 95–110)
CO2 SERPL-SCNC: 27 MMOL/L (ref 23–29)
CREAT SERPL-MCNC: 1 MG/DL (ref 0.5–1.4)
DIFFERENTIAL METHOD BLD: ABNORMAL
EOSINOPHIL # BLD AUTO: 0.2 K/UL (ref 0–0.5)
EOSINOPHIL NFR BLD: 2.5 % (ref 0–8)
ERYTHROCYTE [DISTWIDTH] IN BLOOD BY AUTOMATED COUNT: 14.7 % (ref 11.5–14.5)
EST. GFR  (NO RACE VARIABLE): 60 ML/MIN/1.73 M^2
GLUCOSE SERPL-MCNC: 196 MG/DL (ref 70–110)
HCT VFR BLD AUTO: 33.3 % (ref 37–48.5)
HGB BLD-MCNC: 10.6 G/DL (ref 12–16)
IMM GRANULOCYTES # BLD AUTO: 0.03 K/UL (ref 0–0.04)
IMM GRANULOCYTES NFR BLD AUTO: 0.4 % (ref 0–0.5)
LYMPHOCYTES # BLD AUTO: 2.1 K/UL (ref 1–4.8)
LYMPHOCYTES NFR BLD: 25 % (ref 18–48)
MAGNESIUM SERPL-MCNC: 1.9 MG/DL (ref 1.6–2.6)
MCH RBC QN AUTO: 23.1 PG (ref 27–31)
MCHC RBC AUTO-ENTMCNC: 31.8 G/DL (ref 32–36)
MCV RBC AUTO: 73 FL (ref 82–98)
MONOCYTES # BLD AUTO: 0.8 K/UL (ref 0.3–1)
MONOCYTES NFR BLD: 9.9 % (ref 4–15)
NEUTROPHILS # BLD AUTO: 5.2 K/UL (ref 1.8–7.7)
NEUTROPHILS NFR BLD: 61.8 % (ref 38–73)
NRBC BLD-RTO: 0 /100 WBC
OHS QRS DURATION: 108 MS
OHS QTC CALCULATION: 559 MS
PHOSPHATE SERPL-MCNC: 2.4 MG/DL (ref 2.7–4.5)
PLATELET # BLD AUTO: 232 K/UL (ref 150–450)
PMV BLD AUTO: 12 FL (ref 9.2–12.9)
POCT GLUCOSE: 149 MG/DL (ref 70–110)
POCT GLUCOSE: 239 MG/DL (ref 70–110)
POTASSIUM SERPL-SCNC: 3.6 MMOL/L (ref 3.5–5.1)
PROT SERPL-MCNC: 6.4 G/DL (ref 6–8.4)
RBC # BLD AUTO: 4.58 M/UL (ref 4–5.4)
SODIUM SERPL-SCNC: 139 MMOL/L (ref 136–145)
WBC # BLD AUTO: 8.35 K/UL (ref 3.9–12.7)

## 2024-11-18 PROCEDURE — 99900035 HC TECH TIME PER 15 MIN (STAT)

## 2024-11-18 PROCEDURE — 94761 N-INVAS EAR/PLS OXIMETRY MLT: CPT

## 2024-11-18 PROCEDURE — 27000646 HC AEROBIKA DEVICE

## 2024-11-18 PROCEDURE — 63600175 PHARM REV CODE 636 W HCPCS

## 2024-11-18 PROCEDURE — 83735 ASSAY OF MAGNESIUM: CPT

## 2024-11-18 PROCEDURE — 25000003 PHARM REV CODE 250

## 2024-11-18 PROCEDURE — 80053 COMPREHEN METABOLIC PANEL: CPT

## 2024-11-18 PROCEDURE — 94664 DEMO&/EVAL PT USE INHALER: CPT

## 2024-11-18 PROCEDURE — 25000242 PHARM REV CODE 250 ALT 637 W/ HCPCS

## 2024-11-18 PROCEDURE — 36415 COLL VENOUS BLD VENIPUNCTURE: CPT

## 2024-11-18 PROCEDURE — 84100 ASSAY OF PHOSPHORUS: CPT

## 2024-11-18 PROCEDURE — 85025 COMPLETE CBC W/AUTO DIFF WBC: CPT

## 2024-11-18 RX ORDER — AMOXICILLIN AND CLAVULANATE POTASSIUM 875; 125 MG/1; MG/1
1 TABLET, FILM COATED ORAL 2 TIMES DAILY
Qty: 4 TABLET | Refills: 0 | Status: SHIPPED | OUTPATIENT
Start: 2024-11-18 | End: 2024-11-20

## 2024-11-18 RX ORDER — SODIUM,POTASSIUM PHOSPHATES 280-250MG
1 POWDER IN PACKET (EA) ORAL
Status: DISCONTINUED | OUTPATIENT
Start: 2024-11-18 | End: 2024-11-18 | Stop reason: HOSPADM

## 2024-11-18 RX ORDER — GUAIFENESIN 600 MG/1
600 TABLET, EXTENDED RELEASE ORAL ONCE
Status: COMPLETED | OUTPATIENT
Start: 2024-11-18 | End: 2024-11-18

## 2024-11-18 RX ORDER — POTASSIUM CHLORIDE 20 MEQ/1
20 TABLET, EXTENDED RELEASE ORAL EVERY 4 HOURS
Status: DISCONTINUED | OUTPATIENT
Start: 2024-11-18 | End: 2024-11-18

## 2024-11-18 RX ORDER — FUROSEMIDE 40 MG/1
40 TABLET ORAL DAILY
Status: DISCONTINUED | OUTPATIENT
Start: 2024-11-18 | End: 2024-11-18 | Stop reason: HOSPADM

## 2024-11-18 RX ORDER — AMOXICILLIN 250 MG
1 CAPSULE ORAL ONCE
Status: DISCONTINUED | OUTPATIENT
Start: 2024-11-18 | End: 2024-11-18 | Stop reason: HOSPADM

## 2024-11-18 RX ORDER — FLUTICASONE PROPIONATE 50 MCG
2 SPRAY, SUSPENSION (ML) NASAL ONCE
Status: COMPLETED | OUTPATIENT
Start: 2024-11-18 | End: 2024-11-18

## 2024-11-18 RX ORDER — MAGNESIUM SULFATE HEPTAHYDRATE 40 MG/ML
2 INJECTION, SOLUTION INTRAVENOUS ONCE
Status: COMPLETED | OUTPATIENT
Start: 2024-11-18 | End: 2024-11-18

## 2024-11-18 RX ORDER — POLYETHYLENE GLYCOL 3350 17 G/17G
17 POWDER, FOR SOLUTION ORAL DAILY
Status: DISCONTINUED | OUTPATIENT
Start: 2024-11-18 | End: 2024-11-18 | Stop reason: HOSPADM

## 2024-11-18 RX ADMIN — AZITHROMYCIN MONOHYDRATE 500 MG: 500 INJECTION, POWDER, LYOPHILIZED, FOR SOLUTION INTRAVENOUS at 01:11

## 2024-11-18 RX ADMIN — POTASSIUM BICARBONATE 40 MEQ: 391 TABLET, EFFERVESCENT ORAL at 12:11

## 2024-11-18 RX ADMIN — TAMSULOSIN HYDROCHLORIDE 0.4 MG: 0.4 CAPSULE ORAL at 08:11

## 2024-11-18 RX ADMIN — INSULIN ASPART 4 UNITS: 100 INJECTION, SOLUTION INTRAVENOUS; SUBCUTANEOUS at 01:11

## 2024-11-18 RX ADMIN — METOPROLOL SUCCINATE 50 MG: 50 TABLET, EXTENDED RELEASE ORAL at 08:11

## 2024-11-18 RX ADMIN — ATORVASTATIN CALCIUM 40 MG: 40 TABLET, FILM COATED ORAL at 08:11

## 2024-11-18 RX ADMIN — BUSPIRONE HYDROCHLORIDE 5 MG: 5 TABLET ORAL at 08:11

## 2024-11-18 RX ADMIN — FUROSEMIDE 40 MG: 40 TABLET ORAL at 12:11

## 2024-11-18 RX ADMIN — MAGNESIUM SULFATE HEPTAHYDRATE 2 G: 40 INJECTION, SOLUTION INTRAVENOUS at 11:11

## 2024-11-18 RX ADMIN — GUAIFENESIN 600 MG: 600 TABLET, EXTENDED RELEASE ORAL at 08:11

## 2024-11-18 RX ADMIN — GABAPENTIN 100 MG: 100 CAPSULE ORAL at 08:11

## 2024-11-18 RX ADMIN — FERROUS SULFATE TAB 325 MG (65 MG ELEMENTAL FE) 1 EACH: 325 (65 FE) TAB at 08:11

## 2024-11-18 RX ADMIN — Medication 1000 UNITS: at 08:11

## 2024-11-18 RX ADMIN — FLUTICASONE PROPIONATE 100 MCG: 50 SPRAY, METERED NASAL at 02:11

## 2024-11-18 RX ADMIN — CEFTRIAXONE SODIUM 2 G: 2 INJECTION, POWDER, FOR SOLUTION INTRAMUSCULAR; INTRAVENOUS at 12:11

## 2024-11-18 RX ADMIN — OXYCODONE HYDROCHLORIDE AND ACETAMINOPHEN 500 MG: 500 TABLET ORAL at 08:11

## 2024-11-18 RX ADMIN — ESCITALOPRAM OXALATE 10 MG: 10 TABLET ORAL at 08:11

## 2024-11-18 RX ADMIN — POTASSIUM & SODIUM PHOSPHATES POWDER PACK 280-160-250 MG 1 PACKET: 280-160-250 PACK at 11:11

## 2024-11-18 RX ADMIN — SACUBITRIL AND VALSARTAN 1 TABLET: 24; 26 TABLET, FILM COATED ORAL at 08:11

## 2024-11-18 NOTE — TELEPHONE ENCOUNTER
----- Message from Charley sent at 11/18/2024 12:27 PM CST -----  Regarding: HFU  Patient is being discharged from [facility] and is requiring a hospital follow up appointment with their Primary Care Provider in 7 days. Patient is established. I am unable to schedule an appointment in that time frame. Please schedule patient a sooner appointment and message me back so Discharge Nurse can advise patient prior to discharge.    Expected discharge is 11/20    DX:Multifocal pneumonia      Thank you, Paloma  Physician Referral Specialist/Discharge

## 2024-11-18 NOTE — DISCHARGE SUMMARY
[facility] Hospital Medicine Discharge Summary    Primary Team: [facility] Hospitalist Team A  Attending Physician: Domi Sevilla MD  Resident: Tanna  Intern: Tiffany    Date of Admit: 11/15/2024  Date of Discharge: 11/18/2024    Discharge to: Home  Condition: Stable    Discharge Diagnoses     Patient Active Problem List   Diagnosis    Essential hypertension    Non morbid obesity    Edema    Uncontrolled type 2 diabetes mellitus    RBC microcytosis    Parotid swelling- bilateral parotid swellings    Primary osteoarthritis of left knee    Decreased range of motion of right knee    Weakness of both lower extremities    Hamstring tightness of both lower extremities    Decreased functional mobility    Bilateral carpal tunnel syndrome    Pain in both hands    Finger stiffness, unspecified laterality    Muscle weakness    Acute on chronic congestive heart failure    Elevated troponin    DM2 (diabetes mellitus, type 2)    Anxiety    Iron deficiency anemia    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  Bilateral pleural effusion the possibly of mild infiltrate at the left base is also considered.    Electronically signed by:Ottoniel Escobar  Date:                                            11/15/2024  Time:                                           23:49    CTA chest non-coronary (PE studies)   No evidence of pulmonary thromboembolism.  Diffuse  bilateral patchy ground-glass and confluent airspace opacities throughout the lungs, right greater than left.  Findings are favored to reflect edema, although additional considerations would include multifocal infection, hemorrhage, aspiration, or noninfectious inflammatory process.  Layering bilateral pleural effusions, right greater than left.  Cardiomegaly.  Additional incidental findings as above.    Electronically signed by:Orville Noguera MD  Date:                                            11/16/2024  Time:                                           01:45    Brief History of Present Illness      Katherine Berger is a 74 y.o. female with a PMHX of HFrEF (EF 20-25%, 11/24), T2DM (A1c 7.6, 10/24), HTN, JORGE, and anxiety presented to [facility] on 11/15/2024 with a primary complaint of SOB for 3-4 days. She noted that she had been out of her home Lasix for ~3 days prior to presentation. Labs were significant for granulocytic leukocytosis and elevated BNP. Imaging was consistent with multifocal pneumonia vs diffuse pulmonary edema. Given her clinical findings, she treated for multifocal pneumonia and concurrent acute-on-chronic systolic heart failure. Her symptoms greatly improved with antibiotics and diuresis. On day of discharge she denied SOB and was able to ambulate throughout the hospital unit with SpO2 remaining at goal while on room air.     For the full HPI please refer to the History & Physical from this admission.    Hospital Course By Problem with Pertinent Findings     Acute hypoxic respiratory failure  Multifocal pneumonia  - Initially on CPAP then BiPAP in ED, weaned to nasal cannula prior to admission  - CURB-65 score of 2; inpatient admission  - CBC w/ granulocytic leukocytosis, BNP elevated  - S/p ceftriaxone and azithro in ED, continued daily dosing   - D-dimer ordered and increased, CTPE w/o findings c/w PE but c/w pneumonia on CXR  - Continued home albuterol q4hrs prn for wheezing    - Trended electrolytes and repleted prn   - Bcx NGTD x 3d  - On room air and able to ambulate throughout hospital unit with SpO2 remaining at goal on day of discharge  - Azithromycin 500 mg daily 3-day course completed  - Received 3 days of CTX 2 g daily; discharged with 2 days of Augmentin 875-125 mg BID     Acute on chronic systolic heart failure (EF 20-25%, 11/24)   NSTEMI type II  HTN  - Initial BNP elevated to 1450 (baseline 600), troponin 0.09  - TTE revealed severe global hypokinesis with EF 20-25%  - Given 80 mg IV Lasix with 1.7 L UOP  - Strict I/Os   - Continued home atorvastatin 40 mg daily, Toprol XL 50 mg daily, Entresto 24-26 mg BID, and tamsulosin 0.4 mg daily   - Troponin down-trended 0.126 > 0.096   - Cardiology FU on discharge     MANI, improving   - Cr 1.2, increased from baseline of ~0.7  - Likely fluid overloaded  - FeUrea 48%, intrinsic   - Cr improved to 1.0 following diuresis   - Close FU with PCP outpatient      T2DM (A1c 7.6 10/24)  - Held home glimepizide, Jardiance, metformin, Mounjaro while inpatient  - MDSSI while inpatient   - Restart home meds on discharge     JORGE  Thrombocytopenia, resolved   - Hemoglobin 8.2, decreased from baseline of 11   - Continued home iron supplementation   - Ferritin 147, Fe 10, transferrin 174, TIBC 258, FeSat 4%  - B12 380, folate 11   - Platelets 135, decreased from baseline of 220s, now resolved  - Hep panel negative, HIV negative, PT/INR wnl  - aPTT mildly elevated to 36.2  - Peripheral smear pending   - Trended CBCs  - Can repeat ferritin outpatient, possibly falsely elevated in setting of PNA and HF exacerbation      Chronic pain  - Continued home gabapentin 100 mg BID  - Continued home Percocet 10 mg q6hrs PRN      Anxiety   - Continued home amitriptyline 10 mg qhs PRN, buspirone 5 mg BID, Lexapro 10 mg daily      Vitamin deficiencies     - Continued home vitamin D, vitamin C     Discharge Medications        Medication List        START taking these  medications      amoxicillin-clavulanate 875-125mg 875-125 mg per tablet  Commonly known as: AUGMENTIN  Take 1 tablet by mouth 2 (two) times daily. for 2 days            CONTINUE taking these medications      albuterol 90 mcg/actuation inhaler  Commonly known as: VENTOLIN HFA  Inhale 2 puffs into the lungs every 6 (six) hours as needed for Wheezing. Rescue     ALIVE WOMEN'S ENERGY ORAL     amitriptyline 10 MG tablet  Commonly known as: ELAVIL  TAKE ONE TO THREE TABLETS BY MOUTH AT BEDTIME FOR  HEADACHE     ascorbic acid (vitamin C) 500 MG tablet  Commonly known as: VITAMIN C     atorvastatin 40 MG tablet  Commonly known as: LIPITOR  Take 1 tablet (40 mg total) by mouth once daily.     blood sugar diagnostic Strp  Commonly known as: BLOOD GLUCOSE TEST  Strips for 3 times a day testing   dispense brand covered by insurance and to match meter brand     busPIRone 5 MG Tab  Commonly known as: BUSPAR  Take 1 tablet (5 mg total) by mouth 2 (two) times daily. For anxiety     diabetic supplies, miscellan. Misc  Lancets for 3 times a day testing    dispense brand covered by insurance t     diclofenac sodium 1 % Gel  Commonly known as: VOLTAREN  Apply 2 g topically 3 (three) times daily.     ENTRESTO 24-26 mg per tablet  Generic drug: sacubitriL-valsartan  Take 1 tablet by mouth twice daily     EScitalopram oxalate 10 MG tablet  Commonly known as: LEXAPRO  Take 1 tablet (10 mg total) by mouth once daily.     ferrous sulfate 324 mg (65 mg iron) Tbec  Take 1 tablet (324 mg total) by mouth daily as needed.     furosemide 40 MG tablet  Commonly known as: LASIX  Take 1 tablet by mouth once daily     gabapentin 100 MG capsule  Commonly known as: NEURONTIN  Takes on in am and two in pm for back pain radiating down leg.     glimepiride 2 MG tablet  Commonly known as: AMARYL  Take 1 tablet (2 mg total) by mouth 2 (two) times daily. Take 2 mg by mouth 2 (two) times daily.     hyoscyamine 0.125 mg Tab  Commonly known as:  ANASPAZ,LEVSIN  Take 1 tablet (125 mcg total) by mouth every 6 (six) hours as needed (P.r.n. bladder spasm).     JARDIANCE 25 mg tablet  Generic drug: empagliflozin     metFORMIN 500 MG ER 24hr tablet  Commonly known as: GLUCOPHAGE-XR  Take 2 tablets (1,000 mg total) by mouth daily with breakfast. prn     metoprolol succinate 50 MG 24 hr tablet  Commonly known as: TOPROL-XL  Take 1 tablet (50 mg total) by mouth once daily.     neomycin-polymyxin-dexamethasone 3.5 mg/g-10,000 unit/g-0.1 % Oint  Commonly known as: DEXACINE     oxyCODONE-acetaminophen  mg per tablet  Commonly known as: PERCOCET     tamsulosin 0.4 mg Cap  Commonly known as: FLOMAX  Take 1 capsule (0.4 mg total) by mouth once daily.     * tirzepatide 2.5 mg/0.5 mL Pnij  Inject 2.5 mg into the skin every 7 days.     * tirzepatide 7.5 mg/0.5 mL Pnij  Inject 7.5 mg into the skin every 7 days.     * MOUNJARO 10 mg/0.5 mL Pnij  Generic drug: tirzepatide     vitamin D 1000 units Tab  Commonly known as: VITAMIN D3           * This list has 3 medication(s) that are the same as other medications prescribed for you. Read the directions carefully, and ask your doctor or other care provider to review them with you.                   Where to Get Your Medications        These medications were sent to [facility]  [address], [place] [address]      Hours: Mon-Fri, 8a-5:30p Phone: [phone]   amoxicillin-clavulanate 875-125mg 875-125 mg per tablet         Discharge Information:     Diet:  Cardiac, Diabetic    Physical Activity:  As tolerated             Instructions:  1. Take all medications as prescribed  2. Keep all follow-up appointments  3. Return to the hospital or call your primary care physicians if any worsening symptoms such as fever, chest pain, shortness of breath, persistent nausea/vomiting, severe uncontrolled pain, lightheadedness/dizziness, loss of consciousness, or visual disturbances occur.    Follow-Up  Appointments:  PCP, Cardiology    Results Pending:  Peripheral smear, Blood culture final results       Lori Allen MD  [facility] HO-I  \[facility] [place] [facility]\"" Internal Medicine Team A

## 2024-11-18 NOTE — PLAN OF CARE
Problem: Adult Inpatient Plan of Care  Goal: Plan of Care Review  Outcome: Progressing     VIRTUAL NURSE:  Labs, notes, orders, and careplan reviewed.'

## 2024-11-18 NOTE — PLAN OF CARE
Problem: Adult Inpatient Plan of Care  Goal: Plan of Care Review  Outcome: Progressing  Goal: Patient-Specific Goal (Individualized)  Outcome: Progressing  Goal: Readiness for Transition of Care  Outcome: Progressing     Problem: Skin Injury Risk Increased  Goal: Skin Health and Integrity  Outcome: Progressing     Problem: Pneumonia  Goal: Fluid Balance  Outcome: Progressing  Goal: Effective Oxygenation and Ventilation  Outcome: Progressing     Problem: Heart Failure  Goal: Fluid and Electrolyte Balance  Outcome: Progressing  Goal: Improved Oral Intake  Outcome: Progressing  Goal: Effective Oxygenation and Ventilation  Outcome: Progressing  Goal: Effective Breathing Pattern During Sleep  Outcome: Progressing     Problem: Fall Injury Risk  Goal: Absence of Fall and Fall-Related Injury  Outcome: Progressing     Problem: Bleeding Risk or Actual (Thrombocytopenia)  Goal: Absence of Bleeding  Outcome: Progressing

## 2024-11-18 NOTE — NURSING
VN cued into room and permission is given to adjust the camera towards the patient who is resting in bed and in no apparent distress.  AVS reviewed with patient and patient verbalized complete understanding on the discharge instructions.  Awaiting for medications from Outpatient Pharmacy.  Patient is instructed to call the Nursing Station for wheelchair per Transport when they are ready to leave.  Voiced no other concerns.

## 2024-11-18 NOTE — PLAN OF CARE
Pt is set to d/c home later today. Pt will have her son Rito help transport her home later today. SW requested f/u appts for pt. Rounds completed on pt. All questions addressed. Bedside nurse to discuss d/c medications. Discussed importance to attend all f/u appts and take medications as prescribed. Verbalized understanding. Case Management to sign off.     Raúl Robert, MSBHARAT  [phone]    Future Appointments   Date Time Provider Department Center   12/4/2024  3:15 PM Jeffery Yadav MD [facility]  [facility]   4/8/2025  2:20 PM Itz Muse MD [facility]        11/18/24 1246   Final Note   Assessment Type Final Discharge Note   Anticipated Discharge Disposition Home   What phone number can be called within the next 1-3 days to see how you are doing after discharge? [phone]   Post-Acute Status   Coverage phn   Discharge Delays None known at this time

## 2024-11-18 NOTE — PLAN OF CARE
SW met with pt at bedside this AM to complete DCA. Per pt she is having 0/10 pain and was in a pleasant mood with positive affect throughout conversations. Pt lives at home alone and is fully independent in her ADL's. Pt does not receive any HD or HH services at this time. Pt did not have any additional questions or concerns for sw at this time. White board updated with CM name and contact information.  Discharge brochure provided.  Pt encouraged to call with any questions or concerns.  Cm will continue to follow pt through transitions of care and assist with any discharge needs.    WASHINGTON Swift  [phone]    Future Appointments   Date Time Provider Department Center   4/8/2025  2:20 PM Itz Muse MD [facility]        11/18/24 1101   Discharge Assessment   Assessment Type Discharge Planning Assessment   Confirmed/corrected address, phone number and insurance Yes   Confirmed Demographics Correct on Facesheet   Source of Information patient   Communicated CASPER with patient/caregiver Yes   Reason For Admission multifocal pneumonia   People in Home alone   Facility Arrived From: HOME   Do you expect to return to your current living situation? No   Do you have help at home or someone to help you manage your care at home? No   Prior to hospitilization cognitive status: Alert/Oriented   Current cognitive status: Alert/Oriented   Walking or Climbing Stairs Difficulty no   Dressing/Bathing Difficulty no   Home Accessibility wheelchair accessible   Home Layout Able to live on 1st floor   Equipment Currently Used at Home none   Readmission within 30 days? No   Patient currently being followed by outpatient case management? No   Do you currently have service(s) that help you manage your care at home? No   Do you take prescription medications? Yes   Do you have prescription coverage? Yes   Coverage PHN   Do you have any problems affording any of your prescribed medications? No   Is the patient taking  medications as prescribed? no   Who is going to help you get home at discharge? SON Rito   Are you on dialysis? No   Do you take coumadin? No   Discharge Plan A Home with family   DME Needed Upon Discharge  none   Discharge Plan discussed with: Patient   Transition of Care Barriers None   Health Literacy   How often do you need to have someone help you when you read instructions, pamphlets, or other written material from your doctor or pharmacy? Sometimes

## 2024-11-18 NOTE — PROGRESS NOTES
[facility] Medicine Progress Note    Primary Team: \[facility] [place] [facility]\"" Hospitalist Team A  Attending Physician: Domi Sevilla MD  Resident: Vijaya  Intern: Tiffany    Subjective:      On room air. Denies CP, SOB, and AP. Subjectively doing well. Feels like she can go home today.      Objective:     Last 24 Hour Vital Signs:  BP  Min: 118/60  Max: 145/76  Temp  Av.1 °F (36.7 °C)  Min: 98 °F (36.7 °C)  Max: 98.2 °F (36.8 °C)  Pulse  Av  Min: 58  Max: 83  Resp  Av.7  Min: 17  Max: 18  SpO2  Av.6 %  Min: 91 %  Max: 97 %  I/O last 3 completed shifts:  In: -   Out: 2900 [Urine:2900]    Physical Examination:  Physical Exam  Constitutional:       General: She is not in acute distress.     Appearance: She is obese.   Cardiovascular:      Rate and Rhythm: Normal rate and regular rhythm.   Pulmonary:      Effort: Pulmonary effort is normal.      Comments: Coarse breath sounds bilaterally  Abdominal:      General: Abdomen is flat.      Palpations: Abdomen is soft.      Tenderness: There is no abdominal tenderness.   Musculoskeletal:      Right lower leg: No edema.      Left lower leg: No edema.   Skin:     General: Skin is warm and dry.   Neurological:      Mental Status: She is alert.   Psychiatric:         Mood and Affect: Mood normal.         Thought Content: Thought content normal.        Laboratory:  Recent Labs   Lab 24  0400 24  0710 24  0342 24  0720 24  0307   WBC  --  19.48* 10.44  --  8.35   HGB  --  12.3 10.8*  --  10.6*   HCT  --  39.1 34.5*  --  33.3*   PLT  --  199 232  --  232   MCV  --  74* 73*  --  73*   RDW  --  15.0* 15.1*  --  14.7*     --   --  141 139   K 4.6  --   --  3.3* 3.6     --   --  103 101   CO2 21*  --   --  28 27   BUN 22  --   --  22 18   CREATININE 1.1  --   --  1.0 1.0   *  --   --  153* 196*   PROT 7.3  --   --  6.1 6.4   ALBUMIN 3.1*  --   --  2.7* 2.7*   BILITOT 0.7  --   --  0.4 0.4   AST 32  --   --  21 21   ALKPHOS 62  --   --   48 51   ALT 31  --   --  20 18       Microbiology Data Reviewed:  Pertinent Findings:  Blood Cx NGTD x 2d    Other Results:  EKG (my interpretation): no new EKG    Radiology Data Reviewed:   Pertinent Findings:  CTA Chest Non-Coronary (PE Studies)   Final Result      No evidence of pulmonary thromboembolism.      Diffuse bilateral patchy ground-glass and confluent airspace opacities throughout the lungs, right greater than left.  Findings are favored to reflect edema, although additional considerations would include multifocal infection, hemorrhage, aspiration, or noninfectious inflammatory process.      Layering bilateral pleural effusions, right greater than left.      Cardiomegaly.      Additional incidental findings as above.         Electronically signed by: Orville Noguera MD   Date:    11/16/2024   Time:    01:45      X-Ray Chest AP Portable   Final Result      Prominent opacity involving the right hemithorax consistent with confluent infiltrates/consolidation.      Bilateral pleural effusion the possibly of mild infiltrate at the left base is also considered.         Electronically signed by: Ottoniel Escobar   Date:    11/15/2024   Time:    23:49            Current Medications:     Infusions:       Scheduled:   ascorbic acid (vitamin C)  500 mg Oral Daily    atorvastatin  40 mg Oral Daily    azithromycin  500 mg Intravenous Q24H    busPIRone  5 mg Oral BID    cefTRIAXone (Rocephin) IV (PEDS and ADULTS)  2 g Intravenous Q24H    enoxparin  40 mg Subcutaneous Daily    EScitalopram oxalate  10 mg Oral Daily    ferrous sulfate  1 tablet Oral Daily    gabapentin  100 mg Oral BID    guaiFENesin  600 mg Oral Once    metoprolol succinate  50 mg Oral Daily    neomycin-polymyxin-dexamethasone   Both Eyes QHS    sacubitriL-valsartan  1 tablet Oral BID    tamsulosin  1 capsule Oral Daily    vitamin D  1,000 Units Oral Daily        PRN:    Current Facility-Administered Medications:     acetaminophen, 650 mg, Oral, Q6H  PRN    albuterol, 2 puff, Inhalation, Q4H PRN    amitriptyline, 10 mg, Oral, Nightly PRN    dextrose 10%, 12.5 g, Intravenous, PRN    dextrose 10%, 25 g, Intravenous, PRN    glucagon (human recombinant), 1 mg, Intramuscular, PRN    glucose, 16 g, Oral, PRN    glucose, 24 g, Oral, PRN    insulin aspart U-100, 0-10 Units, Subcutaneous, QID (AC + HS) PRN    oxyCODONE-acetaminophen, 1 tablet, Oral, Q6H PRN    sodium chloride 0.9%, 10 mL, Intravenous, Q12H PRN    Antibiotics and Day Number of Therapy:  CTX/Azithro    Lines and Day Number of Therapy:  PIV 11/18 - current    Assessment:     Katherine Berger is a 74 y.o. female who has a PMHX HFrEF (EF 30% 9/22), T2DM (A1c 7.6 10/24), HTN, JORGE, anxiety who presented on 11/15/2024 with a primary complaint of SOB x3-4 days in the setting of multifocal pneumonia w/ concurrent concern for AoCHFrEF. Admitted to [facility] hospital medicine for further management.     Plan:     Acute Hypoxic Respiratory Failure  Multifocal pneumonia   - Initially on CPAP then BiPAP in ED, weaned to NC prior to admission, now satting 98% on 2L NC  - CURB 65 score of 2; inpatient admission  - CBC w/ granulocytic leukocytosis, BNP elevated, trop negative   - S/p Rocephin and azithro in ED, continued daily dosing   - D dimer ordered and increased, CTPE w/o findings c/w PE but c/w pneumonia on CXR  - Diuresis as below   - Oxygen sats stable on NC, downtitrate as able   - Continue home albuterol q4hrs prn for wheezing   - Trend electrolytes and replete prn   - Continue Azithro/CTX  - Bcx NGTD x 2d  - Now on room air   - De-escalate abx on discharge      AoCHFrEF (EF 20-25%, 11/24)   NSTEMI type II   HTN  - Initial BNP elevated 1450 (baseline 600), troponin 0.09  - TTE revealed severe global hypokinesis with EF 20-25%  - Given 80 mg IV lasix, 1.7L UOP  - Strict I/Os   - Continue home atorvastatin 40 mg daily   - Continue home Toprol XL 50 mg daily   - Continue home Entresto 24-26 bid   - Continue home tamsulosin  0.4 mg daily   - Troponin down-trended 0.126 > 0.096      MANI, improving   - Cr 1.2, increased from 0.72  - Likely fluid overloaded, trend renal function while diuresis occurs  - FeUrea 48%, intrinsic   - Cr improved to 1.0 following diuresis      T2DM (A1c 7.6 10/24)  - Holding home glimepizide, jardiance, metformin, mounjaro while inpatient, ok to continue on discharge  - MDSSI while inpatient      JORGE  Thrombocytopenia, resolved   - Hemoglobin 8.2, decreased from baseline 11   - Continued home iron supplementation   - Ferritin 147, Fe 10, transferrin 174, TIBC 258, FeSat 4%  - B12 380, folate 11   - Platelets 135, decreased from baseline 220s, now resolved  - Hep panel negative, HIV negative, PT/INR wnl  - aPTT mildly elevated to 36.2  - Peripheral smear pending   - Trend CBCs   - Can repeat ferritin outpatient, possibly falsely elevated in setting of PNA and HF exacerbation      Chronic pain  - Continue home gabapentin 100 mg bid  - Continue home Percocet 10 mg q6hrs prn      Anxiety   - Continue home amitriptyline 10 mg qhs prn   - Continue home buspirone 5 mg bid   - Continue home lexapro 10 gm daily      Vitamin deficiencies     - Continue home vitamin D, vitamin C     Diet: Renal  DVT Prophylaxis: Lovenox  IVF: none  Code: Full    Dispo: pending walk test    Lori Allen MD  [facility] Internal Medicine HO-I    [facility] Medicine Hospitalist Pager numbers:   [facility] Hospitalist Medicine Team A (Chris/Roel): [phone]  [facility] Hospitalist Medicine Team B (Laurent/Pinky):  [phone]

## 2024-11-20 ENCOUNTER — PATIENT OUTREACH (OUTPATIENT)
Dept: ADMINISTRATIVE | Facility: CLINIC | Age: 74
End: 2024-11-20
Payer: MEDICARE

## 2024-11-21 LAB
BACTERIA BLD CULT: NORMAL
BACTERIA BLD CULT: NORMAL

## 2024-11-22 LAB — PATH REV BLD -IMP: NORMAL

## 2024-11-25 NOTE — PROGRESS NOTES
Patient ID: Katherine Berger is a 74 y.o. female.     Chief Complaint: Hospital Follow Up    Ms. Berger is a 74 year old female with history of  HFrEF (EF 20-25%, 11/24), T2DM (A1c 7.6, 10/24), HTN, JORGE, and anxiety who presents to clinic for hospital discharge follow up. She presented to [facility] ED on 11/15/24 with complaints of SOB for 3-4 days. She was diagnosed with multifocal pneumonia. SOB worsened by heart failure. Pt improved with IV lasix and antibiotics (azithromycin, rocephin, and augmentin). Pt discharged home with augmentin on 11/18/24. Patient reports improvement in SOB today. Reports that she has completed augmentin. She denies CP, fever, N/V/D. Taking chronic medication as prescribed.         Review of Systems  Review of Systems   Constitutional:  Negative for fever.   HENT:  Negative for ear pain and sinus pain.    Eyes:  Negative for discharge.   Respiratory:  Positive for shortness of breath. Negative for cough and wheezing.    Cardiovascular:  Negative for chest pain and leg swelling.   Gastrointestinal:  Negative for diarrhea, nausea and vomiting.   Genitourinary:  Negative for urgency.   Musculoskeletal:  Negative for myalgias.   Skin:  Negative for rash.   Neurological:  Negative for weakness and headaches.   Psychiatric/Behavioral:  Negative for depression.        Currently Medications  Current Outpatient Medications on File Prior to Visit   Medication Sig Dispense Refill    albuterol (VENTOLIN HFA) 90 mcg/actuation inhaler Inhale 2 puffs into the lungs every 6 (six) hours as needed for Wheezing. Rescue 18 g 1    amitriptyline (ELAVIL) 10 MG tablet TAKE ONE TO THREE TABLETS BY MOUTH AT BEDTIME FOR  HEADACHE 90 tablet 3    ascorbic acid, vitamin C, (VITAMIN C) 500 MG tablet Take 500 mg by mouth once daily.      atorvastatin (LIPITOR) 40 MG tablet Take 1 tablet (40 mg total) by mouth once daily. 90 tablet 3    blood sugar diagnostic (BLOOD GLUCOSE TEST) Strp Strips for 3 times a day testing    dispense brand covered by insurance and to match meter brand 300 each 3    busPIRone (BUSPAR) 5 MG Tab Take 1 tablet (5 mg total) by mouth 2 (two) times daily. For anxiety 180 tablet 3    celecoxib (CELEBREX) 200 MG capsule Take 200 mg by mouth once daily.      diabetic supplies, miscellan. Misc Lancets for 3 times a day testing    dispense brand covered by insurance t 300 each 3    diclofenac sodium (VOLTAREN) 1 % Gel Apply 2 g topically 3 (three) times daily. 1 Tube 3    ENTRESTO 24-26 mg per tablet Take 1 tablet by mouth twice daily 180 tablet 3    EScitalopram oxalate (LEXAPRO) 10 MG tablet Take 1 tablet (10 mg total) by mouth once daily. 90 tablet 3    ferrous sulfate 324 mg (65 mg iron) TbEC Take 1 tablet (324 mg total) by mouth daily as needed. 90 tablet 3    furosemide (LASIX) 40 MG tablet Take 1 tablet by mouth once daily 90 tablet 3    gabapentin (NEURONTIN) 300 MG capsule Take 300 mg by mouth 3 (three) times daily.      glimepiride (AMARYL) 2 MG tablet Take 1 tablet (2 mg total) by mouth 2 (two) times daily. Take 2 mg by mouth 2 (two) times daily. 180 tablet 3    hyoscyamine (ANASPAZ,LEVSIN) 0.125 mg Tab Take 1 tablet (125 mcg total) by mouth every 6 (six) hours as needed (P.r.n. bladder spasm). 25 tablet 0    JANUMET  mg per tablet Take 1 tablet by mouth 2 (two) times daily.      metoprolol succinate (TOPROL-XL) 50 MG 24 hr tablet Take 1 tablet (50 mg total) by mouth once daily. 90 tablet 3    MOUNJARO 10 mg/0.5 mL PnIj SMARTSIG:10 Milligram(s) SUB-Q Once a Week      MULTIVIT/IRON/FA/K/HERB NO.244 (ALIVE WOMEN'S ENERGY ORAL) Take 1 tablet by mouth daily as needed.       neomycin-polymyxin-dexamethasone (DEXACINE) 3.5 mg/g-10,000 unit/g-0.1 % Oint APPLY 1/4 INCH OF OINTMENT ON EYELID EVERY NIGHT AT BEDTIME      oxyCODONE-acetaminophen (PERCOCET)  mg per tablet       tamsulosin (FLOMAX) 0.4 mg Cap Take 1 capsule (0.4 mg total) by mouth once daily. 90 capsule 3    vitamin D (VITAMIN D3) 1000  "units Tab Take 1,000 Units by mouth once daily.      [DISCONTINUED] JARDIANCE 25 mg tablet       [DISCONTINUED] metFORMIN (GLUCOPHAGE-XR) 500 MG ER 24hr tablet Take 2 tablets (1,000 mg total) by mouth daily with breakfast. prn 180 tablet 3    [DISCONTINUED] gabapentin (NEURONTIN) 100 MG capsule Takes on in am and two in pm for back pain radiating down leg. 270 capsule 3    [DISCONTINUED] tirzepatide 2.5 mg/0.5 mL PnIj Inject 2.5 mg into the skin every 7 days. 4 Pen 11    [DISCONTINUED] tirzepatide 7.5 mg/0.5 mL PnIj Inject 7.5 mg into the skin every 7 days. 12 Pen 1     No current facility-administered medications on file prior to visit.       Physical  Exam  Vitals:    11/26/24 1027   BP: (!) 92/56   BP Location: Right arm   Patient Position: Sitting   Pulse: (!) 55   Temp: 97.9 °F (36.6 °C)   TempSrc: Oral   SpO2: 97%   Weight: 78.1 kg (172 lb 4.6 oz)   Height: 5' 7" (1.702 m)      Body mass index is 26.98 kg/m².  Wt Readings from Last 3 Encounters:   11/26/24 78.1 kg (172 lb 4.6 oz)   11/17/24 93 kg (205 lb 0.4 oz)   10/08/24 86.3 kg (190 lb 4.1 oz)       Physical Exam  Vitals and nursing note reviewed.   Constitutional:       General: She is not in acute distress.     Appearance: She is not ill-appearing.   HENT:      Head: Normocephalic and atraumatic.      Right Ear: External ear normal.      Left Ear: External ear normal.      Nose: Nose normal.      Mouth/Throat:      Mouth: Mucous membranes are moist.   Eyes:      Extraocular Movements: Extraocular movements intact.      Conjunctiva/sclera: Conjunctivae normal.   Cardiovascular:      Rate and Rhythm: Normal rate and regular rhythm.      Pulses: Normal pulses.      Heart sounds: No murmur heard.  Pulmonary:      Effort: Pulmonary effort is normal. No respiratory distress.      Breath sounds: No wheezing.   Abdominal:      General: There is no distension.      Palpations: Abdomen is soft. There is no mass.      Tenderness: There is no abdominal tenderness. "   Musculoskeletal:         General: No swelling.      Cervical back: Normal range of motion.   Skin:     Coloration: Skin is not jaundiced.      Findings: No rash.   Neurological:      General: No focal deficit present.      Mental Status: She is alert and oriented to person, place, and time.   Psychiatric:         Mood and Affect: Mood normal.         Thought Content: Thought content normal.         Labs:    Complete Blood Count  Lab Results   Component Value Date    RBC 4.58 11/18/2024    HGB 10.6 (L) 11/18/2024    HCT 33.3 (L) 11/18/2024    MCV 73 (L) 11/18/2024    MCH 23.1 (L) 11/18/2024    MCHC 31.8 (L) 11/18/2024    RDW 14.7 (H) 11/18/2024     11/18/2024    MPV 12.0 11/18/2024    GRAN 5.2 11/18/2024    GRAN 61.8 11/18/2024    LYMPH 2.1 11/18/2024    LYMPH 25.0 11/18/2024    MONO 0.8 11/18/2024    MONO 9.9 11/18/2024    EOS 0.2 11/18/2024    BASO 0.03 11/18/2024    EOSINOPHIL 2.5 11/18/2024    BASOPHIL 0.4 11/18/2024    DIFFMETHOD Automated 11/18/2024       Comprehensive Metabolic Panel  Lab Results   Component Value Date     (H) 11/18/2024    BUN 18 11/18/2024    CREATININE 1.0 11/18/2024     11/18/2024    K 3.6 11/18/2024     11/18/2024    PROT 6.4 11/18/2024    ALBUMIN 2.7 (L) 11/18/2024    BILITOT 0.4 11/18/2024    AST 21 11/18/2024    ALKPHOS 51 11/18/2024    CO2 27 11/18/2024    ALT 18 11/18/2024    ANIONGAP 11 11/18/2024       TSH  Lab Results   Component Value Date    TSH 0.266 (L) 10/02/2024    TSH 0.266 (L) 10/02/2024       Imaging:  Echo    Left Ventricle: Severe global hypokinesis present. There is severely   reduced systolic function with a visually estimated ejection fraction of   20 - 25%.    Right Ventricle: Normal right ventricular cavity size. Systolic   function is normal.    Left Atrium: Left atrium is moderately dilated. Atrial septum is   bulging to the left.    Mitral Valve: There is mild regurgitation with a centrally directed   jet.    Tricuspid Valve: There is  mild regurgitation with a centrally directed   jet.    Pulmonary Artery: No pulmonary hypertension. The estimated pulmonary   artery systolic pressure is 23 mmHg.    IVC/SVC: Normal venous pressure at 3 mmHg.  CTA Chest Non-Coronary (PE Studies)  Narrative: EXAMINATION:  CTA CHEST NON CORONARY (PE STUDIES)    CLINICAL HISTORY:  Pulmonary embolism (PE) suspected, positive D-dimer;    TECHNIQUE:  Low dose axial images, sagittal and coronal reformations were obtained from the thoracic inlet to the lung bases following the IV administration of 100 mL of Omnipaque 350.  Contrast timing was optimized to evaluate the pulmonary arteries.  MIP images were performed.    COMPARISON:  Chest radiograph 11/15/2024, CT chest 05/25/2016    FINDINGS:  The visualized soft tissue structures at the base of the neck appear within normal limits allowing from streak artifact from dense contrast bolus.    The thoracic aorta maintains normal caliber, contour, and course with mild atherosclerotic calcification within its course.  Incidental note is made of a shared origin of the right brachiocephalic and left common carotid arteries.  There is no evidence of aneurysmal dilation.  The heart is enlarged and there is scattered coronary artery calcification.  No significant pericardial effusion.  The esophagus maintains a normal course and caliber. There is no bulky axillary or mediastinal lymph node enlargement.    The trachea is midline and the proximal airways are patent. Detailed evaluation of the lung parenchyma is limited by respiratory motion artifact. There is no pneumothorax. There are patchy ground-glass and somewhat more consolidative opacities throughout the lungs, right greater than left.  There are bilateral layering pleural effusions, right greater than left.    There is no evidence of pulmonary thromboembolism.    Limited images of the upper abdomen obtained during the course of this dedicated thoracic CT demonstrate no acute  abnormalities.    The osseous structures demonstrate degenerative changes of the visualized spine.  There are degenerative changes of bilateral shoulders, right greater than left.  Impression: No evidence of pulmonary thromboembolism.    Diffuse bilateral patchy ground-glass and confluent airspace opacities throughout the lungs, right greater than left.  Findings are favored to reflect edema, although additional considerations would include multifocal infection, hemorrhage, aspiration, or noninfectious inflammatory process.    Layering bilateral pleural effusions, right greater than left.    Cardiomegaly.    Additional incidental findings as above.    Electronically signed by: Orville Noguera MD  Date:    11/16/2024  Time:    01:45      Assessment/Plan:    1. Hospital discharge follow-up    2. Multifocal pneumonia  Comments:  - Pt symptoms improved  - Lung sounds good  - Pending CXR  Orders:  -     X-Ray Chest PA And Lateral; Future; Expected date: 11/26/2024    3. Type 2 diabetes mellitus with hyperglycemia, without long-term current use of insulin  Assessment & Plan:  - Chronic, stable  - Continue glimepiride, janumet, mounjaro  - Follow with PCP        4. Screening for malignant neoplasm of colon  -     Ambulatory referral/consult to Endo Procedure ; Future; Expected date: 11/27/2024           Discussed how to stay healthy including: diet, exercise, refraining from smoking and discussed screening exams / tests needed for age, sex and family Hx.       RTC every 3-6 months with PCP, or JACINTO Vargas PA-C

## 2024-11-26 ENCOUNTER — OFFICE VISIT (OUTPATIENT)
Dept: FAMILY MEDICINE | Facility: CLINIC | Age: 74
End: 2024-11-26
Payer: MEDICARE

## 2024-11-26 VITALS
OXYGEN SATURATION: 97 % | TEMPERATURE: 98 F | HEART RATE: 55 BPM | WEIGHT: 172.31 LBS | DIASTOLIC BLOOD PRESSURE: 56 MMHG | SYSTOLIC BLOOD PRESSURE: 92 MMHG | HEIGHT: 67 IN | BODY MASS INDEX: 27.04 KG/M2

## 2024-11-26 DIAGNOSIS — Z09 HOSPITAL DISCHARGE FOLLOW-UP: Primary | ICD-10-CM

## 2024-11-26 DIAGNOSIS — J18.9 MULTIFOCAL PNEUMONIA: ICD-10-CM

## 2024-11-26 DIAGNOSIS — Z12.11 SCREENING FOR MALIGNANT NEOPLASM OF COLON: ICD-10-CM

## 2024-11-26 DIAGNOSIS — E11.65 TYPE 2 DIABETES MELLITUS WITH HYPERGLYCEMIA, WITHOUT LONG-TERM CURRENT USE OF INSULIN: ICD-10-CM

## 2024-11-26 RX ORDER — CELECOXIB 200 MG/1
200 CAPSULE ORAL DAILY
COMMUNITY
Start: 2024-08-03

## 2024-11-26 RX ORDER — GABAPENTIN 300 MG/1
300 CAPSULE ORAL 3 TIMES DAILY
COMMUNITY
Start: 2024-11-01

## 2024-11-26 RX ORDER — SITAGLIPTIN AND METFORMIN HYDROCHLORIDE 500; 50 MG/1; MG/1
1 TABLET, FILM COATED ORAL 2 TIMES DAILY
COMMUNITY
Start: 2024-10-30

## 2024-11-27 ENCOUNTER — HOSPITAL ENCOUNTER (OUTPATIENT)
Dept: RADIOLOGY | Facility: HOSPITAL | Age: 74
Discharge: HOME | End: 2024-11-27
Attending: PHYSICIAN ASSISTANT
Payer: MEDICARE

## 2024-11-27 DIAGNOSIS — J18.9 MULTIFOCAL PNEUMONIA: ICD-10-CM

## 2024-11-27 PROCEDURE — 71046 X-RAY EXAM CHEST 2 VIEWS: CPT | Mod: TC,FY,PN

## 2024-11-27 PROCEDURE — 71046 X-RAY EXAM CHEST 2 VIEWS: CPT | Mod: 26,,, | Performed by: STUDENT IN AN ORGANIZED HEALTH CARE EDUCATION/TRAINING PROGRAM

## 2024-11-29 ENCOUNTER — TELEPHONE (OUTPATIENT)
Dept: FAMILY MEDICINE | Facility: CLINIC | Age: 74
End: 2024-11-29
Payer: MEDICARE

## 2024-12-18 ENCOUNTER — OFFICE VISIT (OUTPATIENT)
Dept: CARDIOLOGY | Facility: CLINIC | Age: 74
End: 2024-12-18
Payer: MEDICARE

## 2024-12-18 VITALS
HEIGHT: 67 IN | WEIGHT: 170.94 LBS | OXYGEN SATURATION: 96 % | DIASTOLIC BLOOD PRESSURE: 61 MMHG | SYSTOLIC BLOOD PRESSURE: 96 MMHG | BODY MASS INDEX: 26.83 KG/M2 | HEART RATE: 88 BPM

## 2024-12-18 DIAGNOSIS — E11.9 TYPE 2 DIABETES MELLITUS WITHOUT COMPLICATION, WITHOUT LONG-TERM CURRENT USE OF INSULIN: ICD-10-CM

## 2024-12-18 DIAGNOSIS — I50.9 ACUTE ON CHRONIC CONGESTIVE HEART FAILURE, UNSPECIFIED HEART FAILURE TYPE: Primary | ICD-10-CM

## 2024-12-18 DIAGNOSIS — I10 ESSENTIAL HYPERTENSION: ICD-10-CM

## 2024-12-18 PROCEDURE — 1159F MED LIST DOCD IN RCRD: CPT | Mod: CPTII,S$GLB,, | Performed by: INTERNAL MEDICINE

## 2024-12-18 PROCEDURE — 99214 OFFICE O/P EST MOD 30 MIN: CPT | Mod: S$GLB,,, | Performed by: INTERNAL MEDICINE

## 2024-12-18 PROCEDURE — 3078F DIAST BP <80 MM HG: CPT | Mod: CPTII,S$GLB,, | Performed by: INTERNAL MEDICINE

## 2024-12-18 PROCEDURE — 1160F RVW MEDS BY RX/DR IN RCRD: CPT | Mod: CPTII,S$GLB,, | Performed by: INTERNAL MEDICINE

## 2024-12-18 PROCEDURE — 4010F ACE/ARB THERAPY RXD/TAKEN: CPT | Mod: CPTII,S$GLB,, | Performed by: INTERNAL MEDICINE

## 2024-12-18 PROCEDURE — 3288F FALL RISK ASSESSMENT DOCD: CPT | Mod: CPTII,S$GLB,, | Performed by: INTERNAL MEDICINE

## 2024-12-18 PROCEDURE — 99999 PR PBB SHADOW E&M-EST. PATIENT-LVL IV: CPT | Mod: PBBFAC,,, | Performed by: INTERNAL MEDICINE

## 2024-12-18 PROCEDURE — 1125F AMNT PAIN NOTED PAIN PRSNT: CPT | Mod: CPTII,S$GLB,, | Performed by: INTERNAL MEDICINE

## 2024-12-18 PROCEDURE — 3066F NEPHROPATHY DOC TX: CPT | Mod: CPTII,S$GLB,, | Performed by: INTERNAL MEDICINE

## 2024-12-18 PROCEDURE — 3051F HG A1C>EQUAL 7.0%<8.0%: CPT | Mod: CPTII,S$GLB,, | Performed by: INTERNAL MEDICINE

## 2024-12-18 PROCEDURE — 1100F PTFALLS ASSESS-DOCD GE2>/YR: CPT | Mod: CPTII,S$GLB,, | Performed by: INTERNAL MEDICINE

## 2024-12-18 PROCEDURE — 3074F SYST BP LT 130 MM HG: CPT | Mod: CPTII,S$GLB,, | Performed by: INTERNAL MEDICINE

## 2024-12-18 PROCEDURE — 3061F NEG MICROALBUMINURIA REV: CPT | Mod: CPTII,S$GLB,, | Performed by: INTERNAL MEDICINE

## 2024-12-18 PROCEDURE — 1111F DSCHRG MED/CURRENT MED MERGE: CPT | Mod: CPTII,S$GLB,, | Performed by: INTERNAL MEDICINE

## 2024-12-18 PROCEDURE — 3008F BODY MASS INDEX DOCD: CPT | Mod: CPTII,S$GLB,, | Performed by: INTERNAL MEDICINE

## 2024-12-18 RX ORDER — SACUBITRIL AND VALSARTAN 24; 26 MG/1; MG/1
1 TABLET, FILM COATED ORAL 2 TIMES DAILY
Qty: 180 TABLET | Refills: 3 | Status: SHIPPED | OUTPATIENT
Start: 2024-12-18

## 2024-12-18 RX ORDER — ATORVASTATIN CALCIUM 40 MG/1
40 TABLET, FILM COATED ORAL DAILY
Qty: 90 TABLET | Refills: 3 | Status: SHIPPED | OUTPATIENT
Start: 2024-12-18 | End: 2025-12-18

## 2024-12-18 RX ORDER — SPIRONOLACTONE 25 MG/1
25 TABLET ORAL DAILY
Qty: 90 TABLET | Refills: 1 | Status: SHIPPED | OUTPATIENT
Start: 2024-12-18 | End: 2025-12-18

## 2024-12-18 RX ORDER — METOPROLOL SUCCINATE 25 MG/1
50 TABLET, EXTENDED RELEASE ORAL DAILY
Qty: 90 TABLET | Refills: 3 | Status: SHIPPED | OUTPATIENT
Start: 2024-12-18 | End: 2025-12-18

## 2024-12-18 NOTE — PATIENT INSTRUCTIONS
Start jardiance 10 mg daily  Restart aldactone 25 mg daily  Decrease metoprolol to 25 mg (1/2 of 50 )    4 uris

## 2024-12-18 NOTE — PROGRESS NOTES
[facility] 701     SUBJECTIVE:     History of Present Illness:  Patient is a 74 y.o. female presents with congestive heart failure. Feels great .     Primary Diagnosis:   1. Hypertension  2. DM: positive  3. Nonsmoker  4. Family history of early CAD: son  of congestive heart failure;   5. Chronic systolic heart failure   ROS  Since last visit :feeling better   A. Breathing is better; no PND or orthopnea  B. No shortness of breath at rest  C. No palpitations  D. One month ago, blood pressure went down lower than 90 and passed out.   E. Activity: much improved; walking in store; pushing the lawnmower to cut grass without issues; exercises ; walked all over  and did well   F.  blood sugars are markedly improved  G.  blood pressures at home: normal; sometimes as low as 100's ; in spite of lowering the entresto, blood pressure runs in the 90's and feels weak    Review of patient's allergies indicates:  No Known Allergies    Past Medical History:   Diagnosis Date    Arthritis     CHF (congestive heart failure)     Diabetes mellitus     does she will take it I want to you both of     Hypertension     Kidney stone        Past Surgical History:   Procedure Laterality Date    COLONOSCOPY  2013    dr ram - 5 years    FOOT SURGERY Right     bone spur    FOOT SURGERY Left     fx ankle    HYSTERECTOMY      age 47    KNEE SURGERY Left 06/10/2016    TKR    OOPHORECTOMY      TONSILLECTOMY         Family History   Problem Relation Name Age of Onset    Heart attack Mother age 50's     Seizures Mother age 50's         fell in bathtub and drowned    Colon cancer Sister      Breast cancer Sister         Social History     Tobacco Use    Smoking status: Never     Passive exposure: Never    Smokeless tobacco: Never   Substance Use Topics    Alcohol use: No     Alcohol/week: 0.0 standard drinks of alcohol    Drug use: No        Past Hospitalization:   24: diuresed and sent home   :  shortness of breath; pneumonia;   Home meds:  Current Outpatient Medications on File Prior to Visit   Medication Sig Dispense Refill    albuterol (VENTOLIN HFA) 90 mcg/actuation inhaler Inhale 2 puffs into the lungs every 6 (six) hours as needed for Wheezing. Rescue 18 g 1    amitriptyline (ELAVIL) 10 MG tablet TAKE ONE TO THREE TABLETS BY MOUTH AT BEDTIME FOR  HEADACHE 90 tablet 3    ascorbic acid, vitamin C, (VITAMIN C) 500 MG tablet Take 500 mg by mouth once daily.      atorvastatin (LIPITOR) 40 MG tablet Take 1 tablet (40 mg total) by mouth once daily. 90 tablet 3    blood sugar diagnostic (BLOOD GLUCOSE TEST) Strp Strips for 3 times a day testing   dispense brand covered by insurance and to match meter brand 300 each 3    busPIRone (BUSPAR) 5 MG Tab Take 1 tablet (5 mg total) by mouth 2 (two) times daily. For anxiety 180 tablet 3    celecoxib (CELEBREX) 200 MG capsule Take 200 mg by mouth once daily.      diabetic supplies, miscellan. Misc Lancets for 3 times a day testing    dispense brand covered by insurance t 300 each 3    diclofenac sodium (VOLTAREN) 1 % Gel Apply 2 g topically 3 (three) times daily. 1 Tube 3    ENTRESTO 24-26 mg per tablet Take 1 tablet by mouth twice daily 180 tablet 3    EScitalopram oxalate (LEXAPRO) 10 MG tablet Take 1 tablet (10 mg total) by mouth once daily. 90 tablet 3    ferrous sulfate 324 mg (65 mg iron) TbEC Take 1 tablet (324 mg total) by mouth daily as needed. 90 tablet 3    furosemide (LASIX) 40 MG tablet Take 1 tablet by mouth once daily 90 tablet 3    gabapentin (NEURONTIN) 300 MG capsule Take 300 mg by mouth 3 (three) times daily.      glimepiride (AMARYL) 2 MG tablet Take 1 tablet (2 mg total) by mouth 2 (two) times daily. Take 2 mg by mouth 2 (two) times daily. 180 tablet 3    hyoscyamine (ANASPAZ,LEVSIN) 0.125 mg Tab Take 1 tablet (125 mcg total) by mouth every 6 (six) hours as needed (P.r.n. bladder spasm). 25 tablet 0    JANUMET  mg per tablet Take 1 tablet by  "mouth 2 (two) times daily.      metoprolol succinate (TOPROL-XL) 50 MG 24 hr tablet Take 1 tablet (50 mg total) by mouth once daily. 90 tablet 3    MOUNJARO 10 mg/0.5 mL PnIj SMARTSIG:10 Milligram(s) SUB-Q Once a Week      MULTIVIT/IRON/FA/K/HERB NO.244 (ALIVE WOMEN'S ENERGY ORAL) Take 1 tablet by mouth daily as needed.       neomycin-polymyxin-dexamethasone (DEXACINE) 3.5 mg/g-10,000 unit/g-0.1 % Oint APPLY 1/4 INCH OF OINTMENT ON EYELID EVERY NIGHT AT BEDTIME      oxyCODONE-acetaminophen (PERCOCET)  mg per tablet       tamsulosin (FLOMAX) 0.4 mg Cap Take 1 capsule (0.4 mg total) by mouth once daily. 90 capsule 3    vitamin D (VITAMIN D3) 1000 units Tab Take 1,000 Units by mouth once daily.       No current facility-administered medications on file prior to visit.       Cardiac meds:  Atorvastatin 40 mg     entresto 24/26 mg BID  Lasix 40 mg  Metoprolol succinate 50 mg   Aldactone 25 mg   jardiance 10 mg       OBJECTIVE:     Vital Signs (Most Recent)  Vitals:    24 1341   BP: 96/61   Pulse: 88   SpO2: 96%   Weight: 77.6 kg (170 lb 15.5 oz)   Height: 5' 7" (1.702 m)                     Physical Exam:    Neck: normal carotids, no bruits; normal JVP  Lungs :clear  Heart: RR, normal S1,S2, no murmurs, no gallops  Abd: no masses; no bruits;   Exts: normal DP and PT pulses bilaterally, no edema noted           LABS    : GFR normal; K normal ; LDL 81  : CBS: microcytic anemia; GFR >60; lipid panel OK with low HDL , A1c: 8.9  3/24: A1c 8.8, TSH low but new ; lipids great; GFR normal; LFT's normal   : GFR normal; LFT's normal   Diagnostic Results:    1.EK22: NSR, low voltage; nonspecific T wave changes   1b. EKG: 3/24: sinus; IVCD, PVC  2.Echo: : EF 25%; global hypokinesis; LAE, moderate MR, mild TR   2b.Echo: : EF 30 %; mild MR, LAE   2c. Echo: : EF 20-25%; mild TR   3.MUGA: : EF 45%   4.Carotid US: : no significant stenosis   Chart review:      Cath per patient at [facility]: " no blockages ? Date   ASSESSMENT/PLAN:     1. Cardiomyopathy: why? Nonischemic per patient - is this burned out hypertension? Diabetes? Idiopathic familial? - marked improvement in symptoms ; last EF around 45% but again down to 20-25% now   2. Feels weak at times when her blood pressures go down  3. Diabetic neuropathy  4. Losing weight on mounjaro   Plan: 1.decrease metoprolol to 25 mg daily  2. Restart aldactone 25 mg daily   3. Jardiance 10 mg    3. Return 1 months   Jeffery Yadav MD

## 2025-01-07 ENCOUNTER — LAB VISIT (OUTPATIENT)
Dept: LAB | Facility: HOSPITAL | Age: 75
End: 2025-01-07
Attending: INTERNAL MEDICINE
Payer: MEDICARE

## 2025-01-07 DIAGNOSIS — E11.65 INADEQUATELY CONTROLLED DIABETES MELLITUS: Primary | ICD-10-CM

## 2025-01-07 DIAGNOSIS — D64.9 ANEMIA, UNSPECIFIED: ICD-10-CM

## 2025-01-07 DIAGNOSIS — I10 ESSENTIAL HYPERTENSION, MALIGNANT: ICD-10-CM

## 2025-01-07 LAB
ALBUMIN SERPL BCP-MCNC: 4.2 G/DL (ref 3.5–5.2)
ALBUMIN/CREAT UR: 33.8 UG/MG (ref 0–30)
ALP SERPL-CCNC: 59 U/L (ref 38–126)
ALT SERPL W/O P-5'-P-CCNC: 19 U/L (ref 10–44)
ANION GAP SERPL CALC-SCNC: 10 MMOL/L (ref 8–16)
AST SERPL-CCNC: 28 U/L (ref 15–46)
BASOPHILS # BLD AUTO: 0.04 K/UL (ref 0–0.2)
BASOPHILS NFR BLD: 0.4 % (ref 0–1.9)
BILIRUB SERPL-MCNC: 0.7 MG/DL (ref 0.1–1)
CALCIUM SERPL-MCNC: 10 MG/DL (ref 8.7–10.5)
CHLORIDE SERPL-SCNC: 104 MMOL/L (ref 95–110)
CO2 SERPL-SCNC: 26 MMOL/L (ref 23–29)
CREAT SERPL-MCNC: 1.01 MG/DL (ref 0.5–1.4)
CREAT UR-MCNC: 80 MG/DL (ref 15–325)
DIFFERENTIAL METHOD BLD: ABNORMAL
EOSINOPHIL # BLD AUTO: 0.1 K/UL (ref 0–0.5)
EOSINOPHIL NFR BLD: 1 % (ref 0–8)
ERYTHROCYTE [DISTWIDTH] IN BLOOD BY AUTOMATED COUNT: 15.2 % (ref 11.5–14.5)
EST. GFR  (NO RACE VARIABLE): 59.1 ML/MIN/1.73 M^2
ESTIMATED AVG GLUCOSE: 154 MG/DL (ref 68–131)
GLUCOSE SERPL-MCNC: 146 MG/DL (ref 70–110)
HBA1C MFR BLD: 7 % (ref 4–5.6)
HCT VFR BLD AUTO: 42 % (ref 37–48.5)
HGB BLD-MCNC: 12.8 G/DL (ref 12–16)
IMM GRANULOCYTES # BLD AUTO: 0.03 K/UL (ref 0–0.04)
IMM GRANULOCYTES NFR BLD AUTO: 0.3 % (ref 0–0.5)
LIPASE SERPL-CCNC: 334 U/L (ref 23–300)
LYMPHOCYTES # BLD AUTO: 2.6 K/UL (ref 1–4.8)
LYMPHOCYTES NFR BLD: 25.1 % (ref 18–48)
MCH RBC QN AUTO: 22.7 PG (ref 27–31)
MCHC RBC AUTO-ENTMCNC: 30.5 G/DL (ref 32–36)
MCV RBC AUTO: 75 FL (ref 82–98)
MICROALBUMIN UR DL<=1MG/L-MCNC: 27 UG/ML
MONOCYTES # BLD AUTO: 0.7 K/UL (ref 0.3–1)
MONOCYTES NFR BLD: 7.1 % (ref 4–15)
NEUTROPHILS # BLD AUTO: 6.8 K/UL (ref 1.8–7.7)
NEUTROPHILS NFR BLD: 66.1 % (ref 38–73)
NRBC BLD-RTO: 0 /100 WBC
PLATELET # BLD AUTO: 245 K/UL (ref 150–450)
PMV BLD AUTO: 11.6 FL (ref 9.2–12.9)
POTASSIUM SERPL-SCNC: 4.3 MMOL/L (ref 3.5–5.1)
PROT SERPL-MCNC: 7.3 G/DL (ref 6–8.4)
RBC # BLD AUTO: 5.64 M/UL (ref 4–5.4)
SODIUM SERPL-SCNC: 140 MMOL/L (ref 136–145)
UUN UR-MCNC: 19 MG/DL (ref 7–17)
WBC # BLD AUTO: 10.34 K/UL (ref 3.9–12.7)

## 2025-01-07 PROCEDURE — 80053 COMPREHEN METABOLIC PANEL: CPT | Mod: PN | Performed by: INTERNAL MEDICINE

## 2025-01-07 PROCEDURE — 85025 COMPLETE CBC W/AUTO DIFF WBC: CPT | Mod: PN | Performed by: INTERNAL MEDICINE

## 2025-01-07 PROCEDURE — 83690 ASSAY OF LIPASE: CPT | Mod: PN | Performed by: INTERNAL MEDICINE

## 2025-01-07 PROCEDURE — 82570 ASSAY OF URINE CREATININE: CPT | Mod: PN | Performed by: INTERNAL MEDICINE

## 2025-01-07 PROCEDURE — 36415 COLL VENOUS BLD VENIPUNCTURE: CPT | Mod: PN | Performed by: INTERNAL MEDICINE

## 2025-01-07 PROCEDURE — 83036 HEMOGLOBIN GLYCOSYLATED A1C: CPT | Performed by: INTERNAL MEDICINE

## 2025-01-21 ENCOUNTER — TELEPHONE (OUTPATIENT)
Dept: CARDIOLOGY | Facility: CLINIC | Age: 75
End: 2025-01-21
Payer: MEDICARE

## 2025-01-21 ENCOUNTER — PATIENT MESSAGE (OUTPATIENT)
Dept: CARDIOLOGY | Facility: CLINIC | Age: 75
End: 2025-01-21
Payer: MEDICARE

## 2025-01-29 ENCOUNTER — OFFICE VISIT (OUTPATIENT)
Dept: CARDIOLOGY | Facility: CLINIC | Age: 75
End: 2025-01-29
Payer: MEDICARE

## 2025-01-29 VITALS
OXYGEN SATURATION: 98 % | HEART RATE: 73 BPM | BODY MASS INDEX: 28.86 KG/M2 | SYSTOLIC BLOOD PRESSURE: 111 MMHG | WEIGHT: 183.88 LBS | HEIGHT: 67 IN | DIASTOLIC BLOOD PRESSURE: 65 MMHG

## 2025-01-29 DIAGNOSIS — I10 ESSENTIAL HYPERTENSION: ICD-10-CM

## 2025-01-29 DIAGNOSIS — E11.9 TYPE 2 DIABETES MELLITUS WITHOUT COMPLICATION, WITHOUT LONG-TERM CURRENT USE OF INSULIN: ICD-10-CM

## 2025-01-29 DIAGNOSIS — I50.22 CHRONIC SYSTOLIC HEART FAILURE: Primary | ICD-10-CM

## 2025-01-29 PROCEDURE — 99999 PR PBB SHADOW E&M-EST. PATIENT-LVL III: CPT | Mod: PBBFAC,,, | Performed by: INTERNAL MEDICINE

## 2025-01-29 PROCEDURE — 3060F POS MICROALBUMINURIA REV: CPT | Mod: CPTII,S$GLB,, | Performed by: INTERNAL MEDICINE

## 2025-01-29 PROCEDURE — 3066F NEPHROPATHY DOC TX: CPT | Mod: CPTII,S$GLB,, | Performed by: INTERNAL MEDICINE

## 2025-01-29 PROCEDURE — 99214 OFFICE O/P EST MOD 30 MIN: CPT | Mod: S$GLB,,, | Performed by: INTERNAL MEDICINE

## 2025-01-29 PROCEDURE — 1125F AMNT PAIN NOTED PAIN PRSNT: CPT | Mod: CPTII,S$GLB,, | Performed by: INTERNAL MEDICINE

## 2025-01-29 PROCEDURE — 1160F RVW MEDS BY RX/DR IN RCRD: CPT | Mod: CPTII,S$GLB,, | Performed by: INTERNAL MEDICINE

## 2025-01-29 PROCEDURE — 1159F MED LIST DOCD IN RCRD: CPT | Mod: CPTII,S$GLB,, | Performed by: INTERNAL MEDICINE

## 2025-01-29 PROCEDURE — 1101F PT FALLS ASSESS-DOCD LE1/YR: CPT | Mod: CPTII,S$GLB,, | Performed by: INTERNAL MEDICINE

## 2025-01-29 PROCEDURE — 3288F FALL RISK ASSESSMENT DOCD: CPT | Mod: CPTII,S$GLB,, | Performed by: INTERNAL MEDICINE

## 2025-01-29 PROCEDURE — 3008F BODY MASS INDEX DOCD: CPT | Mod: CPTII,S$GLB,, | Performed by: INTERNAL MEDICINE

## 2025-01-29 PROCEDURE — 3051F HG A1C>EQUAL 7.0%<8.0%: CPT | Mod: CPTII,S$GLB,, | Performed by: INTERNAL MEDICINE

## 2025-01-29 PROCEDURE — 3078F DIAST BP <80 MM HG: CPT | Mod: CPTII,S$GLB,, | Performed by: INTERNAL MEDICINE

## 2025-01-29 PROCEDURE — 3074F SYST BP LT 130 MM HG: CPT | Mod: CPTII,S$GLB,, | Performed by: INTERNAL MEDICINE

## 2025-01-29 NOTE — PROGRESS NOTES
[facility] [address]     SUBJECTIVE:     History of Present Illness:  Patient is a 74 y.o. female presents with congestive heart failure. Feels great .     Primary Diagnosis:   1. Hypertension  2. DM: positive  3. Nonsmoker  4. Family history of early CAD: son  of congestive heart failure;   5. Chronic systolic heart failure   ROS  Since last visit :feeling better   A. Breathing is better; no PND or orthopnea  B. No shortness of breath at rest  C. No palpitations  D. One month ago, blood pressure went down lower than 90 and passed out.   E. Activity: much improved; walking in store; pushing the lawnmower to cut grass without issues; exercises ; walked all over  and did well; goes to gym 4 days a week with no issues   F.  blood sugars are markedly improved  G.  blood pressures at home: normal; sometimes as low as 100's ; in spite of lowering the entresto, blood pressure runs in the 90's and feels weak. Dizziness has improved with change in metoprolol succinate     Review of patient's allergies indicates:  No Known Allergies    Past Medical History:   Diagnosis Date    Arthritis     CHF (congestive heart failure)     Diabetes mellitus     does she will take it I want to you both of     Hypertension     Kidney stone        Past Surgical History:   Procedure Laterality Date    COLONOSCOPY  2013    dr ram - 5 years    FOOT SURGERY Right     bone spur    FOOT SURGERY Left     fx ankle    HYSTERECTOMY      age 47    KNEE SURGERY Left 06/10/2016    TKR    OOPHORECTOMY      TONSILLECTOMY         Family History   Problem Relation Name Age of Onset    Heart attack Mother age 50's     Seizures Mother age 50's         fell in bathtub and drowned    Colon cancer Sister      Breast cancer Sister         Social History     Tobacco Use    Smoking status: Never     Passive exposure: Never    Smokeless tobacco: Never   Substance Use Topics    Alcohol use: No     Alcohol/week: 0.0 standard  drinks of alcohol    Drug use: No        Past Hospitalization:   5/13/22: diuresed and sent home   11/24: shortness of breath; pneumonia;   Home meds:  Current Outpatient Medications on File Prior to Visit   Medication Sig Dispense Refill    albuterol (VENTOLIN HFA) 90 mcg/actuation inhaler Inhale 2 puffs into the lungs every 6 (six) hours as needed for Wheezing. Rescue 18 g 1    amitriptyline (ELAVIL) 10 MG tablet TAKE ONE TO THREE TABLETS BY MOUTH AT BEDTIME FOR  HEADACHE 90 tablet 3    ascorbic acid, vitamin C, (VITAMIN C) 500 MG tablet Take 500 mg by mouth once daily.      atorvastatin (LIPITOR) 40 MG tablet Take 1 tablet (40 mg total) by mouth once daily. 90 tablet 3    blood sugar diagnostic (BLOOD GLUCOSE TEST) Strp Strips for 3 times a day testing   dispense brand covered by insurance and to match meter brand 300 each 3    busPIRone (BUSPAR) 5 MG Tab Take 1 tablet (5 mg total) by mouth 2 (two) times daily. For anxiety 180 tablet 3    celecoxib (CELEBREX) 200 MG capsule Take 200 mg by mouth once daily.      diabetic supplies, miscellan. Misc Lancets for 3 times a day testing    dispense brand covered by insurance t 300 each 3    diclofenac sodium (VOLTAREN) 1 % Gel Apply 2 g topically 3 (three) times daily. 1 Tube 3    empagliflozin (JARDIANCE) 10 mg tablet Take 1 tablet (10 mg total) by mouth once daily. 90 tablet 1    EScitalopram oxalate (LEXAPRO) 10 MG tablet Take 1 tablet (10 mg total) by mouth once daily. 90 tablet 3    ferrous sulfate 324 mg (65 mg iron) TbEC Take 1 tablet (324 mg total) by mouth daily as needed. 90 tablet 3    furosemide (LASIX) 40 MG tablet Take 1 tablet by mouth once daily 90 tablet 3    gabapentin (NEURONTIN) 300 MG capsule Take 300 mg by mouth 3 (three) times daily.      glimepiride (AMARYL) 2 MG tablet Take 1 tablet (2 mg total) by mouth 2 (two) times daily. Take 2 mg by mouth 2 (two) times daily. 180 tablet 3    hyoscyamine (ANASPAZ,LEVSIN) 0.125 mg Tab Take 1 tablet (125 mcg  "total) by mouth every 6 (six) hours as needed (P.r.n. bladder spasm). 25 tablet 0    JANUMET  mg per tablet Take 1 tablet by mouth 2 (two) times daily.      metoprolol succinate (TOPROL-XL) 25 MG 24 hr tablet Take 2 tablets (50 mg total) by mouth once daily. 90 tablet 3    MOUNJARO 10 mg/0.5 mL PnIj SMARTSIG:10 Milligram(s) SUB-Q Once a Week      MULTIVIT/IRON/FA/K/HERB NO.244 (ALIVE WOMEN'S ENERGY ORAL) Take 1 tablet by mouth daily as needed.       neomycin-polymyxin-dexamethasone (DEXACINE) 3.5 mg/g-10,000 unit/g-0.1 % Oint APPLY 1/4 INCH OF OINTMENT ON EYELID EVERY NIGHT AT BEDTIME      oxyCODONE-acetaminophen (PERCOCET)  mg per tablet       sacubitriL-valsartan (ENTRESTO) 24-26 mg per tablet Take 1 tablet by mouth 2 (two) times daily. 180 tablet 3    spironolactone (ALDACTONE) 25 MG tablet Take 1 tablet (25 mg total) by mouth once daily. 90 tablet 1    tamsulosin (FLOMAX) 0.4 mg Cap Take 1 capsule (0.4 mg total) by mouth once daily. 90 capsule 3    vitamin D (VITAMIN D3) 1000 units Tab Take 1,000 Units by mouth once daily.       No current facility-administered medications on file prior to visit.       Cardiac meds:  Atorvastatin 40 mg     entresto 24/26 mg BID  Lasix 40 mg  Metoprolol succinate 25 mg   Aldactone 25 mg   jardiance 10 mg       OBJECTIVE:     Vital Signs (Most Recent)  Vitals:    01/29/25 0922   BP: 111/65   Pulse: 73   SpO2: 98%   Weight: 83.4 kg (183 lb 13.8 oz)   Height: 5' 7" (1.702 m)                     Physical Exam:    Neck: normal carotids, no bruits; normal JVP  Lungs :clear  Heart: RR, normal S1,S2, no murmurs, no gallops  Abd: no masses; no bruits;   Exts: normal DP and PT pulses bilaterally, no edema noted           LABS    5/22: GFR normal; K normal ; LDL 81  2/23: CBS: microcytic anemia; GFR >60; lipid panel OK with low HDL , A1c: 8.9  3/24: A1c 8.8, TSH low but new ; lipids great; GFR normal; LFT's normal   11/24: GFR normal; LFT's normal   1/25: A1c 7.0, GFR 59, LFT"s " normal; Diagnostic Results:    1.EK22: NSR, low voltage; nonspecific T wave changes   1b. EKG: 3/24: sinus; IVCD, PVC  2.Echo: : EF 25%; global hypokinesis; LAE, moderate MR, mild TR   2b.Echo: : EF 30 %; mild MR, LAE   2c. Echo: : EF 20-25%; mild TR   3.MUGA: : EF 45%   4.Carotid US: : no significant stenosis   Chart review:      Cath per patient at [facility]: no blockages ? Date   ASSESSMENT/PLAN:     1. Cardiomyopathy: why? Nonischemic per patient - is this burned out hypertension? Diabetes? Idiopathic familial? - marked improvement in symptoms ; last EF around 45% but again down to 20-25% now   2. Feels weak at times when her blood pressures go down - improved   3. Diabetic neuropathy  4. Losing weight on mounjaro   Plan: 1.continue the same medications  2. Return 3 months with check EF - if still low on good therapy, will need ICD   Jeffery Yadav MD

## 2025-02-13 ENCOUNTER — TELEPHONE (OUTPATIENT)
Dept: ENDOSCOPY | Facility: HOSPITAL | Age: 75
End: 2025-02-13

## 2025-02-13 ENCOUNTER — LAB VISIT (OUTPATIENT)
Dept: LAB | Facility: HOSPITAL | Age: 75
End: 2025-02-13
Attending: INTERNAL MEDICINE
Payer: MEDICARE

## 2025-02-13 ENCOUNTER — CLINICAL SUPPORT (OUTPATIENT)
Dept: ENDOSCOPY | Facility: HOSPITAL | Age: 75
End: 2025-02-13
Payer: MEDICARE

## 2025-02-13 DIAGNOSIS — Z12.11 SCREENING FOR MALIGNANT NEOPLASM OF COLON: ICD-10-CM

## 2025-02-13 DIAGNOSIS — E11.65 INADEQUATELY CONTROLLED DIABETES MELLITUS: Primary | ICD-10-CM

## 2025-02-13 LAB
ALBUMIN SERPL BCP-MCNC: 3.8 G/DL (ref 3.5–5.2)
ALP SERPL-CCNC: 57 U/L (ref 38–126)
ALT SERPL W/O P-5'-P-CCNC: 25 U/L (ref 10–44)
ANION GAP SERPL CALC-SCNC: 10 MMOL/L (ref 8–16)
AST SERPL-CCNC: 33 U/L (ref 15–46)
BILIRUB SERPL-MCNC: 0.4 MG/DL (ref 0.1–1)
CALCIUM SERPL-MCNC: 9.6 MG/DL (ref 8.7–10.5)
CHLORIDE SERPL-SCNC: 101 MMOL/L (ref 95–110)
CO2 SERPL-SCNC: 30 MMOL/L (ref 23–29)
CREAT SERPL-MCNC: 1.09 MG/DL (ref 0.5–1.4)
EST. GFR  (NO RACE VARIABLE): 54 ML/MIN/1.73 M^2
ESTIMATED AVG GLUCOSE: 163 MG/DL (ref 68–131)
GLUCOSE SERPL-MCNC: 251 MG/DL (ref 70–110)
HBA1C MFR BLD: 7.3 % (ref 4–5.6)
LIPASE SERPL-CCNC: 558 U/L (ref 23–300)
POTASSIUM SERPL-SCNC: 4.3 MMOL/L (ref 3.5–5.1)
PROT SERPL-MCNC: 6.9 G/DL (ref 6–8.4)
SODIUM SERPL-SCNC: 141 MMOL/L (ref 136–145)
UUN UR-MCNC: 26 MG/DL (ref 7–17)

## 2025-02-13 PROCEDURE — 80053 COMPREHEN METABOLIC PANEL: CPT | Mod: PN | Performed by: INTERNAL MEDICINE

## 2025-02-13 PROCEDURE — 36415 COLL VENOUS BLD VENIPUNCTURE: CPT | Mod: PN | Performed by: INTERNAL MEDICINE

## 2025-02-13 PROCEDURE — 83690 ASSAY OF LIPASE: CPT | Mod: PN | Performed by: INTERNAL MEDICINE

## 2025-02-13 PROCEDURE — 83036 HEMOGLOBIN GLYCOSYLATED A1C: CPT | Performed by: INTERNAL MEDICINE

## 2025-02-13 NOTE — TELEPHONE ENCOUNTER
Contacted the patient to schedule an endoscopy procedure(s) Colnoscopy. The patient did not answer the call and left a voice message requesting a call back.

## 2025-03-10 LAB
LEFT EYE DM RETINOPATHY: POSITIVE
RIGHT EYE DM RETINOPATHY: POSITIVE

## 2025-03-21 ENCOUNTER — PATIENT OUTREACH (OUTPATIENT)
Dept: ADMINISTRATIVE | Facility: HOSPITAL | Age: 75
End: 2025-03-21
Payer: MEDICARE

## 2025-03-21 NOTE — PROGRESS NOTES
Population Health Chart Review & Patient Outreach Details      Additional Pop Health Notes:               Updates Requested / Reviewed:      Updated Care Coordination Note         Health Maintenance Topics Overdue:      [facility] Score: 1     Colon Cancer Screening                       Health Maintenance Topic(s) Outreach Outcomes & Actions Taken:    Eye Exam - Outreach Outcomes & Actions Taken  : Diabetic Eye External Records Uploaded, Care Team & History Updated if Applicable

## 2025-03-24 DIAGNOSIS — Z00.00 ENCOUNTER FOR MEDICARE ANNUAL WELLNESS EXAM: ICD-10-CM

## 2025-04-17 ENCOUNTER — OFFICE VISIT (OUTPATIENT)
Dept: FAMILY MEDICINE | Facility: CLINIC | Age: 75
End: 2025-04-17
Payer: MEDICARE

## 2025-04-17 VITALS
SYSTOLIC BLOOD PRESSURE: 104 MMHG | BODY MASS INDEX: 30.36 KG/M2 | TEMPERATURE: 98 F | HEART RATE: 76 BPM | WEIGHT: 193.44 LBS | OXYGEN SATURATION: 98 % | DIASTOLIC BLOOD PRESSURE: 64 MMHG | HEIGHT: 67 IN

## 2025-04-17 DIAGNOSIS — Z12.11 ENCOUNTER FOR COLORECTAL CANCER SCREENING: ICD-10-CM

## 2025-04-17 DIAGNOSIS — E11.40 TYPE 2 DIABETES MELLITUS WITH DIABETIC NEUROPATHY, WITHOUT LONG-TERM CURRENT USE OF INSULIN: ICD-10-CM

## 2025-04-17 DIAGNOSIS — M62.81 MUSCLE WEAKNESS: ICD-10-CM

## 2025-04-17 DIAGNOSIS — Z12.12 ENCOUNTER FOR COLORECTAL CANCER SCREENING: ICD-10-CM

## 2025-04-17 DIAGNOSIS — F11.20 OPIOID DEPENDENCE, UNCOMPLICATED: ICD-10-CM

## 2025-04-17 DIAGNOSIS — F41.9 ANXIETY: ICD-10-CM

## 2025-04-17 DIAGNOSIS — E11.65 TYPE 2 DIABETES MELLITUS WITH HYPERGLYCEMIA, WITHOUT LONG-TERM CURRENT USE OF INSULIN: Primary | ICD-10-CM

## 2025-04-17 DIAGNOSIS — I10 ESSENTIAL HYPERTENSION: ICD-10-CM

## 2025-04-17 RX ORDER — ATORVASTATIN CALCIUM 40 MG/1
40 TABLET, FILM COATED ORAL DAILY
Qty: 90 TABLET | Refills: 3 | Status: SHIPPED | OUTPATIENT
Start: 2025-04-17 | End: 2026-04-17

## 2025-04-17 RX ORDER — BUSPIRONE HYDROCHLORIDE 5 MG/1
5 TABLET ORAL 2 TIMES DAILY
Qty: 180 TABLET | Refills: 3 | Status: SHIPPED | OUTPATIENT
Start: 2025-04-17 | End: 2026-04-17

## 2025-04-17 RX ORDER — ESCITALOPRAM OXALATE 10 MG/1
10 TABLET ORAL DAILY
Qty: 90 TABLET | Refills: 3 | Status: SHIPPED | OUTPATIENT
Start: 2025-04-17

## 2025-04-20 PROBLEM — E66.01 MORBID (SEVERE) OBESITY DUE TO EXCESS CALORIES: Status: RESOLVED | Noted: 2024-03-21 | Resolved: 2025-04-20

## 2025-04-21 NOTE — PROGRESS NOTES
" Patient ID: Katherine Berger is a 75 y.o. female.    Chief Complaint: Follow-up and Low-back Pain    HPI    Katherine Berger is a 75 y.o. female     History of Present Illness    CHIEF COMPLAINT:  Patient presents today for follow up of multiple medical conditions    PAIN MANAGEMENT:  She reports chronic lower back pain with injections providing only temporary relief lasting one day. She also experiences left knee pain, particularly when bending and climbing stairs. Due to knee pain, she rarely accesses the second floor of her two-story house and has difficulty getting into her high bed.    DIABETES:  She is unable to tolerate diabetes medication due to pancreatic complications, with pancreatic levels reaching 500. She currently takes Jardiance.    CURRENT MEDICATIONS:  She continues Jardiance and Lexapro.         Vitals:    04/17/25 1002   BP: 104/64   BP Location: Right arm   Patient Position: Sitting   Pulse: 76   Temp: 98.4 °F (36.9 °C)   TempSrc: Oral   SpO2: 98%   Weight: 87.8 kg (193 lb 7.3 oz)   Height: 5' 7" (1.702 m)            Review of Symptoms      Physical Exam    Constitutional:  Oriented to person, place, and time.appears well-developed and well-nourished.  No distress.      HENT  Head: Normocephalic and atraumatic  Right Ear: External ear normal.   Left Ear: External ear normal.   Nose: External nose normal.   Mouth:  Moist mucus membranes.    Eyes:  Conjunctivae are normal. Right eye exhibits no discharge.  Left eye exhibits no discharge. No scleral icterus.  No periorbital edema    Cardiovascular:  Regular rate and rhythm with normal S1 and S2     Pulmonary/Chest:   Clear to auscultation bilaterally without wheezes, rhonchi or rales      Musculoskeletal:  No edema. No obvious deformity No wasting       Neurological:  Alert and oriented to person, place, and time.   Coordination normal.     Skin:   Skin is warm and dry.  No diaphoresis.   No rash noted.     Psychiatric: Normal mood and affect. Behavior " "is normal.  Judgment and thought content normal.     Physical Exam    MSK: Knee - Left: Limited extension of left knee.  Ears: All normal.  Lungs: All normal.  Eyes: All normal.  Nose: All normal.  Cardiovascular: All normal.  Throat: All normal.         Complete Blood Count  Lab Results   Component Value Date    RBC 5.64 (H) 01/07/2025    HGB 12.8 01/07/2025    HCT 42.0 01/07/2025    MCV 75 (L) 01/07/2025    MCH 22.7 (L) 01/07/2025    MCHC 30.5 (L) 01/07/2025    RDW 15.2 (H) 01/07/2025     01/07/2025    MPV 11.6 01/07/2025    GRAN 6.8 01/07/2025    GRAN 66.1 01/07/2025    LYMPH 2.6 01/07/2025    LYMPH 25.1 01/07/2025    MONO 0.7 01/07/2025    MONO 7.1 01/07/2025    EOS 0.1 01/07/2025    BASO 0.04 01/07/2025    EOSINOPHIL 1.0 01/07/2025    BASOPHIL 0.4 01/07/2025    DIFFMETHOD Automated 01/07/2025       Comprehensive Metabolic Panel  Lab Results   Component Value Date     (H) 02/13/2025    BUN 26 (H) 02/13/2025    CREATININE 1.09 02/13/2025     02/13/2025    K 4.3 02/13/2025     02/13/2025    PROT 6.9 02/13/2025    ALBUMIN 3.8 02/13/2025    BILITOT 0.4 02/13/2025    AST 33 02/13/2025    ALKPHOS 57 02/13/2025    CO2 30 (H) 02/13/2025    ALT 25 02/13/2025    ANIONGAP 10 02/13/2025       TSH  No results found for: "TSH"    Assessment / Plan:      ICD-10-CM ICD-9-CM   1. Type 2 diabetes mellitus with hyperglycemia, without long-term current use of insulin  E11.65 250.00     790.29   2. Anxiety  F41.9 300.00   3. Encounter for colorectal cancer screening  Z12.11 V76.51    Z12.12 V76.41   4. Opioid dependence, uncomplicated  F11.20 304.00   5. Muscle weakness  M62.81 728.87   6. Essential hypertension  I10 401.9     Type 2 diabetes mellitus with hyperglycemia, without long-term current use of insulin    Anxiety  -     EScitalopram oxalate (LEXAPRO) 10 MG tablet; Take 1 tablet (10 mg total) by mouth once daily.  Dispense: 90 tablet; Refill: 3    Encounter for colorectal cancer screening  -     " Ambulatory referral/consult to Endo Procedure ; Future; Expected date: 04/18/2025    Opioid dependence, uncomplicated    Muscle weakness    Essential hypertension    Other orders  -     atorvastatin (LIPITOR) 40 MG tablet; Take 1 tablet (40 mg total) by mouth once daily.  Dispense: 90 tablet; Refill: 3  -     busPIRone (BUSPAR) 5 MG Tab; Take 1 tablet (5 mg total) by mouth 2 (two) times daily. For anxiety  Dispense: 180 tablet; Refill: 3        Assessment & Plan    - Assessed knee pain and mobility, noting significant arthritis and limited range of motion.  - Considered physical therapy exercises for knee rehabilitation, focusing on strengthening quadriceps and improving flexibility.  - Evaluated effectiveness of pain management injections administered by pain medicine noting limited benefit.  - Reviewed recent hemoglobin A1c results, confirming diabetes control.  - Discussed medication options for diabetes management, noting adverse reaction to previous GLP-1 receptor agonists affecting the pancreas.    TYPE 2 DIABETES MELLITUS WITH DIABETIC NEUROPATHY, UNSPECIFIED:  - Reviewed the patient's last hemoglobin A1c, which shows controlled blood sugar.    - Discussed the patient's treatment history, including previous use of Jardiance at a cost of $47.  - Explained the mechanism of action for GLP-1 receptor agonists like Mounjaro, describing how they mimic natural hormones to regulate glucose levels, and discussed the rationale behind injectable medications for diabetes.  - Recognized the patient's inability to take Mounjaro due to pancreas issues, which limits treatment options.    MORBID (SEVERE) OBESITY DUE TO EXCESS CALORIES:  - Noted the patient's report of trying to lose weight, with a 3-pound reduction from earlier in the year.  - Discussed weight loss medications like Mounjaro as a potential option, but acknowledged that the patient's pancreas issues prevent the use of this medication for both diabetes  and weight management.    PANCREATIC DISEASE:  - Reviewed test results and discontinued the causative medication.    LOW BACK PAIN:  - Noted patient's report of ongoing chronic lower back pain.  - Recognized that patient receives shots for back pain but reports they are ineffective.    LEFT KNEE PAIN AND INSTABILITY:  - Noted patient's report of pain in left knee when bending.  - Physical exam revealed pain, limited range of motion, and instability.  - Assessed condition as arthritis.  - Recommend home exercises to improve range of motion, strengthen quadriceps, and improve stability: - Hamstring stretches: extend leg and lean forward - Quadriceps strengthening: use light weights - Knee bends: gradually increase repetitions  - Instructed patient to apply heat in the morning and ice at night to manage inflammation.  - Suggested proper stair-climbing technique: lead with good leg going up, bad leg going down.    DEPRESSION:  - Patient is on Lexapro for depression management.    DIABETES MANAGEMENT:  - Noted patient is on long-term insulin therapy.    FOLLOW-UP:  - Provided information on new Medicare prescription drug coverage structure, including deductibles and potential out-of-pocket costs.         This note was generated with the assistance of ambient listening technology. Verbal consent was obtained by the patient and accompanying visitor(s) for the recording of patient appointment to facilitate this note. I attest to having reviewed and edited the generated note for accuracy, though some syntax or spelling errors may persist. Please contact the author of this note for any clarification.

## 2025-04-24 ENCOUNTER — PATIENT OUTREACH (OUTPATIENT)
Dept: ADMINISTRATIVE | Facility: OTHER | Age: 75
End: 2025-04-24
Payer: MEDICARE

## 2025-04-24 VITALS — HEART RATE: 66 BPM | SYSTOLIC BLOOD PRESSURE: 102 MMHG | DIASTOLIC BLOOD PRESSURE: 58 MMHG

## 2025-04-29 ENCOUNTER — HOSPITAL ENCOUNTER (OUTPATIENT)
Dept: CARDIOLOGY | Facility: HOSPITAL | Age: 75
Discharge: HOME | End: 2025-04-29
Attending: INTERNAL MEDICINE
Payer: MEDICARE

## 2025-04-29 VITALS — HEIGHT: 67 IN | BODY MASS INDEX: 30.29 KG/M2 | WEIGHT: 193 LBS

## 2025-04-29 DIAGNOSIS — I50.22 CHRONIC SYSTOLIC HEART FAILURE: ICD-10-CM

## 2025-04-29 PROCEDURE — 93306 TTE W/DOPPLER COMPLETE: CPT | Mod: 26,,, | Performed by: INTERNAL MEDICINE

## 2025-04-29 PROCEDURE — 93306 TTE W/DOPPLER COMPLETE: CPT | Mod: PN

## 2025-04-30 LAB
APICAL FOUR CHAMBER EJECTION FRACTION: 35 %
APICAL TWO CHAMBER EJECTION FRACTION: 30 %
ASCENDING AORTA: 2.8 CM
AV INDEX (PROSTH): 0.55
AV MEAN GRADIENT: 4 MMHG
AV PEAK GRADIENT: 6 MMHG
AV VALVE AREA BY VELOCITY RATIO: 1.8 CM²
AV VALVE AREA: 1.7 CM²
AV VELOCITY RATIO: 0.58
BSA FOR ECHO PROCEDURE: 2.03 M2
CV ECHO LV RWT: 0.28 CM
DOP CALC AO PEAK VEL: 1.2 M/S
DOP CALC AO VTI: 25.9 CM
DOP CALC LVOT AREA: 3.1 CM2
DOP CALC LVOT DIAMETER: 2 CM
DOP CALC LVOT PEAK VEL: 0.7 M/S
DOP CALC LVOT STROKE VOLUME: 44.9 CM3
DOP CALC MV VTI: 27.6 CM
DOP CALCLVOT PEAK VEL VTI: 14.3 CM
E WAVE DECELERATION TIME: 190 MSEC
E/A RATIO: 0.93
E/E' RATIO: 12 M/S
ECHO LV POSTERIOR WALL: 0.8 CM (ref 0.6–1.1)
FRACTIONAL SHORTENING: 15.5 % (ref 28–44)
INTERVENTRICULAR SEPTUM: 0.9 CM (ref 0.6–1.1)
IVC DIAMETER: 1.51 CM
IVRT: 91 MSEC
LEFT ATRIUM AREA SYSTOLIC (APICAL 2 CHAMBER): 23.81 CM2
LEFT ATRIUM AREA SYSTOLIC (APICAL 4 CHAMBER): 29.02 CM2
LEFT ATRIUM VOLUME INDEX MOD: 45 ML/M2
LEFT ATRIUM VOLUME MOD: 89 ML
LEFT INTERNAL DIMENSION IN SYSTOLE: 4.9 CM (ref 2.1–4)
LEFT VENTRICLE DIASTOLIC VOLUME INDEX: 83.92 ML/M2
LEFT VENTRICLE DIASTOLIC VOLUME: 167 ML
LEFT VENTRICLE END DIASTOLIC VOLUME APICAL 2 CHAMBER: 64.35 ML
LEFT VENTRICLE END DIASTOLIC VOLUME APICAL 4 CHAMBER: 95.75 ML
LEFT VENTRICLE END SYSTOLIC VOLUME APICAL 2 CHAMBER: 72.87 ML
LEFT VENTRICLE END SYSTOLIC VOLUME APICAL 4 CHAMBER: 105.65 ML
LEFT VENTRICLE MASS INDEX: 95.1 G/M2
LEFT VENTRICLE SYSTOLIC VOLUME INDEX: 56.8 ML/M2
LEFT VENTRICLE SYSTOLIC VOLUME: 113 ML
LEFT VENTRICULAR INTERNAL DIMENSION IN DIASTOLE: 5.8 CM (ref 3.5–6)
LEFT VENTRICULAR MASS: 189.3 G
LV LATERAL E/E' RATIO: 10.6 M/S
LV SEPTAL E/E' RATIO: 14.8 M/S
LVED V (TEICH): 166.94 ML
LVES V (TEICH): 112.78 ML
LVOT MG: 0.8 MMHG
LVOT MV: 0.42 CM/S
MV A" WAVE DURATION": 110.37 MSEC
MV MEAN GRADIENT: 2 MMHG
MV PEAK A VEL: 0.8 M/S
MV PEAK E VEL: 0.74 M/S
MV PEAK GRADIENT: 4 MMHG
MV STENOSIS PRESSURE HALF TIME: 55.16 MS
MV VALVE AREA BY CONTINUITY EQUATION: 1.63 CM2
MV VALVE AREA P 1/2 METHOD: 3.99 CM2
OHS CV RV/LV RATIO: 0.6 CM
OHS LV EJECTION FRACTION SIMPSONS BIPLANE MOD: 32 %
PISA MRMAX VEL: 4.24 M/S
PISA TR MAX VEL: 2.9 M/S
PV PEAK GRADIENT: 2 MMHG
PV PEAK VELOCITY: 0.79 M/S
RA PRESSURE ESTIMATED: 3 MMHG
RA VOL SYS: 34.56 ML
RIGHT ATRIAL AREA: 14.1 CM2
RIGHT ATRIUM VOLUME AREA LENGTH APICAL 4 CHAMBER: 33.61 ML
RIGHT VENTRICLE DIASTOLIC BASEL DIMENSION: 3.5 CM
RV TB RVSP: 6 MMHG
RV TISSUE DOPPLER FREE WALL SYSTOLIC VELOCITY 1 (APICAL 4 CHAMBER VIEW): 10.15 CM/S
SINUS: 2.54 CM
STJ: 2.5 CM
TDI LATERAL: 0.07 M/S
TDI SEPTAL: 0.05 M/S
TDI: 0.06 M/S
TR MAX PG: 34 MMHG
TRICUSPID ANNULAR PLANE SYSTOLIC EXCURSION: 1.8 CM
TV REST PULMONARY ARTERY PRESSURE: 37 MMHG
Z-SCORE OF LEFT VENTRICULAR DIMENSION IN END DIASTOLE: 0.05
Z-SCORE OF LEFT VENTRICULAR DIMENSION IN END SYSTOLE: 2.56

## 2025-05-04 NOTE — PROGRESS NOTES
[facility] Cardiology 701     SUBJECTIVE:     History of Present Illness:  Patient is a 75 y.o. female presents with congestive heart failure. Feels great .     Primary Diagnosis:   1. Hypertension  2. DM: positive  3. Nonsmoker  4. Family history of early CAD: son  of congestive heart failure;   5. Chronic systolic heart failure   ROS  Since last visit :feeling better   A. Breathing is better; no PND or orthopnea  B. No shortness of breath at rest  C. No palpitations  D. One month ago, blood pressure went down lower than 90 and passed out. None since   E. Activity: much improved; walking in store; pushing the lawnmower to cut grass without issues; exercises ; walked all over  and did well; goes to gym 4 days a week with no issues   F.  blood sugars are markedly improved  G.  blood pressures at home: normal; sometimes as low as 100's ; in spite of lowering the entresto, blood pressure runs in the 90's and feels weak. Dizziness has improved with change in metoprolol succinate     Review of patient's allergies indicates:  No Known Allergies    Past Medical History:   Diagnosis Date    Arthritis     CHF (congestive heart failure)     Diabetes mellitus     does she will take it I want to you both of     Hypertension     Kidney stone        Past Surgical History:   Procedure Laterality Date    COLONOSCOPY  2013    dr ram - 5 years    FOOT SURGERY Right     bone spur    FOOT SURGERY Left     fx ankle    HYSTERECTOMY      age 47    KNEE SURGERY Left 06/10/2016    TKR    OOPHORECTOMY      TONSILLECTOMY         Family History   Problem Relation Name Age of Onset    Heart attack Mother age 50's     Seizures Mother age 50's         fell in bathtub and drowned    Colon cancer Sister      Breast cancer Sister         Social History     Tobacco Use    Smoking status: Never     Passive exposure: Never    Smokeless tobacco: Never   Substance Use Topics    Alcohol use: No     Alcohol/week: 0.0  standard drinks of alcohol    Drug use: No        Past Hospitalization:   5/13/22: diuresed and sent home   11/24: shortness of breath; pneumonia;   Home meds:  Current Outpatient Medications on File Prior to Visit   Medication Sig Dispense Refill    albuterol (VENTOLIN HFA) 90 mcg/actuation inhaler Inhale 2 puffs into the lungs every 6 (six) hours as needed for Wheezing. Rescue 18 g 1    amitriptyline (ELAVIL) 10 MG tablet TAKE ONE TO THREE TABLETS BY MOUTH AT BEDTIME FOR  HEADACHE 90 tablet 3    ascorbic acid, vitamin C, (VITAMIN C) 500 MG tablet Take 500 mg by mouth once daily.      atorvastatin (LIPITOR) 40 MG tablet Take 1 tablet (40 mg total) by mouth once daily. 90 tablet 3    blood sugar diagnostic (BLOOD GLUCOSE TEST) Strp Strips for 3 times a day testing   dispense brand covered by insurance and to match meter brand 300 each 3    busPIRone (BUSPAR) 5 MG Tab Take 1 tablet (5 mg total) by mouth 2 (two) times daily. For anxiety 180 tablet 3    celecoxib (CELEBREX) 200 MG capsule Take 200 mg by mouth once daily.      diabetic supplies, miscellan. Misc Lancets for 3 times a day testing    dispense brand covered by insurance t 300 each 3    diclofenac sodium (VOLTAREN) 1 % Gel Apply 2 g topically 3 (three) times daily. 1 Tube 3    empagliflozin (JARDIANCE) 10 mg tablet Take 1 tablet (10 mg total) by mouth once daily. 90 tablet 1    EScitalopram oxalate (LEXAPRO) 10 MG tablet Take 1 tablet (10 mg total) by mouth once daily. 90 tablet 3    ferrous sulfate 324 mg (65 mg iron) TbEC Take 1 tablet (324 mg total) by mouth daily as needed. 90 tablet 3    furosemide (LASIX) 40 MG tablet Take 1 tablet by mouth once daily 90 tablet 3    gabapentin (NEURONTIN) 300 MG capsule Take 300 mg by mouth 3 (three) times daily.      glimepiride (AMARYL) 2 MG tablet Take 1 tablet (2 mg total) by mouth 2 (two) times daily. Take 2 mg by mouth 2 (two) times daily. 180 tablet 3    hyoscyamine (ANASPAZ,LEVSIN) 0.125 mg Tab Take 1 tablet  "(125 mcg total) by mouth every 6 (six) hours as needed (P.r.n. bladder spasm). 25 tablet 0    JANUMET  mg per tablet Take 1 tablet by mouth 2 (two) times daily.      metoprolol succinate (TOPROL-XL) 25 MG 24 hr tablet Take 2 tablets (50 mg total) by mouth once daily. 90 tablet 3    MULTIVIT/IRON/FA/K/HERB NO.244 (ALIVE WOMEN'S ENERGY ORAL) Take 1 tablet by mouth daily as needed.       neomycin-polymyxin-dexamethasone (DEXACINE) 3.5 mg/g-10,000 unit/g-0.1 % Oint APPLY 1/4 INCH OF OINTMENT ON EYELID EVERY NIGHT AT BEDTIME      oxyCODONE-acetaminophen (PERCOCET)  mg per tablet       sacubitriL-valsartan (ENTRESTO) 24-26 mg per tablet Take 1 tablet by mouth 2 (two) times daily. 180 tablet 3    spironolactone (ALDACTONE) 25 MG tablet Take 1 tablet (25 mg total) by mouth once daily. 90 tablet 1    tamsulosin (FLOMAX) 0.4 mg Cap Take 1 capsule (0.4 mg total) by mouth once daily. 90 capsule 3    vitamin D (VITAMIN D3) 1000 units Tab Take 1,000 Units by mouth once daily.       No current facility-administered medications on file prior to visit.       Cardiac meds:  Atorvastatin 40 mg     entresto 24/26 mg BID  Lasix 40 mg  Metoprolol succinate 25 mg   Aldactone 25 mg   jardiance 10 mg       OBJECTIVE:     Vital Signs (Most Recent)  Vitals:    05/06/25 1039   BP: (!) 106/45   Pulse: 75   SpO2: 98%   Weight: 89.8 kg (197 lb 13.8 oz)   Height: 5' 7" (1.702 m)                       Physical Exam:    Neck: normal carotids, no bruits; normal JVP  Lungs :clear  Heart: RR, normal S1,S2, no murmurs, no gallops  Abd: no masses; no bruits;   Exts: normal DP and PT pulses bilaterally, no edema noted           LABS    5/22: GFR normal; K normal ; LDL 81  2/23: CBS: microcytic anemia; GFR >60; lipid panel OK with low HDL , A1c: 8.9  3/24: A1c 8.8, TSH low but new ; lipids great; GFR normal; LFT's normal   11/24: GFR normal; LFT's normal   1/25: A1c 7.0, GFR 59, LFT"s normal;   2/25: A1c 7.3, GFR 54; K normal LFT's normal "   Diagnostic Results:    1.EK22: NSR, low voltage; nonspecific T wave changes   1b. EKG: 3/24: sinus; IVCD, PVC  2.Echo: : EF 25%; global hypokinesis; LAE, moderate MR, mild TR   2b.Echo: : EF 30 %; mild MR, LAE   2c. Echo: : EF 20-25%; mild TR   2d. Echo: : EF 30-35%;RV normal; aortic sclerosis; moderate MR,   3.MUGA: : EF 45%   4.Carotid US: : no significant stenosis   Chart review:      Cath per patient at [facility]: no blockages ? Date   ASSESSMENT/PLAN:     1. Cardiomyopathy: why? Nonischemic per patient - is this burned out hypertension? Diabetes? Idiopathic familial? - marked improvement in symptoms ; recent EF 30-35% no need for ICD  2. Diabetic neuropathy  3. DM still not controlled;   Plan: 1.continue the same medications  2. Increase jardiance 25 mg - let primary know - for better DM control   3. Return 4 months   Jeffery Yadav MD

## 2025-05-06 ENCOUNTER — OFFICE VISIT (OUTPATIENT)
Dept: CARDIOLOGY | Facility: CLINIC | Age: 75
End: 2025-05-06
Payer: MEDICARE

## 2025-05-06 VITALS
DIASTOLIC BLOOD PRESSURE: 45 MMHG | WEIGHT: 197.88 LBS | HEART RATE: 75 BPM | HEIGHT: 67 IN | BODY MASS INDEX: 31.06 KG/M2 | SYSTOLIC BLOOD PRESSURE: 106 MMHG | OXYGEN SATURATION: 98 %

## 2025-05-06 DIAGNOSIS — R60.9 EDEMA, UNSPECIFIED TYPE: ICD-10-CM

## 2025-05-06 DIAGNOSIS — I10 ESSENTIAL HYPERTENSION: ICD-10-CM

## 2025-05-06 DIAGNOSIS — E11.9 TYPE 2 DIABETES MELLITUS WITHOUT COMPLICATION, WITHOUT LONG-TERM CURRENT USE OF INSULIN: ICD-10-CM

## 2025-05-06 DIAGNOSIS — I50.22 CHRONIC SYSTOLIC HEART FAILURE: Primary | ICD-10-CM

## 2025-05-06 PROCEDURE — 4010F ACE/ARB THERAPY RXD/TAKEN: CPT | Mod: CPTII,S$GLB,, | Performed by: INTERNAL MEDICINE

## 2025-05-06 PROCEDURE — 3078F DIAST BP <80 MM HG: CPT | Mod: CPTII,S$GLB,, | Performed by: INTERNAL MEDICINE

## 2025-05-06 PROCEDURE — 1125F AMNT PAIN NOTED PAIN PRSNT: CPT | Mod: CPTII,S$GLB,, | Performed by: INTERNAL MEDICINE

## 2025-05-06 PROCEDURE — 3008F BODY MASS INDEX DOCD: CPT | Mod: CPTII,S$GLB,, | Performed by: INTERNAL MEDICINE

## 2025-05-06 PROCEDURE — 3066F NEPHROPATHY DOC TX: CPT | Mod: CPTII,S$GLB,, | Performed by: INTERNAL MEDICINE

## 2025-05-06 PROCEDURE — 1160F RVW MEDS BY RX/DR IN RCRD: CPT | Mod: CPTII,S$GLB,, | Performed by: INTERNAL MEDICINE

## 2025-05-06 PROCEDURE — 1159F MED LIST DOCD IN RCRD: CPT | Mod: CPTII,S$GLB,, | Performed by: INTERNAL MEDICINE

## 2025-05-06 PROCEDURE — 99214 OFFICE O/P EST MOD 30 MIN: CPT | Mod: S$GLB,,, | Performed by: INTERNAL MEDICINE

## 2025-05-06 PROCEDURE — 3060F POS MICROALBUMINURIA REV: CPT | Mod: CPTII,S$GLB,, | Performed by: INTERNAL MEDICINE

## 2025-05-06 PROCEDURE — 3051F HG A1C>EQUAL 7.0%<8.0%: CPT | Mod: CPTII,S$GLB,, | Performed by: INTERNAL MEDICINE

## 2025-05-06 PROCEDURE — 1101F PT FALLS ASSESS-DOCD LE1/YR: CPT | Mod: CPTII,S$GLB,, | Performed by: INTERNAL MEDICINE

## 2025-05-06 PROCEDURE — 3288F FALL RISK ASSESSMENT DOCD: CPT | Mod: CPTII,S$GLB,, | Performed by: INTERNAL MEDICINE

## 2025-05-06 PROCEDURE — 3074F SYST BP LT 130 MM HG: CPT | Mod: CPTII,S$GLB,, | Performed by: INTERNAL MEDICINE

## 2025-05-06 PROCEDURE — 99999 PR PBB SHADOW E&M-EST. PATIENT-LVL IV: CPT | Mod: PBBFAC,,, | Performed by: INTERNAL MEDICINE

## 2025-05-06 RX ORDER — SPIRONOLACTONE 25 MG/1
25 TABLET ORAL DAILY
Qty: 90 TABLET | Refills: 1 | Status: SHIPPED | OUTPATIENT
Start: 2025-05-06 | End: 2026-05-06

## 2025-05-06 RX ORDER — METOPROLOL SUCCINATE 25 MG/1
25 TABLET, EXTENDED RELEASE ORAL DAILY
Start: 2025-05-06 | End: 2026-05-06

## 2025-05-06 RX ORDER — FUROSEMIDE 40 MG/1
TABLET ORAL
Qty: 90 TABLET | Refills: 3 | Status: SHIPPED | OUTPATIENT
Start: 2025-05-06

## 2025-05-06 RX ORDER — SACUBITRIL AND VALSARTAN 24; 26 MG/1; MG/1
1 TABLET, FILM COATED ORAL 2 TIMES DAILY
Qty: 180 TABLET | Refills: 3 | Status: SHIPPED | OUTPATIENT
Start: 2025-05-06

## 2025-05-06 RX ORDER — ATORVASTATIN CALCIUM 40 MG/1
40 TABLET, FILM COATED ORAL DAILY
Qty: 90 TABLET | Refills: 3 | Status: SHIPPED | OUTPATIENT
Start: 2025-05-06 | End: 2026-05-06

## 2025-05-22 ENCOUNTER — LAB VISIT (OUTPATIENT)
Dept: LAB | Facility: HOSPITAL | Age: 75
End: 2025-05-22
Attending: INTERNAL MEDICINE
Payer: MEDICARE

## 2025-05-22 DIAGNOSIS — K86.9 DISEASE OF PANCREAS: ICD-10-CM

## 2025-05-22 DIAGNOSIS — E11.65 INADEQUATELY CONTROLLED DIABETES MELLITUS: Primary | ICD-10-CM

## 2025-05-22 LAB
ALBUMIN SERPL BCP-MCNC: 4.2 G/DL (ref 3.5–5.2)
ALP SERPL-CCNC: 63 UNIT/L (ref 38–126)
ALT SERPL W/O P-5'-P-CCNC: 18 UNIT/L (ref 10–44)
AMYLASE SERPL-CCNC: 133 UNIT/L (ref 30–110)
ANION GAP (OHS): 12 MMOL/L (ref 8–16)
AST SERPL-CCNC: 31 UNIT/L (ref 15–46)
BILIRUB SERPL-MCNC: 0.6 MG/DL (ref 0.1–1)
BUN SERPL-MCNC: 29 MG/DL (ref 7–17)
CALCIUM SERPL-MCNC: 9.5 MG/DL (ref 8.7–10.5)
CHLORIDE SERPL-SCNC: 101 MMOL/L (ref 95–110)
CO2 SERPL-SCNC: 28 MMOL/L (ref 23–29)
CREAT SERPL-MCNC: 1.2 MG/DL (ref 0.5–1.4)
EAG (OHS): 171 MG/DL (ref 68–131)
GFR SERPLBLD CREATININE-BSD FMLA CKD-EPI: 48 ML/MIN/1.73/M2
GLUCOSE SERPL-MCNC: 164 MG/DL (ref 70–110)
HBA1C MFR BLD: 7.6 % (ref 4–5.6)
LIPASE SERPL-CCNC: 389 U/L (ref 23–300)
POTASSIUM SERPL-SCNC: 4.6 MMOL/L (ref 3.5–5.1)
PROT SERPL-MCNC: 7.5 GM/DL (ref 6–8.4)
SODIUM SERPL-SCNC: 141 MMOL/L (ref 136–145)

## 2025-05-22 PROCEDURE — 36415 COLL VENOUS BLD VENIPUNCTURE: CPT | Mod: PN

## 2025-05-22 PROCEDURE — 83690 ASSAY OF LIPASE: CPT | Mod: PN

## 2025-05-22 PROCEDURE — 83036 HEMOGLOBIN GLYCOSYLATED A1C: CPT | Mod: PN

## 2025-05-22 PROCEDURE — 82374 ASSAY BLOOD CARBON DIOXIDE: CPT | Mod: PN

## 2025-05-22 PROCEDURE — 82150 ASSAY OF AMYLASE: CPT | Mod: PN

## 2025-05-31 ENCOUNTER — TELEPHONE (OUTPATIENT)
Dept: ENDOSCOPY | Facility: HOSPITAL | Age: 75
End: 2025-05-31

## 2025-05-31 ENCOUNTER — CLINICAL SUPPORT (OUTPATIENT)
Dept: ENDOSCOPY | Facility: HOSPITAL | Age: 75
End: 2025-05-31
Payer: MEDICARE

## 2025-05-31 VITALS — HEIGHT: 67 IN | BODY MASS INDEX: 30.92 KG/M2 | WEIGHT: 197 LBS

## 2025-05-31 DIAGNOSIS — Z12.11 SCREENING FOR MALIGNANT NEOPLASM OF COLON: ICD-10-CM

## 2025-05-31 DIAGNOSIS — Z12.11 SPECIAL SCREENING FOR MALIGNANT NEOPLASMS, COLON: Primary | ICD-10-CM

## 2025-05-31 NOTE — PLAN OF CARE
Patient is scheduled for a Colonoscopy on 06/30/2025 with Dr. TYLOR Nesbitt  Referral for procedure from PAT appointment

## 2025-05-31 NOTE — PATIENT INSTRUCTIONS
Colonoscopy Procedure Prep Instructions    Date of procedure: 06/30/2025 Arrive at: 11:00 AM    Location of Department:   [facility] [address] [place] [address]   Stop in the hospital lobby on the 1st floor to get registered, then take the hospital elevators to the 2nd floor waiting room    As soon as possible:   your prep from pharmacy and over the counter DULCOLAX LAXATIVE TABLETS            On the day before your procedure   What You CAN do:   You may have clear liquids ONLY-see below for list.     Liquids That Are OK to Drink:   Water  Sports drinks (Gatorade, Power-Aid)  Coffee or tea (no cream or nondairy creamer)  Clear juices without pulp (apple, white grape)  Gelatin desserts (no fruit or toppings)  Clear soda (sprite, coke, ginger ale)  Chicken broth (until 12 midnight the night before procedure)    What You CANNOT do:   Do not EAT solid food, drink milk or anything   colored red.  Do not drink alcohol.  Do not take oral medications within 1 hour of starting   each dose of prep.  No gum chewing or candy morning of procedure                       Note:   (Please disregard the insert instructions from pharmacy).  PEG Bowel Prep is indicated for cleansing of the colon as a preparation for colonoscopy in adults.   Be sure to tell your doctor about all the medicines you take, including prescription and non-prescription medicines, vitamins, and herbal supplements. PEG Bowel Prep may affect how other medicines work.  Medication taken by mouth may not be absorbed properly when taken within 1 hour before the start of each dose of PEG Bowel Prep.    It is not uncommon to experience some abdominal cramping, nausea and/or vomiting when taking the prep. If you have nausea and/or vomiting while taking the prep, stop drinking for 20 to 30 minutes then continue.      How to take prep:    PEG Bowel Prep is a (2-day) prep.    One (1) bottle of prep are required for a complete preparation  for colonoscopy. Dilute the solution concentrate as directed prior to use. You must drink water with each dose of prep, and additional water after each dose.    DOSE 1--Day Before Colonoscopy 06/29/2025     Drink at least 6 to 8 glasses of clear liquids from time you wake up until you begin your prep and then continue until bedtime to avoid dehydration.     12:00 pm (NOON) Mix your entire container of prep with lukewarm water and refrigerate. Take four (4) Dulcolax (Bisacodyl) tablets with at least 8 ounces or more of clear liquids.       6:00 pm:    You must complete Steps 1 and 2 below before going to bed:    Step 1-Drink half the liquid in the container within one (1) hour.   Step 2-Refrigerate the remaining half of the liquid for dose 2. See below when to begin this step.                       IMPORTANT: If you experience preparation-related symptoms (for example, nausea, bloating, or cramping), stop, or slow the rate of drinking the additional water until your symptoms decrease.    DOSE 2--Day of the Colonoscopy 06/30/2025 at 2-3 AM.    For this dose, repeat Step 1 shown above using the remaining half of the liquid prep.   You may continue drinking water/clear liquids until   4 hours before your colonoscopy or as directed by the scheduling nurse  08:00 AM.      For information about your procedure, two (2) things to view prior to colonoscopy:  Please watch this informational video. It is important to watch this animated consent video prior to your arrival. If you haven't watched the video prior to arriving, you are required to watch it during admission which can causes delays.    Options for viewing:   Using a keyboard:  press and hold the control tab (Ctrl) and left mouse click to follow links.           Colonoscopy Instructional Video                                                                                   OR    Type link address into your web browser's address  bar:  [email or web address]      Educational Booklet with pictures:      Colonoscopy Prep - Liquid      Comments:        IMPORTANT INFORMATION TO KNOW BEFORE YOUR PROCEDURE    [facility] 2nd Floor         If your procedure requires the administration of anesthesia, it is necessary for a responsible adult to drive you home. (Medical Transportation, Uber, Lyft, Taxi, etc. may ONLY be used if a responsible adult is present to accompany you home.  The responsible adult CAN'T be the  of the service).      person must be available to return to pick you up within 15 minutes of being notified of discharge.       Please bring a picture ID, insurance card, & copayment      Take Medications as directed below:              If you are taking the oral medication(s):   Invokana (Canagliflozin), Farxiga (Dapagliflozin), Jardiance (Empagliflozin), Invokamet/Invokamet XR (invokana +metformin), Xigduo (farxiga + metformin), Synjardy XR (jardiance + metformin), hold 3 days prior to your procedure, please stop taking on 6/27/2025.       If you begin taking any blood thinning medications, injectable weight loss/diabetes medications (other than insulin) , Adipex (Phentermine) , please contact the endoscopy scheduling department listed below as soon as possible.    If you are diabetic see the attached instruction sheet regarding your medication.     If you take HEART, BLOOD PRESSURE, SEIZURE, PAIN, LUNG (including inhalers/nebulizers), ANTI-REJECTION (transplant patients), or PSYCHIATRIC medications, please take at your regular times with a sip of water or as directed by the scheduling nurse.     Important contact information:    Endoscopy [phone] Hours of operation Monday-Friday 8:00-4:30pm.    Questions about insurance or financial obligations call [phone] or [phone].    If you have questions regarding the prep or need to reschedule, please call  [phone]. After hours questions requiring immediate assistance, contact [facility] nurse line at [phone] or [phone].   NOTE:     On occasion, unforeseen circumstances may cause a delay in your procedure start time. We respect your time and appreciate your patience during these circumstances.      Comments:     If you are unsure about any of these instructions, call [facility] at [phone].                                 Oral Medicine Week of Procedure Day of Prep   Day of Procedure            Glyburide       Do NOT take morning of procedure. If you        Glucotrol (Glipizide)   Do NOT take.   take twice daily, take with dinner.        Amaryl (Glimepiride)                                     Glucophage       Do NOT take morning of procedure. Take        Glumetza   Take as   when you start eating again.          Fortamet (Metformin)   prescribed.                                 Januvia (Sitaglipitin)       Do NOT take morning of procedure. Take        Nesina (Aloglipitin)       when you start eating again.          Onglyza (Saxaglipitin)   Take as                  Tradjenta (Linaglipitin)   prescribed.                                 Invokana (Canagliflozin)                      Farxiga (Dapagliflozin)       Do NOT take morning of procedure. Take        Jardiance(Empagliflozin) STOP 2 days before you start prep Do NOT take   when you start eating again.                         Actos (Pioglitazone)   Take as   Do NOT take morning of procedure. Take            prescribed.   when you start eating again.                          Injectable & Combination   Daily Dose   Weekly Dose            Medicine                      Adlyxin (Lixisenatide)   If you take these   For weekly dose, hold dose at least 8 days prior      Byetta (Exenatide)   medications daily   to appointment. You may take the dose after your      Bydureon (Exenatide XL)   on the day of your   procedure.            Ozempic  (Semaglutide)   appointment hold                   Trulicity (Dulaglutide)   that dose until                   Victoza (Liraglutide)   after your                  Mounjaro (Tirzepatide)   procedure.                  Denzel Owens                      It is important to monitor your blood sugar while doing the bowel preparation. On the day of your bowel prep, when you are on a clear liquid diet, you may drink beverages with sugar as your source of glucose. Be sure to mix the prep with water or sugar free liquid only. Below are instructions on how to adjust your diabetic medications prior to your scheduled procedure. Call the healthcare provider who manages your diabetes if you have questions.      Insulin for Type 1 Diabetes Mellitus   Day of Prep                                          Day of procedure            Basaglar If you use in the morning, take as prescribed.        If you take in the morning,          Lantus If you use in the evening, inject 70% of dose.       inject 80% of dose. If you take         Levemir           in the evenings, inject usual          Toujeo           dose.              Cathy Tello Count carbs and adjust dose        Do NOT take morining of procedure.          Apidra accordingly. If not carb counting,        Take when you start eating again.          Humalog 100 take 25% of usual meal dose.                       Humalog 200 May use correction dose every                        Novolog 4 hours. Do NOT use once sugar-free                         liquid prep is started.                                         Insulin Pump SEE SEPARATE PUMP GUIDANCE                                                                                                                       Insulin for Type 2 Diabetes Mellitus   Day of Prep                                          Day of procedure             Basaglar If you use in the morning, take as prescribed.        If you take in the morning,          Lantus If you use in the evening, inject 70% of dose.       inject 80% of dose. If you take         Levemir           in the evenings, inject usual          Toujeo           dose.              Tresiba                                                                     Afrezza Inject 50 % of dose with clear liquid diet.        Do NOT take morning of          Apidra Do NOT use once sugar-free clear liquid prep       procedure. Take when you          Fiasp is started. If you are using a correction scale,        start eating meals again.          Humalog 100 take dose every 4 hours as needed.                        Humalog 200                         Novolog                                           NPH  Inject 50% of breakfast and dinner          Do NOT take morning of           doses with clear liquid diet.          procedure. Take usual dose                     when you eat dinner.                            Regular  Inject 50% of breakfast dose and do NOT take       Do NOT take morning of            dinner dose once sugar-free liquid prep has        procedure. Take usual dose            started. If using correction scale, may take dose       when you eat dinner.            every 6 hours as needed.                                          Novolog Mix 70/30 Inject 50% of breakfast dose. Inject 25%       Do NOT take breakfast dose.          Humolog Mix 75/25 of dinner dose.          Take usual dose when you eat          Humolog Mix 50/50           dinner.                                U500 Take 50% of AM or breakfast unit jeanine dose.       Do NOT take mornining of            Take 25% of lunch or dinner or PM unit jeanine dose.        procedure. Take when you                      start eating again.                              V-Go  Continue to wear your V-Go device. Do NOT click        Coninue to wear your V-Go            once sugar-free clear liquid prep is started.        device. Resume clicks with                      meals.                                                  Revised 3.4.24

## 2025-06-03 ENCOUNTER — HOSPITAL ENCOUNTER (OUTPATIENT)
Dept: RADIOLOGY | Facility: HOSPITAL | Age: 75
Discharge: HOME | End: 2025-06-03
Attending: INTERNAL MEDICINE
Payer: MEDICARE

## 2025-06-03 DIAGNOSIS — K85.90 ACUTE PANCREATITIS: ICD-10-CM

## 2025-06-03 PROCEDURE — 25500020 PHARM REV CODE 255: Mod: PN | Performed by: INTERNAL MEDICINE

## 2025-06-03 PROCEDURE — 74160 CT ABDOMEN W/CONTRAST: CPT | Mod: 26,,, | Performed by: STUDENT IN AN ORGANIZED HEALTH CARE EDUCATION/TRAINING PROGRAM

## 2025-06-03 PROCEDURE — 74160 CT ABDOMEN W/CONTRAST: CPT | Mod: TC,PN

## 2025-06-03 RX ADMIN — IOHEXOL 100 ML: 350 INJECTION, SOLUTION INTRAVENOUS at 07:06

## 2025-06-11 ENCOUNTER — PATIENT MESSAGE (OUTPATIENT)
Dept: ADMINISTRATIVE | Facility: CLINIC | Age: 75
End: 2025-06-11
Payer: MEDICARE

## 2025-06-13 ENCOUNTER — TELEPHONE (OUTPATIENT)
Dept: ADMINISTRATIVE | Facility: CLINIC | Age: 75
End: 2025-06-13
Payer: MEDICARE

## 2025-06-24 ENCOUNTER — TELEPHONE (OUTPATIENT)
Dept: ENDOSCOPY | Facility: HOSPITAL | Age: 75
End: 2025-06-24
Payer: MEDICARE

## 2025-06-24 NOTE — TELEPHONE ENCOUNTER
Spoke with patient about arrival time @ 1100.   Colonoscopy confirmed  Stop Jardiance 06/27, 28th and 29th, verbalized understanding.  NPO at 0800.

## 2025-06-30 ENCOUNTER — ANESTHESIA EVENT (OUTPATIENT)
Dept: ENDOSCOPY | Facility: HOSPITAL | Age: 75
End: 2025-06-30
Payer: MEDICARE

## 2025-06-30 ENCOUNTER — ANESTHESIA (OUTPATIENT)
Dept: ENDOSCOPY | Facility: HOSPITAL | Age: 75
End: 2025-06-30
Payer: MEDICARE

## 2025-06-30 ENCOUNTER — HOSPITAL ENCOUNTER (OUTPATIENT)
Facility: HOSPITAL | Age: 75
Discharge: HOME | End: 2025-06-30
Attending: INTERNAL MEDICINE | Admitting: INTERNAL MEDICINE
Payer: MEDICARE

## 2025-06-30 VITALS
SYSTOLIC BLOOD PRESSURE: 139 MMHG | OXYGEN SATURATION: 99 % | HEART RATE: 77 BPM | DIASTOLIC BLOOD PRESSURE: 68 MMHG | BODY MASS INDEX: 26.63 KG/M2 | RESPIRATION RATE: 17 BRPM | TEMPERATURE: 98 F | HEIGHT: 70 IN | WEIGHT: 186 LBS

## 2025-06-30 DIAGNOSIS — Z12.11 SCREENING FOR MALIGNANT NEOPLASM OF COLON: ICD-10-CM

## 2025-06-30 DIAGNOSIS — Z12.11 COLON CANCER SCREENING: ICD-10-CM

## 2025-06-30 LAB — POCT GLUCOSE: 197 MG/DL (ref 70–110)

## 2025-06-30 PROCEDURE — 27201089 HC SNARE, DISP (ANY): Performed by: INTERNAL MEDICINE

## 2025-06-30 PROCEDURE — 45385 COLONOSCOPY W/LESION REMOVAL: CPT | Mod: PT,,, | Performed by: INTERNAL MEDICINE

## 2025-06-30 PROCEDURE — 37000008 HC ANESTHESIA 1ST 15 MINUTES: Performed by: INTERNAL MEDICINE

## 2025-06-30 PROCEDURE — 37000009 HC ANESTHESIA EA ADD 15 MINS: Performed by: INTERNAL MEDICINE

## 2025-06-30 PROCEDURE — 25000003 PHARM REV CODE 250

## 2025-06-30 PROCEDURE — 63600175 PHARM REV CODE 636 W HCPCS

## 2025-06-30 PROCEDURE — 45385 COLONOSCOPY W/LESION REMOVAL: CPT | Mod: PT | Performed by: INTERNAL MEDICINE

## 2025-06-30 PROCEDURE — 88305 TISSUE EXAM BY PATHOLOGIST: CPT | Mod: TC | Performed by: INTERNAL MEDICINE

## 2025-06-30 RX ORDER — SODIUM CHLORIDE 9 MG/ML
INJECTION, SOLUTION INTRAVENOUS CONTINUOUS
Status: DISCONTINUED | OUTPATIENT
Start: 2025-06-30 | End: 2025-06-30 | Stop reason: HOSPADM

## 2025-06-30 RX ORDER — PROPOFOL 10 MG/ML
VIAL (ML) INTRAVENOUS
Status: DISCONTINUED | OUTPATIENT
Start: 2025-06-30 | End: 2025-06-30

## 2025-06-30 RX ORDER — PHENYLEPHRINE HYDROCHLORIDE 10 MG/ML
INJECTION INTRAVENOUS
Status: DISCONTINUED | OUTPATIENT
Start: 2025-06-30 | End: 2025-06-30

## 2025-06-30 RX ORDER — PROPOFOL 10 MG/ML
VIAL (ML) INTRAVENOUS CONTINUOUS PRN
Status: DISCONTINUED | OUTPATIENT
Start: 2025-06-30 | End: 2025-06-30

## 2025-06-30 RX ORDER — ETOMIDATE 2 MG/ML
INJECTION INTRAVENOUS
Status: DISCONTINUED | OUTPATIENT
Start: 2025-06-30 | End: 2025-06-30

## 2025-06-30 RX ADMIN — PROPOFOL 20 MG: 10 INJECTION, EMULSION INTRAVENOUS at 12:06

## 2025-06-30 RX ADMIN — ETOMIDATE 6 MG: 2 INJECTION, SOLUTION INTRAVENOUS at 12:06

## 2025-06-30 RX ADMIN — GLYCOPYRROLATE 0.2 MG: 0.2 INJECTION, SOLUTION INTRAMUSCULAR; INTRAVITREAL at 12:06

## 2025-06-30 RX ADMIN — PHENYLEPHRINE HYDROCHLORIDE 50 MCG: 10 INJECTION INTRAVENOUS at 12:06

## 2025-06-30 RX ADMIN — SODIUM CHLORIDE: 0.9 INJECTION, SOLUTION INTRAVENOUS at 12:06

## 2025-06-30 RX ADMIN — PROPOFOL 50 MCG/KG/MIN: 10 INJECTION, EMULSION INTRAVENOUS at 12:06

## 2025-06-30 NOTE — ANESTHESIA POSTPROCEDURE EVALUATION
Anesthesia Post Evaluation    Patient: Katherine Rising    Procedure(s) Performed: Procedure(s) (LRB):  COLONOSCOPY, SCREENING, LOW RISK PATIENT (N/A)    Final Anesthesia Type: general      Patient location during evaluation: PACU  Patient participation: Yes- Able to Participate  Level of consciousness: awake  Post-procedure vital signs: reviewed and stable  Pain management: adequate  Airway patency: patent    PONV status at discharge: No PONV  Anesthetic complications: no      Cardiovascular status: blood pressure returned to baseline  Respiratory status: unassisted  Hydration status: euvolemic  Follow-up not needed.              Vitals Value Taken Time   /68 06/30/25 13:00   Temp 36.8 °C (98.2 °F) 06/30/25 12:30   Pulse 77 06/30/25 13:00   Resp 17 06/30/25 13:00   SpO2 99 % 06/30/25 13:00         No case tracking events are documented in the log.      Pain/Harry Score: Harry Score: 10 (6/30/2025 12:30 PM)

## 2025-06-30 NOTE — ANESTHESIA PREPROCEDURE EVALUATION
[facility]  Anesthesia Pre-Operative Evaluation         Patient Name: Katherine Berger  YOB: 1950  MRN: [id number]    SUBJECTIVE:     06/30/2025    Procedure(s) (LRB):  COLONOSCOPY, SCREENING, LOW RISK PATIENT (N/A)    Katherine Berger is a 75 y.o. female here for Procedure(s) (LRB):  COLONOSCOPY, SCREENING, LOW RISK PATIENT (N/A)    Drips:    0.9% NaCl   Intravenous Continuous           Problem List[1]    Review of patient's allergies indicates:  No Known Allergies    Medications Ordered Prior to Encounter[2]    Past Surgical History:   Procedure Laterality Date    COLONOSCOPY  05/23/2013    dr ram - 5 years    FOOT SURGERY Right 2010    bone spur    FOOT SURGERY Left 2010    fx ankle    HYSTERECTOMY      age 47    KNEE SURGERY Left 06/10/2016    TKR    OOPHORECTOMY      TONSILLECTOMY         Social History[3]      OBJECTIVE:     Vital Signs Range (Last 24H):  Temp:  [36.6 °C (97.9 °F)] 36.6 °C (97.9 °F)  Pulse:  [63] 63  Resp:  [20] 20  SpO2:  [97 %] 97 %  BP: (167)/(84) 167/84    Significant Labs:  Lab Results   Component Value Date    WBC 10.34 01/07/2025    HGB 12.8 01/07/2025    HCT 42.0 01/07/2025     01/07/2025    CHOL 110 (L) 10/02/2024    CHOL 109 (L) 10/02/2024    TRIG 98 10/02/2024    TRIG 97 10/02/2024    HDL 33 (L) 10/02/2024    HDL 34 (L) 10/02/2024    ALT 18 05/22/2025    AST 31 05/22/2025     05/22/2025    K 4.6 05/22/2025     05/22/2025    CREATININE 1.2 05/22/2025    BUN 29 (H) 05/22/2025    CO2 28 05/22/2025    TSH 0.266 (L) 10/02/2024    TSH 0.266 (L) 10/02/2024    INR 1.1 11/16/2024    HGBA1C 7.6 (H) 05/22/2025       Diagnostic Studies:    EKG:   Results for orders placed or performed during the hospital encounter of 11/15/24   EKG 12-lead    Collection Time: 11/15/24  9:38 PM   Result Value Ref Range    QRS Duration 108 ms    [facility] QTC Calculation 559 ms    Narrative    Test Reason : R06.02,    Vent. Rate :  77 BPM     Atrial Rate :  46 BPM     P-R Int  ":    ms          QRS Dur : 108 ms      QT Int : 494 ms       P-R-T Axes :    -40 -20 degrees    QTcB Int : 559 ms    Normal sinus rhythm with premature ventricular or aberrantly conducted  complexes  Left axis deviation  Low voltage QRS  Possible Anterolateral infarct ,age undetermined  Abnormal ECG  When compared with ECG of 21-Mar-2024 11:55,    Confirmed by Miles Lozano ([id number]) on 11/18/2024 8:58:16 PM    Referred By: AAAREFERRAL SELF           Confirmed By: Miles Bustos       2D ECHO:  TTE:  No results found for this or any previous visit.  Results for orders placed or performed during the hospital encounter of 04/29/25   Echo   Result Value Ref Range    LA Vol (MOD) 89 mL    LV ESV A2C 72.87 mL    LV EDV A4C 95.75 mL    LA area A4C 29.02 cm2    LA area A2C 23.81 cm2    LV ESV A4C 105.65 mL    LV EDV A2C [id number] mL    Triscuspid Valve Regurgitation Peak Gradient 34 mmHg    RA Vol 34.56 mL    LV Diastolic Volume 167 mL    LV Systolic Volume 113 mL    MV Peak A Yair 0.80 m/s    MV stenosis pressure 1/2 time 55.16 ms    MV VTI 27.6 cm    TR Max Yair 2.9 m/s    MV Peak E Yair 0.74 m/s    MV peak gradient 4 mmHg    Mr max yair 4.24 m/s    Ao VTI 25.9 cm    Ao peak yair 1.2 m/s    LVOT peak VTI 14.3 cm    LVOT peak yair 0.7 m/s    LVOT diameter 2.0 cm    MV "A" wave duration [id number] msec    IVRT 91 msec    E wave deceleration time 190 msec    MV mean gradient 2 mmHg    AV mean gradient 4 mmHg    RA area length vol 33.61 mL    RV- serna basal diam 3.5 cm    A4C EF 35 %    A2C EF 30 %    RA Area 14.1 cm2    RV S' 10.15 cm/s    TAPSE 1.8 cm    Ascending aorta 2.8 cm    STJ 2.5 cm    Sinus 2.54 cm    LVIDs 4.9 (A) 2.1 - 4.0 cm    PW 0.8 0.6 - 1.1 cm    IVS 0.9 0.6 - 1.1 cm    LVIDd 5.8 3.5 - 6.0 cm    PV PEAK VELOCITY 0.79 m/s    TDI LATERAL 0.07 m/s    Left Ventricular Outflow Tract Mean Gradient 0.80 mmHg    Left Ventricular Outflow Tract Mean Velocity 0.42 cm/s    Left Ventricular End " Systolic Volume by Teichholz Method 112.78 mL    Left Ventricular End Diastolic Volume by Teichholz Method 166.94 mL    Nye's Biplane MOD Ejection Fraction 32 %    IVC diameter 1.51 cm    TDI SEPTAL 0.05 m/s    LV LATERAL E/E' RATIO 10.6 m/s    LV SEPTAL E/E' RATIO 14.8 m/s    RV/LV Ratio 0.60 cm    FS 15.5 (A) 28 - 44 %    LV mass 189.3 g    Left Ventricle Relative Wall Thickness 0.28 cm    AV valve area 1.7 cm²    AV Velocity Ratio 0.58     AV index (prosthetic) 0.55     MV valve area p 1/2 method 3.99 cm2    MV valve area by continuity eq 1.63 cm2    PV peak gradient 2 mmHg    E/A ratio 0.93     Mean e' 0.06 m/s    LVOT area 3.1 cm2    LVOT stroke volume 44.9 cm3    AV peak gradient 6 mmHg    E/E' ratio 12 m/s    HANK by Velocity Ratio 1.8 cm²    BSA 2.03 m2    LV Systolic Volume Index 56.8 mL/m2    LV Diastolic Volume Index 83.92 mL/m2    LV Mass Index 95.1 g/m2    SONAM (MOD) 45 mL/m2    ZLVIDS 2.56     ZLVIDD 0.05     TV resting pulmonary artery pressure 37 mmHg    RV TB RVSP 6 mmHg    Est. RA pres 3 mmHg    Narrative      Left Ventricle: The left ventricle is dilated measuring 5.8 cm. Normal   wall thickness. Global hypokinesis present. There is moderately reduced   systolic function with a visually estimated ejection fraction of 30 - 35%.   Grade III diastolic dysfunction.    Right Ventricle: The right ventricle is normal in size. Systolic   function is normal.    Left Atrium: Moderately dilated    Aortic Valve: The aortic valve is a trileaflet valve. There is mild   aortic valve sclerosis.    Mitral Valve: There is mild to moderate regurgitation with a centrally   directed jet.    Tricuspid Valve: There is mild regurgitation.    Pulmonary Artery: There is mild pulmonary hypertension. The estimated   pulmonary artery systolic pressure is 37 mmHg.    IVC/SVC: Normal venous pressure at 3 mmHg.               Pre-op Assessment    I have reviewed the Patient Summary Reports.     I have reviewed the Nursing  Notes. I have reviewed the NPO Status.   I have reviewed the Medications.     Review of Systems  Anesthesia Hx:  No problems with previous Anesthesia   History of prior surgery of interest to airway management or planning:            Denies Personal Hx of Anesthesia complications.                    Social:  Non-Smoker, No Alcohol Use       Cardiovascular:     Hypertension  Denies Valvular problems/Murmurs.  Denies MI.        CHF                Shortness of Breath       Congestive Heart Failure (CHF)                Hypertension         Pulmonary:      Shortness of breath              Pulmonary Infection:  Pneumonia.     Renal/:  Chronic Renal Disease renal calculi       Kidney Function/Disease             Hepatic/GI:      Denies GERD.                Musculoskeletal:  Arthritis        Arthritis          Neurological:  Denies TIA.  Denies CVA. Neuromuscular Disease,   Denies Seizures.        Arthritis                         Neuromuscular Disease   Endocrine:  Diabetes Denies Hypothyroidism.  Denies Hyperthyroidism.  Diabetes                    Denies Obesity / BMI > 30  Psych:  Psychiatric History                  Physical Exam  General: Well nourished, Cooperative, Alert and Oriented    Airway:  Mallampati: III / II  Mouth Opening: Normal  TM Distance: Normal  Tongue: Normal  Neck ROM: Normal ROM    Dental:  Intact        Anesthesia Plan  Type of Anesthesia, risks & benefits discussed:    Anesthesia Type: Gen Natural Airway  Intra-op Monitoring Plan: Standard ASA Monitors  Post Op Pain Control Plan: multimodal analgesia  Induction:  IV  Informed Consent: Informed consent signed with the Patient and all parties understand the risks and agree with anesthesia plan.  All questions answered.   ASA Score: 3  Day of Surgery Review of History & Physical: H&P Update referred to the surgeon/provider.  Anesthesia Plan Notes:      Plan for gentle sedation.     Ready For Surgery From Anesthesia Perspective.     .           [1]    Patient Active Problem List  Diagnosis    Essential hypertension    Non morbid obesity    Edema    Uncontrolled type 2 diabetes mellitus    RBC microcytosis    Parotid swelling- bilateral parotid swellings    Primary osteoarthritis of left knee    Decreased range of motion of right knee    Weakness of both lower extremities    Hamstring tightness of both lower extremities    Decreased functional mobility    Bilateral carpal tunnel syndrome    Pain in both hands    Finger stiffness, unspecified laterality    Muscle weakness    Acute on chronic congestive heart failure    Elevated troponin    DM2 (diabetes mellitus, type 2)    Anxiety    Iron deficiency anemia    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     Current Outpatient Medications on File Prior to Encounter   Medication Sig Dispense Refill    amitriptyline (ELAVIL) 10 MG tablet TAKE ONE TO THREE TABLETS BY MOUTH AT BEDTIME FOR  HEADACHE 90 tablet 3    ascorbic acid, vitamin C, (VITAMIN C) 500 MG tablet Take 500 mg by mouth once daily.      atorvastatin (LIPITOR) 40 MG tablet Take 1 tablet (40 mg total) by mouth once daily. 90 tablet 3    busPIRone (BUSPAR) 5 MG Tab Take 1 tablet (5 mg total) by mouth 2 (two) times daily. For anxiety 180 tablet 3    empagliflozin (JARDIANCE) 25 mg tablet Take 1 tablet (25 mg total) by mouth once daily. 90 tablet 1    EScitalopram oxalate (LEXAPRO) 10 MG tablet Take 1 tablet (10 mg total) by mouth once daily. 90 tablet 3    ferrous sulfate 324 mg (65 mg iron) TbEC Take 1 tablet (324 mg total) by mouth daily as  needed. 90 tablet 3    furosemide (LASIX) 40 MG tablet Take 1 tablet by mouth once daily 90 tablet 3    gabapentin (NEURONTIN) 300 MG capsule Take 300 mg by mouth 3 (three) times daily.      glimepiride (AMARYL) 2 MG tablet Take 1 tablet (2 mg total) by mouth 2 (two) times daily. Take 2 mg by mouth 2 (two) times daily. 180 tablet 3    metoprolol succinate (TOPROL-XL) 25 MG 24 hr tablet Take 1 tablet (25 mg total) by mouth once daily.      MULTIVIT/IRON/FA/K/HERB NO.244 (ALIVE WOMEN'S ENERGY ORAL) Take 1 tablet by mouth daily as needed.       sacubitriL-valsartan (ENTRESTO) 24-26 mg per tablet Take 1 tablet by mouth 2 (two) times daily. 180 tablet 3    spironolactone (ALDACTONE) 25 MG tablet Take 1 tablet (25 mg total) by mouth once daily. 90 tablet 1    tamsulosin (FLOMAX) 0.4 mg Cap Take 1 capsule (0.4 mg total) by mouth once daily. 90 capsule 3    vitamin D (VITAMIN D3) 1000 units Tab Take 1,000 Units by mouth once daily.      albuterol (VENTOLIN HFA) 90 mcg/actuation inhaler Inhale 2 puffs into the lungs every 6 (six) hours as needed for Wheezing. Rescue 18 g 1    blood sugar diagnostic (BLOOD GLUCOSE TEST) Strp Strips for 3 times a day testing   dispense brand covered by insurance and to match meter brand 300 each 3    celecoxib (CELEBREX) 200 MG capsule Take 200 mg by mouth once daily.      diabetic supplies, miscellan. Misc Lancets for 3 times a day testing    dispense brand covered by insurance t 300 each 3    diclofenac sodium (VOLTAREN) 1 % Gel Apply 2 g topically 3 (three) times daily. 1 Tube 3    hyoscyamine (ANASPAZ,LEVSIN) 0.125 mg Tab Take 1 tablet (125 mcg total) by mouth every 6 (six) hours as needed (P.r.n. bladder spasm). 25 tablet 0    JANUMET  mg per tablet Take 1 tablet by mouth 2 (two) times daily.      neomycin-polymyxin-dexamethasone (DEXACINE) 3.5 mg/g-10,000 unit/g-0.1 % Oint APPLY 1/4 INCH OF OINTMENT ON EYELID EVERY NIGHT AT BEDTIME      oxyCODONE-acetaminophen (PERCOCET)  mg  per tablet      [3]   Social History  Socioeconomic History    Marital status: Single   Tobacco Use    Smoking status: Never     Passive exposure: Never    Smokeless tobacco: Never   Substance and Sexual Activity    Alcohol use: No     Alcohol/week: 0.0 standard drinks of alcohol    Drug use: No    Sexual activity: Not Currently     Social Drivers of Health     Financial Resource Strain: High Risk (11/16/2024)    Overall Financial Resource Strain (CARDIA)     Difficulty of Paying Living Expenses: Very hard   Food Insecurity: Food Insecurity Present (11/16/2024)    Hunger Vital Sign     Worried About Running Out of Food in the Last Year: Often true     Ran Out of Food in the Last Year: Often true   Transportation Needs: No Transportation Needs (11/16/2024)    TRANSPORTATION NEEDS     Transportation : No   Stress: Stress Concern Present (11/16/2024)    [employer] [place] [employer] - Occupational Stress Questionnaire     Feeling of Stress : Very much   Housing Stability: High Risk (11/16/2024)    Housing Stability Vital Sign     Unable to Pay for Housing in the Last Year: Yes     Homeless in the Last Year: No

## 2025-06-30 NOTE — H&P
Short Stay Endoscopy History and Physical    PCP - Itz Muse MD    Procedure - Colonoscopy  ASA - II  Mallampati - per anesthesia  History of Anesthesia problems - no  Family history Anesthesia problems - no     HPI:  This is a 75 y.o. female here for evaluation of : Colon cancer screening    Family history of colon cancer - no  History of polyps - na  Change in bowel habits - no  Rectal bleeding - no      ROS:  Constitutional: No fevers, chills, No weight loss  ENT: No allergies  CV: No chest pain  Pulm: No shortness of breath  GI: see HPI  Derm: No rash    Medical History:  has a past medical history of Arthritis, CHF (congestive heart failure), Diabetes mellitus, does she will take it I want to you both of, Hypertension, and Kidney stone.    Surgical History:  has a past surgical history that includes Colonoscopy (05/23/2013); Hysterectomy; Tonsillectomy; Foot surgery (Right, 2010); Foot surgery (Left, 2010); Knee surgery (Left, 06/10/2016); and Oophorectomy.    Family History: family history includes Breast cancer in her sister; Colon cancer in her sister; Heart attack in her mother; Seizures in her mother.. Otherwise no colon cancer, inflammatory bowel disease, or GI malignancies.    Social History:  reports that she has never smoked. She has never been exposed to tobacco smoke. She has never used smokeless tobacco. She reports that she does not drink alcohol and does not use drugs.    Review of patient's allergies indicates:  No Known Allergies    Medications:   Prescriptions Prior to Admission[1]      Objective Findings:    Vital Signs: see nursing notes  Physical Exam:  General Appearance: Well appearing in no acute distress  Eyes:    No scleral icterus  ENT: Neck supple  Lungs: CTA anteriorly  Heart:  S1, S2 normal, no murmurs heard  Abdomen: Soft, non tender, non distended with positive bowel sounds. No hepatosplenomegaly, ascites, or mass  Extremities: no edema  Skin: No rash      Labs:  Lab  Results   Component Value Date    WBC 10.34 01/07/2025    HGB 12.8 01/07/2025    HCT 42.0 01/07/2025     01/07/2025    CHOL 110 (L) 10/02/2024    CHOL 109 (L) 10/02/2024    TRIG 98 10/02/2024    TRIG 97 10/02/2024    HDL 33 (L) 10/02/2024    HDL 34 (L) 10/02/2024    ALT 18 05/22/2025    AST 31 05/22/2025     05/22/2025    K 4.6 05/22/2025     05/22/2025    CREATININE 1.2 05/22/2025    BUN 29 (H) 05/22/2025    CO2 28 05/22/2025    TSH 0.266 (L) 10/02/2024    TSH 0.266 (L) 10/02/2024    INR 1.1 11/16/2024    HGBA1C 7.6 (H) 05/22/2025       I have explained the risks and benefits of endoscopy procedures to the patient including but not limited to bleeding, perforation, infection, and death.    Julito Nesbitt MD         [1]   Medications Prior to Admission   Medication Sig Dispense Refill Last Dose/Taking    albuterol (VENTOLIN HFA) 90 mcg/actuation inhaler Inhale 2 puffs into the lungs every 6 (six) hours as needed for Wheezing. Rescue 18 g 1     amitriptyline (ELAVIL) 10 MG tablet TAKE ONE TO THREE TABLETS BY MOUTH AT BEDTIME FOR  HEADACHE 90 tablet 3     ascorbic acid, vitamin C, (VITAMIN C) 500 MG tablet Take 500 mg by mouth once daily.       atorvastatin (LIPITOR) 40 MG tablet Take 1 tablet (40 mg total) by mouth once daily. 90 tablet 3     blood sugar diagnostic (BLOOD GLUCOSE TEST) Strp Strips for 3 times a day testing   dispense brand covered by insurance and to match meter brand 300 each 3     busPIRone (BUSPAR) 5 MG Tab Take 1 tablet (5 mg total) by mouth 2 (two) times daily. For anxiety 180 tablet 3     celecoxib (CELEBREX) 200 MG capsule Take 200 mg by mouth once daily.       diabetic supplies, miscellan. Misc Lancets for 3 times a day testing    dispense brand covered by insurance t 300 each 3     diclofenac sodium (VOLTAREN) 1 % Gel Apply 2 g topically 3 (three) times daily. 1 Tube 3     empagliflozin (JARDIANCE) 25 mg tablet Take 1 tablet (25 mg total) by mouth once daily. 90  tablet 1     EScitalopram oxalate (LEXAPRO) 10 MG tablet Take 1 tablet (10 mg total) by mouth once daily. 90 tablet 3     ferrous sulfate 324 mg (65 mg iron) TbEC Take 1 tablet (324 mg total) by mouth daily as needed. 90 tablet 3     furosemide (LASIX) 40 MG tablet Take 1 tablet by mouth once daily 90 tablet 3     gabapentin (NEURONTIN) 300 MG capsule Take 300 mg by mouth 3 (three) times daily.       glimepiride (AMARYL) 2 MG tablet Take 1 tablet (2 mg total) by mouth 2 (two) times daily. Take 2 mg by mouth 2 (two) times daily. 180 tablet 3     hyoscyamine (ANASPAZ,LEVSIN) 0.125 mg Tab Take 1 tablet (125 mcg total) by mouth every 6 (six) hours as needed (P.r.n. bladder spasm). 25 tablet 0     JANUMET  mg per tablet Take 1 tablet by mouth 2 (two) times daily.       metoprolol succinate (TOPROL-XL) 25 MG 24 hr tablet Take 1 tablet (25 mg total) by mouth once daily.       MULTIVIT/IRON/FA/K/HERB NO.244 (ALIVE WOMEN'S ENERGY ORAL) Take 1 tablet by mouth daily as needed.        neomycin-polymyxin-dexamethasone (DEXACINE) 3.5 mg/g-10,000 unit/g-0.1 % Oint APPLY 1/4 INCH OF OINTMENT ON EYELID EVERY NIGHT AT BEDTIME       oxyCODONE-acetaminophen (PERCOCET)  mg per tablet        sacubitriL-valsartan (ENTRESTO) 24-26 mg per tablet Take 1 tablet by mouth 2 (two) times daily. 180 tablet 3     spironolactone (ALDACTONE) 25 MG tablet Take 1 tablet (25 mg total) by mouth once daily. 90 tablet 1     tamsulosin (FLOMAX) 0.4 mg Cap Take 1 capsule (0.4 mg total) by mouth once daily. 90 capsule 3     vitamin D (VITAMIN D3) 1000 units Tab Take 1,000 Units by mouth once daily.

## 2025-06-30 NOTE — TRANSFER OF CARE
"Anesthesia Transfer of Care Note    Patient: Katherine Rising    Procedure(s) Performed: Procedure(s) (LRB):  COLONOSCOPY, SCREENING, LOW RISK PATIENT (N/A)    Patient location: GI    Anesthesia Type: general    Transport from OR: Transported from OR on room air with adequate spontaneous ventilation    Post pain: adequate analgesia    Post assessment: no apparent anesthetic complications and tolerated procedure well    Post vital signs: stable    Level of consciousness: awake    Nausea/Vomiting: no nausea/vomiting    Complications: none    Transfer of care protocol was followed      Last vitals: Visit Vitals  BP (!) 167/84   Pulse 63   Temp 36.6 °C (97.9 °F)   Resp 20   Ht 5' 10" (1.778 m)   Wt 84.4 kg (186 lb)   LMP  (LMP Unknown)   SpO2 97%   Breastfeeding No   BMI 26.69 kg/m²     "

## 2025-06-30 NOTE — PROVATION PATIENT INSTRUCTIONS
Discharge Summary/Instructions after an Endoscopic Procedure  Patient Name: Katherine Berger  Patient MRN: [id number]  Patient YOB: 1950 Monday, June 30, 2025  Julito Nesbitt MD  Dear patient,  As a result of recent federal legislation (The Federal Cures Act), you may   receive lab or pathology results from your procedure in your [facility]   account before your physician is able to contact you. Your physician or   their representative will relay the results to you with their   recommendations at their soonest availability.  Thank you,  Your health is very important to us during the Covid Crisis. Following your   procedure today, you will receive a daily text for 2 weeks asking about   signs or symptoms of Covid 19.  Please respond to this text when you   receive it so we can follow up and keep you as safe as possible.   RESTRICTIONS:  During your procedure today, you received medications for sedation.  These   medications may affect your judgment, balance and coordination.  Therefore,   for 24 hours, you have the following restrictions:   - DO NOT drive a car, operate machinery, make legal/financial decisions,   sign important papers or drink alcohol.    ACTIVITY:  Today: no heavy lifting, straining or running due to procedural   sedation/anesthesia.  The following day: return to full activity including work.  DIET:  Eat and drink normally unless instructed otherwise.     TREATMENT FOR COMMON SIDE EFFECTS:  - Mild abdominal pain, nausea, belching, bloating or excessive gas:  rest,   eat lightly and use a heating pad.  - Sore Throat: treat with throat lozenges and/or gargle with warm salt   water.  - Because air was used during the procedure, expelling large amounts of air   from your rectum or belching is normal.  - If a bowel prep was taken, you may not have a bowel movement for 1-3 days.    This is normal.  SYMPTOMS TO WATCH FOR AND REPORT TO YOUR PHYSICIAN:  1. Abdominal pain or bloating, other  than gas cramps.  2. Chest pain.  3. Back pain.  4. Signs of infection such as: chills or fever occurring within 24 hours   after the procedure.  5. Rectal bleeding, which would show as bright red, maroon, or black stools.   (A tablespoon of blood from the rectum is not serious, especially if   hemorrhoids are present.)  6. Vomiting.  7. Weakness or dizziness.  GO DIRECTLY TO THE NEAREST EMERGENCY ROOM IF YOU HAVE ANY OF THE FOLLOWING:      Difficulty breathing              Chills and/or fever over 101 F   Persistent vomiting and/or vomiting blood   Severe abdominal pain   Severe chest pain   Black, tarry stools   Bleeding- more than one tablespoon   Any other symptom or condition that you feel may need urgent attention  Your doctor recommends these additional instructions:  If any biopsies were taken, your doctors clinic will contact you in 1 to 2   weeks with any results.  - Discharge patient to home (ambulatory).   - Patient has a contact number available for emergencies.  The signs and   symptoms of potential delayed complications were discussed with the   patient.  Return to normal activities tomorrow.  Written discharge   instructions were provided to the patient.   - Resume previous diet.   - Continue present medications.   - Return to primary care physician as previously scheduled.   - Repeat colonoscopy in 5 years for surveillance based on clinical status at   that time.  For questions, problems or results please call your physician - Julito Nesbitt MD.  EMERGENCY PHONE NUMBER: [phone],  LAB RESULTS: [phone]  IF A COMPLICATION OR EMERGENCY SITUATION ARISES AND YOU ARE UNABLE TO REACH   YOUR PHYSICIAN - GO DIRECTLY TO THE EMERGENCY ROOM.  Julito Nesbitt MD  6/30/2025 12:26:31 PM  This report has been verified and signed electronically.  Dear patient,  As a result of recent federal legislation (The Federal Cures Act), you may   receive lab or pathology results from your  procedure in your [facility]   account before your physician is able to contact you. Your physician or   their representative will relay the results to you with their   recommendations at their soonest availability.  Thank you,  [employer]

## 2025-07-01 ENCOUNTER — RESULTS FOLLOW-UP (OUTPATIENT)
Dept: FAMILY MEDICINE | Facility: CLINIC | Age: 75
End: 2025-07-01
Payer: MEDICARE

## 2025-07-01 LAB
ESTROGEN SERPL-MCNC: NORMAL PG/ML
INSULIN SERPL-ACNC: NORMAL U[IU]/ML
LAB AP CLINICAL INFORMATION: NORMAL
LAB AP GROSS DESCRIPTION: NORMAL
LAB AP PERFORMING LOCATION(S): NORMAL
LAB AP REPORT FOOTNOTES: NORMAL

## 2025-07-28 ENCOUNTER — LAB VISIT (OUTPATIENT)
Dept: LAB | Facility: HOSPITAL | Age: 75
End: 2025-07-28
Attending: INTERNAL MEDICINE
Payer: MEDICARE

## 2025-07-28 DIAGNOSIS — E11.9 TYPE 2 DIABETES MELLITUS WITHOUT COMPLICATION, WITHOUT LONG-TERM CURRENT USE OF INSULIN: ICD-10-CM

## 2025-07-28 LAB
EAG (OHS): 169 MG/DL (ref 68–131)
HBA1C MFR BLD: 7.5 % (ref 4–5.6)

## 2025-07-28 PROCEDURE — 36415 COLL VENOUS BLD VENIPUNCTURE: CPT | Mod: PN

## 2025-07-28 PROCEDURE — 83036 HEMOGLOBIN GLYCOSYLATED A1C: CPT | Mod: PN

## 2025-07-29 ENCOUNTER — RESULTS FOLLOW-UP (OUTPATIENT)
Dept: FAMILY MEDICINE | Facility: CLINIC | Age: 75
End: 2025-07-29
Payer: MEDICARE

## 2025-07-31 ENCOUNTER — TELEPHONE (OUTPATIENT)
Dept: FAMILY MEDICINE | Facility: CLINIC | Age: 75
End: 2025-07-31
Payer: MEDICARE

## 2025-07-31 NOTE — TELEPHONE ENCOUNTER
I spoke with pt. She stated that she needed us to contact her insurance provider to inform them that she has chronic conditions. I called number listed below in previous message and had to leave a message for rep to call back.

## 2025-07-31 NOTE — TELEPHONE ENCOUNTER
Copied from CRM #[id number]. Topic: General Inquiry - Patient Advice  >> Jul 31, 2025  8:32 AM Rachel wrote:  Type:  Needs Medical Advice    Who Called: Patient    Best Call Back Number: [phone]    Additional Information: Patient is requesting a call back in regards to Medicare needing to speak to a nurse about her financial assistance for medicare advantage prescriptions  [phone]